# Patient Record
Sex: FEMALE | Race: WHITE | NOT HISPANIC OR LATINO | Employment: OTHER | URBAN - METROPOLITAN AREA
[De-identification: names, ages, dates, MRNs, and addresses within clinical notes are randomized per-mention and may not be internally consistent; named-entity substitution may affect disease eponyms.]

---

## 2017-05-03 ENCOUNTER — CONSULT (OUTPATIENT)
Dept: CARDIOLOGY | Facility: CLINIC | Age: 62
End: 2017-05-03

## 2017-05-03 VITALS
SYSTOLIC BLOOD PRESSURE: 134 MMHG | HEIGHT: 62 IN | HEART RATE: 59 BPM | BODY MASS INDEX: 25.17 KG/M2 | WEIGHT: 136.8 LBS | DIASTOLIC BLOOD PRESSURE: 62 MMHG

## 2017-05-03 DIAGNOSIS — I73.9 PAD (PERIPHERAL ARTERY DISEASE) (HCC): ICD-10-CM

## 2017-05-03 DIAGNOSIS — E78.2 MIXED HYPERLIPIDEMIA: ICD-10-CM

## 2017-05-03 DIAGNOSIS — I10 ESSENTIAL HYPERTENSION: Primary | ICD-10-CM

## 2017-05-03 DIAGNOSIS — I70.221 ATHEROSCLEROSIS OF NATIVE ARTERY OF RIGHT LOWER EXTREMITY WITH REST PAIN (HCC): ICD-10-CM

## 2017-05-03 PROCEDURE — 99204 OFFICE O/P NEW MOD 45 MIN: CPT | Performed by: INTERNAL MEDICINE

## 2017-05-03 PROCEDURE — 93000 ELECTROCARDIOGRAM COMPLETE: CPT | Performed by: INTERNAL MEDICINE

## 2017-05-15 ENCOUNTER — HOSPITAL ENCOUNTER (OUTPATIENT)
Dept: CARDIOLOGY | Facility: HOSPITAL | Age: 62
Discharge: HOME | End: 2017-05-15
Attending: INTERNAL MEDICINE | Admitting: INTERNAL MEDICINE

## 2017-05-15 ENCOUNTER — HOSPITAL ENCOUNTER (OUTPATIENT)
Dept: CARDIOLOGY | Facility: HOSPITAL | Age: 62
Discharge: HOME | End: 2017-05-15
Attending: INTERNAL MEDICINE

## 2017-05-15 ENCOUNTER — LAB (OUTPATIENT)
Dept: LAB | Facility: HOSPITAL | Age: 62
End: 2017-05-15

## 2017-05-15 VITALS
DIASTOLIC BLOOD PRESSURE: 80 MMHG | HEART RATE: 67 BPM | BODY MASS INDEX: 24.84 KG/M2 | WEIGHT: 135 LBS | HEIGHT: 62 IN | SYSTOLIC BLOOD PRESSURE: 130 MMHG

## 2017-05-15 DIAGNOSIS — I73.9 PAD (PERIPHERAL ARTERY DISEASE) (HCC): ICD-10-CM

## 2017-05-15 DIAGNOSIS — I10 ESSENTIAL HYPERTENSION: ICD-10-CM

## 2017-05-15 LAB
ARTICHOKE IGE QN: 147 MG/DL (ref 0–130)
BH CV STRESS BP STAGE 2: NORMAL
BH CV STRESS BP STAGE 3: NORMAL
BH CV STRESS COMMENTS STAGE 1: NORMAL
BH CV STRESS DOSE REGADENOSON STAGE 1: 0.4
BH CV STRESS DURATION MIN STAGE 1: 1
BH CV STRESS DURATION MIN STAGE 2: 1
BH CV STRESS DURATION MIN STAGE 3: 1
BH CV STRESS DURATION MIN STAGE 4: 1
BH CV STRESS DURATION SEC STAGE 2: 0
BH CV STRESS HR STAGE 1: 68
BH CV STRESS HR STAGE 2: 99
BH CV STRESS HR STAGE 3: 96
BH CV STRESS HR STAGE 4: 93
BH CV STRESS PROTOCOL 1: NORMAL
BH CV STRESS RECOVERY BP: NORMAL MMHG
BH CV STRESS RECOVERY HR: 88 BPM
BH CV STRESS STAGE 1: 1
BH CV STRESS STAGE 2: 2
BH CV STRESS STAGE 3: 3
BH CV STRESS STAGE 4: 4
CHOLEST SERPL-MCNC: 225 MG/DL (ref 0–200)
HDLC SERPL-MCNC: 70 MG/DL (ref 40–60)
LV EF NUC BP: 65 %
MAXIMAL PREDICTED HEART RATE: 156 BPM
PERCENT MAX PREDICTED HR: 63.46 %
STRESS BASELINE BP: NORMAL MMHG
STRESS BASELINE HR: 65 BPM
STRESS PERCENT HR: 75 %
STRESS POST PEAK BP: NORMAL MMHG
STRESS POST PEAK HR: 99 BPM
STRESS TARGET HR: 133 BPM
TRIGL SERPL-MCNC: 78 MG/DL (ref 0–150)

## 2017-05-15 PROCEDURE — 36415 COLL VENOUS BLD VENIPUNCTURE: CPT

## 2017-05-15 PROCEDURE — 80061 LIPID PANEL: CPT | Performed by: INTERNAL MEDICINE

## 2017-05-15 PROCEDURE — 93018 CV STRESS TEST I&R ONLY: CPT | Performed by: INTERNAL MEDICINE

## 2017-05-15 PROCEDURE — A9500 TC99M SESTAMIBI: HCPCS | Performed by: INTERNAL MEDICINE

## 2017-05-15 PROCEDURE — 78452 HT MUSCLE IMAGE SPECT MULT: CPT

## 2017-05-15 PROCEDURE — 0 TECHNETIUM SESTAMIBI: Performed by: INTERNAL MEDICINE

## 2017-05-15 PROCEDURE — 78452 HT MUSCLE IMAGE SPECT MULT: CPT | Performed by: INTERNAL MEDICINE

## 2017-05-15 PROCEDURE — 25010000002 REGADENOSON 0.4 MG/5ML SOLUTION: Performed by: INTERNAL MEDICINE

## 2017-05-15 PROCEDURE — 93017 CV STRESS TEST TRACING ONLY: CPT

## 2017-05-15 RX ADMIN — Medication 1 DOSE: at 10:05

## 2017-05-15 RX ADMIN — Medication 1 DOSE: at 08:15

## 2017-05-15 RX ADMIN — REGADENOSON 0.4 MG: 0.08 INJECTION, SOLUTION INTRAVENOUS at 10:05

## 2017-05-23 ENCOUNTER — TELEPHONE (OUTPATIENT)
Dept: CARDIOLOGY | Facility: CLINIC | Age: 62
End: 2017-05-23

## 2017-06-29 ENCOUNTER — TRANSCRIBE ORDERS (OUTPATIENT)
Dept: ADMINISTRATIVE | Facility: HOSPITAL | Age: 62
End: 2017-06-29

## 2017-06-29 DIAGNOSIS — N95.9 MENOPAUSAL AND PERIMENOPAUSAL DISORDER: Primary | ICD-10-CM

## 2017-06-29 DIAGNOSIS — Z12.31 VISIT FOR SCREENING MAMMOGRAM: Primary | ICD-10-CM

## 2017-08-29 ENCOUNTER — OFFICE VISIT (OUTPATIENT)
Dept: CARDIOLOGY | Facility: CLINIC | Age: 62
End: 2017-08-29

## 2017-08-29 VITALS
DIASTOLIC BLOOD PRESSURE: 68 MMHG | HEIGHT: 62 IN | SYSTOLIC BLOOD PRESSURE: 140 MMHG | WEIGHT: 137.8 LBS | BODY MASS INDEX: 25.36 KG/M2 | HEART RATE: 65 BPM

## 2017-08-29 DIAGNOSIS — E78.5 DYSLIPIDEMIA: ICD-10-CM

## 2017-08-29 DIAGNOSIS — I10 ESSENTIAL HYPERTENSION: ICD-10-CM

## 2017-08-29 DIAGNOSIS — I73.9 PERIPHERAL ARTERIAL DISEASE (HCC): Primary | ICD-10-CM

## 2017-08-29 PROCEDURE — 99213 OFFICE O/P EST LOW 20 MIN: CPT | Performed by: INTERNAL MEDICINE

## 2017-08-29 RX ORDER — ROSUVASTATIN CALCIUM 20 MG/1
20 TABLET, COATED ORAL DAILY
Qty: 30 TABLET | Refills: 11 | Status: SHIPPED | OUTPATIENT
Start: 2017-08-29 | End: 2018-09-02 | Stop reason: SDUPTHER

## 2017-08-29 RX ORDER — AMLODIPINE BESYLATE 5 MG/1
5 TABLET ORAL DAILY
Refills: 1 | COMMUNITY
Start: 2017-08-16 | End: 2019-01-04 | Stop reason: SDUPTHER

## 2017-08-29 RX ORDER — LOSARTAN POTASSIUM 100 MG/1
100 TABLET ORAL DAILY
Refills: 0 | COMMUNITY
Start: 2017-08-16 | End: 2019-01-04 | Stop reason: SDUPTHER

## 2017-08-29 NOTE — PROGRESS NOTES
Subjective:     Encounter Date:08/29/2017      Patient ID: Aarti Wade is a 62 y.o. single white female, from [place], [place].  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     INTERNIST: Jannette Chapa MD  VASCULAR SURGEON: Severino Carbajal MD  UROLOGIST: Unknown (near [facility]?)  ORTHOPEDIST: Lance Burgess MD    Chief Complaint:   Chief Complaint   Patient presents with   • Follow-up     peripheral arterial disease     Problem List:  1. Peripheral arterial disease:    A. Subclavian steal syndrome, 2016     B. Abnormal right femoral arterial duplex and popliteal tibial arterial duplex, on ASA and   Plavix, with right femoral endarterectomy August 2016 (Dr. Carbajal)    C. Residual class I claudication without recent TIA/presyncope   2. Hypertensive cardiovascular disease:    A. Remote IV stress test over 10 years ago; negative per patient-data deficit    B. Residual class I symptoms/normal EKG with acceptable IV Lexiscan Cardiolite study   suggestive of low probability for significant focal obstructive coronary artery disease   (LVEF  0.65), May 2017.  3. Hypertension  4. Hyperlipidemia; 10.8% 10-year risk; 3.6% with treatment  5. COPD with mild-moderate exertional dyspnea  6. Former smoker; smoked 1 ppd x 40 years, quit in 2016  7. Syncopal episodes years ago associated with UTIs; last one a couple of years ago; data deficit  8. Depression  9. GERD  10. History of recurrent UTIs   11. Chronic low back pain  12. Surgical history:    A. Right femoral endarterectomy, August 2016    B. Lens implant 1990s    C. Spontaneous pneumothorax, 1960     No Known Allergies      Current Outpatient Prescriptions:   •  amLODIPine (NORVASC) 5 MG tablet, Take 5 mg by mouth Daily., Disp: , Rfl: 1  •  ascorbic acid (VITAMIN C) 1000 MG tablet, Take 1,000 mg by mouth 2 (two) times a day., Disp: , Rfl:   •  aspirin 81  "MG EC tablet, Take 81 mg by mouth every morning. TOLD NOT TO STOP BEFORE SURGERY, Disp: , Rfl:   •  Calcium Citrate-Vitamin D (CALCIUM + D PO), Take 2 tablets by mouth 2 (two) times a day., Disp: , Rfl:   •  Celecoxib (CELEBREX PO), Take 200 mg by mouth 2 (two) times a day., Disp: , Rfl:   •  citalopram (CeleXA) 20 MG tablet, Take 20 mg by mouth every night., Disp: , Rfl:   •  clopidogrel (PLAVIX) 75 MG tablet, Take 75 mg by mouth every night. TOLD NOT TO STOP BEFORE SURGERY, Disp: , Rfl:   •  Fish Oil oil, Take 4 capsules by mouth daily., Disp: , Rfl:   •  Flaxseed, Linseed, (FLAXSEED OIL) 1000 MG capsule, Take 1 capsule by mouth daily., Disp: , Rfl:   •  fluticasone (FLONASE) 50 MCG/ACT nasal spray, 2 sprays into each nostril daily. Administer 2 sprays in each nostril for each dose., Disp: , Rfl:   •  losartan (COZAAR) 100 MG tablet, Take 100 mg by mouth Daily., Disp: , Rfl: 0  •  Methylsulfonylmethane (MSM PO), Take 2 tablets by mouth daily., Disp: , Rfl:   •  Multiple Vitamins-Minerals (MULTIVITAMIN ADULT PO), Take 1 tablet by mouth daily., Disp: , Rfl:   •  omeprazole (PriLOSEC) 20 MG capsule, Take 20 mg by mouth daily., Disp: , Rfl:   •  Potassium 99 MG tablet, Take 1 capsule by mouth daily., Disp: , Rfl:   •  URINARY HEALTH/CRANBERRY PO, Take 1 tablet by mouth 2 (two) times a day. \"CRANACTIN\", Disp: , Rfl:   •  Vitamin D, Cholecalciferol, 1000 UNITS capsule, Take 1 capsule by mouth daily., Disp: , Rfl:     History of Present Illness Patient returns for followup after a nearly 4-month hiatus. She has a horse in a turf sale at [facility] this week and is optimistic.  She is advised that her recent stress test was acceptable but that her cholesterol levels are up.  She states that Dr. Chapa tried her on pravastatin, and within a few weeks, she had shoulder pain so bad that she had to stop it; she has tried it again, but it caused the same problem. She is open to trying other statin medications.  She is back to " "smoking and is aware that she needs to quit.  She is mostly unhappy with her weight and says that this is the heaviest she has ever been in her life.  She is active on a daily basis and is without symptoms from a cardiopulmonary perspective with her activities.  Her blood pressure is elevated today, and she notes that she has a blockage in her left arm, and so she does not get accurate blood pressure readings in that arm.  Patient otherwise denies chest pain, shortness of breath, PND, edema, palpitations, syncope, or presyncope at this time.        Review of Systems   Constitution: Positive for weight gain.   Hematologic/Lymphatic: Bruises/bleeds easily.   Skin: Positive for dry skin.   Musculoskeletal: Positive for arthritis, joint pain, joint swelling and myalgias.      Obtained and otherwise negative except as outlined in problem list and HPI.    Procedures       Objective:       Vitals:    08/29/17 1411 08/29/17 1412 08/29/17 1424 08/29/17 1425   BP: 168/75 156/69 (!) 70/50 140/68   BP Location: Right arm Right arm Left arm Right arm   Patient Position: Sitting Standing Sitting Sitting   Pulse: 68 65     Weight: 137 lb 12.8 oz (62.5 kg)      Height: 62\" (157.5 cm)        Body mass index is 25.2 kg/(m^2).   Last weight:  136 lbs.    Physical Exam   Constitutional: She is oriented to person, place, and time. She appears well-developed and well-nourished.   Neck: No JVD present. Carotid bruit is not present. No thyromegaly present.   Cardiovascular: Regular rhythm, S1 normal, S2 normal and normal heart sounds.  Exam reveals no gallop, no S3 and no friction rub.    No murmur heard.  Pulses:       Carotid pulses are 1+ on the right side, and 1+ on the left side.       Radial pulses are 1+ on the right side, and 1+ on the left side.        Femoral pulses are 1+ on the right side, and 1+ on the left side.       Popliteal pulses are 1+ on the right side, and 1+ on the left side.        Dorsalis pedis pulses are 1+ on " the right side, and 1+ on the left side.        Posterior tibial pulses are 1+ on the right side, and 1+ on the left side.   Pulmonary/Chest: Effort normal. She has decreased breath sounds. She has no wheezes. She has no rhonchi. She has no rales.   Abdominal: Soft. She exhibits no mass. There is no hepatosplenomegaly. There is no tenderness. There is no guarding.   Lymphadenopathy:     She has no cervical adenopathy.   Neurological: She is alert and oriented to person, place, and time.   Skin: Skin is warm, dry and intact. No rash noted.   Vitals reviewed.      Lab Review:   Lab Results   Component Value Date    GLUCOSE 99 08/23/2016    BUN 18 08/23/2016    CREATININE 0.60 08/23/2016    EGFRIFNONA 101 08/23/2016    BCR 30.0 (H) 08/23/2016    CO2 30.0 08/23/2016    CALCIUM 8.9 08/23/2016    ALBUMIN 4.2 06/05/2015    AST 25 06/05/2015    ALT 24 06/05/2015       Lab Results   Component Value Date    WBC 8.04 08/23/2016    HGB 11.7 08/23/2016    HCT 34.1 (L) 08/23/2016    MCV 90.5 08/23/2016     08/23/2016       Lab Results   Component Value Date    HGBA1C 4.40 08/19/2016       Lab Results   Component Value Date    CHOL 225 (H) 05/15/2017     Lab Results   Component Value Date    TRIG 78 05/15/2017     Lab Results   Component Value Date    HDL 70 (H) 05/15/2017   LDL - 147 (05/15/2017)        Assessment:   Overall continued acceptable course with no interim cardiopulmonary complaints with acceptable functional status. We will defer additional diagnostic or therapeutic intervention from a cardiac perspective at this time other than to initiate statin drug therapy.       Diagnosis Plan   1. Peripheral arterial disease  Scheduled for reassessment with Dr. Carbajal next month   2. Dyslipidemia  Initiate rosuvastatin 20 mg daily   3. Essential hypertension  Continue current medications.          Plan:         1. Patient to continue current medications and close follow up with the above providers other than to  initiate rosuvastatin 20 mg daily.  2. Tentative cardiology follow up in February 2018, or patient may return sooner PRN.       Transcribed by Maria Antonia Hamm for Dr. Floyd Ernandez at 2:19 PM on 08/29/2017    IFloyd MD, [facility], personally performed the services described in this documentation as scribed by the above named individual in my presence, and it is both accurate and complete. At 3:55 PM on 08/29/2017

## 2017-09-27 ENCOUNTER — OFFICE VISIT (OUTPATIENT)
Dept: ORTHOPEDIC SURGERY | Facility: CLINIC | Age: 62
End: 2017-09-27

## 2017-09-27 VITALS
HEIGHT: 62 IN | DIASTOLIC BLOOD PRESSURE: 61 MMHG | SYSTOLIC BLOOD PRESSURE: 170 MMHG | HEART RATE: 64 BPM | WEIGHT: 132 LBS | BODY MASS INDEX: 24.29 KG/M2

## 2017-09-27 DIAGNOSIS — M19.011 GLENOHUMERAL ARTHRITIS, RIGHT: ICD-10-CM

## 2017-09-27 DIAGNOSIS — R52 PAIN: Primary | ICD-10-CM

## 2017-09-27 PROCEDURE — 99203 OFFICE O/P NEW LOW 30 MIN: CPT | Performed by: ORTHOPAEDIC SURGERY

## 2017-09-27 RX ORDER — CELECOXIB 200 MG/1
CAPSULE ORAL
Refills: 0 | COMMUNITY
Start: 2017-09-03 | End: 2018-03-02 | Stop reason: SDUPTHER

## 2017-09-27 NOTE — PROGRESS NOTES
[facility] Note    Subjective     Chief Complaint   Patient presents with   • Right Shoulder - Pain     Previous patient of Dr. Burgess- last seen 08/2016        HPI    Aarti Wade is a 62 y.o. female. Patient presents today for evaluation of her right shoulder.  She has a known history of advanced arthritis in the shoulder, following an injury and December 2000.  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  She's previously been followed by Dr. Burgess, and is on anti-inflammatories on a regular basis.  She's had previous injections without long-term relief.  She is currently on Celebrex 200 mg.  The pain is stabbing, so she was stiffness, and moderate in severity.  It has been unchanged over the past several years.      Patient Active Problem List   Diagnosis   • Atherosclerosis of native artery of right lower extremity with rest pain   • Peripheral arterial disease   • Essential hypertension   • Mixed hyperlipidemia   • Dyslipidemia     Past Medical History:   Diagnosis Date   • Anxiety    • Arthritis    • Arthritis of right shoulder region    • Chronic UTI    • Circulatory disorder    • Claudication    • COPD (chronic obstructive pulmonary disease)    • DA (degenerative arthritis)    • Depression    • Gastrointestinal disorder    • GERD (gastroesophageal reflux disease)    • Head injury    • Hepatitis     UNKNOWN TYPE   • Hyperlipidemia    • Hypertension    • Inflammatory arthritis    • Osteoporosis    • PAD (peripheral artery disease)    • Smoker    • Subclavian artery stenosis, left    • Wears dentures     TOP ONLY      Past Surgical History:   Procedure Laterality Date   • ANGIOGRAM - CONVERTED  08/16/2016    AA WITH RUNOFF PER DR. HARRISON   • COLONOSCOPY     • EYE LENS IMPLANT SECONDARY Left    • FEMORAL FEMORAL BYPASS Right 8/22/2016    Procedure: RIGHT COMMON FEMORAL ENDARTERECTOMY WITH PATCH;  Surgeon: Severino Harrison MD;  Location:   [facility];  Service:    • FINGER SURGERY Left     LEFT INDEX FINGER   • INTERVENTIONAL RADIOLOGY PROCEDURE N/A 8/16/2016    Procedure: IR PTA femoral popliteal artery;  Surgeon: Severino Carbajal MD;  Location: [facility] CATH INVASIVE LOCATION;  Service:    • TONSILLECTOMY        Family History   Problem Relation Age of Onset   • Osteoarthritis Mother    • Hypertension Father    • Heart attack Father    • No Known Problems Sister    • No Known Problems Brother    • No Known Problems Daughter    • No Known Problems Son    • Breast cancer Maternal Grandmother 50   • Breast cancer Paternal Grandmother 50   • No Known Problems Maternal Aunt    • No Known Problems Paternal Aunt    • Other Other    • Heart attack Other      Social History     Social History   • Marital status: Single     Spouse name: N/A   • Number of children: N/A   • Years of education: N/A     Occupational History   • Not on file.     Social History Main Topics   • Smoking status: Former Smoker     Packs/day: 1.00     Years: 40.00     Types: Electronic Cigarette, Cigarettes     Quit date: 6/16/2016   • Smokeless tobacco: Never Used      Comment: 1 PACK/DAY SMOKER UNTIL JUNE 2016; THEN STARTED ON E-CIGS STILL USING E-CIGARETTE   • Alcohol use 1.2 oz/week     2 Shots of liquor per week      Comment: 2 DRINKS PER DAY   • Drug use: No   • Sexual activity: Defer     Other Topics Concern   • Not on file     Social History Narrative      Current Outpatient Prescriptions on File Prior to Visit   Medication Sig Dispense Refill   • amLODIPine (NORVASC) 5 MG tablet Take 5 mg by mouth Daily.  1   • ascorbic acid (VITAMIN C) 1000 MG tablet Take 1,000 mg by mouth 2 (two) times a day.     • aspirin 81 MG EC tablet Take 81 mg by mouth every morning. TOLD NOT TO STOP BEFORE SURGERY     • Calcium Citrate-Vitamin D (CALCIUM + D PO) Take 2 tablets by mouth 2 (two) times a day.     • Cetirizine HCl (ZYRTEC ALLERGY PO) Take  by mouth Take As Directed.     • citalopram (CeleXA)  "20 MG tablet Take 20 mg by mouth every night.     • clopidogrel (PLAVIX) 75 MG tablet Take 75 mg by mouth every night. TOLD NOT TO STOP BEFORE SURGERY     • Fish Oil oil Take 4 capsules by mouth daily.     • Flaxseed, Linseed, (FLAXSEED OIL) 1000 MG capsule Take 1 capsule by mouth daily.     • FLUoxetine HCl (PROZAC PO) Take  by mouth Take As Directed.     • fluticasone (FLONASE) 50 MCG/ACT nasal spray 2 sprays into each nostril daily. Administer 2 sprays in each nostril for each dose.     • losartan (COZAAR) 100 MG tablet Take 100 mg by mouth Daily.  0   • Methylsulfonylmethane (MSM PO) Take 2 tablets by mouth daily.     • Multiple Vitamins-Minerals (MULTIVITAMIN ADULT PO) Take 1 tablet by mouth daily.     • omeprazole (PriLOSEC) 20 MG capsule Take 20 mg by mouth daily.     • Potassium 99 MG tablet Take 1 capsule by mouth daily.     • rosuvastatin (CRESTOR) 20 MG tablet Take 1 tablet by mouth Daily. 30 tablet 11   • URINARY HEALTH/CRANBERRY PO Take 1 tablet by mouth 2 (two) times a day. \"CRANACTIN\"     • Vitamin D, Cholecalciferol, 1000 UNITS capsule Take 1 capsule by mouth daily.     • [DISCONTINUED] Celecoxib (CELEBREX PO) Take 200 mg by mouth 2 (two) times a day.     • [DISCONTINUED] LISINOPRIL PO Take  by mouth Take As Directed.       No current facility-administered medications on file prior to visit.       No Known Allergies     Review of Systems   Constitutional: Negative for activity change, appetite change, chills, diaphoresis, fatigue, fever and unexpected weight change.   HENT: Negative for congestion, dental problem, drooling, ear discharge, ear pain, facial swelling, hearing loss, mouth sores, nosebleeds, postnasal drip, rhinorrhea, sinus pressure, sneezing, sore throat, tinnitus, trouble swallowing and voice change.    Eyes: Negative for photophobia, pain, discharge, redness, itching and visual disturbance.   Respiratory: Negative for apnea, cough, choking, chest tightness, shortness of breath, " "wheezing and stridor.    Cardiovascular: Negative for chest pain, palpitations and leg swelling.   Gastrointestinal: Negative for abdominal distention, abdominal pain, anal bleeding, blood in stool, constipation, diarrhea, nausea, rectal pain and vomiting.   Endocrine: Negative for cold intolerance, heat intolerance, polydipsia, polyphagia and polyuria.   Genitourinary: Negative for decreased urine volume, difficulty urinating, dysuria, enuresis, flank pain, frequency, genital sores, hematuria and urgency.   Musculoskeletal: Positive for arthralgias. Negative for back pain, gait problem, joint swelling, myalgias, neck pain and neck stiffness.   Skin: Negative for color change, pallor, rash and wound.   Allergic/Immunologic: Negative for environmental allergies, food allergies and immunocompromised state.   Neurological: Negative for dizziness, tremors, seizures, syncope, facial asymmetry, speech difficulty, weakness, light-headedness, numbness and headaches.   Hematological: Negative for adenopathy. Does not bruise/bleed easily.   Psychiatric/Behavioral: Negative for agitation, behavioral problems, confusion, decreased concentration, dysphoric mood, hallucinations, self-injury, sleep disturbance and suicidal ideas. The patient is not nervous/anxious and is not hyperactive.         Objective      Physical Exam  /61  Pulse 64  Ht 61.5\" (156.2 cm)  Wt 132 lb (59.9 kg)  BMI 24.54 kg/m2    Body mass index is 24.54 kg/(m^2).    General:   Mental Status:  Alert   Appearance: Cooperative, in no acute distress   Build and Nutrition: Well-nourished and well developed female   Orientation: Alert and oriented to person, place and time   Posture: Normal   Gait: Normal    Integument:   Right shoulder: No skin lesions, no rash, no ecchymosis    Neurologic:   Sensation:    Right hand: Intact to light touch in the digits   Motor:  Right upper extremity: 5/5 deltoid, biceps, triceps, wrist flexors, wrist extensors, and " interossei    Vascular:   Right upper extremity: 2+ dorsalis radial pulse, prompt capillary refill    Upper Extremities:   Right Shoulder:    Tenderness:  None    Swelling:  None    Crepitus: Positive    Atrophy:  None    Range of motion:  External rotation:  30°       Forward flexion:  90°       Abduction:   90°  Instability:  None  Deformities:  Bump on the clavicle  Functional testing: Negative drop arm, negative lift-off, positive impingement      Imaging/Studies      Imaging Results (last 24 hours)     Procedure Component Value Units Date/Time    XR Shoulder 2+ View Right [[id number]] Resulted:  09/27/17 1009     Updated:  09/27/17 1010    Narrative:       Right Shoulder Radiographs  Indication: right shoulder pain  Views: AP, outlet and axillary views of the right shoulder    Comparison: no prior studies available for review    Findings:  Advanced arthritic changes are seen, with flattening of the humeral head,   erosions, an old healed clavicle fracture is also appreciated.  Complete   obliteration of the joint space.            Assessment and Plan     Aarti was seen today for pain.    Diagnoses and all orders for this visit:    Pain  -     XR Shoulder 2+ View Right    Glenohumeral arthritis, right        I reviewed my findings with patient today.  She's had long-standing problems with the right shoulder, with advanced arthritis.  Her pain is controlled reasonably with Celebrex.  She is not interested in surgical intervention.  I will be happy to see her back in a year, but sooner for any problems.  She should continue with Celebrex as prescribed by Dr. Burgess.    Return in about 1 year (around 9/27/2018).      Medical Decision Making  Management Options : prescription/IM medicine  Data/Risk: radiology tests and independent visualization of imaging, lab tests, or EMG/NCV      Elvin Moody MD  09/27/17  10:20 AM

## 2017-10-30 ENCOUNTER — HOSPITAL ENCOUNTER (OUTPATIENT)
Dept: BONE DENSITY | Facility: HOSPITAL | Age: 62
Discharge: HOME | End: 2017-10-30
Attending: INTERNAL MEDICINE

## 2017-10-30 ENCOUNTER — HOSPITAL ENCOUNTER (OUTPATIENT)
Dept: MAMMOGRAPHY | Facility: HOSPITAL | Age: 62
Discharge: HOME | End: 2017-10-30
Attending: INTERNAL MEDICINE | Admitting: INTERNAL MEDICINE

## 2017-10-30 DIAGNOSIS — Z12.31 VISIT FOR SCREENING MAMMOGRAM: ICD-10-CM

## 2017-10-30 DIAGNOSIS — N95.9 MENOPAUSAL AND PERIMENOPAUSAL DISORDER: ICD-10-CM

## 2017-10-30 PROCEDURE — 77063 BREAST TOMOSYNTHESIS BI: CPT

## 2017-10-30 PROCEDURE — 77080 DXA BONE DENSITY AXIAL: CPT

## 2017-10-30 PROCEDURE — G0202 SCR MAMMO BI INCL CAD: HCPCS

## 2017-10-31 PROCEDURE — 77063 BREAST TOMOSYNTHESIS BI: CPT | Performed by: RADIOLOGY

## 2017-10-31 PROCEDURE — 77067 SCR MAMMO BI INCL CAD: CPT | Performed by: RADIOLOGY

## 2018-03-01 ENCOUNTER — TELEPHONE (OUTPATIENT)
Dept: ORTHOPEDIC SURGERY | Facility: CLINIC | Age: 63
End: 2018-03-01

## 2018-03-01 NOTE — TELEPHONE ENCOUNTER
PT IS RE[facility]ING [facility]

## 2018-03-02 ENCOUNTER — OFFICE VISIT (OUTPATIENT)
Dept: CARDIOLOGY | Facility: CLINIC | Age: 63
End: 2018-03-02

## 2018-03-02 ENCOUNTER — TELEPHONE (OUTPATIENT)
Dept: ORTHOPEDIC SURGERY | Facility: CLINIC | Age: 63
End: 2018-03-02

## 2018-03-02 VITALS
BODY MASS INDEX: 24.48 KG/M2 | DIASTOLIC BLOOD PRESSURE: 64 MMHG | SYSTOLIC BLOOD PRESSURE: 157 MMHG | HEART RATE: 48 BPM | WEIGHT: 133 LBS | HEIGHT: 62 IN

## 2018-03-02 DIAGNOSIS — I10 ESSENTIAL HYPERTENSION: ICD-10-CM

## 2018-03-02 DIAGNOSIS — I70.221 ATHEROSCLEROSIS OF NATIVE ARTERY OF RIGHT LOWER EXTREMITY WITH REST PAIN (HCC): Primary | ICD-10-CM

## 2018-03-02 DIAGNOSIS — I73.9 PERIPHERAL ARTERIAL DISEASE (HCC): ICD-10-CM

## 2018-03-02 DIAGNOSIS — E78.2 MIXED HYPERLIPIDEMIA: ICD-10-CM

## 2018-03-02 PROCEDURE — 99214 OFFICE O/P EST MOD 30 MIN: CPT | Performed by: INTERNAL MEDICINE

## 2018-03-02 RX ORDER — HYDROCHLOROTHIAZIDE 12.5 MG/1
12.5 CAPSULE, GELATIN COATED ORAL DAILY
Qty: 90 CAPSULE | Refills: 3 | Status: SHIPPED | OUTPATIENT
Start: 2018-03-02 | End: 2018-09-07

## 2018-03-02 RX ORDER — CELECOXIB 200 MG/1
200 CAPSULE ORAL 2 TIMES DAILY PRN
Qty: 60 CAPSULE | Refills: 0 | Status: SHIPPED | OUTPATIENT
Start: 2018-03-02 | End: 2019-01-14 | Stop reason: SDUPTHER

## 2018-03-02 RX ORDER — HYDROCHLOROTHIAZIDE 12.5 MG/1
12.5 CAPSULE, GELATIN COATED ORAL DAILY
Qty: 90 CAPSULE | Refills: 3 | Status: SHIPPED | OUTPATIENT
Start: 2018-03-02 | End: 2018-03-02 | Stop reason: SDUPTHER

## 2018-03-02 RX ORDER — UBIDECARENONE 200 MG
400 CAPSULE ORAL DAILY
COMMUNITY

## 2018-03-02 NOTE — PROGRESS NOTES
Subjective:     Encounter Date:03/02/2018    Patient ID: Aarti Wade is a 63 y.o. single white female, from [place], [place].  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      INTERNIST: Jannette Chapa MD  VASCULAR SURGEON: Severino Carbajal MD  UROLOGIST: Unknown (near [facility]?)  ORTHOPEDIST: Lance Burgess MD       Chief Complaint:   Chief Complaint   Patient presents with   • Hypertension   • peripheral arterial disease     Problem List:  1. Peripheral arterial disease:                                        A. Subclavian steal syndrome, 2016                                         B. Abnormal right femoral arterial duplex and popliteal tibial arterial duplex, on ASA and Plavix, with right femoral endarterectomy August 2016 (Dr. Carbajal)                                        C. Residual class I claudication without recent TIA/presyncope                  2. Hypertensive cardiovascular disease:                                        A. Remote IV stress test over 10 years ago; negative per patient-data deficit                                        B. Residual class I symptoms/normal EKG with acceptable IV Lexiscan Cardiolite study suggestive of low probability for significant focal obstructive coronary artery disease  (LVEF 0.65), May 2017.  3. Hypertension  4. Hyperlipidemia; 10.8% 10-year risk; 3.6% with treatment  5. COPD with mild-moderate exertional dyspnea  6. Former smoker; smoked 1 ppd x 40 years, quit in 2016  7. Syncopal episodes years ago associated with UTIs; last one a couple of years ago; data deficit  8. Depression  9. GERD  10. History of recurrent UTIs   11. Chronic low back pain  12. Surgical history:                                        A. Right femoral endarterectomy, August 2016                                        B. Lens implant 1990s                                      "   C. Spontaneous pneumothorax, 1960    No Known Allergies      Current Outpatient Prescriptions:   •  amLODIPine (NORVASC) 5 MG tablet, Take 5 mg by mouth Daily., Disp: , Rfl: 1  •  ascorbic acid (VITAMIN C) 1000 MG tablet, Take 1,000 mg by mouth 2 (two) times a day., Disp: , Rfl:   •  aspirin 81 MG EC tablet, Take 81 mg by mouth every morning. TOLD NOT TO STOP BEFORE SURGERY, Disp: , Rfl:   •  BISOPROLOL FUMARATE PO, Take  by mouth Daily., Disp: , Rfl:   •  Calcium Citrate-Vitamin D (CALCIUM + D PO), Take 2 tablets by mouth 2 (two) times a day., Disp: , Rfl:   •  celecoxib (CeleBREX) 200 MG capsule, Take 1 capsule by mouth 2 (Two) Times a Day As Needed for Mild Pain ., Disp: 60 capsule, Rfl: 0  •  citalopram (CeleXA) 20 MG tablet, Take 20 mg by mouth every night., Disp: , Rfl:   •  clopidogrel (PLAVIX) 75 MG tablet, Take 75 mg by mouth every night. TOLD NOT TO STOP BEFORE SURGERY, Disp: , Rfl:   •  Fish Oil oil, Take 4 capsules by mouth daily., Disp: , Rfl:   •  Flaxseed, Linseed, (FLAXSEED OIL) 1000 MG capsule, Take 1 capsule by mouth daily., Disp: , Rfl:   •  fluticasone (FLONASE) 50 MCG/ACT nasal spray, 2 sprays into each nostril daily. Administer 2 sprays in each nostril for each dose., Disp: , Rfl:   •  losartan (COZAAR) 100 MG tablet, Take 100 mg by mouth Daily., Disp: , Rfl: 0  •  Methylsulfonylmethane (MSM PO), Take 2 tablets by mouth daily., Disp: , Rfl:   •  Multiple Vitamins-Minerals (MULTIVITAMIN ADULT PO), Take 1 tablet by mouth daily., Disp: , Rfl:   •  omeprazole (PriLOSEC) 20 MG capsule, Take 20 mg by mouth daily., Disp: , Rfl:   •  Potassium 99 MG tablet, Take 1 capsule by mouth daily., Disp: , Rfl:   •  rosuvastatin (CRESTOR) 20 MG tablet, Take 1 tablet by mouth Daily., Disp: 30 tablet, Rfl: 11  •  Temazepam (RESTORIL PO), Take  by mouth Daily., Disp: , Rfl:   •  URINARY HEALTH/CRANBERRY PO, Take 1 tablet by mouth 2 (two) times a day. \"CRANACTIN\", Disp: , Rfl:   •  Vitamin D, Cholecalciferol, " "1000 UNITS capsule, Take 1 capsule by mouth daily., Disp: , Rfl:     HISTORY OF PRESENT ILLNESS: Patient returns for scheduled 6-month followup. She denies chest pain, SOB, palpitations, presyncope, syncope, or edema.  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  Her blood pressure has been uncontrolled lately.  Her losartan was decreased to 50 mg, then increased back up to 100 mg when it became uncontrolled again.  Patient otherwise denies chest pain, shortness of breath, PND, edema, palpitations, syncope or presyncope at this time.      Review of Systems   Hematologic/Lymphatic: Bruises/bleeds easily.   Skin: Positive for dry skin.   Musculoskeletal: Positive for arthritis, back pain and muscle cramps.      Obtained and otherwise negative except as outlined in problem list and HPI.    Procedures       Objective:       Vitals:    03/02/18 1455 03/02/18 1502   BP: 175/65 157/64   BP Location: Right arm Right arm   Patient Position: Standing Sitting   Pulse: 52 (!) 48   Weight: 60.3 kg (133 lb)    Height: 157.5 cm (62\")    Recheck blood pressure right arm sitting was 140/62  Body mass index is 24.33 kg/(m^2).   Last weight:  137 lbs.    Physical Exam   Constitutional: She is oriented to person, place, and time. She appears well-developed and well-nourished.   Neck: No JVD present. Carotid bruit is not present. No thyromegaly present.   Cardiovascular: Regular rhythm, S1 normal, S2 normal and normal heart sounds.  Exam reveals no gallop, no S3 and no friction rub.    No murmur heard.  Pulses:       Dorsalis pedis pulses are 1+ on the right side, and 1+ on the left side.        Posterior tibial pulses are 1+ on the right side, and 1+ on the left side.   Pulmonary/Chest: Effort normal. She has decreased breath sounds. She has no wheezes. She has no rhonchi. She has no rales.   Abdominal: Soft. She exhibits no mass. There is no " hepatosplenomegaly. There is no tenderness. There is no guarding.   Bowel sounds audible x4   Musculoskeletal: Normal range of motion. She exhibits no edema.   Lymphadenopathy:     She has no cervical adenopathy.   Neurological: She is alert and oriented to person, place, and time.   Skin: Skin is warm, dry and intact. No rash noted.   Vitals reviewed.        Lab Review:   Lab Results   Component Value Date    GLUCOSE 99 08/23/2016    BUN 18 08/23/2016    CREATININE 0.60 08/23/2016    EGFRIFNONA 101 08/23/2016    BCR 30.0 (H) 08/23/2016    CO2 30.0 08/23/2016    CALCIUM 8.9 08/23/2016    ALBUMIN 4.2 06/05/2015    AST 25 06/05/2015    ALT 24 06/05/2015       Lab Results   Component Value Date    WBC 8.04 08/23/2016    HGB 11.7 08/23/2016    HCT 34.1 (L) 08/23/2016    MCV 90.5 08/23/2016     08/23/2016       Lab Results   Component Value Date    HGBA1C 4.40 08/19/2016       Lab Results   Component Value Date    CHOL 225 (H) 05/15/2017     Lab Results   Component Value Date    TRIG 78 05/15/2017     Lab Results   Component Value Date    HDL 70 (H) 05/15/2017     Lab Results   Component Value Date     (H) 05/15/2017           Assessment:   Overall continued acceptable course with no interim cardiopulmonary complaints with acceptable functional status. We will defer additional diagnostic or therapeutic intervention from a cardiac perspective at this time. Patient has uncontrolled hypertension and we will add hydrochlorothiazide 12.5 mg daily and we asked the patient to monitor her blood pressure for 2 weeks and call us with the results.       Diagnosis Plan   1. Atherosclerosis of native artery of right lower extremity with rest pain  Stable    2. Peripheral arterial disease  Stable    3. Essential hypertension  Uncontrolled, add hydrochlorothiazide 12.5 mg daily    4. Mixed hyperlipidemia  No new labs           Plan:         1. Patient to continue current medications and close follow up with the above  providers.  2. Tentative cardiology follow up in September 2018, or patient may return sooner PRN.  3. Hydrochlorothiazide 12.5 mg daily  4. Patient to monitor blood pressure for 2 weeks and call us with results  5. [phone] card with fax number; the patient states she will attempt to allow us to review her recent laboratory studies from Dr. Chapa's office.    Scribed for Floyd Ernandez MD by Cynthia Powell, APRN. 3/2/2018  3:02 PM    I, Floyd Ernandez MD, [facility], personally performed the services described in this documentation as scribed by the above named individual in my presence, and it is both accurate and complete. At 3:42 PM on 03/02/2018

## 2018-03-02 NOTE — TELEPHONE ENCOUNTER
SHE CALLED DR SEN'S OFFICE TO GET HER CELEBREX AND SHE IS OUT OF THE OFFICE FOR TWO WEEKS.  SHE IS ASKING IF YOU COULD WRITE HER ONE MONTH OF CELEBREX 200 MG BID AT [facility].  THEN SHE THINKS SHE CAN GET IT THROUGH DR SEN.  (FORMER DR ACOSTA PATIENT)

## 2018-03-02 NOTE — TELEPHONE ENCOUNTER
It would be best for her to get refills from her PCP if possible, because it should be monitored for other drug interactions and screening blood work.  If she cannot get it from her PCP, please have her call back and we will need to coordinate.

## 2018-03-29 NOTE — TELEPHONE ENCOUNTER
[employer] WILL NOT COVER CELEBREX UNLESS IT  IS WRITTEN BY DR COPPOLA. IF DR COPPOLA DECLINES TO WRITE RX FOR PT, [employer] WOULD LIKE A WRITTEN STATEMENT FAXED [phone] CLAIM # [id number].

## 2018-09-04 RX ORDER — ROSUVASTATIN CALCIUM 20 MG/1
TABLET, COATED ORAL
Qty: 30 TABLET | Refills: 11 | Status: SHIPPED | OUTPATIENT
Start: 2018-09-04 | End: 2019-01-14 | Stop reason: SDUPTHER

## 2018-09-07 ENCOUNTER — OFFICE VISIT (OUTPATIENT)
Dept: CARDIOLOGY | Facility: CLINIC | Age: 63
End: 2018-09-07

## 2018-09-07 VITALS
HEIGHT: 62 IN | BODY MASS INDEX: 24.48 KG/M2 | WEIGHT: 133 LBS | DIASTOLIC BLOOD PRESSURE: 61 MMHG | HEART RATE: 49 BPM | SYSTOLIC BLOOD PRESSURE: 156 MMHG

## 2018-09-07 DIAGNOSIS — I73.9 PERIPHERAL ARTERIAL DISEASE (HCC): Primary | ICD-10-CM

## 2018-09-07 DIAGNOSIS — I10 ESSENTIAL HYPERTENSION: ICD-10-CM

## 2018-09-07 DIAGNOSIS — E78.2 MIXED HYPERLIPIDEMIA: ICD-10-CM

## 2018-09-07 PROCEDURE — 99214 OFFICE O/P EST MOD 30 MIN: CPT | Performed by: INTERNAL MEDICINE

## 2018-09-07 RX ORDER — GABAPENTIN 100 MG/1
100 CAPSULE ORAL 2 TIMES DAILY
COMMUNITY
End: 2019-01-04 | Stop reason: SDUPTHER

## 2018-09-07 NOTE — PROGRESS NOTES
Subjective:     Encounter Date:09/07/2018    Patient ID: Aarti Wade is a 63 y.o. single white female, from [place], [place].  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      INTERNIST: Jannette Chapa MD  VASCULAR SURGEON: Severino Carbajal MD  UROLOGIST: Unknown (near [facility]?)  REMOTE ORTHOPEDIST: Lance Burgess MD    Chief Complaint:   Chief Complaint   Patient presents with   • Hypertension     Problem List:  1. Peripheral arterial disease:                                        A. Subclavian steal syndrome, 2016                                         B. Abnormal right femoral arterial duplex and popliteal tibial arterial duplex, on ASA and Plavix, with right femoral endarterectomy August 2016 (Dr. Carbajal)                                        C. Residual class I claudication without recent TIA/presyncope                  2. Hypertensive cardiovascular disease:                                        A. Remote IV stress test over 10 years ago; negative per patient-data deficit                                        B. Residual class I symptoms/normal EKG with acceptable IV Lexiscan Cardiolite study suggestive of low probability for significant focal obstructive coronary artery disease  (LVEF 0.65), May 2017.  3. Hypertension  4. Hyperlipidemia; 10.8% 10-year risk; 3.6% with treatment  5. COPD with mild-moderate exertional dyspnea  6. Former smoker; smoked 1 ppd x 40 years, quit in 2016  7. Syncopal episodes years ago associated with UTIs; last one a couple of years ago; data deficit  8. Depression  9. GERD  10. History of recurrent UTIs   11. Chronic low back pain  12. Surgical history:                                        A. Right femoral endarterectomy, August 2016                                        B. Lens implant 1990s                                        C. Spontaneous  "pneumothorax, 1960     No Known Allergies      Current Outpatient Prescriptions:   •  amLODIPine (NORVASC) 5 MG tablet, Take 5 mg by mouth Daily., Disp: , Rfl: 1  •  ascorbic acid (VITAMIN C) 1000 MG tablet, Take 1,000 mg by mouth 2 (two) times a day., Disp: , Rfl:   •  aspirin 81 MG EC tablet, Take 81 mg by mouth every morning. TOLD NOT TO STOP BEFORE SURGERY, Disp: , Rfl:   •  BISOPROLOL FUMARATE PO, Take  by mouth Daily., Disp: , Rfl:   •  Calcium Citrate-Vitamin D (CALCIUM + D PO), Take 2 tablets by mouth 2 (two) times a day., Disp: , Rfl:   •  celecoxib (CeleBREX) 200 MG capsule, Take 1 capsule by mouth 2 (Two) Times a Day As Needed for Mild Pain ., Disp: 60 capsule, Rfl: 0  •  citalopram (CeleXA) 20 MG tablet, Take 20 mg by mouth every night., Disp: , Rfl:   •  clopidogrel (PLAVIX) 75 MG tablet, Take 75 mg by mouth every night. TOLD NOT TO STOP BEFORE SURGERY, Disp: , Rfl:   •  coenzyme Q10 100 MG capsule, Take 400 mg by mouth Daily., Disp: , Rfl:   •  Fish Oil oil, Take 4 capsules by mouth daily., Disp: , Rfl:   •  Flaxseed, Linseed, (FLAXSEED OIL) 1000 MG capsule, Take 1 capsule by mouth daily., Disp: , Rfl:   •  fluticasone (FLONASE) 50 MCG/ACT nasal spray, 2 sprays into each nostril daily. Administer 2 sprays in each nostril for each dose., Disp: , Rfl:   •  gabapentin (NEURONTIN) 100 MG capsule, Take 100 mg by mouth 2 (Two) Times a Day., Disp: , Rfl:   •  losartan (COZAAR) 100 MG tablet, Take 100 mg by mouth Daily., Disp: , Rfl: 0  •  Methylsulfonylmethane (MSM PO), Take 2 tablets by mouth daily., Disp: , Rfl:   •  Multiple Vitamins-Minerals (MULTIVITAMIN ADULT PO), Take 1 tablet by mouth daily., Disp: , Rfl:   •  omeprazole (PriLOSEC) 20 MG capsule, Take 20 mg by mouth daily., Disp: , Rfl:   •  rosuvastatin (CRESTOR) 20 MG tablet, take 1 tablet by mouth once daily, Disp: 30 tablet, Rfl: 11  •  URINARY HEALTH/CRANBERRY PO, Take 1 tablet by mouth 2 (two) times a day. \"CRANACTIN\", Disp: , Rfl:     HISTORY " "OF PRESENT ILLNESS: Patient returns for scheduled 6-month followup. She denies any chest pressure, palpitations, presyncope, syncope, or edema.  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 She had lab testing with her physician in January 2018 which was acceptable (see below).  Several months ago, she was having some burning in her right great toe, and initially, her potassium apparently was abnormal. The skin on her arms was also itching. Since discontinuing the hydrochlorothiazide, she has not had any problems. Her blood pressure has been in the 130s whenever it has been checked.  Patient otherwise denies chest pain, shortness of breath, PND, edema, palpitations, syncope or presyncope at this time.  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      Review of Systems   All other systems reviewed and are negative.     Obtained and otherwise negative except as outlined in problem list and HPI.    Procedures       Objective:       Vitals:    09/07/18 1428 09/07/18 1430   BP: 135/70 156/61   BP Location: Right arm Right arm   Patient Position: Sitting Standing   Pulse: (!) 49 (!) 49   Weight: 60.3 kg (133 lb)    Height: 157.5 cm (62\")    Recheck blood pressure right arm sitting was 138/58, HR 54  Body mass index is 24.33 kg/m².   Last weight:  133 lbs.    Physical Exam   Constitutional: She is oriented to person, place, and time. She appears well-developed and well-nourished.   Neck: No JVD present. Carotid bruit is not present. No thyromegaly present.   Cardiovascular: Regular rhythm, S1 normal, S2 normal and normal heart sounds.  Exam reveals no gallop, no S3 and no friction rub.    No murmur heard.  Pulses:       Dorsalis pedis pulses are 2+ on the right side, and 2+ on the left side.        Posterior tibial pulses are 2+ on the right side, and 2+ on the left side.   Pulmonary/Chest: Effort normal. She has decreased breath sounds. " She has no wheezes. She has no rhonchi. She has no rales.   Abdominal: Soft. She exhibits no mass. There is no hepatosplenomegaly. There is no tenderness. There is no guarding.   Bowel sounds audible x4   Musculoskeletal: Normal range of motion. She exhibits no edema.   Lymphadenopathy:     She has no cervical adenopathy.   Neurological: She is alert and oriented to person, place, and time.   Skin: Skin is warm, dry and intact. No rash noted.   Vitals reviewed.        Lab Review:   01/08/2018 (reviewed with patient by letter):  · OPAL - mildly elevated at 30  · Vitamin D - 66.8  · CMP - normal electrolytes, serum creatinine 1, BUN 40, normal serum calcium, glucose 105  · Liver function tests mildly elevated with elevated ALT and AST  · Serum albumin - 4.8  · FLP - total cholesterol 141, triglycerides 98, HDL-C 69, LDL-C 52    Lab Results   Component Value Date    GLUCOSE 99 08/23/2016    BUN 18 08/23/2016    CREATININE 0.60 08/23/2016    EGFRIFNONA 101 08/23/2016    BCR 30.0 (H) 08/23/2016    CO2 30.0 08/23/2016    CALCIUM 8.9 08/23/2016    ALBUMIN 4.2 06/05/2015    AST 25 06/05/2015    ALT 24 06/05/2015       Lab Results   Component Value Date    WBC 8.04 08/23/2016    HGB 11.7 08/23/2016    HCT 34.1 (L) 08/23/2016    MCV 90.5 08/23/2016     08/23/2016       Lab Results   Component Value Date    HGBA1C 4.40 08/19/2016       Lab Results   Component Value Date    CHOL 225 (H) 05/15/2017     Lab Results   Component Value Date    TRIG 78 05/15/2017     Lab Results   Component Value Date    HDL 70 (H) 05/15/2017     Lab Results   Component Value Date     (H) 05/15/2017           Assessment:   Overall continued acceptable course with no interim cardiopulmonary complaints with good functional status. We will defer additional diagnostic or therapeutic intervention from a cardiac perspective at this time.       Diagnosis Plan   1. Peripheral arterial disease (CMS/HCC)  Stable   2. Essential hypertension   Controlled   3. Mixed hyperlipidemia  Acceptable values in January 2018          Plan:         1. Patient to continue current medications and close follow up with the above providers.  2. Tentative cardiology follow up in May 2019 or patient may return sooner PRN.    Scribed for Floyd Ernandez MD by Cynthia Powell, APRN. 9/7/2018  3:44 PM    I, Floyd Ernandez MD, [facility], personally performed the services described in this documentation as scribed by the above named individual in my presence, and it is both accurate and complete. At 3:29 PM on 09/07/2018

## 2018-11-14 ENCOUNTER — TRANSCRIBE ORDERS (OUTPATIENT)
Dept: ADMINISTRATIVE | Facility: HOSPITAL | Age: 63
End: 2018-11-14

## 2018-11-14 DIAGNOSIS — Z12.31 VISIT FOR SCREENING MAMMOGRAM: Primary | ICD-10-CM

## 2019-01-03 PROBLEM — K21.9 GASTROESOPHAGEAL REFLUX DISEASE WITHOUT ESOPHAGITIS: Status: ACTIVE | Noted: 2018-04-25

## 2019-01-04 ENCOUNTER — HOSPITAL ENCOUNTER (OUTPATIENT)
Dept: MAMMOGRAPHY | Facility: HOSPITAL | Age: 64
Discharge: HOME | End: 2019-01-04
Attending: INTERNAL MEDICINE | Admitting: INTERNAL MEDICINE

## 2019-01-04 DIAGNOSIS — Z12.31 VISIT FOR SCREENING MAMMOGRAM: ICD-10-CM

## 2019-01-04 PROCEDURE — 77063 BREAST TOMOSYNTHESIS BI: CPT | Performed by: RADIOLOGY

## 2019-01-04 PROCEDURE — 77063 BREAST TOMOSYNTHESIS BI: CPT

## 2019-01-04 PROCEDURE — 77067 SCR MAMMO BI INCL CAD: CPT

## 2019-01-04 PROCEDURE — 77067 SCR MAMMO BI INCL CAD: CPT | Performed by: RADIOLOGY

## 2019-01-04 RX ORDER — LOSARTAN POTASSIUM 100 MG/1
100 TABLET ORAL DAILY
Qty: 30 TABLET | Refills: 11 | Status: SHIPPED | OUTPATIENT
Start: 2019-01-04 | End: 2019-01-14 | Stop reason: SDUPTHER

## 2019-01-04 RX ORDER — GABAPENTIN 100 MG/1
200 CAPSULE ORAL EVERY EVENING
Qty: 60 CAPSULE | Refills: 2 | Status: SHIPPED | OUTPATIENT
Start: 2019-01-04 | End: 2019-01-14 | Stop reason: SDUPTHER

## 2019-01-04 RX ORDER — AMLODIPINE BESYLATE 5 MG/1
5 TABLET ORAL DAILY
Qty: 30 TABLET | Refills: 11 | Status: SHIPPED | OUTPATIENT
Start: 2019-01-04 | End: 2019-01-14 | Stop reason: SDUPTHER

## 2019-01-14 ENCOUNTER — LAB (OUTPATIENT)
Dept: LAB | Facility: HOSPITAL | Age: 64
End: 2019-01-14

## 2019-01-14 ENCOUNTER — OFFICE VISIT (OUTPATIENT)
Dept: INTERNAL MEDICINE | Facility: CLINIC | Age: 64
End: 2019-01-14

## 2019-01-14 ENCOUNTER — TELEPHONE (OUTPATIENT)
Dept: INTERNAL MEDICINE | Facility: CLINIC | Age: 64
End: 2019-01-14

## 2019-01-14 VITALS
HEIGHT: 62 IN | HEART RATE: 52 BPM | WEIGHT: 133 LBS | BODY MASS INDEX: 24.48 KG/M2 | DIASTOLIC BLOOD PRESSURE: 70 MMHG | TEMPERATURE: 98.1 F | SYSTOLIC BLOOD PRESSURE: 148 MMHG

## 2019-01-14 DIAGNOSIS — K21.9 GASTROESOPHAGEAL REFLUX DISEASE WITHOUT ESOPHAGITIS: ICD-10-CM

## 2019-01-14 DIAGNOSIS — I10 BENIGN ESSENTIAL HYPERTENSION: Primary | ICD-10-CM

## 2019-01-14 DIAGNOSIS — M85.89 OSTEOPENIA OF MULTIPLE SITES: ICD-10-CM

## 2019-01-14 DIAGNOSIS — E78.2 MIXED HYPERLIPIDEMIA: ICD-10-CM

## 2019-01-14 DIAGNOSIS — Z23 NEED FOR 23-POLYVALENT PNEUMOCOCCAL POLYSACCHARIDE VACCINE: ICD-10-CM

## 2019-01-14 DIAGNOSIS — Z11.59 ENCOUNTER FOR HEPATITIS C SCREENING TEST FOR LOW RISK PATIENT: ICD-10-CM

## 2019-01-14 DIAGNOSIS — I73.9 PERIPHERAL ARTERIAL DISEASE (HCC): ICD-10-CM

## 2019-01-14 DIAGNOSIS — M15.3 POST-TRAUMATIC OSTEOARTHRITIS OF MULTIPLE JOINTS: ICD-10-CM

## 2019-01-14 DIAGNOSIS — I10 BENIGN ESSENTIAL HYPERTENSION: ICD-10-CM

## 2019-01-14 LAB
ALBUMIN SERPL-MCNC: 4.66 G/DL (ref 3.2–4.8)
ALBUMIN/GLOB SERPL: 2 G/DL (ref 1.5–2.5)
ALP SERPL-CCNC: 48 U/L (ref 25–100)
ALT SERPL W P-5'-P-CCNC: 25 U/L (ref 7–40)
ANION GAP SERPL CALCULATED.3IONS-SCNC: 4 MMOL/L (ref 3–11)
AST SERPL-CCNC: 26 U/L (ref 0–33)
BILIRUB SERPL-MCNC: 0.7 MG/DL (ref 0.3–1.2)
BUN BLD-MCNC: 33 MG/DL (ref 9–23)
BUN/CREAT SERPL: 38.8 (ref 7–25)
CALCIUM SPEC-SCNC: 9.4 MG/DL (ref 8.7–10.4)
CHLORIDE SERPL-SCNC: 103 MMOL/L (ref 99–109)
CHOLEST SERPL-MCNC: 128 MG/DL (ref 0–200)
CO2 SERPL-SCNC: 30 MMOL/L (ref 20–31)
CREAT BLD-MCNC: 0.85 MG/DL (ref 0.6–1.3)
GFR SERPL CREATININE-BSD FRML MDRD: 67 ML/MIN/1.73
GLOBULIN UR ELPH-MCNC: 2.3 GM/DL
GLUCOSE BLD-MCNC: 100 MG/DL (ref 70–100)
HDLC SERPL-MCNC: 56 MG/DL (ref 40–60)
LDLC SERPL CALC-MCNC: 53 MG/DL (ref 0–100)
LDLC/HDLC SERPL: 0.95 {RATIO}
POTASSIUM BLD-SCNC: 4.8 MMOL/L (ref 3.5–5.5)
PROT SERPL-MCNC: 7 G/DL (ref 5.7–8.2)
SODIUM BLD-SCNC: 137 MMOL/L (ref 132–146)
TRIGL SERPL-MCNC: 95 MG/DL (ref 0–150)
VLDLC SERPL-MCNC: 19 MG/DL

## 2019-01-14 PROCEDURE — 90471 IMMUNIZATION ADMIN: CPT | Performed by: INTERNAL MEDICINE

## 2019-01-14 PROCEDURE — 80061 LIPID PANEL: CPT

## 2019-01-14 PROCEDURE — 82570 ASSAY OF URINE CREATININE: CPT

## 2019-01-14 PROCEDURE — 36415 COLL VENOUS BLD VENIPUNCTURE: CPT

## 2019-01-14 PROCEDURE — 99214 OFFICE O/P EST MOD 30 MIN: CPT | Performed by: INTERNAL MEDICINE

## 2019-01-14 PROCEDURE — 90732 PPSV23 VACC 2 YRS+ SUBQ/IM: CPT | Performed by: INTERNAL MEDICINE

## 2019-01-14 PROCEDURE — 82043 UR ALBUMIN QUANTITATIVE: CPT

## 2019-01-14 PROCEDURE — 80053 COMPREHEN METABOLIC PANEL: CPT

## 2019-01-14 RX ORDER — AMMONIUM LACTATE 12 %
1 LOTION (GRAM) TOPICAL AS NEEDED
Refills: 1 | COMMUNITY
Start: 2018-11-18 | End: 2022-02-07

## 2019-01-14 RX ORDER — CITALOPRAM 40 MG/1
40 TABLET ORAL DAILY
Qty: 90 TABLET | Refills: 1 | Status: SHIPPED | OUTPATIENT
Start: 2019-01-14 | End: 2019-07-19 | Stop reason: SDUPTHER

## 2019-01-14 RX ORDER — FLUTICASONE PROPIONATE 50 MCG
2 SPRAY, SUSPENSION (ML) NASAL 2 TIMES DAILY
Qty: 1 BOTTLE | Refills: 5 | Status: SHIPPED | OUTPATIENT
Start: 2019-01-14 | End: 2019-08-25 | Stop reason: SDUPTHER

## 2019-01-14 RX ORDER — ROSUVASTATIN CALCIUM 20 MG/1
20 TABLET, COATED ORAL DAILY
Qty: 30 TABLET | Refills: 5 | Status: SHIPPED | OUTPATIENT
Start: 2019-01-14 | End: 2019-07-21 | Stop reason: SDUPTHER

## 2019-01-14 RX ORDER — AMLODIPINE BESYLATE 5 MG/1
5 TABLET ORAL DAILY
Qty: 30 TABLET | Refills: 11 | Status: SHIPPED | OUTPATIENT
Start: 2019-01-14 | End: 2019-05-10 | Stop reason: SDUPTHER

## 2019-01-14 RX ORDER — LOSARTAN POTASSIUM 100 MG/1
100 TABLET ORAL DAILY
Qty: 30 TABLET | Refills: 11 | Status: SHIPPED | OUTPATIENT
Start: 2019-01-14 | End: 2019-07-19

## 2019-01-14 RX ORDER — BISOPROLOL FUMARATE 10 MG/1
10 TABLET, FILM COATED ORAL DAILY
Qty: 30 TABLET | Refills: 5 | Status: SHIPPED | OUTPATIENT
Start: 2019-01-14 | End: 2019-05-10 | Stop reason: SDUPTHER

## 2019-01-14 RX ORDER — CLOPIDOGREL BISULFATE 75 MG/1
75 TABLET ORAL NIGHTLY
Qty: 30 TABLET | Refills: 5 | Status: SHIPPED | OUTPATIENT
Start: 2019-01-14 | End: 2019-07-21 | Stop reason: SDUPTHER

## 2019-01-14 RX ORDER — GABAPENTIN 100 MG/1
200 CAPSULE ORAL EVERY EVENING
Qty: 60 CAPSULE | Refills: 2 | Status: SHIPPED | OUTPATIENT
Start: 2019-01-14 | End: 2019-04-11 | Stop reason: SDUPTHER

## 2019-01-14 RX ORDER — CELECOXIB 200 MG/1
200 CAPSULE ORAL 2 TIMES DAILY PRN
Qty: 60 CAPSULE | Refills: 0 | Status: SHIPPED | OUTPATIENT
Start: 2019-01-14 | End: 2019-02-15 | Stop reason: SDUPTHER

## 2019-01-14 RX ORDER — BISOPROLOL FUMARATE 10 MG/1
TABLET, FILM COATED ORAL
Refills: 1 | COMMUNITY
Start: 2019-01-12 | End: 2019-01-14 | Stop reason: SDUPTHER

## 2019-01-14 NOTE — TELEPHONE ENCOUNTER
CALLED [facility] SPOKE WITH SHANNAN UNABLE TO PROCESS NOT RETURNED WITH IN 72 HRS. IF SHE WANT ORDER THEY WILL NEED TO SPEAK WITH PT.

## 2019-01-14 NOTE — PROGRESS NOTES
Subjective   Aarti Wade is a 64 y.o. female.     History of Present Illness     {Common H&P Review Areas:02303}    Review of Systems    Objective   Physical Exam      Assessment/Plan   {Assess/PlanSmartLinks:72394}

## 2019-01-14 NOTE — PROGRESS NOTES
[facility]     Aarti Wade  1955   [id number]      Patient Care Team:  Jannette Chapa MD as PCP - General  Jannette Chapa MD as PCP - Family Medicine  Severino Harrison MD as Consulting Physician (Vascular Surgery)    Chief Complaint;:   Chief Complaint   Patient presents with   • Follow-up     6 month fasting   • Med Refill            HPI  Patient is a 64 y.o. female presents with f/u hypertension.  CHRONIC CONDITIONS  BP's at home 120's/70's. 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 Eats low fat and low salt diet.  She takes all of her medications as prescribed.  She denies any side effects.  She denies any claudication.  Her arthritis symptoms are continuing problem.  Her right shoulder is the worst recently.  She does take Celebrex once a day as needed and it does help her symptoms.  She also takes gabapentin every evening and feels that it does help her arthritis symptoms.    She feels somewhat depressed lately and continues to feel anxious.  and would like to try a higher dose of the citalopram.  She does enjoy activities.  She also enjoys her work very much.    Past Medical History:   Diagnosis Date   • Anxiety    • Arthritis    • Arthritis of right shoulder region    • Chronic UTI    • Circulatory disorder    • Claudication (CMS/HCC)    • COPD (chronic obstructive pulmonary disease) (CMS/HCC)    • DA (degenerative arthritis)    • Depression    • Gastrointestinal disorder    • GERD (gastroesophageal reflux disease)    • Head injury    • Hepatitis     UNKNOWN TYPE   • Hyperlipidemia    • Hypertension    • Inflammatory arthritis    • Osteopenia of multiple sites    • Osteoporosis    • PAD (peripheral artery disease) (CMS/HCC)    • Smoker    • Subclavian artery stenosis, left (CMS/HCC)    • Wears dentures     TOP ONLY       Past Surgical History:   Procedure Laterality Date   • ANGIOGRAM - CONVERTED  08/16/2016    AA WITH RUNOFF PER DR. HARRISON   • COLONOSCOPY     • EYE LENS  IMPLANT SECONDARY Left    • FEMORAL FEMORAL BYPASS Right 2016    Procedure: RIGHT COMMON FEMORAL ENDARTERECTOMY WITH PATCH;  Surgeon: Severino Carbajal MD;  Location:  [facility];  Service:    • FINGER SURGERY Left     LEFT INDEX FINGER   • INTERVENTIONAL RADIOLOGY PROCEDURE N/A 2016    Procedure: IR PTA femoral popliteal artery;  Surgeon: Severino Carbajal MD;  Location:  [facility];  Service:    • TONSILLECTOMY         Family History   Problem Relation Age of Onset   • Osteoarthritis Mother    • Alzheimer's disease Mother    • Hypertension Father    • Heart attack Father    • Alcohol abuse Father    • No Known Problems Sister    • No Known Problems Brother    • No Known Problems Daughter    • No Known Problems Son    • Breast cancer Maternal Grandmother 50   • Breast cancer Paternal Grandmother 50   • No Known Problems Maternal Aunt    • No Known Problems Paternal Aunt    • Other Other    • Heart attack Other    • Coronary artery disease Paternal Grandfather    • Stroke Paternal Grandfather    • Ovarian cancer Neg Hx    • Endometrial cancer Neg Hx        Social History     Socioeconomic History   • Marital status: Single     Spouse name: Not on file   • Number of children: Not on file   • Years of education: Not on file   • Highest education level: Not on file   Social Needs   • Financial resource strain: Not on file   • Food insecurity - worry: Not on file   • Food insecurity - inability: Not on file   • Transportation needs - medical: Not on file   • Transportation needs - non-medical: Not on file   Occupational History   • Not on file   Tobacco Use   • Smoking status: Former Smoker     Packs/day: 1.00     Years: 40.00     Pack years: 40.00     Types: Electronic Cigarette, Cigarettes     Last attempt to quit: 2016     Years since quittin.5   • Smokeless tobacco: Never Used   • Tobacco comment: 1 PACK/DAY SMOKER UNTIL 2016; THEN STARTED ON E-CIGS STILL USING E-CIGARETTE  "  Substance and Sexual Activity   • Alcohol use: Yes     Alcohol/week: 1.2 oz     Types: 2 Shots of liquor per week     Comment: 2 DRINKS PER DAY   • Drug use: No   • Sexual activity: Defer   Other Topics Concern   • Not on file   Social History Narrative   • Not on file       Allergies   Allergen Reactions   • Levocetirizine Dihydrochloride Myalgia     Muscle aches/joint pain       Review of Systems:     Review of Systems   Constitutional: Negative for chills, fatigue and fever.   HENT: Negative for congestion, ear pain and sinus pressure.    Respiratory: Negative for cough, chest tightness, shortness of breath and wheezing.    Cardiovascular: Negative for chest pain and palpitations.   Gastrointestinal: Negative for abdominal pain, blood in stool and constipation.   Musculoskeletal: Positive for arthralgias. Negative for back pain and gait problem.   Skin: Negative for color change.   Allergic/Immunologic: Negative for environmental allergies.   Neurological: Negative for dizziness, speech difficulty and headache.   Psychiatric/Behavioral: Negative for decreased concentration. The patient is not nervous/anxious.        Vital Signs  Vitals:    01/14/19 0906   BP: 148/70   BP Location: Right arm   Patient Position: Sitting   Cuff Size: Adult   Pulse: 52   Temp: 98.1 °F (36.7 °C)   TempSrc: Temporal   Weight: 60.3 kg (133 lb)   Height: 157.5 cm (62.01\")   PainSc: 0-No pain     Body mass index is 24.32 kg/m².      Current Outpatient Medications:   •  amLODIPine (NORVASC) 5 MG tablet, Take 1 tablet by mouth Daily., Disp: 30 tablet, Rfl: 11  •  ascorbic acid (VITAMIN C) 1000 MG tablet, Take 1,000 mg by mouth 2 (two) times a day., Disp: , Rfl:   •  aspirin 81 MG EC tablet, Take 81 mg by mouth every morning. TOLD NOT TO STOP BEFORE SURGERY, Disp: , Rfl:   •  Calcium Citrate-Vitamin D (CALCIUM + D PO), Take 2 tablets by mouth 2 (two) times a day., Disp: , Rfl:   •  celecoxib (CeleBREX) 200 MG capsule, Take 1 capsule by " "mouth 2 (Two) Times a Day As Needed for Mild Pain ., Disp: 60 capsule, Rfl: 0  •  clopidogrel (PLAVIX) 75 MG tablet, Take 1 tablet by mouth Every Night. TOLD NOT TO STOP BEFORE SURGERY, Disp: 30 tablet, Rfl: 5  •  coenzyme Q10 100 MG capsule, Take 400 mg by mouth Daily., Disp: , Rfl:   •  Fish Oil oil, Take 1 capsule by mouth Daily., Disp: , Rfl:   •  Flaxseed, Linseed, (FLAXSEED OIL) 1000 MG capsule, Take 1 capsule by mouth daily., Disp: , Rfl:   •  fluticasone (FLONASE) 50 MCG/ACT nasal spray, 2 sprays into the nostril(s) as directed by provider 2 (Two) Times a Day. Administer 2 sprays in each nostril for each dose., Disp: 1 bottle, Rfl: 5  •  gabapentin (NEURONTIN) 100 MG capsule, Take 2 capsules by mouth Every Evening., Disp: 60 capsule, Rfl: 2  •  losartan (COZAAR) 100 MG tablet, Take 1 tablet by mouth Daily., Disp: 30 tablet, Rfl: 11  •  Methylsulfonylmethane (MSM PO), Take 2 tablets by mouth daily., Disp: , Rfl:   •  Multiple Vitamins-Minerals (MULTIVITAMIN ADULT PO), Take 1 tablet by mouth daily., Disp: , Rfl:   •  omeprazole (PriLOSEC) 20 MG capsule, Take 20 mg by mouth daily., Disp: , Rfl:   •  rosuvastatin (CRESTOR) 20 MG tablet, Take 1 tablet by mouth Daily., Disp: 30 tablet, Rfl: 5  •  URINARY HEALTH/CRANBERRY PO, Take 1 tablet by mouth 2 (two) times a day. \"CRANACTIN\", Disp: , Rfl:   •  AMLACTIN 12 % lotion, , Disp: , Rfl: 1  •  bisoprolol (ZEBeta) 10 MG tablet, Take 1 tablet by mouth Daily., Disp: 30 tablet, Rfl: 5  •  citalopram (CELEXA) 40 MG tablet, Take 1 tablet by mouth Daily., Disp: 90 tablet, Rfl: 1    Physical Exam:    Physical Exam   Constitutional: She is oriented to person, place, and time. She appears well-developed and well-nourished.   HENT:   Head: Normocephalic.   Eyes: Conjunctivae and EOM are normal. Pupils are equal, round, and reactive to light.   Neck: Normal range of motion. Neck supple. No thyromegaly present.   Cardiovascular: Normal rate, regular rhythm, normal heart sounds " and intact distal pulses.   Pulmonary/Chest: Effort normal and breath sounds normal.   Musculoskeletal: Normal range of motion. She exhibits deformity. She exhibits no edema or tenderness.   Lymphadenopathy:     She has no cervical adenopathy.   Neurological: She is alert and oriented to person, place, and time.   Psychiatric: She has a normal mood and affect. Thought content normal.   Nursing note and vitals reviewed.       Results Review:     None    Medication Review: Medications reviewed and noted    Assessment/Plan:    Aarti was seen today for follow-up and med refill.    Diagnoses and all orders for this visit:    Benign essential hypertension  -     Comprehensive metabolic panel; Future  -     Microalbumin / Creatinine Urine Ratio - Urine, Clean Catch; Future    Mixed hyperlipidemia  -     Lipid Panel With LDL/HDL Ratio; Future    Peripheral arterial disease (CMS/HCC)    Gastroesophageal reflux disease without esophagitis    Osteopenia of multiple sites    Need for 23-polyvalent pneumococcal polysaccharide vaccine  -     Pneumococcal Polysaccharide Vaccine 23-Valent Greater Than or Equal To 3yo Subcutaneous / IM    Encounter for hepatitis C screening test for low risk patient  -     Hepatitis C Antibody; Future    Post-traumatic osteoarthritis of multiple joints  -     gabapentin (NEURONTIN) 100 MG capsule; Take 2 capsules by mouth Every Evening.    Other orders  -     Cancel: Lipid Panel With LDL/HDL Ratio; Future  -     Cancel: Comprehensive metabolic panel; Future  -     citalopram (CELEXA) 40 MG tablet; Take 1 tablet by mouth Daily.  -     rosuvastatin (CRESTOR) 20 MG tablet; Take 1 tablet by mouth Daily.  -     clopidogrel (PLAVIX) 75 MG tablet; Take 1 tablet by mouth Every Night. TOLD NOT TO STOP BEFORE SURGERY  -     losartan (COZAAR) 100 MG tablet; Take 1 tablet by mouth Daily.  -     amLODIPine (NORVASC) 5 MG tablet; Take 1 tablet by mouth Daily.  -     bisoprolol (ZEBeta) 10 MG tablet; Take 1 tablet  by mouth Daily.  -     celecoxib (CeleBREX) 200 MG capsule; Take 1 capsule by mouth 2 (Two) Times a Day As Needed for Mild Pain .  -     fluticasone (FLONASE) 50 MCG/ACT nasal spray; 2 sprays into the nostril(s) as directed by provider 2 (Two) Times a Day. Administer 2 sprays in each nostril for each dose.    She will continue current treatment for blood pressure.  She will continue to eat a low-salt and low-fat diet.  She will continue regular physical activity and exercise.  She will continue to drink a lot of fluids.  She will follow-up with Dr. Carbajal as planned for the peripheral arterial disease.      We will increase the citalopram to 40 mg daily to improve anxiety symptoms.  Physical activity and exercise also help anxiety.  Doing some relaxation every day helps doing fun things that you enjoy a with friends and family helps.      Plan of care reviewed with patient at the conclusion of today's visit. Education was provided regarding diagnosis, management, and any prescribed or recommended OTC medications.Patient verbalizes understanding of and agreement with management plan.         Jannette Chapa MD

## 2019-01-15 LAB
CREAT 24H UR-MCNC: 95.1 MG/DL
MICROALBUMIN UR-MCNC: 70.4 UG/ML
MICROALBUMIN/CREAT UR: 74 MG/G CREAT (ref 0–30)

## 2019-02-12 PROBLEM — G89.29 CHRONIC LOW BACK PAIN: Status: ACTIVE | Noted: 2019-02-12

## 2019-02-12 PROBLEM — M54.50 CHRONIC LOW BACK PAIN: Status: ACTIVE | Noted: 2019-02-12

## 2019-02-12 PROBLEM — M77.9 CAPSULITIS: Status: ACTIVE | Noted: 2019-02-12

## 2019-02-12 PROBLEM — M19.019 DEGENERATIVE JOINT DISEASE OF SHOULDER REGION: Status: ACTIVE | Noted: 2019-02-12

## 2019-02-12 PROBLEM — F32.A DEPRESSIVE DISORDER: Status: ACTIVE | Noted: 2019-02-12

## 2019-02-12 PROBLEM — I73.9 PERIPHERAL VASCULAR DISEASE: Status: ACTIVE | Noted: 2019-02-12

## 2019-02-12 PROBLEM — J44.9 CHRONIC OBSTRUCTIVE LUNG DISEASE: Status: ACTIVE | Noted: 2019-02-12

## 2019-02-12 PROBLEM — L85.8 KERATOSIS PILARIS: Status: ACTIVE | Noted: 2019-02-12

## 2019-02-12 PROBLEM — G45.8 SUBCLAVIAN STEAL SYNDROME: Status: ACTIVE | Noted: 2019-02-12

## 2019-02-15 ENCOUNTER — OFFICE VISIT (OUTPATIENT)
Dept: INTERNAL MEDICINE | Facility: CLINIC | Age: 64
End: 2019-02-15

## 2019-02-15 VITALS
BODY MASS INDEX: 23.92 KG/M2 | DIASTOLIC BLOOD PRESSURE: 72 MMHG | SYSTOLIC BLOOD PRESSURE: 138 MMHG | TEMPERATURE: 97.8 F | WEIGHT: 130 LBS | HEART RATE: 62 BPM | HEIGHT: 62 IN

## 2019-02-15 DIAGNOSIS — M25.511 CHRONIC RIGHT SHOULDER PAIN: Primary | ICD-10-CM

## 2019-02-15 DIAGNOSIS — G89.29 CHRONIC RIGHT SHOULDER PAIN: Primary | ICD-10-CM

## 2019-02-15 PROCEDURE — 99213 OFFICE O/P EST LOW 20 MIN: CPT | Performed by: PHYSICIAN ASSISTANT

## 2019-02-15 RX ORDER — CELECOXIB 200 MG/1
200 CAPSULE ORAL DAILY
Qty: 30 CAPSULE | Refills: 5 | Status: SHIPPED | OUTPATIENT
Start: 2019-02-15 | End: 2019-08-11 | Stop reason: SDUPTHER

## 2019-02-15 NOTE — PROGRESS NOTES
Patient Care Team:  Jannette Chapa MD as PCP - General  Jannette Chapa MD as PCP - Family Medicine  Severino Carbajal MD as Consulting Physician (Vascular Surgery)    Chief Complaint;:   Chief Complaint   Patient presents with   • Shoulder Pain     since 2000        Subjective     HPI  64-year-old white female presents to the office today complaining of chronic right shoulder pain.  She had been taking the Celebrex twice a day but has now reduced it once a day.  She needs a refill on her Celebrex.  She is also taking 200 mg of gabapentin which also helps with her chronic pain.  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    • Osteopenia of multiple sites    • Osteoporosis    • PAD (peripheral artery disease) (CMS/HCC)    • Smoker    • Spontaneous pneumothorax 1960   • Subclavian artery stenosis, left (CMS/[facility])    • Subclavian steal syndrome 2016   • Wears dentures     TOP ONLY       Social History     Socioeconomic History   • Marital status: Single     Spouse name: Not on file   • Number of children: Not on file   • Years of education: Not on file   • Highest education level: Not on file   Social Needs   • Financial resource strain: Not on file   • Food insecurity - worry: Not on file   • Food insecurity - inability: Not on file   • Transportation needs - medical: Not on file   • Transportation needs - non-medical: Not on file   Occupational History   • Not on file   Tobacco Use  "  • Smoking status: Former Smoker     Packs/day: 1.00     Years: 40.00     Pack years: 40.00     Types: Electronic Cigarette, Cigarettes     Last attempt to quit: 2016     Years since quittin.6   • Smokeless tobacco: Never Used   • Tobacco comment: 1 PACK/DAY SMOKER UNTIL 2016   Substance and Sexual Activity   • Alcohol use: Yes     Alcohol/week: 1.2 oz     Types: 2 Shots of liquor per week     Comment: 2 DRINKS PER DAY   • Drug use: No   • Sexual activity: Defer   Other Topics Concern   • Not on file   Social History Narrative   • Not on file       Allergies   Allergen Reactions   • Levocetirizine Dihydrochloride Myalgia     Muscle aches/joint pain       Review of Systems:     Review of Systems   Constitutional: Negative.    Respiratory: Negative.    Cardiovascular: Negative.    Musculoskeletal: Positive for arthralgias.       Vital Signs  Vitals:    02/15/19 1554   BP: 138/72   Pulse: 62   Temp: 97.8 °F (36.6 °C)   Weight: 59 kg (130 lb)   Height: 157.5 cm (62.01\")         Current Outpatient Medications:   •  AMLACTIN 12 % lotion, , Disp: , Rfl: 1  •  amLODIPine (NORVASC) 5 MG tablet, Take 1 tablet by mouth Daily., Disp: 30 tablet, Rfl: 11  •  ascorbic acid (VITAMIN C) 1000 MG tablet, Take 1,000 mg by mouth 2 (two) times a day., Disp: , Rfl:   •  aspirin 81 MG EC tablet, Take 81 mg by mouth every morning. TOLD NOT TO STOP BEFORE SURGERY, Disp: , Rfl:   •  bisoprolol (ZEBeta) 10 MG tablet, Take 1 tablet by mouth Daily., Disp: 30 tablet, Rfl: 5  •  Calcium Citrate-Vitamin D (CALCIUM + D PO), Take 2 tablets by mouth 2 (two) times a day., Disp: , Rfl:   •  celecoxib (CeleBREX) 200 MG capsule, Take 1 capsule by mouth Daily., Disp: 30 capsule, Rfl: 5  •  citalopram (CELEXA) 40 MG tablet, Take 1 tablet by mouth Daily., Disp: 90 tablet, Rfl: 1  •  clopidogrel (PLAVIX) 75 MG tablet, Take 1 tablet by mouth Every Night. TOLD NOT TO STOP BEFORE SURGERY, Disp: 30 tablet, Rfl: 5  •  coenzyme Q10 100 MG capsule, " "Take 400 mg by mouth Daily., Disp: , Rfl:   •  Fish Oil oil, Take 1 capsule by mouth Daily., Disp: , Rfl:   •  Flaxseed, Linseed, (FLAXSEED OIL) 1000 MG capsule, Take 1 capsule by mouth daily., Disp: , Rfl:   •  fluticasone (FLONASE) 50 MCG/ACT nasal spray, 2 sprays into the nostril(s) as directed by provider 2 (Two) Times a Day. Administer 2 sprays in each nostril for each dose., Disp: 1 bottle, Rfl: 5  •  gabapentin (NEURONTIN) 100 MG capsule, Take 2 capsules by mouth Every Evening., Disp: 60 capsule, Rfl: 2  •  losartan (COZAAR) 100 MG tablet, Take 1 tablet by mouth Daily., Disp: 30 tablet, Rfl: 11  •  Methylsulfonylmethane (MSM PO), Take 2 tablets by mouth daily., Disp: , Rfl:   •  Multiple Vitamins-Minerals (MULTIVITAMIN ADULT PO), Take 1 tablet by mouth daily., Disp: , Rfl:   •  omeprazole (PriLOSEC) 20 MG capsule, Take 20 mg by mouth daily., Disp: , Rfl:   •  rosuvastatin (CRESTOR) 20 MG tablet, Take 1 tablet by mouth Daily., Disp: 30 tablet, Rfl: 5  •  URINARY HEALTH/CRANBERRY PO, Take 1 tablet by mouth 2 (two) times a day. \"CRANACTIN\", Disp: , Rfl:     Physical Exam:    Physical Exam   Constitutional: She is oriented to person, place, and time. She appears well-developed and well-nourished.   Cardiovascular: Normal rate, regular rhythm and normal heart sounds.   Pulmonary/Chest: Effort normal and breath sounds normal.   Musculoskeletal: She exhibits tenderness. She exhibits no edema.   Right shoulder decreased range of motion right upper back  tenderness   Neurological: She is alert and oriented to person, place, and time.   Nursing note and vitals reviewed.      Procedures      Assessment/Plan   Problem List Items Addressed This Visit     None      Visit Diagnoses     Chronic right shoulder pain    -  Primary    Refilled Celebrex        Patient Instructions   Continue Celebrex 200 mg once a day.  Continue 200 mg of gabapentin may increase dose if necessary.  Follow-up in May as scheduled.      Plan of care " reviewed with patient at the conclusion of today's visit. Education was provided regarding diagnosis, management, and any prescribed or recommended OTC medications.Patient verbalizes understanding of and agreement with management plan.     Alison Parekh PA-C

## 2019-02-15 NOTE — PATIENT INSTRUCTIONS
Continue Celebrex 200 mg once a day.  Continue 200 mg of gabapentin may increase dose if necessary.  Follow-up in May as scheduled.

## 2019-02-15 NOTE — PROGRESS NOTES
Patient Care Team:  Jannette Chapa MD as PCP - General  Jannette Chapa MD as PCP - Family Medicine  Severino Harrison MD as Consulting Physician (Vascular Surgery)    Chief Complaint;:   Chief Complaint   Patient presents with   • Shoulder Pain     since 2000        Subjective     HPI    Past Medical History:   Diagnosis Date   • Anxiety    • Arthritis    • Arthritis of right shoulder region    • Chronic UTI    • Circulatory disorder    • Claudication (CMS/HCC)    • COPD (chronic obstructive pulmonary disease) (CMS/HCC)    • DA (degenerative arthritis)    • Depression    • diffuse fatty liver infiltration on CT 2009   • Gastrointestinal disorder    • GERD (gastroesophageal reflux disease)    • Head injury    • Hepatitis     UNKNOWN TYPE   • Hyperlipidemia    • Hypertension    • Inflammatory arthritis    • 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 HARRISON   • COLONOSCOPY     • EYE LENS IMPLANT SECONDARY Left    • FEMORAL FEMORAL BYPASS Right 8/22/2016    Procedure: RIGHT COMMON FEMORAL ENDARTERECTOMY WITH PATCH;  Surgeon: Severino Harrison MD;  Location:  [facility];  Service:    • FINGER SURGERY Left     LEFT INDEX FINGER   • INTERVENTIONAL RADIOLOGY PROCEDURE N/A 8/16/2016    Procedure: IR PTA femoral popliteal artery;  Surgeon: Severino Harrison MD;  Location:  [facility];  Service:    • TONSILLECTOMY         Family History   Problem Relation Age of Onset   • Osteoarthritis Mother    • Alzheimer's disease Mother    • Hypertension Father    • Heart attack Father    • Alcohol abuse Father    • No Known  "Problems Sister    • No Known Problems Brother    • No Known Problems Daughter    • No Known Problems Son    • Breast cancer Maternal Grandmother 50   • Breast cancer Paternal Grandmother 50   • No Known Problems Maternal Aunt    • No Known Problems Paternal Aunt    • Other Other    • Heart attack Other    • Coronary artery disease Paternal Grandfather    • Stroke Paternal Grandfather    • Ovarian cancer Neg Hx    • Endometrial cancer Neg Hx        Social History     Socioeconomic History   • Marital status: Single     Spouse name: Not on file   • Number of children: Not on file   • Years of education: Not on file   • Highest education level: Not on file   Social Needs   • Financial resource strain: Not on file   • Food insecurity - worry: Not on file   • Food insecurity - inability: Not on file   • Transportation needs - medical: Not on file   • Transportation needs - non-medical: Not on file   Occupational History   • Not on file   Tobacco Use   • Smoking status: Former Smoker     Packs/day: 1.00     Years: 40.00     Pack years: 40.00     Types: Electronic Cigarette, Cigarettes     Last attempt to quit: 2016     Years since quittin.6   • Smokeless tobacco: Never Used   • Tobacco comment: 1 PACK/DAY SMOKER UNTIL 2016   Substance and Sexual Activity   • Alcohol use: Yes     Alcohol/week: 1.2 oz     Types: 2 Shots of liquor per week     Comment: 2 DRINKS PER DAY   • Drug use: No   • Sexual activity: Defer   Other Topics Concern   • Not on file   Social History Narrative   • Not on file       Allergies   Allergen Reactions   • Levocetirizine Dihydrochloride Myalgia     Muscle aches/joint pain       Review of Systems:     Review of Systems    Vital Signs  Vitals:    02/15/19 1554   BP: 138/72   Pulse: 62   Temp: 97.8 °F (36.6 °C)   Weight: 59 kg (130 lb)   Height: 157.5 cm (62.01\")         Current Outpatient Medications:   •  AMLACTIN 12 % lotion, , Disp: , Rfl: 1  •  amLODIPine (NORVASC) 5 MG tablet, " "Take 1 tablet by mouth Daily., Disp: 30 tablet, Rfl: 11  •  ascorbic acid (VITAMIN C) 1000 MG tablet, Take 1,000 mg by mouth 2 (two) times a day., Disp: , Rfl:   •  aspirin 81 MG EC tablet, Take 81 mg by mouth every morning. TOLD NOT TO STOP BEFORE SURGERY, Disp: , Rfl:   •  bisoprolol (ZEBeta) 10 MG tablet, Take 1 tablet by mouth Daily., Disp: 30 tablet, Rfl: 5  •  Calcium Citrate-Vitamin D (CALCIUM + D PO), Take 2 tablets by mouth 2 (two) times a day., Disp: , Rfl:   •  celecoxib (CeleBREX) 200 MG capsule, Take 1 capsule by mouth 2 (Two) Times a Day As Needed for Mild Pain ., Disp: 60 capsule, Rfl: 0  •  citalopram (CELEXA) 40 MG tablet, Take 1 tablet by mouth Daily., Disp: 90 tablet, Rfl: 1  •  clopidogrel (PLAVIX) 75 MG tablet, Take 1 tablet by mouth Every Night. TOLD NOT TO STOP BEFORE SURGERY, Disp: 30 tablet, Rfl: 5  •  coenzyme Q10 100 MG capsule, Take 400 mg by mouth Daily., Disp: , Rfl:   •  Fish Oil oil, Take 1 capsule by mouth Daily., Disp: , Rfl:   •  Flaxseed, Linseed, (FLAXSEED OIL) 1000 MG capsule, Take 1 capsule by mouth daily., Disp: , Rfl:   •  fluticasone (FLONASE) 50 MCG/ACT nasal spray, 2 sprays into the nostril(s) as directed by provider 2 (Two) Times a Day. Administer 2 sprays in each nostril for each dose., Disp: 1 bottle, Rfl: 5  •  gabapentin (NEURONTIN) 100 MG capsule, Take 2 capsules by mouth Every Evening., Disp: 60 capsule, Rfl: 2  •  losartan (COZAAR) 100 MG tablet, Take 1 tablet by mouth Daily., Disp: 30 tablet, Rfl: 11  •  Methylsulfonylmethane (MSM PO), Take 2 tablets by mouth daily., Disp: , Rfl:   •  Multiple Vitamins-Minerals (MULTIVITAMIN ADULT PO), Take 1 tablet by mouth daily., Disp: , Rfl:   •  omeprazole (PriLOSEC) 20 MG capsule, Take 20 mg by mouth daily., Disp: , Rfl:   •  rosuvastatin (CRESTOR) 20 MG tablet, Take 1 tablet by mouth Daily., Disp: 30 tablet, Rfl: 5  •  URINARY HEALTH/CRANBERRY PO, Take 1 tablet by mouth 2 (two) times a day. \"CRANACTIN\", Disp: , Rfl: "     Physical Exam:    Physical Exam    Procedures     Results Review:    I reviewed the patient's new clinical results.    Assessment/Plan   Problem List Items Addressed This Visit     None        There are no Patient Instructions on file for this visit.    Plan of care reviewed with patient at the conclusion of today's visit. Education was provided regarding diagnosis, management, and any prescribed or recommended OTC medications.Patient verbalizes understanding of and agreement with management plan.     Nanda Young LPN

## 2019-02-15 NOTE — PROGRESS NOTES
Patient Care Team:  Jannette Chapa MD as PCP - General  Jannette Chapa MD as PCP - Family Medicine  Severino Carbajal MD as Consulting Physician (Vascular Surgery)    Chief Complaint;:   Chief Complaint   Patient presents with   • Shoulder Pain     since         Subjective     HPI    Past Medical History:   Diagnosis Date   • Anxiety    • Arthritis    • Arthritis of right shoulder region    • Chronic UTI    • Circulatory disorder    • Claudication (CMS/[facility])    • COPD (chronic obstructive pulmonary disease) (CMS/[facility])    • DA (degenerative arthritis)    • Depression    • diffuse fatty liver infiltration on CT    • Gastrointestinal disorder    • GERD (gastroesophageal reflux disease)    • Head injury    • Hepatitis     UNKNOWN TYPE   • Hyperlipidemia    • Hypertension    • Inflammatory arthritis    • 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    • Osteopenia of multiple sites    • Osteoporosis    • PAD (peripheral artery disease) (CMS/[facility])    • Smoker    • Spontaneous pneumothorax    • Subclavian artery stenosis, left (CMS/[facility])    • Subclavian steal syndrome    • Wears dentures     TOP ONLY       Social History     Socioeconomic History   • Marital status: Single     Spouse name: Not on file   • Number of children: Not on file   • Years of education: Not on file   • Highest education level: Not on file   Social Needs   • Financial resource strain: Not on file   • Food insecurity - worry: Not on file   • Food insecurity - inability: Not on file   • Transportation needs - medical: Not on file   • Transportation needs - non-medical: Not on file   Occupational History   • Not on file   Tobacco Use   • Smoking status: Former Smoker     Packs/day: 1.00     Years: 40.00     Pack years: 40.00     Types: Electronic Cigarette, Cigarettes     Last attempt to quit: 2016     Years since quittin.6   • Smokeless tobacco: Never Used   • Tobacco comment: 1 PACK/DAY SMOKER UNTIL 2016  "  Substance and Sexual Activity   • Alcohol use: Yes     Alcohol/week: 1.2 oz     Types: 2 Shots of liquor per week     Comment: 2 DRINKS PER DAY   • Drug use: No   • Sexual activity: Defer   Other Topics Concern   • Not on file   Social History Narrative   • Not on file       Allergies   Allergen Reactions   • Levocetirizine Dihydrochloride Myalgia     Muscle aches/joint pain       Review of Systems:     Review of Systems    Vital Signs  Vitals:    02/15/19 1554   BP: 138/72   Pulse: 62   Temp: 97.8 °F (36.6 °C)   Weight: 59 kg (130 lb)   Height: 157.5 cm (62.01\")         Current Outpatient Medications:   •  AMLACTIN 12 % lotion, , Disp: , Rfl: 1  •  amLODIPine (NORVASC) 5 MG tablet, Take 1 tablet by mouth Daily., Disp: 30 tablet, Rfl: 11  •  ascorbic acid (VITAMIN C) 1000 MG tablet, Take 1,000 mg by mouth 2 (two) times a day., Disp: , Rfl:   •  aspirin 81 MG EC tablet, Take 81 mg by mouth every morning. TOLD NOT TO STOP BEFORE SURGERY, Disp: , Rfl:   •  bisoprolol (ZEBeta) 10 MG tablet, Take 1 tablet by mouth Daily., Disp: 30 tablet, Rfl: 5  •  Calcium Citrate-Vitamin D (CALCIUM + D PO), Take 2 tablets by mouth 2 (two) times a day., Disp: , Rfl:   •  celecoxib (CeleBREX) 200 MG capsule, Take 1 capsule by mouth 2 (Two) Times a Day As Needed for Mild Pain ., Disp: 60 capsule, Rfl: 0  •  citalopram (CELEXA) 40 MG tablet, Take 1 tablet by mouth Daily., Disp: 90 tablet, Rfl: 1  •  clopidogrel (PLAVIX) 75 MG tablet, Take 1 tablet by mouth Every Night. TOLD NOT TO STOP BEFORE SURGERY, Disp: 30 tablet, Rfl: 5  •  coenzyme Q10 100 MG capsule, Take 400 mg by mouth Daily., Disp: , Rfl:   •  Fish Oil oil, Take 1 capsule by mouth Daily., Disp: , Rfl:   •  Flaxseed, Linseed, (FLAXSEED OIL) 1000 MG capsule, Take 1 capsule by mouth daily., Disp: , Rfl:   •  fluticasone (FLONASE) 50 MCG/ACT nasal spray, 2 sprays into the nostril(s) as directed by provider 2 (Two) Times a Day. Administer 2 sprays in each nostril for each dose., " "Disp: 1 bottle, Rfl: 5  •  gabapentin (NEURONTIN) 100 MG capsule, Take 2 capsules by mouth Every Evening., Disp: 60 capsule, Rfl: 2  •  losartan (COZAAR) 100 MG tablet, Take 1 tablet by mouth Daily., Disp: 30 tablet, Rfl: 11  •  Methylsulfonylmethane (MSM PO), Take 2 tablets by mouth daily., Disp: , Rfl:   •  Multiple Vitamins-Minerals (MULTIVITAMIN ADULT PO), Take 1 tablet by mouth daily., Disp: , Rfl:   •  omeprazole (PriLOSEC) 20 MG capsule, Take 20 mg by mouth daily., Disp: , Rfl:   •  rosuvastatin (CRESTOR) 20 MG tablet, Take 1 tablet by mouth Daily., Disp: 30 tablet, Rfl: 5  •  URINARY HEALTH/CRANBERRY PO, Take 1 tablet by mouth 2 (two) times a day. \"CRANACTIN\", Disp: , Rfl:     Physical Exam:    Physical Exam    Procedures      Assessment/Plan   Problem List Items Addressed This Visit     None        There are no Patient Instructions on file for this visit.    Plan of care reviewed with patient at the conclusion of today's visit. Education was provided regarding diagnosis, management, and any prescribed or recommended OTC medications.Patient verbalizes understanding of and agreement with management plan.     Alison Parekh PA-C  "

## 2019-04-11 DIAGNOSIS — M15.3 POST-TRAUMATIC OSTEOARTHRITIS OF MULTIPLE JOINTS: ICD-10-CM

## 2019-04-11 RX ORDER — GABAPENTIN 100 MG/1
200 CAPSULE ORAL EVERY EVENING
Qty: 60 CAPSULE | Refills: 2 | Status: SHIPPED | OUTPATIENT
Start: 2019-04-11 | End: 2019-07-21 | Stop reason: SDUPTHER

## 2019-04-13 ENCOUNTER — HOSPITAL ENCOUNTER (EMERGENCY)
Facility: HOSPITAL | Age: 64
Discharge: LEFT BEFORE DISCHARGE | End: 2019-04-13
Attending: EMERGENCY MEDICINE

## 2019-04-13 VITALS
HEIGHT: 62 IN | RESPIRATION RATE: 20 BRPM | HEART RATE: 55 BPM | SYSTOLIC BLOOD PRESSURE: 158 MMHG | OXYGEN SATURATION: 92 % | BODY MASS INDEX: 23.92 KG/M2 | WEIGHT: 130 LBS | TEMPERATURE: 97.5 F | DIASTOLIC BLOOD PRESSURE: 70 MMHG

## 2019-05-10 ENCOUNTER — OFFICE VISIT (OUTPATIENT)
Dept: CARDIOLOGY | Facility: CLINIC | Age: 64
End: 2019-05-10

## 2019-05-10 VITALS
HEIGHT: 62 IN | WEIGHT: 130 LBS | DIASTOLIC BLOOD PRESSURE: 58 MMHG | BODY MASS INDEX: 23.92 KG/M2 | HEART RATE: 46 BPM | SYSTOLIC BLOOD PRESSURE: 149 MMHG

## 2019-05-10 DIAGNOSIS — I10 BENIGN ESSENTIAL HYPERTENSION: ICD-10-CM

## 2019-05-10 DIAGNOSIS — E78.2 MIXED HYPERLIPIDEMIA: ICD-10-CM

## 2019-05-10 DIAGNOSIS — I73.9 PERIPHERAL ARTERIAL DISEASE (HCC): Primary | ICD-10-CM

## 2019-05-10 PROCEDURE — 99214 OFFICE O/P EST MOD 30 MIN: CPT | Performed by: INTERNAL MEDICINE

## 2019-05-10 RX ORDER — BISOPROLOL FUMARATE 5 MG/1
5 TABLET, FILM COATED ORAL DAILY
Qty: 90 TABLET | Refills: 1 | Status: SHIPPED | OUTPATIENT
Start: 2019-05-10 | End: 2019-07-19 | Stop reason: SDUPTHER

## 2019-05-10 RX ORDER — AMLODIPINE BESYLATE 10 MG/1
10 TABLET ORAL DAILY
Qty: 90 TABLET | Refills: 1 | Status: SHIPPED | OUTPATIENT
Start: 2019-05-10 | End: 2019-05-24 | Stop reason: SDUPTHER

## 2019-05-10 NOTE — PROGRESS NOTES
Subjective:     Encounter Date:05/10/2019    Patient ID: Aarti Wade is a 64 y.o. single white female, from [place], [place].  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      INTERNIST: Jannette Chapa MD  VASCULAR SURGEON: Severino Carbajal MD  UROLOGIST: Unknown (near [facility]?)  REMOTE ORTHOPEDIST: Lance Burgess MD    Chief Complaint:   Chief Complaint   Patient presents with   • peripheral arterial disease     Problem List:  1. Peripheral arterial disease:  a. Subclavian steal syndrome, 2016   b. Abnormal right femoral arterial duplex and popliteal tibial arterial duplex, on ASA and Plavix, with right femoral endarterectomy August 2016 (Dr. Carbajal)  c. Residual class I claudication without recent TIA/presyncope  2. Hypertensive cardiovascular disease:  a. Remote IV stress test over 10 years ago; negative per patient-data deficit  b. Residual class I symptoms/normal EKG with acceptable IV Lexiscan Cardiolite study suggestive of low probability for significant focal obstructive coronary artery disease  (LVEF 0.65), May 2017.  3. Hypertension  4. Hyperlipidemia; 10.8% 10-year risk; 3.6% with treatment  5. COPD with mild-moderate exertional dyspnea  6. Former smoker; smoked 1 ppd x 40 years, quit in 2016  7. Syncopal episodes years ago associated with UTIs; last one a couple of years ago; data deficit  8. Depression  9. GERD  10. History of recurrent UTIs   11. Chronic low back pain  12. Surgical history:  a. Right femoral endarterectomy, August 2016  b. Lens implant, 1990s  c. Spontaneous pneumothorax, 1960        Allergies   Allergen Reactions   • Levocetirizine Dihydrochloride Myalgia     Muscle aches/joint pain         Current Outpatient Medications:   •  AMLACTIN 12 % lotion, Daily., Disp: , Rfl: 1  •  amLODIPine (NORVASC) 5 MG tablet, Take 1 tablet by mouth Daily., Disp: 30 tablet, Rfl:  "11  •  ascorbic acid (VITAMIN C) 1000 MG tablet, Take 1,000 mg by mouth 2 (two) times a day., Disp: , Rfl:   •  aspirin 81 MG EC tablet, Take 81 mg by mouth every morning. TOLD NOT TO STOP BEFORE SURGERY, Disp: , Rfl:   •  bisoprolol (ZEBeta) 10 MG tablet, Take 1 tablet by mouth Daily., Disp: 30 tablet, Rfl: 5  •  Calcium Citrate-Vitamin D (CALCIUM + D PO), Take 2 tablets by mouth 2 (two) times a day., Disp: , Rfl:   •  celecoxib (CeleBREX) 200 MG capsule, Take 1 capsule by mouth Daily., Disp: 30 capsule, Rfl: 5  •  citalopram (CELEXA) 40 MG tablet, Take 1 tablet by mouth Daily., Disp: 90 tablet, Rfl: 1  •  clopidogrel (PLAVIX) 75 MG tablet, Take 1 tablet by mouth Every Night. TOLD NOT TO STOP BEFORE SURGERY, Disp: 30 tablet, Rfl: 5  •  coenzyme Q10 100 MG capsule, Take 400 mg by mouth Daily., Disp: , Rfl:   •  Fish Oil oil, Take 1 capsule by mouth Daily., Disp: , Rfl:   •  Flaxseed, Linseed, (FLAXSEED OIL) 1000 MG capsule, Take 1 capsule by mouth daily., Disp: , Rfl:   •  fluticasone (FLONASE) 50 MCG/ACT nasal spray, 2 sprays into the nostril(s) as directed by provider 2 (Two) Times a Day. Administer 2 sprays in each nostril for each dose., Disp: 1 bottle, Rfl: 5  •  gabapentin (NEURONTIN) 100 MG capsule, Take 2 capsules by mouth Every Evening., Disp: 60 capsule, Rfl: 2  •  losartan (COZAAR) 100 MG tablet, Take 1 tablet by mouth Daily., Disp: 30 tablet, Rfl: 11  •  Methylsulfonylmethane (MSM PO), Take 2 tablets by mouth daily., Disp: , Rfl:   •  Multiple Vitamins-Minerals (MULTIVITAMIN ADULT PO), Take 1 tablet by mouth daily., Disp: , Rfl:   •  omeprazole (PriLOSEC) 20 MG capsule, Take 20 mg by mouth daily., Disp: , Rfl:   •  rosuvastatin (CRESTOR) 20 MG tablet, Take 1 tablet by mouth Daily., Disp: 30 tablet, Rfl: 5  •  URINARY HEALTH/CRANBERRY PO, Take 1 tablet by mouth 2 (two) times a day. \"CRANACTIN\", Disp: , Rfl:     HISTORY OF PRESENT ILLNESS: Patient returns for scheduled 8-month followup. The patient denies " "any chest pressure, palpitations, presyncope, syncope, or edema.  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  Whenever she goes into [facility], she has some lightheadedness, but she does not experience this at any other time.  She believes that she is scheduled to see her physician in July 2019 for laboratory testing.  She checks her blood pressure at home, and it is normally in the 120s systolic.  Patient otherwise denies chest pain, unrelieved shortness of breath, PND, edema, palpitations, syncope or presyncope at this time.      Review of Systems   Musculoskeletal: Positive for arthritis, back pain, joint pain and joint swelling.      Obtained and otherwise negative except as outlined in problem list and HPI.    Procedures       Objective:       Vitals:    05/10/19 1352 05/10/19 1354   BP: 168/66 149/58   BP Location: Right arm Right arm   Patient Position: Sitting Standing   Pulse: (!) 43 (!) 46   Weight: 59 kg (130 lb) 59 kg (130 lb)   Height: 157.5 cm (62\") 157.5 cm (62\")   Recheck blood pressure right arm sitting was 140/58  Body mass index is 23.78 kg/m².   Last weight:  133 lbs.    Physical Exam   Constitutional: She is oriented to person, place, and time. She appears well-developed and well-nourished.   Neck: No JVD present. Carotid bruit is not present. No thyromegaly present.   Cardiovascular: Regular rhythm, S1 normal, S2 normal and normal heart sounds. Bradycardia present. Exam reveals no gallop, no S3 and no friction rub.   No murmur heard.  Pulses:       Dorsalis pedis pulses are 2+ on the right side, and 2+ on the left side.        Posterior tibial pulses are 2+ on the right side, and 2+ on the left side.   Pulmonary/Chest: Effort normal. She has decreased breath sounds. She has no wheezes. She has no rhonchi. She has no rales. "   Abdominal: Soft. She exhibits no mass. There is no hepatosplenomegaly. There is no tenderness. There is no guarding.   Bowel sounds audible x4   Musculoskeletal: Normal range of motion. She exhibits no edema.   Lymphadenopathy:     She has no cervical adenopathy.   Neurological: She is alert and oriented to person, place, and time.   Skin: Skin is warm, dry and intact. No rash noted.   Vitals reviewed.        Lab Review:   Lab Results   Component Value Date    GLUCOSE 100 01/14/2019    BUN 33 (H) 01/14/2019    CREATININE 0.85 01/14/2019    EGFRIFNONA 67 01/14/2019    BCR 38.8 (H) 01/14/2019    CO2 30.0 01/14/2019    CALCIUM 9.4 01/14/2019    ALBUMIN 4.66 01/14/2019    AST 26 01/14/2019    ALT 25 01/14/2019   Sodium - 137  Potassium - 4.8  Chloride - 103    Lab Results   Component Value Date    WBC 8.04 08/23/2016    HGB 11.7 08/23/2016    HCT 34.1 (L) 08/23/2016    MCV 90.5 08/23/2016     08/23/2016       Lab Results   Component Value Date    HGBA1C 4.40 08/19/2016       Lab Results   Component Value Date    CHOL 128 01/14/2019    CHOL 225 (H) 05/15/2017     Lab Results   Component Value Date    TRIG 95 01/14/2019    TRIG 78 05/15/2017     Lab Results   Component Value Date    HDL 56 01/14/2019    HDL 70 (H) 05/15/2017     Lab Results   Component Value Date    LDL 53 01/14/2019     (H) 05/15/2017     01/14/2019:  · Creatinine, urine - 95.1  · Microalbumin, urine - 70.4      Assessment:   Overall continued acceptable course with no interim cardiopulmonary complaints with acceptable functional status. We will defer additional diagnostic or therapeutic intervention from a cardiac perspective at this time.  The patient has bradycardia with rates in the 40s, so we will decrease her bisoprolol to 5 mg daily and increase her amlodipine to 10 mg daily.  She states she is to have laboratory studies done in July 2019 with Dr. Chapa.       Diagnosis Plan   1. Peripheral arterial disease (CMS/HCC)  Stable,  continue current cardiac medications   2. Benign essential hypertension  Decrease bisoprolol to 5 mg daily, and increase amlodipine to 10 mg daily   3. Mixed hyperlipidemia   Acceptable laboratory testing January 2019, would continue Crestor          Plan:         1. Patient to continue current medications and close follow up with the above providers with the following changes:   A. Decrease bisoprolol to 5 mg daily   B. Increase amlodipine to 10 mg daily  2. Tentative cardiology follow up in January 2020 or patient may return sooner PRN.      Scribed for Floyd Ernandez MD by Cynthia Powell, CYR[facility]. 5/10/2019  2:24 PM    I, Floyd Ernandez MD, [facility], personally performed the services described in this documentation as scribed by the above named individual in my presence, and it is both accurate and complete. At 2:27 PM on 05/10/2019

## 2019-05-23 ENCOUNTER — TELEPHONE (OUTPATIENT)
Dept: INTERNAL MEDICINE | Facility: CLINIC | Age: 64
End: 2019-05-23

## 2019-05-23 NOTE — TELEPHONE ENCOUNTER
PATIENT CALLED AND SAID THAT DR. LEONARDO MADE CHANGES IN HER BLOOD PRESSURE MEDICATION. HE INCREASED HER AMLODIPINE FROM 5MG TO 10MG AND BISOPROLOL FROM 10MG TO 5MG AND HAS BEEN HAVING CHEST PAINS EVER SINCE.    TOLD PATIENT TO CALL DR. LEONARDO'S OFFICE FOR ADVICE SINCE HE MADE THE CHANGES.  IF SHE CAN NOT GET TO THEIR OFFICE TO CALL US BACK AND IF PAINS GET WORSE GO TO ER.       FYI TO DR. VALDIVIA  (DR. SEN'S PATIENT)

## 2019-05-23 NOTE — TELEPHONE ENCOUNTER
If she is still having chest pains and unable to get a hold of cardiolgy would recoomend going to ER for further evaluation and could follow with us tomorrow

## 2019-05-24 ENCOUNTER — TELEPHONE (OUTPATIENT)
Dept: CARDIOLOGY | Facility: CLINIC | Age: 64
End: 2019-05-24

## 2019-05-24 RX ORDER — AMLODIPINE BESYLATE 5 MG/1
5 TABLET ORAL DAILY
Qty: 30 TABLET | Refills: 3 | Status: SHIPPED | OUTPATIENT
Start: 2019-05-24 | End: 2019-12-20 | Stop reason: SDUPTHER

## 2019-05-24 NOTE — TELEPHONE ENCOUNTER
Spoke with patient, advised per KTS to go back to dosages taking prior to visit which will be:  Bisoprolol 10 mg QD, Amlodipine 5 mg QD.  She needs new RX for amlodipine sent in. She will keep a record of BP/HR and symptoms and update us next week.

## 2019-05-24 NOTE — TELEPHONE ENCOUNTER
Tell her to go back to what she was taking and update us on your symptoms, blood pressure, and heart rate next week.    Thanks

## 2019-05-24 NOTE — TELEPHONE ENCOUNTER
Spoke with patient in response to VM she left with question about some medication changes made at her last appt.   Per chart review:     1. Patient to continue current medications and close follow up with the above providers with the following changes:              A. Decrease bisoprolol to 5 mg daily              B. Increase amlodipine to 10 mg daily  2. Tentative cardiology follow up in January 2020 or patient may return sooner PRN.    She states she has been having some discomfort in her chest, she states it's not bad enough to go to the ER, but she just feels something is different and wonders if she should switch her medications back to how she took them previously. She reports BP has been running 120-135 systolic, 70-78 diastolic with heart rate 50-52.  Advised will check with Dr. Ernandez for recommendations and call her back.

## 2019-05-28 ENCOUNTER — TELEPHONE (OUTPATIENT)
Dept: INTERNAL MEDICINE | Facility: CLINIC | Age: 64
End: 2019-05-28

## 2019-05-28 NOTE — TELEPHONE ENCOUNTER
PT CALLED STATING THAT YOU HAD PRESCRIBED HER SOME TRIAMCINOLONE CREAM 4-5 YRS AGO AND WANTED TO KNOW IF YOU WOULD PRESCRIBE IT AGAIN   SHE CLEANED OUT HER VENTS AT HOME AND HAS GOTTEN SOME BUG BITES   SHE SAYS SHE HAS SOME OF THE OINTMENT BUT ITS OLD  AND SHE WANTS THE CREAM    ALSO WANTS TO LET YOU KNOW THAT DR MACHADO CHANGED HER AMLODIPINE AND ANOTHER MEDICATION BUT I COULDN'T UNDERSTAND WHAT SHE SAID  BACK TO WHAT IT WAS AND SHE FEELS BETTER     I CALLED PT BACK TO GET THAT MEDICATION AND TO SEE IF SHE WOULD WANT TO BE SEEN GOT NO ANSWER AND COULDN'T LEAVE VM

## 2019-05-29 RX ORDER — TRIAMCINOLONE ACETONIDE 1 MG/G
CREAM TOPICAL 2 TIMES DAILY
Qty: 15 G | Refills: 0 | Status: SHIPPED | OUTPATIENT
Start: 2019-05-29 | End: 2022-02-09

## 2019-07-19 ENCOUNTER — OFFICE VISIT (OUTPATIENT)
Dept: INTERNAL MEDICINE | Facility: CLINIC | Age: 64
End: 2019-07-19

## 2019-07-19 ENCOUNTER — LAB (OUTPATIENT)
Dept: LAB | Facility: HOSPITAL | Age: 64
End: 2019-07-19

## 2019-07-19 VITALS
HEIGHT: 62 IN | HEART RATE: 56 BPM | SYSTOLIC BLOOD PRESSURE: 112 MMHG | WEIGHT: 133 LBS | DIASTOLIC BLOOD PRESSURE: 78 MMHG | BODY MASS INDEX: 24.48 KG/M2

## 2019-07-19 DIAGNOSIS — G89.29 CHRONIC BILATERAL LOW BACK PAIN WITHOUT SCIATICA: Chronic | ICD-10-CM

## 2019-07-19 DIAGNOSIS — E78.2 MIXED HYPERLIPIDEMIA: Chronic | ICD-10-CM

## 2019-07-19 DIAGNOSIS — I73.9 PERIPHERAL ARTERIAL DISEASE (HCC): Chronic | ICD-10-CM

## 2019-07-19 DIAGNOSIS — M85.89 OSTEOPENIA OF MULTIPLE SITES: Chronic | ICD-10-CM

## 2019-07-19 DIAGNOSIS — Z11.59 ENCOUNTER FOR HEPATITIS C SCREENING TEST FOR LOW RISK PATIENT: ICD-10-CM

## 2019-07-19 DIAGNOSIS — J44.9 COPD MIXED TYPE (HCC): Chronic | ICD-10-CM

## 2019-07-19 DIAGNOSIS — I10 BENIGN ESSENTIAL HYPERTENSION: Chronic | ICD-10-CM

## 2019-07-19 DIAGNOSIS — M12.50 TRAUMATIC ARTHRITIS: Chronic | ICD-10-CM

## 2019-07-19 DIAGNOSIS — K21.9 GASTROESOPHAGEAL REFLUX DISEASE WITHOUT ESOPHAGITIS: Chronic | ICD-10-CM

## 2019-07-19 DIAGNOSIS — Z13.820 OSTEOPOROSIS SCREENING: ICD-10-CM

## 2019-07-19 DIAGNOSIS — Z00.00 ANNUAL PHYSICAL EXAM: Primary | ICD-10-CM

## 2019-07-19 DIAGNOSIS — G47.62 NOCTURNAL LEG CRAMPS: Chronic | ICD-10-CM

## 2019-07-19 DIAGNOSIS — M54.50 CHRONIC BILATERAL LOW BACK PAIN WITHOUT SCIATICA: Chronic | ICD-10-CM

## 2019-07-19 DIAGNOSIS — I10 BENIGN ESSENTIAL HYPERTENSION: ICD-10-CM

## 2019-07-19 DIAGNOSIS — I70.221 ATHEROSCLEROSIS OF NATIVE ARTERY OF RIGHT LOWER EXTREMITY WITH REST PAIN (HCC): Chronic | ICD-10-CM

## 2019-07-19 LAB
25(OH)D3 SERPL-MCNC: 84.4 NG/ML (ref 30–100)
ALBUMIN SERPL-MCNC: 4.6 G/DL (ref 3.5–5.2)
ALBUMIN UR-MCNC: 11.4 MG/L
ALBUMIN/GLOB SERPL: 1.6 G/DL
ALP SERPL-CCNC: 43 U/L (ref 39–117)
ALT SERPL W P-5'-P-CCNC: 22 U/L (ref 1–33)
ANION GAP SERPL CALCULATED.3IONS-SCNC: 11.8 MMOL/L (ref 5–15)
AST SERPL-CCNC: 23 U/L (ref 1–32)
BACTERIA UR QL AUTO: ABNORMAL /HPF
BASOPHILS # BLD AUTO: 0.03 10*3/MM3 (ref 0–0.2)
BASOPHILS NFR BLD AUTO: 0.6 % (ref 0–1.5)
BILIRUB SERPL-MCNC: 0.6 MG/DL (ref 0.2–1.2)
BILIRUB UR QL STRIP: NEGATIVE
BUN BLD-MCNC: 25 MG/DL (ref 8–23)
BUN/CREAT SERPL: 24.3 (ref 7–25)
CALCIUM SPEC-SCNC: 9.6 MG/DL (ref 8.6–10.5)
CHLORIDE SERPL-SCNC: 100 MMOL/L (ref 98–107)
CHOLEST SERPL-MCNC: 107 MG/DL (ref 0–200)
CLARITY UR: ABNORMAL
CO2 SERPL-SCNC: 23.2 MMOL/L (ref 22–29)
COLOR UR: ABNORMAL
CREAT BLD-MCNC: 1.03 MG/DL (ref 0.57–1)
CREAT UR-MCNC: 156.9 MG/DL
DEPRECATED RDW RBC AUTO: 42 FL (ref 37–54)
EOSINOPHIL # BLD AUTO: 0.2 10*3/MM3 (ref 0–0.4)
EOSINOPHIL NFR BLD AUTO: 3.8 % (ref 0.3–6.2)
ERYTHROCYTE [DISTWIDTH] IN BLOOD BY AUTOMATED COUNT: 11.7 % (ref 12.3–15.4)
GFR SERPL CREATININE-BSD FRML MDRD: 54 ML/MIN/1.73
GLOBULIN UR ELPH-MCNC: 2.8 GM/DL
GLUCOSE BLD-MCNC: 96 MG/DL (ref 65–99)
GLUCOSE UR STRIP-MCNC: NEGATIVE MG/DL
HCT VFR BLD AUTO: 42.1 % (ref 34–46.6)
HCV AB SER DONR QL: NORMAL
HCYS SERPL-MCNC: 17.1 UMOL/L (ref 0–15)
HDLC SERPL-MCNC: 56 MG/DL (ref 40–60)
HGB BLD-MCNC: 13.5 G/DL (ref 12–15.9)
HGB UR QL STRIP.AUTO: NEGATIVE
HYALINE CASTS UR QL AUTO: ABNORMAL /LPF
IMM GRANULOCYTES # BLD AUTO: 0.01 10*3/MM3 (ref 0–0.05)
IMM GRANULOCYTES NFR BLD AUTO: 0.2 % (ref 0–0.5)
KETONES UR QL STRIP: ABNORMAL
LDLC SERPL CALC-MCNC: 38 MG/DL (ref 0–100)
LDLC/HDLC SERPL: 0.68 {RATIO}
LEUKOCYTE ESTERASE UR QL STRIP.AUTO: ABNORMAL
LYMPHOCYTES # BLD AUTO: 1.06 10*3/MM3 (ref 0.7–3.1)
LYMPHOCYTES NFR BLD AUTO: 20.3 % (ref 19.6–45.3)
MCH RBC QN AUTO: 31.2 PG (ref 26.6–33)
MCHC RBC AUTO-ENTMCNC: 32.1 G/DL (ref 31.5–35.7)
MCV RBC AUTO: 97.2 FL (ref 79–97)
MICROALBUMIN/CREAT UR: 72.7 MG/G
MONOCYTES # BLD AUTO: 0.49 10*3/MM3 (ref 0.1–0.9)
MONOCYTES NFR BLD AUTO: 9.4 % (ref 5–12)
NEUTROPHILS # BLD AUTO: 3.42 10*3/MM3 (ref 1.7–7)
NEUTROPHILS NFR BLD AUTO: 65.7 % (ref 42.7–76)
NITRITE UR QL STRIP: NEGATIVE
NRBC BLD AUTO-RTO: 0 /100 WBC (ref 0–0.2)
PH UR STRIP.AUTO: <=5 [PH] (ref 5–8)
PLATELET # BLD AUTO: 253 10*3/MM3 (ref 140–450)
PMV BLD AUTO: 11.3 FL (ref 6–12)
POTASSIUM BLD-SCNC: 5 MMOL/L (ref 3.5–5.2)
PROT SERPL-MCNC: 7.4 G/DL (ref 6–8.5)
PROT UR QL STRIP: ABNORMAL
RBC # BLD AUTO: 4.33 10*6/MM3 (ref 3.77–5.28)
RBC # UR: ABNORMAL /HPF
REF LAB TEST METHOD: ABNORMAL
SODIUM BLD-SCNC: 135 MMOL/L (ref 136–145)
SP GR UR STRIP: 1.03 (ref 1–1.03)
SQUAMOUS #/AREA URNS HPF: ABNORMAL /HPF
TRIGL SERPL-MCNC: 66 MG/DL (ref 0–150)
TSH SERPL DL<=0.05 MIU/L-ACNC: 1.77 MIU/ML (ref 0.27–4.2)
UROBILINOGEN UR QL STRIP: ABNORMAL
VLDLC SERPL-MCNC: 13.2 MG/DL (ref 5–40)
WBC NRBC COR # BLD: 5.21 10*3/MM3 (ref 3.4–10.8)
WBC UR QL AUTO: ABNORMAL /HPF

## 2019-07-19 PROCEDURE — 83090 ASSAY OF HOMOCYSTEINE: CPT

## 2019-07-19 PROCEDURE — 99397 PER PM REEVAL EST PAT 65+ YR: CPT | Performed by: INTERNAL MEDICINE

## 2019-07-19 PROCEDURE — 82570 ASSAY OF URINE CREATININE: CPT

## 2019-07-19 PROCEDURE — 80061 LIPID PANEL: CPT

## 2019-07-19 PROCEDURE — 84443 ASSAY THYROID STIM HORMONE: CPT

## 2019-07-19 PROCEDURE — 86803 HEPATITIS C AB TEST: CPT

## 2019-07-19 PROCEDURE — 82306 VITAMIN D 25 HYDROXY: CPT

## 2019-07-19 PROCEDURE — 80053 COMPREHEN METABOLIC PANEL: CPT

## 2019-07-19 PROCEDURE — 36415 COLL VENOUS BLD VENIPUNCTURE: CPT

## 2019-07-19 PROCEDURE — 81001 URINALYSIS AUTO W/SCOPE: CPT

## 2019-07-19 PROCEDURE — 85025 COMPLETE CBC W/AUTO DIFF WBC: CPT

## 2019-07-19 PROCEDURE — 94060 EVALUATION OF WHEEZING: CPT | Performed by: INTERNAL MEDICINE

## 2019-07-19 PROCEDURE — 82043 UR ALBUMIN QUANTITATIVE: CPT

## 2019-07-19 PROCEDURE — 99213 OFFICE O/P EST LOW 20 MIN: CPT | Performed by: INTERNAL MEDICINE

## 2019-07-19 RX ORDER — OLMESARTAN MEDOXOMIL 5 MG/1
5 TABLET ORAL DAILY
Qty: 30 TABLET | Refills: 5 | Status: SHIPPED | OUTPATIENT
Start: 2019-07-19 | End: 2019-09-30 | Stop reason: SDUPTHER

## 2019-07-19 RX ORDER — BISOPROLOL FUMARATE 10 MG/1
10 TABLET, FILM COATED ORAL DAILY
Refills: 1 | COMMUNITY
Start: 2019-07-06 | End: 2020-02-04 | Stop reason: SDUPTHER

## 2019-07-19 RX ORDER — ACETAMINOPHEN 650 MG/1
650 TABLET, EXTENDED RELEASE ORAL 2 TIMES DAILY
COMMUNITY

## 2019-07-19 RX ORDER — CITALOPRAM 40 MG/1
40 TABLET ORAL DAILY
Qty: 90 TABLET | Refills: 1 | Status: SHIPPED | OUTPATIENT
Start: 2019-07-19 | End: 2020-01-31 | Stop reason: SDUPTHER

## 2019-07-19 NOTE — PROGRESS NOTES
Annual Wellness visit    HEALTH RISK ASSESSMENT    1955    Recent History  No hospitalization(s) within the last year..        Current Medical Providers:  Patient Care Team:  Jannette Chapa MD as PCP - General  Jannette Chapa MD as PCP - Family Medicine  Severino Carbajal MD as Consulting Physician (Vascular Surgery)        Smoking Status:  Social History     Tobacco Use   Smoking Status Former Smoker   • Packs/day: 1.00   • Years: 40.00   • Pack years: 40.00   • Types: Electronic Cigarette, Cigarettes   • Last attempt to quit: 6/16/2016   • Years since quitting: 3.0   Smokeless Tobacco Never Used   Tobacco Comment    1 PACK/DAY SMOKER UNTIL JUNE 2016       Alcohol Consumption:  Social History     Substance and Sexual Activity   Alcohol Use Yes   • Alcohol/week: 1.2 oz   • Types: 2 Shots of liquor per week    Comment: 2 DRINKS PER DAY       Depression Screen:   No flowsheet data found.    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  She does try to eat a low-fat and healthy diet with lots of vegetables.          Recent Lab Results:  CMP:  Lab Results   Component Value Date    BUN 25 (H) 07/19/2019    CREATININE 1.03 (H) 07/19/2019    EGFRIFNONA 54 (L) 07/19/2019    BCR 24.3 07/19/2019     (L) 07/19/2019    K 5.0 07/19/2019    CO2 23.2 07/19/2019    CALCIUM 9.6 07/19/2019    ALBUMIN 4.60 07/19/2019    BILITOT 0.6 07/19/2019    ALKPHOS 43 07/19/2019    AST 23 07/19/2019    ALT 22 07/19/2019     Lipid Panel:  Lab Results   Component Value Date    CHOL 107 07/19/2019    TRIG 66 07/19/2019    HDL 56 07/19/2019    VLDL 13.2 07/19/2019    LDLHDL 0.68 07/19/2019     HbA1c:  Lab Results   Component Value Date    HGBA1C 4.40 08/19/2016           Age-appropriate Screening Schedule:  Refer to the list below for future screening recommendations based on patient's age, sex and/or medical conditions. Orders for these recommended tests are listed in the plan  section. The patient has been provided with a written plan.    Age appropriate preventive counseling done including age appropriate vaccines,regular  Mammogram and self breast exam, pap smear, colonoscopy, regular dental visits, mental health, injury prevention such as wearing seat belt and preventing falls, healthy  nutrition, healthy weight, regular physical exercise. Alcohol use is moderate.  Tobacco history-quit 2016. Drug use-none.  STD's-not at risk.        Health Maintenance   Topic Date Due   • COLONOSCOPY  05/03/2017   • ZOSTER VACCINE (2 of 3) 11/29/2017   • INFLUENZA VACCINE  08/01/2019   • DXA SCAN  10/30/2019   • LIPID PANEL  07/19/2020   • MAMMOGRAM  01/04/2021   • TDAP/TD VACCINES (2 - Td) 08/05/2025   • PNEUMOCOCCAL VACCINES (65+ LOW/MEDIUM RISK)  Completed        Subjective   History of Present Illness    Aarti Wade is a 64 y.o. female who presents for an Annual Wellness Visit and follow-up hypertension, peripheral artery disease, hyperlipidemia, COPD, GERD, traumatic arthritis multiple joints.    CHRONIC CONDITIONS    Blood pressures at home are 113-149/64-84.  Heart rates usually 53-57.  In May, Dr. Ernandez had changed her medications to decrease the bisoprolol because of the bradycardia and increase the amlodipine to make up for that.  She had palpitations and fast heartbeat and so we went back to bisoprolol 10 mg and amlodipine 5 mg.  She also takes losartan 100 mg daily.  She feels good on her medications.  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  She thinks may be she does not drink enough in the hot weather.  She sleeps well.    No chest pain or palpitations with her physical work.  No edema.    She denies getting short of breath with her physical work.  She denies cough or wheeze.  She is not on any inhalers or COPD.    Chronic joint and back pains from the past traumas are stable.  The right  shoulder is the most painful joint lately.  1 tablet of Celebrex daily helps a whole lot.  She is taking gabapentin and Tylenol at night.  That combination does pretty well.    For hyperlipidemia, she takes Crestor every evening.  She eats a low-fat diet.  Active at the farm.    For osteopenia, she does weightbearing exercises at the farm.  She takes calcium and vitamin D.    The following portions of the patient's history were reviewed and updated as appropriate: allergies, current medications, past family history, past medical history, past social history, past surgical history and problem list.    Outpatient Medications Prior to Visit   Medication Sig Dispense Refill   • acetaminophen (ACETAMINOPHEN 8 HOUR) 650 MG 8 hr tablet Take 650 mg by mouth Every 8 (Eight) Hours As Needed for Mild Pain .     • AMLACTIN 12 % lotion Daily.  1   • amLODIPine (NORVASC) 5 MG tablet Take 1 tablet by mouth Daily. 30 tablet 3   • ascorbic acid (VITAMIN C) 1000 MG tablet Take 1,000 mg by mouth 2 (two) times a day.     • aspirin 81 MG EC tablet Take 81 mg by mouth every morning. TOLD NOT TO STOP BEFORE SURGERY     • bisoprolol (ZEBeta) 10 MG tablet   1   • Calcium Citrate-Vitamin D (CALCIUM + D PO) Take 2 tablets by mouth 2 (two) times a day.     • celecoxib (CeleBREX) 200 MG capsule Take 1 capsule by mouth Daily. 30 capsule 5   • clopidogrel (PLAVIX) 75 MG tablet Take 1 tablet by mouth Every Night. TOLD NOT TO STOP BEFORE SURGERY 30 tablet 5   • coenzyme Q10 100 MG capsule Take 400 mg by mouth Daily.     • Fish Oil oil Take 1 capsule by mouth Daily.     • Flaxseed, Linseed, (FLAXSEED OIL) 1000 MG capsule Take 1 capsule by mouth daily.     • fluticasone (FLONASE) 50 MCG/ACT nasal spray 2 sprays into the nostril(s) as directed by provider 2 (Two) Times a Day. Administer 2 sprays in each nostril for each dose. 1 bottle 5   • gabapentin (NEURONTIN) 100 MG capsule Take 2 capsules by mouth Every Evening. 60 capsule 2   •  "Methylsulfonylmethane (MSM PO) Take 2 tablets by mouth daily.     • Multiple Vitamins-Minerals (MULTIVITAMIN ADULT PO) Take 1 tablet by mouth daily.     • omeprazole (PriLOSEC) 20 MG capsule Take 20 mg by mouth daily.     • rosuvastatin (CRESTOR) 20 MG tablet Take 1 tablet by mouth Daily. 30 tablet 5   • triamcinolone (KENALOG) 0.1 % cream Apply  topically to the appropriate area as directed 2 (Two) Times a Day. 15 g 0   • URINARY HEALTH/CRANBERRY PO Take 1 tablet by mouth 2 (two) times a day. \"CRANACTIN\"     • citalopram (CELEXA) 40 MG tablet Take 1 tablet by mouth Daily. 90 tablet 1   • losartan (COZAAR) 100 MG tablet Take 1 tablet by mouth Daily. 30 tablet 11   • bisoprolol (ZEBeta) 5 MG tablet Take 1 tablet by mouth Daily. 90 tablet 1     No facility-administered medications prior to visit.        Patient Active Problem List   Diagnosis   • Atherosclerosis of native artery of right lower extremity with rest pain (CMS/HCC)   • Peripheral arterial disease (CMS/HCC)   • Benign essential hypertension   • Mixed hyperlipidemia   • Allergic rhinitis   • Gastroesophageal reflux disease without esophagitis   • Osteopenia of multiple sites   • Capsulitis   • Chronic low back pain   • COPD mixed type (CMS/HCC)   • Degenerative joint disease of shoulder region   • Depressive disorder   • Keratosis pilaris   • Subclavian steal syndrome   • Nocturnal leg cramps   • Traumatic arthritis       Advance Care Planning:  Patient has an advance directive - a copy has not been provided. Have asked the patient to send this to us to add to record    Identification of Risk Factors:  Risk factors include: Chronic Pain .    Review of Systems   Constitutional: Positive for fatigue. Negative for chills and fever.   HENT: Negative for sinus pressure and sore throat.    Eyes: Negative for visual disturbance.   Respiratory: Negative for cough, shortness of breath and wheezing.    Cardiovascular: Negative for chest pain, palpitations and leg " swelling.   Gastrointestinal: Negative for abdominal pain, blood in stool, constipation, diarrhea and nausea.   Endocrine: Negative for cold intolerance and heat intolerance.   Genitourinary: Negative for dysuria, frequency and hematuria.   Musculoskeletal: Positive for arthralgias and back pain. Negative for gait problem and joint swelling.   Skin: Negative for rash and wound.   Allergic/Immunologic: Positive for environmental allergies. Negative for food allergies and immunocompromised state.   Neurological: Negative for dizziness, weakness, numbness and headaches.   Hematological: Negative for adenopathy. Does not bruise/bleed easily.   Psychiatric/Behavioral: Negative for dysphoric mood, sleep disturbance and suicidal ideas. The patient is not nervous/anxious.        Compared to one year ago, the patient feels her physical health is the same.  Compared to one year ago, the patient feels her mental health is the same.    Objective     Physical Exam   Constitutional: She is oriented to person, place, and time. She appears well-developed and well-nourished.   HENT:   Head: Normocephalic.   Eyes: Conjunctivae and EOM are normal. Pupils are equal, round, and reactive to light.   Neck: Normal range of motion. Neck supple. No thyromegaly present.   Cardiovascular: Normal rate, regular rhythm, normal heart sounds and intact distal pulses.   Pulmonary/Chest: Effort normal and breath sounds normal. She has no wheezes. Right breast exhibits no inverted nipple, no mass, no nipple discharge, no skin change and no tenderness. Left breast exhibits no inverted nipple, no mass, no nipple discharge, no skin change and no tenderness.   Abdominal: Soft. Bowel sounds are normal. There is no tenderness.   Musculoskeletal: She exhibits deformity. She exhibits no edema.        Right shoulder: She exhibits decreased range of motion and tenderness. She exhibits no swelling.        Right hand: She exhibits decreased range of motion and  deformity. She exhibits no tenderness.        Left hand: She exhibits decreased range of motion and deformity. She exhibits no tenderness.   Multiple bony deformities after remote fractures   Lymphadenopathy:        Head (right side): No submental, no submandibular, no preauricular, no posterior auricular and no occipital adenopathy present.        Head (left side): No submental, no submandibular, no preauricular, no posterior auricular and no occipital adenopathy present.     She has no cervical adenopathy.     She has no axillary adenopathy.   Neurological: She is alert and oriented to person, place, and time. She has normal strength. No cranial nerve deficit or sensory deficit. Coordination and gait normal.   Skin: Skin is warm and dry. No rash noted.   Psychiatric: She has a normal mood and affect. Her speech is normal and behavior is normal. Judgment and thought content normal. Cognition and memory are normal.   Nursing note and vitals reviewed.          ECG 12 Lead  Date/Time: 7/20/2019 4:46 PM  Performed by: Jannette Chapa MD  Authorized by: Jannette Chapa MD   Comparison: compared with previous ECG from 7/20/2018  Similar to previous ECG  Rhythm: sinus bradycardia  Rate: bradycardic  BPM: 47  Conduction: conduction normal  ST Segments: ST segments normal  T Waves: T waves normal  QRS axis: normal    Clinical impression: normal ECG        PFT's  FVC is 2.24 or 78% predicted prebronchodilator and 2.32 or 80% predicted postbronchodilator  FEV1 pre-bronchodilator is 1.52 or 69% of predicted, postbronchodilator is 1.59  FEV1/ FVC is 0.679 or 89% prebronchodilator and 0.687 postbronchodilator  FEF 25-75 is 0.76 prebronchodilator and improves to 0.86 post bronchodilator, 14% change    Moderate obstruction with improved FEF 25-75 after bronchodilator consistent with mixed COPD with bronchodilator response.      Vitals:    07/19/19 0938   BP: 112/78   BP Location: Right arm   Patient Position: Sitting   Cuff  "Size: Adult   Pulse: 56   Weight: 60.3 kg (133 lb)   Height: 157.5 cm (62\")       Patient's Body mass index is 24.33 kg/m². BMI is within normal parameters. No follow-up required..      CMP:  Lab Results   Component Value Date    BUN 25 (H) 07/19/2019    CREATININE 1.03 (H) 07/19/2019    EGFRIFNONA 54 (L) 07/19/2019    BCR 24.3 07/19/2019     (L) 07/19/2019    K 5.0 07/19/2019    CO2 23.2 07/19/2019    CALCIUM 9.6 07/19/2019    ALBUMIN 4.60 07/19/2019    BILITOT 0.6 07/19/2019    ALKPHOS 43 07/19/2019    AST 23 07/19/2019    ALT 22 07/19/2019     HbA1c:  Lab Results   Component Value Date    HGBA1C 4.40 08/19/2016     Microalbumin:  Lab Results   Component Value Date    MICROALBUR 11.4 07/19/2019     Lipid Panel  Lab Results   Component Value Date    CHOL 107 07/19/2019    TRIG 66 07/19/2019    HDL 56 07/19/2019    LDL 38 07/19/2019    AST 23 07/19/2019    ALT 22 07/19/2019       Assessment/Plan   Patient Self-Management and Personalized Health Advice  The patient has been provided with information about: exercise and prevention of cardiac or vascular disease and preventive services including:   · Blood pressure and cholesterol control and exercise.    Problem List Items Addressed This Visit        Cardiovascular and Mediastinum    Atherosclerosis of native artery of right lower extremity with rest pain (CMS/HCC)    Peripheral arterial disease (CMS/HCC)    Benign essential hypertension    Relevant Medications    amLODIPine (NORVASC) 5 MG tablet    bisoprolol (ZEBeta) 10 MG tablet    olmesartan (BENICAR) 5 MG tablet    Other Relevant Orders    CBC & Differential (Completed)    Comprehensive Metabolic Panel (Completed)    Microalbumin / Creatinine Urine Ratio - Urine, Clean Catch (Completed)    Urinalysis With Culture If Indicated - (Completed)    Mixed hyperlipidemia    Relevant Medications    rosuvastatin (CRESTOR) 20 MG tablet    Other Relevant Orders    Homocysteine (Completed)    Lipid Panel (Completed)    " TSH (Completed)       Respiratory    COPD mixed type (CMS/HCC)    Relevant Medications    fluticasone (FLONASE) 50 MCG/ACT nasal spray    Other Relevant Orders    Spirometry With Bronchodilator       Digestive    Gastroesophageal reflux disease without esophagitis    Relevant Medications    omeprazole (PriLOSEC) 20 MG capsule       Nervous and Auditory    Chronic low back pain       Musculoskeletal and Integument    Nocturnal leg cramps (Chronic)    Overview     Drink more fluids, especially in hot weather.  Also may take over-the-counter magnesium tablets or gummy's.  Up to 500 mg twice a day.         Osteopenia of multiple sites    Relevant Orders    Vitamin D 25 Hydroxy (Completed)    Traumatic arthritis      Other Visit Diagnoses     Annual physical exam    -  Primary    Encounter for hepatitis C screening test for low risk patient        Relevant Orders    Hepatitis C Antibody    Osteoporosis screening        Relevant Orders    DEXA Bone Density Axial           There are no Patient Instructions on file for this visit.    Outpatient Encounter Medications as of 7/19/2019   Medication Sig Dispense Refill   • acetaminophen (ACETAMINOPHEN 8 HOUR) 650 MG 8 hr tablet Take 650 mg by mouth Every 8 (Eight) Hours As Needed for Mild Pain .     • AMLACTIN 12 % lotion Daily.  1   • amLODIPine (NORVASC) 5 MG tablet Take 1 tablet by mouth Daily. 30 tablet 3   • ascorbic acid (VITAMIN C) 1000 MG tablet Take 1,000 mg by mouth 2 (two) times a day.     • aspirin 81 MG EC tablet Take 81 mg by mouth every morning. TOLD NOT TO STOP BEFORE SURGERY     • bisoprolol (ZEBeta) 10 MG tablet   1   • Calcium Citrate-Vitamin D (CALCIUM + D PO) Take 2 tablets by mouth 2 (two) times a day.     • celecoxib (CeleBREX) 200 MG capsule Take 1 capsule by mouth Daily. 30 capsule 5   • citalopram (CELEXA) 40 MG tablet Take 1 tablet by mouth Daily. 90 tablet 1   • clopidogrel (PLAVIX) 75 MG tablet Take 1 tablet by mouth Every Night. TOLD NOT TO STOP  "BEFORE SURGERY 30 tablet 5   • coenzyme Q10 100 MG capsule Take 400 mg by mouth Daily.     • Fish Oil oil Take 1 capsule by mouth Daily.     • Flaxseed, Linseed, (FLAXSEED OIL) 1000 MG capsule Take 1 capsule by mouth daily.     • fluticasone (FLONASE) 50 MCG/ACT nasal spray 2 sprays into the nostril(s) as directed by provider 2 (Two) Times a Day. Administer 2 sprays in each nostril for each dose. 1 bottle 5   • gabapentin (NEURONTIN) 100 MG capsule Take 2 capsules by mouth Every Evening. 60 capsule 2   • Methylsulfonylmethane (MSM PO) Take 2 tablets by mouth daily.     • Multiple Vitamins-Minerals (MULTIVITAMIN ADULT PO) Take 1 tablet by mouth daily.     • omeprazole (PriLOSEC) 20 MG capsule Take 20 mg by mouth daily.     • rosuvastatin (CRESTOR) 20 MG tablet Take 1 tablet by mouth Daily. 30 tablet 5   • triamcinolone (KENALOG) 0.1 % cream Apply  topically to the appropriate area as directed 2 (Two) Times a Day. 15 g 0   • URINARY HEALTH/CRANBERRY PO Take 1 tablet by mouth 2 (two) times a day. \"CRANACTIN\"     • [DISCONTINUED] citalopram (CELEXA) 40 MG tablet Take 1 tablet by mouth Daily. 90 tablet 1   • [DISCONTINUED] losartan (COZAAR) 100 MG tablet Take 1 tablet by mouth Daily. 30 tablet 11   • olmesartan (BENICAR) 5 MG tablet Take 1 tablet by mouth Daily. 30 tablet 5   • [DISCONTINUED] bisoprolol (ZEBeta) 5 MG tablet Take 1 tablet by mouth Daily. 90 tablet 1     No facility-administered encounter medications on file as of 7/19/2019.        Reviewed use of high risk medication in the elderly: yes  Reviewed for potential of harmful drug interactions in the elderly: yes    Follow Up:  Return in about 6 months (around 1/19/2020) for Fasting lipids and peripheral arterial disease, labs today.     An After Visit Summary and PPPS with all of these plans were given to the patient.           Note: Part of this note may be an electronic transcription/translation of spoken language to printed text using the Dragon Dictation " System.

## 2019-07-20 PROBLEM — M12.50 TRAUMATIC ARTHRITIS: Status: ACTIVE | Noted: 2019-07-20

## 2019-07-20 PROCEDURE — 93000 ELECTROCARDIOGRAM COMPLETE: CPT | Performed by: INTERNAL MEDICINE

## 2019-07-21 DIAGNOSIS — M15.3 POST-TRAUMATIC OSTEOARTHRITIS OF MULTIPLE JOINTS: ICD-10-CM

## 2019-07-22 ENCOUNTER — TELEPHONE (OUTPATIENT)
Dept: INTERNAL MEDICINE | Facility: CLINIC | Age: 64
End: 2019-07-22

## 2019-07-22 RX ORDER — CLOPIDOGREL BISULFATE 75 MG/1
75 TABLET ORAL NIGHTLY
Qty: 30 TABLET | Refills: 5 | Status: SHIPPED | OUTPATIENT
Start: 2019-07-22 | End: 2019-12-30

## 2019-07-22 RX ORDER — GABAPENTIN 100 MG/1
200 CAPSULE ORAL EVERY EVENING
Qty: 60 CAPSULE | Refills: 2 | Status: SHIPPED | OUTPATIENT
Start: 2019-07-22 | End: 2019-09-30 | Stop reason: SDUPTHER

## 2019-07-22 RX ORDER — ROSUVASTATIN CALCIUM 20 MG/1
20 TABLET, COATED ORAL DAILY
Qty: 30 TABLET | Refills: 5 | Status: SHIPPED | OUTPATIENT
Start: 2019-07-22 | End: 2019-09-30 | Stop reason: SDUPTHER

## 2019-07-22 NOTE — TELEPHONE ENCOUNTER
Regarding: Prescription Question  Contact: [phone]  ----- Message from Mercedes Trammell LPN sent at 7/22/2019  8:45 AM EDT -----       ----- Message from Aarti Wade to Jannette Chapa MD sent at 7/21/2019  9:36 AM -----   Hi Dr. Chapa,   When I saw you Friday I declined an inhaler, but after thinking about it, I think I would like to try one if that is okay. I also forgot to look at some of my meds that had no more refills which were Gabapentin, generic for crestor, and plavix..sorry, but I did put them on a refill request today on the MY Chart. Thank you and sorry to bother you.   Thanks so much,   Aarti:)

## 2019-08-01 DIAGNOSIS — E72.11 HYPERHOMOCYSTEINEMIA (HCC): Primary | ICD-10-CM

## 2019-08-01 RX ORDER — FOLIC ACID 1 MG/1
1 TABLET ORAL DAILY
Qty: 90 TABLET | Refills: 1 | Status: SHIPPED | OUTPATIENT
Start: 2019-08-01 | End: 2019-09-30 | Stop reason: SDUPTHER

## 2019-08-12 RX ORDER — CELECOXIB 200 MG/1
CAPSULE ORAL
Qty: 90 CAPSULE | Refills: 0 | Status: SHIPPED | OUTPATIENT
Start: 2019-08-12 | End: 2019-12-08 | Stop reason: SDUPTHER

## 2019-08-26 RX ORDER — FLUTICASONE PROPIONATE 50 MCG
SPRAY, SUSPENSION (ML) NASAL
Qty: 16 G | Refills: 5 | Status: SHIPPED | OUTPATIENT
Start: 2019-08-26 | End: 2020-06-30

## 2019-09-30 ENCOUNTER — OFFICE VISIT (OUTPATIENT)
Dept: INTERNAL MEDICINE | Facility: CLINIC | Age: 64
End: 2019-09-30

## 2019-09-30 VITALS
BODY MASS INDEX: 23.74 KG/M2 | SYSTOLIC BLOOD PRESSURE: 150 MMHG | TEMPERATURE: 97.8 F | WEIGHT: 129 LBS | HEIGHT: 62 IN | HEART RATE: 56 BPM | DIASTOLIC BLOOD PRESSURE: 74 MMHG

## 2019-09-30 DIAGNOSIS — G89.29 CHRONIC BILATERAL LOW BACK PAIN WITHOUT SCIATICA: ICD-10-CM

## 2019-09-30 DIAGNOSIS — I10 BENIGN ESSENTIAL HYPERTENSION: Chronic | ICD-10-CM

## 2019-09-30 DIAGNOSIS — E72.11 HYPERHOMOCYSTEINEMIA (HCC): ICD-10-CM

## 2019-09-30 DIAGNOSIS — E78.2 MIXED HYPERLIPIDEMIA: ICD-10-CM

## 2019-09-30 DIAGNOSIS — M54.50 CHRONIC BILATERAL LOW BACK PAIN WITHOUT SCIATICA: ICD-10-CM

## 2019-09-30 DIAGNOSIS — B02.9 HERPES ZOSTER WITHOUT COMPLICATION: Primary | ICD-10-CM

## 2019-09-30 DIAGNOSIS — M15.3 POST-TRAUMATIC OSTEOARTHRITIS OF MULTIPLE JOINTS: ICD-10-CM

## 2019-09-30 PROCEDURE — 99213 OFFICE O/P EST LOW 20 MIN: CPT | Performed by: NURSE PRACTITIONER

## 2019-09-30 RX ORDER — OLMESARTAN MEDOXOMIL 5 MG/1
10 TABLET ORAL DAILY
Qty: 60 TABLET | Refills: 0 | Status: SHIPPED | OUTPATIENT
Start: 2019-09-30 | End: 2019-10-29 | Stop reason: SDUPTHER

## 2019-09-30 RX ORDER — FOLIC ACID 1 MG/1
1 TABLET ORAL DAILY
Qty: 90 TABLET | Refills: 1 | Status: SHIPPED | OUTPATIENT
Start: 2019-09-30 | End: 2020-01-31 | Stop reason: SDUPTHER

## 2019-09-30 RX ORDER — GABAPENTIN 100 MG/1
200 CAPSULE ORAL EVERY EVENING
Qty: 60 CAPSULE | Refills: 2 | Status: SHIPPED | OUTPATIENT
Start: 2019-09-30 | End: 2020-01-31 | Stop reason: SDUPTHER

## 2019-09-30 RX ORDER — ROSUVASTATIN CALCIUM 20 MG/1
20 TABLET, COATED ORAL DAILY
Qty: 30 TABLET | Refills: 5 | Status: SHIPPED | OUTPATIENT
Start: 2019-09-30 | End: 2020-05-08

## 2019-09-30 NOTE — PROGRESS NOTES
Aarti Wade  1955  [id number]  Patient Care Team:  Jannette Chapa MD as PCP - General  Jannette Chapa MD as PCP - Family Medicine  Severino Harrison MD as Consulting Physician (Vascular Surgery)    Aarti Wade is a pleasant 64 y.o. female who presents for evaluation of Rash      Chief Complaint   Patient presents with   • Rash       HPI:   Rash x 8 days, left trunk, bra line, thought it was flea bites initially using calamine    BP:  htn still high, avg 140/70s at home.  No cp, sob, leg swelling  olmesartan from losartan switched a few mo ago to try and get better control    Needs refills on gabapentin, olmesartan, crestor and folic acid  Past Medical History:   Diagnosis Date   • Anxiety    • Arthritis    • Arthritis of right shoulder region    • Chronic UTI    • Circulatory disorder    • Claudication (CMS/HCC)    • COPD (chronic obstructive pulmonary disease) (CMS/HCC)    • DA (degenerative arthritis)    • Depression    • diffuse fatty liver infiltration on CT 2009   • Gastrointestinal disorder    • GERD (gastroesophageal reflux disease)    • Head injury    • Hepatitis     UNKNOWN TYPE   • Hyperlipidemia    • Hypertension    • Inflammatory arthritis    • 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 HARRISON   • COLONOSCOPY     • EYE LENS IMPLANT SECONDARY Left    • FEMORAL FEMORAL BYPASS Right 8/22/2016    Procedure: RIGHT COMMON FEMORAL ENDARTERECTOMY WITH PATCH;  Surgeon: Severino Harrison MD;  Location: [facility];  Service:    • FINGER SURGERY Left     LEFT INDEX FINGER   • INTERVENTIONAL RADIOLOGY PROCEDURE N/A  8/16/2016    Procedure: IR PTA femoral popliteal artery;  Surgeon: Severino Carbajal MD;  Location: [facility] INVASIVE LOCATION;  Service:    • TONSILLECTOMY       Family History   Problem Relation Age of Onset   • Osteoarthritis Mother    • Alzheimer's disease Mother    • Hypertension Father    • Heart attack Father    • Alcohol abuse Father    • No Known Problems Sister    • No Known Problems Brother    • No Known Problems Daughter    • No Known Problems Son    • Breast cancer Maternal Grandmother 50   • Breast cancer Paternal Grandmother 50   • No Known Problems Maternal Aunt    • No Known Problems Paternal Aunt    • Other Other    • Heart attack Other    • Coronary artery disease Paternal Grandfather    • Stroke Paternal Grandfather    • Ovarian cancer Neg Hx    • Endometrial cancer Neg Hx      Social History     Tobacco Use   Smoking Status Former Smoker   • Packs/day: 1.00   • Years: 40.00   • Pack years: 40.00   • Types: Electronic Cigarette, Cigarettes   • Last attempt to quit: 6/16/2016   • Years since quitting: 3.2   Smokeless Tobacco Never Used   Tobacco Comment    1 PACK/DAY SMOKER UNTIL JUNE 2016     Allergies   Allergen Reactions   • Levocetirizine Dihydrochloride Myalgia     Muscle aches/joint pain       Current Outpatient Medications:   •  acetaminophen (ACETAMINOPHEN 8 HOUR) 650 MG 8 hr tablet, Take 650 mg by mouth Every 8 (Eight) Hours As Needed for Mild Pain ., Disp: , Rfl:   •  AMLACTIN 12 % lotion, Daily., Disp: , Rfl: 1  •  amLODIPine (NORVASC) 5 MG tablet, Take 1 tablet by mouth Daily., Disp: 30 tablet, Rfl: 3  •  ascorbic acid (VITAMIN C) 1000 MG tablet, Take 1,000 mg by mouth 2 (two) times a day., Disp: , Rfl:   •  aspirin 81 MG EC tablet, Take 81 mg by mouth every morning. TOLD NOT TO STOP BEFORE SURGERY, Disp: , Rfl:   •  bisoprolol (ZEBeta) 10 MG tablet, Take 10 mg by mouth Daily., Disp: , Rfl: 1  •  Calcium Citrate-Vitamin D (CALCIUM + D PO), Take 2 tablets by mouth 2 (two) times a  "day., Disp: , Rfl:   •  celecoxib (CeleBREX) 200 MG capsule, take 1 capsule by mouth once daily with food, Disp: 90 capsule, Rfl: 0  •  citalopram (CELEXA) 40 MG tablet, Take 1 tablet by mouth Daily., Disp: 90 tablet, Rfl: 1  •  clopidogrel (PLAVIX) 75 MG tablet, Take 1 tablet by mouth Every Night. TOLD NOT TO STOP BEFORE SURGERY, Disp: 30 tablet, Rfl: 5  •  Coenzyme Q10 200 MG capsule, Take 400 mg by mouth Daily., Disp: , Rfl:   •  Fish Oil oil, Take 1 capsule by mouth Daily., Disp: , Rfl:   •  Flaxseed, Linseed, (FLAXSEED OIL) 1000 MG capsule, Take 1 capsule by mouth daily., Disp: , Rfl:   •  fluticasone (FLONASE) 50 MCG/ACT nasal spray, instill 2 sprays into each nostril twice a day, Disp: 16 g, Rfl: 5  •  folic acid (FOLVITE) 1 MG tablet, Take 1 tablet by mouth Daily., Disp: 90 tablet, Rfl: 1  •  gabapentin (NEURONTIN) 100 MG capsule, Take 2 capsules by mouth Every Evening., Disp: 60 capsule, Rfl: 2  •  Methylsulfonylmethane (MSM PO), Take 2 tablets by mouth daily., Disp: , Rfl:   •  Multiple Vitamins-Minerals (MULTIVITAMIN ADULT PO), Take 1 tablet by mouth daily., Disp: , Rfl:   •  olmesartan (BENICAR) 5 MG tablet, Take 2 tablets by mouth Daily., Disp: 60 tablet, Rfl: 0  •  omeprazole (PriLOSEC) 20 MG capsule, Take 20 mg by mouth daily., Disp: , Rfl:   •  rosuvastatin (CRESTOR) 20 MG tablet, Take 1 tablet by mouth Daily., Disp: 30 tablet, Rfl: 5  •  triamcinolone (KENALOG) 0.1 % cream, Apply  topically to the appropriate area as directed 2 (Two) Times a Day., Disp: 15 g, Rfl: 0  •  URINARY HEALTH/CRANBERRY PO, Take 1 tablet by mouth 2 (two) times a day. \"CRANACTIN\", Disp: , Rfl:   •  umeclidinium-vilanterol (ANORO ELLIPTA) 62.5-25 MCG/INH aerosol powder  inhaler, Inhale 1 puff Daily., Disp: 60 each, Rfl: 3    Review of Systems   Constitutional: Negative for chills, fatigue and fever.   HENT: Negative for congestion, ear pain and sinus pressure.    Respiratory: Negative for cough, chest tightness, shortness of " "breath and wheezing.    Cardiovascular: Negative for chest pain and palpitations.   Gastrointestinal: Negative for abdominal pain, blood in stool and constipation.   Skin: Negative for color change.   Allergic/Immunologic: Negative for environmental allergies.   Neurological: Negative for dizziness, speech difficulty and headache.   Psychiatric/Behavioral: Negative for decreased concentration. The patient is nervous/anxious.      /74 (BP Location: Right arm, Patient Position: Sitting, Cuff Size: Adult)   Pulse 56   Temp 97.8 °F (36.6 °C) (Temporal)   Ht 157.5 cm (62.01\")   Wt 58.5 kg (129 lb)   BMI 23.59 kg/m²     Physical Exam   Constitutional: She appears well-developed and well-nourished.   Pulmonary/Chest: Effort normal.   Skin: Skin is warm and dry. Rash noted. Rash is vesicular.   Drying, no secondary infx, no weeping   Psychiatric: She has a normal mood and affect. Her behavior is normal.   Vitals reviewed.      Results Review:  None    Assessment/Plan:  Aarti was seen today for rash.    Diagnoses and all orders for this visit:    Herpes zoster without complication    Benign essential hypertension  Comments:  To improve blood pressure control, change losartan to olmesartan 40 mg tablet daily.Continue 10 mg bisoprolol and 5 mg amlodipine daily.Avoid salt in the diet  Orders:  -     olmesartan (BENICAR) 5 MG tablet; Take 2 tablets by mouth Daily.    Post-traumatic osteoarthritis of multiple joints  -     gabapentin (NEURONTIN) 100 MG capsule; Take 2 capsules by mouth Every Evening.    Hyperhomocysteinemia (CMS/HCC)  -     folic acid (FOLVITE) 1 MG tablet; Take 1 tablet by mouth Daily.    Mixed hyperlipidemia    Chronic bilateral low back pain without sciatica    Other orders  -     rosuvastatin (CRESTOR) 20 MG tablet; Take 1 tablet by mouth Daily.       Patient Instructions   Shingles    Shingles is an infection. It gives you a painful skin rash and blisters that have fluid in them. Shingles is " caused by the same germ (virus) that causes chickenpox.  Shingles only happens in people who:  · Have had chickenpox.  · Have been given a shot of medicine (vaccine) to protect against chickenpox. Shingles is rare in this group.  The first symptoms of shingles may be itching, tingling, or pain in an area on your skin. A rash will show on your skin a few days or weeks later. The rash is likely to be on one side of your body. The rash usually has a shape like a belt or a band. Over time, the rash turns into fluid-filled blisters. The blisters will break open, change into scabs, and dry up. Medicines may:  · Help with pain and itching.  · Help you get better sooner.  · Help to prevent long-term problems.  Follow these instructions at home:  Medicines  · Take over-the-counter and prescription medicines only as told by your doctor.  · Put on an anti-itch cream or numbing cream where you have a rash, blisters, or scabs. Do this as told by your doctor.  Helping with itching and discomfort    · Put cold, wet cloths (cold compresses) on the area of the rash or blisters as told by your doctor.  · Cool baths can help you feel better. Try adding baking soda or dry oatmeal to the water to lessen itching. Do not bathe in hot water.  Blister and rash care  · Keep your rash covered with a loose bandage (dressing).  · Wear loose clothing that does not rub on your rash.  · Keep your rash and blisters clean. To do this, wash the area with mild soap and cool water as told by your doctor.  · Check your rash every day for signs of infection. Check for:  ? More redness, swelling, or pain.  ? Fluid or blood.  ? Warmth.  ? Pus or a bad smell.  · Do not scratch your rash. Do not pick at your blisters. To help you to not scratch:  ? Keep your fingernails clean and cut short.  ? Wear gloves or mittens when you sleep, if scratching is a problem.  General instructions  · Rest as told by your doctor.  · Keep all follow-up visits as told by your  doctor. This is important.  · Wash your hands often with soap and water. If soap and water are not available, use hand . Doing this lowers your chance of getting a skin infection caused by germs (bacteria).  · Your infection can cause chickenpox in people who have never had chickenpox or never got a shot of chickenpox vaccine. If you have blisters that did not change into scabs yet, try not to touch other people or be around other people, especially:  ? Babies.  ? Pregnant women.  ? Children who have areas of red, itchy, or rough skin (eczema).  ? Very old people who have transplants.  ? People who have a long-term (chronic) sickness, like cancer or AIDS.  Contact a doctor if:  · Your pain does not get better with medicine.  · Your pain does not get better after the rash heals.  · You have any signs of infection in the rash area. These signs include:  ? More redness, swelling, or pain around the rash.  ? Fluid or blood coming from the rash.  ? The rash area feeling warm to the touch.  ? Pus or a bad smell coming from the rash.  Get help right away if:  · The rash is on your face or nose.  · You have pain in your face or pain by your eye.  · You lose feeling on one side of your face.  · You have trouble seeing.  · You have ear pain, or you have ringing in your ear.  · You have a loss of taste.  · Your condition gets worse.  Summary  · Shingles gives you a painful skin rash and blisters that have fluid in them.  · Shingles is an infection. It is caused by the same germ (virus) that causes chickenpox.  · Keep your rash covered with a loose bandage (dressing). Wear loose clothing that does not rub on your rash.  · If you have blisters that did not change into scabs yet, try not to touch other people or be around people.  This information is not intended to replace advice given to you by your health care provider. Make sure you discuss any questions you have with your health care provider.  Document Released:  06/05/2009 Document Revised: 08/22/2018 Document Reviewed: 08/22/2018  [employer] Interactive Patient Education © 2019 [employer]      This patient is on a controlled substance which improves symptoms/quality of life and is aware of the risks, benefits and possible side-effects current treatment. The patient denies any medication side-effects at this time. A controlled substance agreement will be obtained or is currently on file. We reviewed required monitoring for controlled substances including but not limited to quarterly follow-up visits, annual depression screening, and urine drug screens to which the patient is agreeable. A WOOD report has been or shortly will be reviewed. There are no signs of deviation or misuse.         Plan of care reviewed with patient at the conclusion of today's visit. Education was provided regarding diagnosis, management and any prescribed or recommended OTC medications.  Patient verbalizes understanding of and agreement with management plan.    Return in about 1 month (around 10/30/2019) for Recheck.    *Note that portions of this note were completed with a voice recognition program.  Efforts were made to edit the dictation but occasionally words are transcribed.    CYRUS Gallegos

## 2019-09-30 NOTE — PATIENT INSTRUCTIONS
Shingles    Shingles is an infection. It gives you a painful skin rash and blisters that have fluid in them. Shingles is caused by the same germ (virus) that causes chickenpox.  Shingles only happens in people who:  · Have had chickenpox.  · Have been given a shot of medicine (vaccine) to protect against chickenpox. Shingles is rare in this group.  The first symptoms of shingles may be itching, tingling, or pain in an area on your skin. A rash will show on your skin a few days or weeks later. The rash is likely to be on one side of your body. The rash usually has a shape like a belt or a band. Over time, the rash turns into fluid-filled blisters. The blisters will break open, change into scabs, and dry up. Medicines may:  · Help with pain and itching.  · Help you get better sooner.  · Help to prevent long-term problems.  Follow these instructions at home:  Medicines  · Take over-the-counter and prescription medicines only as told by your doctor.  · Put on an anti-itch cream or numbing cream where you have a rash, blisters, or scabs. Do this as told by your doctor.  Helping with itching and discomfort    · Put cold, wet cloths (cold compresses) on the area of the rash or blisters as told by your doctor.  · Cool baths can help you feel better. Try adding baking soda or dry oatmeal to the water to lessen itching. Do not bathe in hot water.  Blister and rash care  · Keep your rash covered with a loose bandage (dressing).  · Wear loose clothing that does not rub on your rash.  · Keep your rash and blisters clean. To do this, wash the area with mild soap and cool water as told by your doctor.  · Check your rash every day for signs of infection. Check for:  ? More redness, swelling, or pain.  ? Fluid or blood.  ? Warmth.  ? Pus or a bad smell.  · Do not scratch your rash. Do not pick at your blisters. To help you to not scratch:  ? Keep your fingernails clean and cut short.  ? Wear gloves or mittens when you sleep, if  scratching is a problem.  General instructions  · Rest as told by your doctor.  · Keep all follow-up visits as told by your doctor. This is important.  · Wash your hands often with soap and water. If soap and water are not available, use hand . Doing this lowers your chance of getting a skin infection caused by germs (bacteria).  · Your infection can cause chickenpox in people who have never had chickenpox or never got a shot of chickenpox vaccine. If you have blisters that did not change into scabs yet, try not to touch other people or be around other people, especially:  ? Babies.  ? Pregnant women.  ? Children who have areas of red, itchy, or rough skin (eczema).  ? Very old people who have transplants.  ? People who have a long-term (chronic) sickness, like cancer or AIDS.  Contact a doctor if:  · Your pain does not get better with medicine.  · Your pain does not get better after the rash heals.  · You have any signs of infection in the rash area. These signs include:  ? More redness, swelling, or pain around the rash.  ? Fluid or blood coming from the rash.  ? The rash area feeling warm to the touch.  ? Pus or a bad smell coming from the rash.  Get help right away if:  · The rash is on your face or nose.  · You have pain in your face or pain by your eye.  · You lose feeling on one side of your face.  · You have trouble seeing.  · You have ear pain, or you have ringing in your ear.  · You have a loss of taste.  · Your condition gets worse.  Summary  · Shingles gives you a painful skin rash and blisters that have fluid in them.  · Shingles is an infection. It is caused by the same germ (virus) that causes chickenpox.  · Keep your rash covered with a loose bandage (dressing). Wear loose clothing that does not rub on your rash.  · If you have blisters that did not change into scabs yet, try not to touch other people or be around people.  This information is not intended to replace advice given to you by  your health care provider. Make sure you discuss any questions you have with your health care provider.  Document Released: 06/05/2009 Document Revised: 08/22/2018 Document Reviewed: 08/22/2018  [employer] Interactive Patient Education © 2019 [employer]      This patient is on a controlled substance which improves symptoms/quality of life and is aware of the risks, benefits and possible side-effects current treatment. The patient denies any medication side-effects at this time. A controlled substance agreement will be obtained or is currently on file. We reviewed required monitoring for controlled substances including but not limited to quarterly follow-up visits, annual depression screening, and urine drug screens to which the patient is agreeable. A WOOD report has been or shortly will be reviewed. There are no signs of deviation or misuse.

## 2019-10-29 ENCOUNTER — OFFICE VISIT (OUTPATIENT)
Dept: INTERNAL MEDICINE | Facility: CLINIC | Age: 64
End: 2019-10-29

## 2019-10-29 DIAGNOSIS — I10 BENIGN ESSENTIAL HYPERTENSION: Chronic | ICD-10-CM

## 2019-10-29 PROCEDURE — 99213 OFFICE O/P EST LOW 20 MIN: CPT | Performed by: NURSE PRACTITIONER

## 2019-10-29 RX ORDER — OLMESARTAN MEDOXOMIL 20 MG/1
20 TABLET ORAL DAILY
Qty: 30 TABLET | Refills: 0 | Status: SHIPPED | OUTPATIENT
Start: 2019-10-29 | End: 2019-11-06 | Stop reason: SDUPTHER

## 2019-10-29 NOTE — PATIENT INSTRUCTIONS
Increase olmesartan to 20 mg from 10mg daily.  Continue other regular medications.  Check BP at home and keep a log for your next visit.    •In general, adults should engage in aerobic physical activity 3-4 times a week with each session lasting an average of 40 minutes.  Goal for 150 minutes per week total.  •Moderate (brisk walking or jogging) to vigorous (running or biking) physical activity is recommended.  •There are many helpful strategies for heart-healthy eating, including the DASH diet and the [employer] Choose My Plate.   •Patients with high blood pressure should consume no more than 2,400 mg of sodium a day, ideally reducing sodium intake to 1,500 mg a day. However, even reducing sodium intake in one's current diet by 1,000 mg each day can help lower blood pressure.    Limit alcohol consumption.    Information obtained from the American College of Cardiology and American Heart Association.

## 2019-10-29 NOTE — PROGRESS NOTES
Aarti Wade  1955  [id number]  Patient Care Team:  Jannette Chapa MD as PCP - General  Jannette Chapa MD as PCP - Family Medicine  Severino Harrison MD as Consulting Physician (Vascular Surgery)    Aarti Wade is a pleasant 64 y.o. female who presents for evaluation of Herpes Zoster (1 month follow up )      Chief Complaint   Patient presents with   • Herpes Zoster     1 month follow up        HPI:   bp f/u:  Not checking bp at home in past few weeks, we increased olmesartan from 5 to 10mg last month.  No change in diet, activity but inc stress at work.  Denies headaches, chest pain, leg swelling.  Compliant with all medications including amlodipine 5 mg, bisoprolol 10 mg, and olmesartan.  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 HARRISON   • COLONOSCOPY     • EYE LENS IMPLANT SECONDARY Left    • FEMORAL FEMORAL BYPASS Right 8/22/2016    Procedure: RIGHT COMMON FEMORAL ENDARTERECTOMY WITH PATCH;  Surgeon: Severino Harrison MD;  Location: [facility] OR;  Service:    • FINGER SURGERY Left     LEFT  INDEX FINGER   • INTERVENTIONAL RADIOLOGY PROCEDURE N/A 8/16/2016    Procedure: IR PTA femoral popliteal artery;  Surgeon: Severino Carbajal MD;  Location: [facility] INVASIVE LOCATION;  Service:    • TONSILLECTOMY       Family History   Problem Relation Age of Onset   • Osteoarthritis Mother    • Alzheimer's disease Mother    • Hypertension Father    • Heart attack Father    • Alcohol abuse Father    • No Known Problems Sister    • No Known Problems Brother    • No Known Problems Daughter    • No Known Problems Son    • Breast cancer Maternal Grandmother 50   • Breast cancer Paternal Grandmother 50   • No Known Problems Maternal Aunt    • No Known Problems Paternal Aunt    • Other Other    • Heart attack Other    • Coronary artery disease Paternal Grandfather    • Stroke Paternal Grandfather    • Ovarian cancer Neg Hx    • Endometrial cancer Neg Hx      Social History     Tobacco Use   Smoking Status Former Smoker   • Packs/day: 1.00   • Years: 40.00   • Pack years: 40.00   • Types: Electronic Cigarette, Cigarettes   • Last attempt to quit: 6/16/2016   • Years since quitting: 3.3   Smokeless Tobacco Never Used   Tobacco Comment    1 PACK/DAY SMOKER UNTIL JUNE 2016     Allergies   Allergen Reactions   • Levocetirizine Dihydrochloride Myalgia     Muscle aches/joint pain       Current Outpatient Medications:   •  acetaminophen (ACETAMINOPHEN 8 HOUR) 650 MG 8 hr tablet, Take 650 mg by mouth Every 8 (Eight) Hours As Needed for Mild Pain ., Disp: , Rfl:   •  AMLACTIN 12 % lotion, Daily., Disp: , Rfl: 1  •  amLODIPine (NORVASC) 5 MG tablet, Take 1 tablet by mouth Daily., Disp: 30 tablet, Rfl: 3  •  ascorbic acid (VITAMIN C) 1000 MG tablet, Take 1,000 mg by mouth 2 (two) times a day., Disp: , Rfl:   •  aspirin 81 MG EC tablet, Take 81 mg by mouth every morning. TOLD NOT TO STOP BEFORE SURGERY, Disp: , Rfl:   •  bisoprolol (ZEBeta) 10 MG tablet, Take 10 mg by mouth Daily., Disp: , Rfl: 1  •  Calcium Citrate-Vitamin D  "(CALCIUM + D PO), Take 2 tablets by mouth 2 (two) times a day., Disp: , Rfl:   •  celecoxib (CeleBREX) 200 MG capsule, take 1 capsule by mouth once daily with food, Disp: 90 capsule, Rfl: 0  •  citalopram (CELEXA) 40 MG tablet, Take 1 tablet by mouth Daily., Disp: 90 tablet, Rfl: 1  •  clopidogrel (PLAVIX) 75 MG tablet, Take 1 tablet by mouth Every Night. TOLD NOT TO STOP BEFORE SURGERY, Disp: 30 tablet, Rfl: 5  •  Coenzyme Q10 200 MG capsule, Take 400 mg by mouth Daily., Disp: , Rfl:   •  Fish Oil oil, Take 1 capsule by mouth Daily., Disp: , Rfl:   •  Flaxseed, Linseed, (FLAXSEED OIL) 1000 MG capsule, Take 1 capsule by mouth daily., Disp: , Rfl:   •  fluticasone (FLONASE) 50 MCG/ACT nasal spray, instill 2 sprays into each nostril twice a day, Disp: 16 g, Rfl: 5  •  folic acid (FOLVITE) 1 MG tablet, Take 1 tablet by mouth Daily., Disp: 90 tablet, Rfl: 1  •  gabapentin (NEURONTIN) 100 MG capsule, Take 2 capsules by mouth Every Evening., Disp: 60 capsule, Rfl: 2  •  Methylsulfonylmethane (MSM PO), Take 2 tablets by mouth daily., Disp: , Rfl:   •  Multiple Vitamins-Minerals (MULTIVITAMIN ADULT PO), Take 1 tablet by mouth daily., Disp: , Rfl:   •  olmesartan (BENICAR) 20 MG tablet, Take 1 tablet by mouth Daily., Disp: 30 tablet, Rfl: 0  •  omeprazole (PriLOSEC) 20 MG capsule, Take 20 mg by mouth daily., Disp: , Rfl:   •  rosuvastatin (CRESTOR) 20 MG tablet, Take 1 tablet by mouth Daily., Disp: 30 tablet, Rfl: 5  •  triamcinolone (KENALOG) 0.1 % cream, Apply  topically to the appropriate area as directed 2 (Two) Times a Day., Disp: 15 g, Rfl: 0  •  umeclidinium-vilanterol (ANORO ELLIPTA) 62.5-25 MCG/INH aerosol powder  inhaler, Inhale 1 puff Daily., Disp: 60 each, Rfl: 3  •  URINARY HEALTH/CRANBERRY PO, Take 1 tablet by mouth 2 (two) times a day. \"CRANACTIN\", Disp: , Rfl:     Review of Systems   Constitutional: Negative for chills, fatigue and fever.   HENT: Positive for congestion. Negative for ear pain and sinus " "pressure.    Respiratory: Negative for cough, chest tightness, shortness of breath and wheezing.    Cardiovascular: Negative for chest pain and palpitations.   Gastrointestinal: Negative for abdominal pain, blood in stool and constipation.   Skin: Negative for color change.   Allergic/Immunologic: Negative for environmental allergies.   Neurological: Negative for dizziness, speech difficulty and headache.   Psychiatric/Behavioral: Negative for decreased concentration. The patient is not nervous/anxious.      /78   Pulse 60   Temp 97.2 °F (36.2 °C) (Temporal)   Ht 157.5 cm (62.01\")   Wt 59.4 kg (131 lb)   BMI 23.95 kg/m²     Physical Exam   Constitutional: She appears well-developed and well-nourished.   HENT:   Head: Normocephalic and atraumatic.   Right Ear: External ear normal.   Left Ear: External ear normal.   Mouth/Throat: Oropharynx is clear and moist.   Eyes: Conjunctivae and EOM are normal.   Neck: Normal range of motion. Neck supple.   Cardiovascular: Normal rate, regular rhythm and normal heart sounds.   Pulmonary/Chest: Effort normal and breath sounds normal.   Musculoskeletal: Normal range of motion.   Neurological: She is alert.   Skin: Skin is warm and dry.   Psychiatric: She has a normal mood and affect. Her behavior is normal. Thought content normal.       Results Review:  None    Assessment/Plan:  Aarti was seen today for herpes zoster.    Diagnoses and all orders for this visit:    Benign essential hypertension  Comments:  To improve blood pressure control, change losartan to olmesartan 40 mg tablet daily.Continue 10 mg bisoprolol and 5 mg amlodipine daily.Avoid salt in the diet  Orders:  -     olmesartan (BENICAR) 20 MG tablet; Take 1 tablet by mouth Daily.       Patient Instructions   Increase olmesartan to 20 mg from 10mg daily.  Continue other regular medications.  Check BP at home and keep a log for your next visit.    •In general, adults should engage in aerobic physical activity " 3-4 times a week with each session lasting an average of 40 minutes.  Goal for 150 minutes per week total.  •Moderate (brisk walking or jogging) to vigorous (running or biking) physical activity is recommended.  •There are many helpful strategies for heart-healthy eating, including the DASH diet and the [employer] Choose My Plate.   •Patients with high blood pressure should consume no more than 2,400 mg of sodium a day, ideally reducing sodium intake to 1,500 mg a day. However, even reducing sodium intake in one's current diet by 1,000 mg each day can help lower blood pressure.    Limit alcohol consumption.    Information obtained from the American College of Cardiology and American Heart Association.      Plan of care reviewed with patient at the conclusion of today's visit. Education was provided regarding diagnosis, management and any prescribed or recommended OTC medications.  Patient verbalizes understanding of and agreement with management plan.    Return in about 2 weeks (around 11/12/2019).    *Note that portions of this note were completed with a voice recognition program.  Efforts were made to edit the dictation but occasionally words are transcribed.    CYRUS Gallegos

## 2019-10-30 VITALS
HEART RATE: 60 BPM | BODY MASS INDEX: 24.11 KG/M2 | WEIGHT: 131 LBS | DIASTOLIC BLOOD PRESSURE: 78 MMHG | TEMPERATURE: 97.2 F | HEIGHT: 62 IN | SYSTOLIC BLOOD PRESSURE: 172 MMHG

## 2019-10-31 ENCOUNTER — TELEPHONE (OUTPATIENT)
Dept: INTERNAL MEDICINE | Facility: CLINIC | Age: 64
End: 2019-10-31

## 2019-11-04 ENCOUNTER — APPOINTMENT (OUTPATIENT)
Dept: BONE DENSITY | Facility: HOSPITAL | Age: 64
End: 2019-11-04

## 2019-11-06 ENCOUNTER — OFFICE VISIT (OUTPATIENT)
Dept: INTERNAL MEDICINE | Facility: CLINIC | Age: 64
End: 2019-11-06

## 2019-11-06 VITALS
SYSTOLIC BLOOD PRESSURE: 166 MMHG | DIASTOLIC BLOOD PRESSURE: 72 MMHG | HEART RATE: 60 BPM | BODY MASS INDEX: 24.29 KG/M2 | TEMPERATURE: 97.2 F | WEIGHT: 132 LBS | HEIGHT: 62 IN

## 2019-11-06 DIAGNOSIS — R07.89 CHEST PRESSURE: ICD-10-CM

## 2019-11-06 DIAGNOSIS — I73.9 PERIPHERAL ARTERIAL DISEASE (HCC): ICD-10-CM

## 2019-11-06 DIAGNOSIS — I10 BENIGN ESSENTIAL HYPERTENSION: Chronic | ICD-10-CM

## 2019-11-06 DIAGNOSIS — I10 ESSENTIAL HYPERTENSION: Primary | ICD-10-CM

## 2019-11-06 PROCEDURE — 99213 OFFICE O/P EST LOW 20 MIN: CPT | Performed by: NURSE PRACTITIONER

## 2019-11-06 PROCEDURE — 93000 ELECTROCARDIOGRAM COMPLETE: CPT | Performed by: NURSE PRACTITIONER

## 2019-11-06 RX ORDER — OLMESARTAN MEDOXOMIL 20 MG/1
20 TABLET ORAL DAILY
Qty: 30 TABLET | Refills: 0 | Status: SHIPPED | OUTPATIENT
Start: 2019-11-06 | End: 2019-11-11 | Stop reason: SDUPTHER

## 2019-11-06 RX ORDER — HYDROCHLOROTHIAZIDE 12.5 MG/1
12.5 TABLET ORAL DAILY
Qty: 30 TABLET | Refills: 2 | Status: SHIPPED | OUTPATIENT
Start: 2019-11-06 | End: 2020-01-31 | Stop reason: SDUPTHER

## 2019-11-06 NOTE — PATIENT INSTRUCTIONS
Continue bisoprolol 10mg daily, amlodipine 5mg daily, olmesartan 40mg daily, and aspirin 81mg daily.  Adding hctz 12.5 mg daily.  Increase fluid intake.    Ordering echo and carotid duplex studies.  Follow up here in 2 weeks.

## 2019-11-06 NOTE — PROGRESS NOTES
"Aarti Wade  1955  [id number]  Patient Care Team:  Jannette Chapa MD as PCP - General  Jannette Chapa MD as PCP - Family Medicine  Severino Carbajal MD as Consulting Physician (Vascular Surgery)    Aarti Wade is a pleasant 64 y.o. female who presents for evaluation of Hypertension (1 week follow up )    Chief Complaint   Patient presents with   • Hypertension     1 week follow up        HPI:   Blood pressure remains high despite continued medication adjustments.  See home records (scanned).  Many blood pressure still in the 160s over 80s. she came for her one-month follow-up on 1029 blood pressure was still high.  We increased olmesartan from 10 mg to 20 mg and advised her to continue bisoprolol 10 mg and amlodipine 5 mg.  Today she reports that she has had some chest wall discomfort although she has not mentioned this because she thought this was secondary to her recent zoster outbreak.  Pressure has been intermittent, mild, \"uncomfortable\".  No dyspnea more than her baseline COPD.  Has had increased stress at work, no change in diet, activity.  Compliant with meds.  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 HARRISON   • COLONOSCOPY     • EYE LENS IMPLANT SECONDARY Left    • FEMORAL FEMORAL BYPASS Right 8/22/2016    Procedure: RIGHT COMMON FEMORAL ENDARTERECTOMY WITH PATCH;  Surgeon: Severino Harrison MD;  Location:  [facility] OR;  Service:    • FINGER SURGERY Left     LEFT INDEX FINGER   • INTERVENTIONAL RADIOLOGY PROCEDURE N/A 8/16/2016    Procedure: IR PTA femoral popliteal artery;  Surgeon: Severino Harrison MD;  Location:  [facility] INVASIVE LOCATION;  Service:    • TONSILLECTOMY       Family History   Problem Relation Age of Onset   • Osteoarthritis Mother    • Alzheimer's disease Mother    • Hypertension Father    • Heart attack Father    • Alcohol abuse Father    • No Known Problems Sister    • No Known Problems Brother    • No Known Problems Daughter    • No Known Problems Son    • Breast cancer Maternal Grandmother 50   • Breast cancer Paternal Grandmother 50   • No Known Problems Maternal Aunt    • No Known Problems Paternal Aunt    • Other Other    • Heart attack Other    • Coronary artery disease Paternal Grandfather    • Stroke Paternal Grandfather    • Ovarian cancer Neg Hx    • Endometrial cancer Neg Hx      Social History     Tobacco Use   Smoking Status Former Smoker   • Packs/day: 1.00   • Years: 40.00   • Pack years: 40.00   • Types: Electronic Cigarette, Cigarettes   • Last attempt to quit: 6/16/2016   • Years since quitting: 3.3   Smokeless Tobacco Never Used   Tobacco Comment    1 PACK/DAY SMOKER UNTIL JUNE 2016     Allergies   Allergen Reactions   • Levocetirizine Dihydrochloride Myalgia     Muscle aches/joint pain       Current Outpatient Medications:   •  acetaminophen (ACETAMINOPHEN 8 HOUR) 650 MG 8 hr tablet, Take 650 mg by mouth Every 8 (Eight) Hours As Needed for Mild Pain ., Disp: , Rfl:   •  AMLACTIN 12 % lotion, Daily., Disp: , Rfl: 1  •  amLODIPine (NORVASC) 5 MG tablet,  Take 1 tablet by mouth Daily., Disp: 30 tablet, Rfl: 3  •  ascorbic acid (VITAMIN C) 1000 MG tablet, Take 1,000 mg by mouth 2 (two) times a day., Disp: , Rfl:   •  aspirin 81 MG EC tablet, Take 81 mg by mouth every morning. TOLD NOT TO STOP BEFORE SURGERY, Disp: , Rfl:   •  bisoprolol (ZEBeta) 10 MG tablet, Take 10 mg by mouth Daily., Disp: , Rfl: 1  •  Calcium Citrate-Vitamin D (CALCIUM + D PO), Take 2 tablets by mouth 2 (two) times a day., Disp: , Rfl:   •  celecoxib (CeleBREX) 200 MG capsule, take 1 capsule by mouth once daily with food, Disp: 90 capsule, Rfl: 0  •  citalopram (CELEXA) 40 MG tablet, Take 1 tablet by mouth Daily., Disp: 90 tablet, Rfl: 1  •  clopidogrel (PLAVIX) 75 MG tablet, Take 1 tablet by mouth Every Night. TOLD NOT TO STOP BEFORE SURGERY, Disp: 30 tablet, Rfl: 5  •  Coenzyme Q10 200 MG capsule, Take 400 mg by mouth Daily., Disp: , Rfl:   •  Fish Oil oil, Take 1 capsule by mouth Daily., Disp: , Rfl:   •  Flaxseed, Linseed, (FLAXSEED OIL) 1000 MG capsule, Take 1 capsule by mouth daily., Disp: , Rfl:   •  fluticasone (FLONASE) 50 MCG/ACT nasal spray, instill 2 sprays into each nostril twice a day, Disp: 16 g, Rfl: 5  •  folic acid (FOLVITE) 1 MG tablet, Take 1 tablet by mouth Daily., Disp: 90 tablet, Rfl: 1  •  gabapentin (NEURONTIN) 100 MG capsule, Take 2 capsules by mouth Every Evening., Disp: 60 capsule, Rfl: 2  •  Methylsulfonylmethane (MSM PO), Take 2 tablets by mouth daily., Disp: , Rfl:   •  Multiple Vitamins-Minerals (MULTIVITAMIN ADULT PO), Take 1 tablet by mouth daily., Disp: , Rfl:   •  olmesartan (BENICAR) 20 MG tablet, Take 1 tablet by mouth Daily., Disp: 30 tablet, Rfl: 0  •  omeprazole (PriLOSEC) 20 MG capsule, Take 20 mg by mouth daily., Disp: , Rfl:   •  rosuvastatin (CRESTOR) 20 MG tablet, Take 1 tablet by mouth Daily., Disp: 30 tablet, Rfl: 5  •  triamcinolone (KENALOG) 0.1 % cream, Apply  topically to the appropriate area as directed 2 (Two) Times a Day., Disp: 15 g, Rfl:  "0  •  umeclidinium-vilanterol (ANORO ELLIPTA) 62.5-25 MCG/INH aerosol powder  inhaler, Inhale 1 puff Daily., Disp: 60 each, Rfl: 3  •  URINARY HEALTH/CRANBERRY PO, Take 1 tablet by mouth 2 (two) times a day. \"CRANACTIN\", Disp: , Rfl:   •  hydroCHLOROthiazide (HYDRODIURIL) 12.5 MG tablet, Take 1 tablet by mouth Daily., Disp: 30 tablet, Rfl: 2    Review of Systems   Constitutional: Negative for chills, fatigue and fever.   HENT: Negative for congestion, ear pain and sinus pressure.    Respiratory: Negative for cough, chest tightness, shortness of breath and wheezing.    Cardiovascular: Positive for chest pain (discomfort under left breast). Negative for palpitations.   Gastrointestinal: Negative for abdominal pain, blood in stool and constipation.   Skin: Negative for color change.   Allergic/Immunologic: Negative for environmental allergies.   Neurological: Negative for dizziness, speech difficulty and headache.   Psychiatric/Behavioral: Negative for decreased concentration. The patient is not nervous/anxious.      /72 (BP Location: Right arm, Patient Position: Sitting, Cuff Size: Adult)   Pulse 60   Temp 97.2 °F (36.2 °C) (Temporal)   Ht 157.5 cm (62.01\")   Wt 59.9 kg (132 lb)   BMI 24.14 kg/m²     Physical Exam   Constitutional: She appears well-developed and well-nourished.   HENT:   Head: Normocephalic and atraumatic.   Right Ear: External ear normal.   Left Ear: External ear normal.   Mouth/Throat: Oropharynx is clear and moist.   Eyes: Conjunctivae and EOM are normal.   Neck: Normal range of motion. Neck supple.   Cardiovascular: Regular rhythm and normal heart sounds. Bradycardia present.   Pulmonary/Chest: Effort normal and breath sounds normal.   Musculoskeletal: Normal range of motion.   Neurological: She is alert.   Skin: Skin is warm and dry.   Psychiatric: She has a normal mood and affect. Her behavior is normal. Thought content normal.         ECG 12 Lead  Date/Time: 11/6/2019 4:28 " PM  Performed by: Jenny Hurtado APRN  Authorized by: Jenny Hurtado APRN   Comparison: compared with previous ECG from 11/6/2019  Similar to previous ECG  Rhythm: sinus bradycardia  BPM: 48    Clinical impression: abnormal EKG            Results Review:  I reviewed the patient's new clinical results.    Assessment/Plan:  Aarti was seen today for hypertension.    Diagnoses and all orders for this visit:    Essential hypertension  -     ECG 12 Lead  -     Duplex Carotid Ultrasound CAR; Future  -     Adult Transthoracic Echo Complete W/ Cont if Necessary Per Protocol; Future    Chest pressure  -     ECG 12 Lead  -     Duplex Carotid Ultrasound CAR; Future  -     Adult Transthoracic Echo Complete W/ Cont if Necessary Per Protocol; Future    Peripheral arterial disease (CMS/HCC)    Benign essential hypertension  Comments:  To improve blood pressure control, change losartan to olmesartan 40 mg tablet daily.Continue 10 mg bisoprolol and 5 mg amlodipine daily.Avoid salt in the diet  Orders:  -     olmesartan (BENICAR) 20 MG tablet; Take 1 tablet by mouth Daily.    Other orders  -     hydroCHLOROthiazide (HYDRODIURIL) 12.5 MG tablet; Take 1 tablet by mouth Daily.       Patient Instructions   Continue bisoprolol 10mg daily, amlodipine 5mg daily, olmesartan 40mg daily, and aspirin 81mg daily.  Adding hctz 12.5 mg daily.  Increase fluid intake.    Ordering echo and carotid duplex studies.  Follow up here in 2 weeks.    Plan of care reviewed with patient at the conclusion of today's visit. Education was provided regarding diagnosis, management and any prescribed or recommended OTC medications.  Patient verbalizes understanding of and agreement with management plan.    Return in about 2 weeks (around 11/20/2019).    *Note that portions of this note were completed with a voice recognition program.  Efforts were made to edit the dictation but occasionally words are transcribed.    CYRUS Gallegos

## 2019-11-11 ENCOUNTER — TELEPHONE (OUTPATIENT)
Dept: INTERNAL MEDICINE | Facility: CLINIC | Age: 64
End: 2019-11-11

## 2019-11-11 DIAGNOSIS — I10 BENIGN ESSENTIAL HYPERTENSION: Chronic | ICD-10-CM

## 2019-11-11 RX ORDER — OLMESARTAN MEDOXOMIL 20 MG/1
40 TABLET ORAL DAILY
Qty: 60 TABLET | Refills: 3 | Status: SHIPPED | OUTPATIENT
Start: 2019-11-11 | End: 2019-11-11 | Stop reason: SDUPTHER

## 2019-11-11 RX ORDER — OLMESARTAN MEDOXOMIL 40 MG/1
40 TABLET ORAL DAILY
Qty: 90 TABLET | Refills: 0 | Status: SHIPPED | OUTPATIENT
Start: 2019-11-11 | End: 2020-02-03

## 2019-11-11 NOTE — TELEPHONE ENCOUNTER
Cynthia from [facility] called with a prescription dosage change request for the olmesartan (BENICAR) 20 MG tablet. She stated that the patient had previously been taking 1 tablet of this medication daily and that the prescription was increased to 2 tablets of this medication daily. Cynthia said that the patient's insurance will only cover 1 tablet a day for this medication regardless of the strength, so she is requesting for the prescription to be changed to the 40 MG tablet taken by mouth once daily. Please call Cynthia from [facility] back at [phone] when this message is received.

## 2019-11-11 NOTE — TELEPHONE ENCOUNTER
See previous call. Your note said for her to take 40mg daily but it was only sent in for 20mg daily. Pt will run out tomorrow. Please send the 40mg olmesartan.

## 2019-11-11 NOTE — TELEPHONE ENCOUNTER
Pt stated that when her medication was sent to the pharmacy last month the dosage was sent incorrectly.  The medication is olmesartan 20 mg. Pt needs to take 2 days a day but last month it was sent as 1 time a day. Pt is almost out of medication.    Her pharmacy is [facility] in [place] on [address]

## 2019-11-18 ENCOUNTER — TELEPHONE (OUTPATIENT)
Dept: INTERNAL MEDICINE | Facility: CLINIC | Age: 64
End: 2019-11-18

## 2019-11-18 ENCOUNTER — HOSPITAL ENCOUNTER (OUTPATIENT)
Dept: CARDIOLOGY | Facility: HOSPITAL | Age: 64
Discharge: HOME | End: 2019-11-18

## 2019-11-18 ENCOUNTER — HOSPITAL ENCOUNTER (OUTPATIENT)
Dept: CARDIOLOGY | Facility: HOSPITAL | Age: 64
Discharge: HOME | End: 2019-11-18
Admitting: NURSE PRACTITIONER

## 2019-11-18 DIAGNOSIS — I10 ESSENTIAL HYPERTENSION: ICD-10-CM

## 2019-11-18 DIAGNOSIS — R07.89 CHEST PRESSURE: ICD-10-CM

## 2019-11-18 LAB
BH CV ECHO MEAS - AI DEC SLOPE: 208 CM/SEC^2
BH CV ECHO MEAS - AI MAX PG: 50.6 MMHG
BH CV ECHO MEAS - AI MAX VEL: 355.5 CM/SEC
BH CV ECHO MEAS - AI P1/2T: 500.6 MSEC
BH CV ECHO MEAS - AO MAX PG (FULL): 1.5 MMHG
BH CV ECHO MEAS - AO MAX PG: 8 MMHG
BH CV ECHO MEAS - AO MEAN PG (FULL): 1 MMHG
BH CV ECHO MEAS - AO MEAN PG: 4 MMHG
BH CV ECHO MEAS - AO ROOT AREA (BSA CORRECTED): 1.7
BH CV ECHO MEAS - AO ROOT AREA: 5.7 CM^2
BH CV ECHO MEAS - AO ROOT DIAM: 2.7 CM
BH CV ECHO MEAS - AO V2 MAX: 143 CM/SEC
BH CV ECHO MEAS - AO V2 MEAN: 93.8 CM/SEC
BH CV ECHO MEAS - AO V2 VTI: 37.4 CM
BH CV ECHO MEAS - AVA(I,A): 1.5 CM^2
BH CV ECHO MEAS - AVA(I,D): 1.5 CM^2
BH CV ECHO MEAS - AVA(V,A): 1.6 CM^2
BH CV ECHO MEAS - AVA(V,D): 1.6 CM^2
BH CV ECHO MEAS - BSA(HAYCOCK): 1.6 M^2
BH CV ECHO MEAS - BSA(HAYCOCK): 1.6 M^2
BH CV ECHO MEAS - BSA: 1.6 M^2
BH CV ECHO MEAS - BSA: 1.6 M^2
BH CV ECHO MEAS - BZI_BMI: 23.8 KILOGRAMS/M^2
BH CV ECHO MEAS - BZI_BMI: 24.1 KILOGRAMS/M^2
BH CV ECHO MEAS - BZI_METRIC_HEIGHT: 157.5 CM
BH CV ECHO MEAS - BZI_METRIC_HEIGHT: 157.5 CM
BH CV ECHO MEAS - BZI_METRIC_WEIGHT: 59 KG
BH CV ECHO MEAS - BZI_METRIC_WEIGHT: 59.9 KG
BH CV ECHO MEAS - EDV(CUBED): 88.1 ML
BH CV ECHO MEAS - EDV(MOD-SP2): 52 ML
BH CV ECHO MEAS - EDV(MOD-SP4): 49 ML
BH CV ECHO MEAS - EDV(TEICH): 90.1 ML
BH CV ECHO MEAS - EF(CUBED): 75.4 %
BH CV ECHO MEAS - EF(MOD-BP): 64 %
BH CV ECHO MEAS - EF(MOD-SP2): 61.5 %
BH CV ECHO MEAS - EF(MOD-SP4): 63.3 %
BH CV ECHO MEAS - EF(TEICH): 67.5 %
BH CV ECHO MEAS - ESV(CUBED): 21.7 ML
BH CV ECHO MEAS - ESV(MOD-SP2): 20 ML
BH CV ECHO MEAS - ESV(MOD-SP4): 18 ML
BH CV ECHO MEAS - ESV(TEICH): 29.3 ML
BH CV ECHO MEAS - FS: 37.3 %
BH CV ECHO MEAS - IVS/LVPW: 0.96
BH CV ECHO MEAS - IVSD: 0.8 CM
BH CV ECHO MEAS - LA DIMENSION: 4.1 CM
BH CV ECHO MEAS - LA/AO: 1.5
BH CV ECHO MEAS - LAD MAJOR: 5.2 CM
BH CV ECHO MEAS - LAT PEAK E' VEL: 9.5 CM/SEC
BH CV ECHO MEAS - LATERAL E/E' RATIO: 11.2
BH CV ECHO MEAS - LV DIASTOLIC VOL/BSA (35-75): 30.8 ML/M^2
BH CV ECHO MEAS - LV MASS(C)D: 120.9 GRAMS
BH CV ECHO MEAS - LV MASS(C)DI: 76 GRAMS/M^2
BH CV ECHO MEAS - LV MAX PG: 6.5 MMHG
BH CV ECHO MEAS - LV MEAN PG: 3 MMHG
BH CV ECHO MEAS - LV SYSTOLIC VOL/BSA (12-30): 11.3 ML/M^2
BH CV ECHO MEAS - LV V1 MAX: 127 CM/SEC
BH CV ECHO MEAS - LV V1 MEAN: 80.2 CM/SEC
BH CV ECHO MEAS - LV V1 VTI: 32.8 CM
BH CV ECHO MEAS - LVIDD: 4.5 CM
BH CV ECHO MEAS - LVIDS: 2.8 CM
BH CV ECHO MEAS - LVLD AP2: 6.3 CM
BH CV ECHO MEAS - LVLD AP4: 5.8 CM
BH CV ECHO MEAS - LVLS AP2: 5 CM
BH CV ECHO MEAS - LVLS AP4: 4.9 CM
BH CV ECHO MEAS - LVOT AREA (M): 1.8 CM^2
BH CV ECHO MEAS - LVOT AREA: 1.8 CM^2
BH CV ECHO MEAS - LVOT DIAM: 1.5 CM
BH CV ECHO MEAS - LVPWD: 0.9 CM
BH CV ECHO MEAS - MED PEAK E' VEL: 8.2 CM/SEC
BH CV ECHO MEAS - MEDIAL E/E' RATIO: 12.9
BH CV ECHO MEAS - MV A MAX VEL: 71.6 CM/SEC
BH CV ECHO MEAS - MV DEC TIME: 0.31 SEC
BH CV ECHO MEAS - MV E MAX VEL: 106 CM/SEC
BH CV ECHO MEAS - MV E/A: 1.5
BH CV ECHO MEAS - PA ACC SLOPE: 546 CM/SEC^2
BH CV ECHO MEAS - PA ACC TIME: 0.14 SEC
BH CV ECHO MEAS - PA PR(ACCEL): 17.4 MMHG
BH CV ECHO MEAS - PI END-D VEL: 90.1 CM/SEC
BH CV ECHO MEAS - RAP SYSTOLE: 3 MMHG
BH CV ECHO MEAS - RVSP: 28 MMHG
BH CV ECHO MEAS - SI(AO): 134.5 ML/M^2
BH CV ECHO MEAS - SI(CUBED): 41.7 ML/M^2
BH CV ECHO MEAS - SI(LVOT): 36.4 ML/M^2
BH CV ECHO MEAS - SI(MOD-SP2): 20.1 ML/M^2
BH CV ECHO MEAS - SI(MOD-SP4): 19.5 ML/M^2
BH CV ECHO MEAS - SI(TEICH): 38.2 ML/M^2
BH CV ECHO MEAS - SV(AO): 214.1 ML
BH CV ECHO MEAS - SV(CUBED): 66.4 ML
BH CV ECHO MEAS - SV(LVOT): 58 ML
BH CV ECHO MEAS - SV(MOD-SP2): 32 ML
BH CV ECHO MEAS - SV(MOD-SP4): 31 ML
BH CV ECHO MEAS - SV(TEICH): 60.8 ML
BH CV ECHO MEAS - TR MAX PG: 25 MMHG
BH CV ECHO MEAS - TR MAX VEL: 250 CM/SEC
BH CV ECHO MEASUREMENTS AVERAGE E/E' RATIO: 11.98
BH CV XLRA MEAS LEFT DIST CCA EDV: 20.3 CM/SEC
BH CV XLRA MEAS LEFT DIST CCA PSV: 109 CM/SEC
BH CV XLRA MEAS LEFT DIST ICA EDV: 34.9 CM/SEC
BH CV XLRA MEAS LEFT DIST ICA PSV: 135 CM/SEC
BH CV XLRA MEAS LEFT ICA/CCA RATIO: 1.4
BH CV XLRA MEAS LEFT MID CCA EDV: 17.5 CM/SEC
BH CV XLRA MEAS LEFT MID CCA PSV: 87.3 CM/SEC
BH CV XLRA MEAS LEFT MID ICA EDV: 32.8 CM/SEC
BH CV XLRA MEAS LEFT MID ICA PSV: 127 CM/SEC
BH CV XLRA MEAS LEFT PROX CCA EDV: 17.5 CM/SEC
BH CV XLRA MEAS LEFT PROX CCA PSV: 103 CM/SEC
BH CV XLRA MEAS LEFT PROX ECA EDV: 88.7 CM/SEC
BH CV XLRA MEAS LEFT PROX ECA PSV: 106 CM/SEC
BH CV XLRA MEAS LEFT PROX ICA EDV: 38.4 CM/SEC
BH CV XLRA MEAS LEFT PROX ICA PSV: 149 CM/SEC
BH CV XLRA MEAS LEFT PROX SCLA EDV: 0 CM/SEC
BH CV XLRA MEAS LEFT PROX SCLA PSV: 95.7 CM/SEC
BH CV XLRA MEAS LEFT VERTEBRAL A EDV: 0 CM/SEC
BH CV XLRA MEAS LEFT VERTEBRAL A PSV: 65.6 CM/SEC
BH CV XLRA MEAS RIGHT DIST CCA EDV: 12.9 CM/SEC
BH CV XLRA MEAS RIGHT DIST CCA PSV: 52.2 CM/SEC
BH CV XLRA MEAS RIGHT DIST ICA EDV: 19.1 CM/SEC
BH CV XLRA MEAS RIGHT DIST ICA PSV: 89.2 CM/SEC
BH CV XLRA MEAS RIGHT ICA/CCA RATIO: 3.9
BH CV XLRA MEAS RIGHT MID CCA EDV: 10.4 CM/SEC
BH CV XLRA MEAS RIGHT MID CCA PSV: 45.9 CM/SEC
BH CV XLRA MEAS RIGHT MID ICA EDV: 24.1 CM/SEC
BH CV XLRA MEAS RIGHT MID ICA PSV: 112 CM/SEC
BH CV XLRA MEAS RIGHT PROX CCA EDV: 12.6 CM/SEC
BH CV XLRA MEAS RIGHT PROX CCA PSV: 62.9 CM/SEC
BH CV XLRA MEAS RIGHT PROX ECA EDV: 0 CM/SEC
BH CV XLRA MEAS RIGHT PROX ECA PSV: 47.8 CM/SEC
BH CV XLRA MEAS RIGHT PROX ICA EDV: 34.8 CM/SEC
BH CV XLRA MEAS RIGHT PROX ICA PSV: 202 CM/SEC
BH CV XLRA MEAS RIGHT PROX SCLA EDV: 0 CM/SEC
BH CV XLRA MEAS RIGHT PROX SCLA PSV: 134 CM/SEC
BH CV XLRA MEAS RIGHT VERTEBRAL A EDV: 19.6 CM/SEC
BH CV XLRA MEAS RIGHT VERTEBRAL A PSV: 99.9 CM/SEC
LEFT ATRIUM VOLUME INDEX: 32.7 ML/M^2
LEFT ATRIUM VOLUME: 52 ML
LV EF 2D ECHO EST: 68 %

## 2019-11-18 PROCEDURE — 93880 EXTRACRANIAL BILAT STUDY: CPT

## 2019-11-18 PROCEDURE — 93306 TTE W/DOPPLER COMPLETE: CPT

## 2019-11-18 PROCEDURE — 93306 TTE W/DOPPLER COMPLETE: CPT | Performed by: INTERNAL MEDICINE

## 2019-11-18 PROCEDURE — 93880 EXTRACRANIAL BILAT STUDY: CPT | Performed by: INTERNAL MEDICINE

## 2019-11-18 NOTE — TELEPHONE ENCOUNTER
Pt is calling for a return call , she had a echo this morning and is wanting to discuss with Dr Dial , phone number confirmed

## 2019-11-19 ENCOUNTER — TELEPHONE (OUTPATIENT)
Dept: INTERNAL MEDICINE | Facility: CLINIC | Age: 64
End: 2019-11-19

## 2019-11-20 ENCOUNTER — OFFICE VISIT (OUTPATIENT)
Dept: INTERNAL MEDICINE | Facility: CLINIC | Age: 64
End: 2019-11-20

## 2019-11-20 ENCOUNTER — TELEPHONE (OUTPATIENT)
Dept: INTERNAL MEDICINE | Facility: CLINIC | Age: 64
End: 2019-11-20

## 2019-11-20 VITALS
DIASTOLIC BLOOD PRESSURE: 70 MMHG | SYSTOLIC BLOOD PRESSURE: 142 MMHG | TEMPERATURE: 97.4 F | WEIGHT: 131 LBS | BODY MASS INDEX: 24.11 KG/M2 | HEIGHT: 62 IN | HEART RATE: 56 BPM

## 2019-11-20 DIAGNOSIS — I65.23 BILATERAL CAROTID ARTERY STENOSIS: ICD-10-CM

## 2019-11-20 DIAGNOSIS — I10 BENIGN ESSENTIAL HYPERTENSION: Primary | ICD-10-CM

## 2019-11-20 DIAGNOSIS — I73.9 PERIPHERAL ARTERIAL DISEASE (HCC): ICD-10-CM

## 2019-11-20 PROCEDURE — 99214 OFFICE O/P EST MOD 30 MIN: CPT | Performed by: NURSE PRACTITIONER

## 2019-11-20 NOTE — TELEPHONE ENCOUNTER
Call tomorrow and let her know I talked to Dr. Chapa and we will continue Plavix and aspirin but hold off on adding Xarelto.  I have put in the order for the renal artery duplex

## 2019-11-20 NOTE — PROGRESS NOTES
Aarti Wade  1955  [id number]  Patient Care Team:  Jannette Chapa MD as PCP - General  Jannette Chapa MD as PCP - Family Medicine  Severino Carbajal MD as Consulting Physician (Vascular Surgery)    Aarti Wade is a pleasant 64 y.o. female who presents for evaluation of Hypertension (2 week follow up )      Chief Complaint   Patient presents with   • Hypertension     2 week follow up        HPI:   Resistant HTN:  finally improved to normal after recent changes in addition of HCTZ.  avg at home this week 120s over 70s.  No chest pain, shortness of breath, leg swelling.  See home readings.    She did have the carotid ultrasound and echo as scheduled.  She has moderate right coronary artery stenosis and mild left coronary artery stenosis.  She has a follow-up with Dr. Ernandez in January.  Echo was basically unchanged from previous.    She sees Dr. Hollins for follow-up on December 4 and hopefully will have the vein surgery on the left leg like she did on the right leg in the past.  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  She does endorse increased leg pain and wants to proceed.  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 HARRISON   • COLONOSCOPY     • EYE LENS IMPLANT SECONDARY Left    • FEMORAL FEMORAL BYPASS Right 8/22/2016    Procedure: RIGHT COMMON FEMORAL ENDARTERECTOMY WITH PATCH;  Surgeon: Severino Harrison MD;  Location:  [facility];  Service:    • FINGER SURGERY Left     LEFT INDEX FINGER   • INTERVENTIONAL RADIOLOGY PROCEDURE N/A 8/16/2016    Procedure: IR PTA femoral popliteal artery;  Surgeon: Severino Harrison MD;  Location:  [facility];  Service:    • TONSILLECTOMY       Family History   Problem Relation Age of Onset   • Osteoarthritis Mother    • Alzheimer's disease Mother    • Hypertension Father    • Heart attack Father    • Alcohol abuse Father    • No Known Problems Sister    • No Known Problems Brother    • No Known Problems Daughter    • No Known Problems Son    • Breast cancer Maternal Grandmother 50   • Breast cancer Paternal Grandmother 50   • No Known Problems Maternal Aunt    • No Known Problems Paternal Aunt    • Other Other    • Heart attack Other    • Coronary artery disease Paternal Grandfather    • Stroke Paternal Grandfather    • Ovarian cancer Neg Hx    • Endometrial cancer Neg Hx      Social History     Tobacco Use   Smoking Status Former Smoker   • Packs/day: 1.00   • Years: 40.00   • Pack years: 40.00   • Types: Electronic Cigarette, Cigarettes   • Last attempt to quit: 6/16/2016   • Years since quitting: 3.4   Smokeless Tobacco Never Used   Tobacco Comment    1 PACK/DAY SMOKER UNTIL JUNE 2016     Allergies   Allergen Reactions   • Levocetirizine Dihydrochloride Myalgia     Muscle aches/joint pain       Current Outpatient Medications:   •  acetaminophen (ACETAMINOPHEN 8 HOUR) 650 MG 8 hr tablet, Take 650 mg by mouth Every 8 (Eight) Hours As Needed for Mild Pain ., Disp: , Rfl:   •  AMLACTIN 12 % lotion, Daily., Disp: , Rfl: 1  •   amLODIPine (NORVASC) 5 MG tablet, Take 1 tablet by mouth Daily., Disp: 30 tablet, Rfl: 3  •  ascorbic acid (VITAMIN C) 1000 MG tablet, Take 1,000 mg by mouth 2 (two) times a day., Disp: , Rfl:   •  aspirin 81 MG EC tablet, Take 81 mg by mouth every morning. TOLD NOT TO STOP BEFORE SURGERY, Disp: , Rfl:   •  bisoprolol (ZEBeta) 10 MG tablet, Take 10 mg by mouth Daily., Disp: , Rfl: 1  •  Calcium Citrate-Vitamin D (CALCIUM + D PO), Take 2 tablets by mouth 2 (two) times a day., Disp: , Rfl:   •  celecoxib (CeleBREX) 200 MG capsule, take 1 capsule by mouth once daily with food, Disp: 90 capsule, Rfl: 0  •  citalopram (CELEXA) 40 MG tablet, Take 1 tablet by mouth Daily., Disp: 90 tablet, Rfl: 1  •  clopidogrel (PLAVIX) 75 MG tablet, Take 1 tablet by mouth Every Night. TOLD NOT TO STOP BEFORE SURGERY, Disp: 30 tablet, Rfl: 5  •  Coenzyme Q10 200 MG capsule, Take 400 mg by mouth Daily., Disp: , Rfl:   •  Fish Oil oil, Take 1 capsule by mouth Daily., Disp: , Rfl:   •  Flaxseed, Linseed, (FLAXSEED OIL) 1000 MG capsule, Take 1 capsule by mouth daily., Disp: , Rfl:   •  fluticasone (FLONASE) 50 MCG/ACT nasal spray, instill 2 sprays into each nostril twice a day, Disp: 16 g, Rfl: 5  •  folic acid (FOLVITE) 1 MG tablet, Take 1 tablet by mouth Daily., Disp: 90 tablet, Rfl: 1  •  gabapentin (NEURONTIN) 100 MG capsule, Take 2 capsules by mouth Every Evening., Disp: 60 capsule, Rfl: 2  •  hydroCHLOROthiazide (HYDRODIURIL) 12.5 MG tablet, Take 1 tablet by mouth Daily., Disp: 30 tablet, Rfl: 2  •  Methylsulfonylmethane (MSM PO), Take 2 tablets by mouth daily., Disp: , Rfl:   •  Multiple Vitamins-Minerals (MULTIVITAMIN ADULT PO), Take 1 tablet by mouth daily., Disp: , Rfl:   •  olmesartan (BENICAR) 40 MG tablet, Take 1 tablet by mouth Daily., Disp: 90 tablet, Rfl: 0  •  omeprazole (PriLOSEC) 20 MG capsule, Take 20 mg by mouth daily., Disp: , Rfl:   •  rosuvastatin (CRESTOR) 20 MG tablet, Take 1 tablet by mouth Daily., Disp: 30 tablet,  "Rfl: 5  •  triamcinolone (KENALOG) 0.1 % cream, Apply  topically to the appropriate area as directed 2 (Two) Times a Day., Disp: 15 g, Rfl: 0  •  URINARY HEALTH/CRANBERRY PO, Take 1 tablet by mouth 2 (two) times a day. \"CRANACTIN\", Disp: , Rfl:     Review of Systems   Constitutional: Negative for chills, fatigue and fever.   HENT: Negative for congestion, ear pain and sinus pressure.    Respiratory: Negative for cough, chest tightness, shortness of breath and wheezing.    Cardiovascular: Negative for chest pain and palpitations.   Gastrointestinal: Negative for abdominal pain, blood in stool and constipation.   Skin: Negative for color change.   Allergic/Immunologic: Positive for environmental allergies.   Neurological: Negative for dizziness, speech difficulty and headache.   Psychiatric/Behavioral: Negative for decreased concentration. The patient is not nervous/anxious.      /70 (BP Location: Right arm, Patient Position: Sitting, Cuff Size: Adult)   Pulse 56   Temp 97.4 °F (36.3 °C) (Temporal)   Ht 157.5 cm (62.01\")   Wt 59.4 kg (131 lb)   BMI 23.95 kg/m²     Physical Exam   Constitutional: She appears well-developed and well-nourished.   HENT:   Head: Normocephalic and atraumatic.   Right Ear: External ear normal.   Left Ear: External ear normal.   Mouth/Throat: Oropharynx is clear and moist.   Eyes: Conjunctivae and EOM are normal.   Neck: Normal range of motion. Neck supple.   Cardiovascular: Normal rate, regular rhythm and normal heart sounds.   Pulmonary/Chest: Effort normal and breath sounds normal.   Abdominal: Soft. Bowel sounds are normal.   Musculoskeletal: Normal range of motion.   Neurological: She is alert.   Skin: Skin is warm and dry.   Psychiatric: She has a normal mood and affect. Her behavior is normal. Thought content normal.       Results Review:  I reviewed the patient's new clinical results.    Assessment/Plan:  Aarti was seen today for hypertension.    Diagnoses and all orders " for this visit:    Benign essential hypertension  Comments:  Continue olmesartan 40 mg, hydrochlorothiazide 12.5 mg, amlodipine 5 mg.  Continue bisoprolol 10 mg    Peripheral arterial disease (CMS/HCC)  Comments:  Continue Plavix and aspirin    Bilateral carotid artery stenosis  Comments:  Follow-up with [facility] as planned in January continue Plavix and aspirin, statin, blood pressure medications and monitoring BP       There are no Patient Instructions on file for this visit.  Plan of care reviewed with patient at the conclusion of today's visit. Education was provided regarding diagnosis, management and any prescribed or recommended OTC medications.  Patient verbalizes understanding of and agreement with management plan.    Return in about 1 month (around 12/20/2019).    *Note that portions of this note were completed with a voice recognition program.  Efforts were made to edit the dictation but occasionally words are transcribed.    Jenny Hurtado, CYRUS

## 2019-11-26 ENCOUNTER — TELEPHONE (OUTPATIENT)
Dept: INTERNAL MEDICINE | Facility: CLINIC | Age: 64
End: 2019-11-26

## 2019-11-26 NOTE — TELEPHONE ENCOUNTER
I put in an order but since he is not with [facility] can we change that to external if she is can have him do it?  We need to clarify with her if he is going to do it where she needs to go to [facility] for it

## 2019-11-27 NOTE — TELEPHONE ENCOUNTER
Spoke with pt and she said Dr. Carbajal office said it would be another 6 weeks before they could do it. She would like to have it done before the end of the year. She request that we just set it up for her and then we could send the results to [facility].

## 2019-12-09 RX ORDER — CELECOXIB 200 MG/1
CAPSULE ORAL
Qty: 90 CAPSULE | Refills: 0 | Status: SHIPPED | OUTPATIENT
Start: 2019-12-09 | End: 2020-01-15

## 2019-12-11 DIAGNOSIS — N28.9: Primary | ICD-10-CM

## 2019-12-11 DIAGNOSIS — I73.9 PERIPHERAL ARTERIAL DISEASE (HCC): ICD-10-CM

## 2019-12-11 DIAGNOSIS — I10 BENIGN ESSENTIAL HYPERTENSION: ICD-10-CM

## 2019-12-11 DIAGNOSIS — I10 UNCONTROLLED HYPERTENSION: ICD-10-CM

## 2019-12-11 DIAGNOSIS — I65.23 BILATERAL CAROTID ARTERY STENOSIS: ICD-10-CM

## 2019-12-13 ENCOUNTER — APPOINTMENT (OUTPATIENT)
Dept: CARDIOLOGY | Facility: HOSPITAL | Age: 64
End: 2019-12-13

## 2019-12-20 ENCOUNTER — OFFICE VISIT (OUTPATIENT)
Dept: INTERNAL MEDICINE | Facility: CLINIC | Age: 64
End: 2019-12-20

## 2019-12-20 VITALS
HEIGHT: 62 IN | BODY MASS INDEX: 23.92 KG/M2 | TEMPERATURE: 97.1 F | SYSTOLIC BLOOD PRESSURE: 128 MMHG | HEART RATE: 56 BPM | DIASTOLIC BLOOD PRESSURE: 62 MMHG | WEIGHT: 130 LBS

## 2019-12-20 DIAGNOSIS — I10 BENIGN ESSENTIAL HYPERTENSION: Primary | ICD-10-CM

## 2019-12-20 PROCEDURE — 99213 OFFICE O/P EST LOW 20 MIN: CPT | Performed by: NURSE PRACTITIONER

## 2019-12-20 RX ORDER — AMLODIPINE BESYLATE 5 MG/1
5 TABLET ORAL DAILY
Qty: 30 TABLET | Refills: 5 | Status: SHIPPED | OUTPATIENT
Start: 2019-12-20 | End: 2020-05-11 | Stop reason: SDUPTHER

## 2019-12-20 NOTE — PATIENT INSTRUCTIONS
Continue medications as prescribed.  Check BP at home and keep a log for your next visit.    •In general, adults should engage in aerobic physical activity 3-4 times a week with each session lasting an average of 40 minutes.  Goal for 150 minutes per week total.  •Moderate (brisk walking or jogging) to vigorous (running or biking) physical activity is recommended.  •There are many helpful strategies for heart-healthy eating, including the DASH diet and the [employer]'s Choose My Plate.   •Patients with high blood pressure should consume no more than 2,400 mg of sodium a day, ideally reducing sodium intake to 1,500 mg a day. However, even reducing sodium intake in one's current diet by 1,000 mg each day can help lower blood pressure.    Limit alcohol consumption.    Information obtained from the American College of Cardiology and American Heart Association.      Referral has already been made to see nephrology, appointment has not yet been scheduled.

## 2019-12-20 NOTE — PROGRESS NOTES
Aarti Wade  1955  [id number]  Patient Care Team:  Jannette Chapa MD as PCP - General  Jannette Chapa MD as PCP - Family Medicine  Severino Harrison MD as Consulting Physician (Vascular Surgery)    Aarti Wade is a pleasant 64 y.o. female who presents for evaluation of Hypertension (1 month follow up )      Chief Complaint   Patient presents with   • Hypertension     1 month follow up        HPI:   HTN:  Continue to be normal after recent med adjustment, no cp, inc. sob, leg swelling.  Has not been checking daily now that it is back to normal but has been checking occasionally and is 120s and 130s over 60s and highest 70s.    Plans to have Left leg vein surg with Gurv[facility] early next year.  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 HARRISON   • COLONOSCOPY     • EYE LENS IMPLANT SECONDARY Left    • FEMORAL FEMORAL BYPASS Right 8/22/2016    Procedure: RIGHT  COMMON FEMORAL ENDARTERECTOMY WITH PATCH;  Surgeon: Severino Carbajal MD;  Location:  [facility];  Service:    • FINGER SURGERY Left     LEFT INDEX FINGER   • INTERVENTIONAL RADIOLOGY PROCEDURE N/A 8/16/2016    Procedure: IR PTA femoral popliteal artery;  Surgeon: Severino Carbajal MD;  Location:  [facility];  Service:    • TONSILLECTOMY       Family History   Problem Relation Age of Onset   • Osteoarthritis Mother    • Alzheimer's disease Mother    • Hypertension Father    • Heart attack Father    • Alcohol abuse Father    • No Known Problems Sister    • No Known Problems Brother    • No Known Problems Daughter    • No Known Problems Son    • Breast cancer Maternal Grandmother 50   • Breast cancer Paternal Grandmother 50   • No Known Problems Maternal Aunt    • No Known Problems Paternal Aunt    • Other Other    • Heart attack Other    • Coronary artery disease Paternal Grandfather    • Stroke Paternal Grandfather    • Ovarian cancer Neg Hx    • Endometrial cancer Neg Hx      Social History     Tobacco Use   Smoking Status Former Smoker   • Packs/day: 1.00   • Years: 40.00   • Pack years: 40.00   • Types: Electronic Cigarette, Cigarettes   • Last attempt to quit: 6/16/2016   • Years since quitting: 3.5   Smokeless Tobacco Never Used   Tobacco Comment    1 PACK/DAY SMOKER UNTIL JUNE 2016     Allergies   Allergen Reactions   • Levocetirizine Dihydrochloride Myalgia     Muscle aches/joint pain       Current Outpatient Medications:   •  acetaminophen (ACETAMINOPHEN 8 HOUR) 650 MG 8 hr tablet, Take 650 mg by mouth Every 8 (Eight) Hours As Needed for Mild Pain ., Disp: , Rfl:   •  AMLACTIN 12 % lotion, Daily., Disp: , Rfl: 1  •  amLODIPine (NORVASC) 5 MG tablet, Take 1 tablet by mouth Daily., Disp: 30 tablet, Rfl: 5  •  ascorbic acid (VITAMIN C) 1000 MG tablet, Take 1,000 mg by mouth 2 (two) times a day., Disp: , Rfl:   •  aspirin 81 MG EC tablet, Take 81 mg by mouth every morning. TOLD NOT TO STOP  "BEFORE SURGERY, Disp: , Rfl:   •  bisoprolol (ZEBeta) 10 MG tablet, Take 10 mg by mouth Daily., Disp: , Rfl: 1  •  Calcium Citrate-Vitamin D (CALCIUM + D PO), Take 2 tablets by mouth 2 (two) times a day., Disp: , Rfl:   •  celecoxib (CeleBREX) 200 MG capsule, take 1 capsule by mouth once daily with food, Disp: 90 capsule, Rfl: 0  •  citalopram (CELEXA) 40 MG tablet, Take 1 tablet by mouth Daily., Disp: 90 tablet, Rfl: 1  •  clopidogrel (PLAVIX) 75 MG tablet, Take 1 tablet by mouth Every Night. TOLD NOT TO STOP BEFORE SURGERY, Disp: 30 tablet, Rfl: 5  •  Coenzyme Q10 200 MG capsule, Take 400 mg by mouth Daily., Disp: , Rfl:   •  Fish Oil oil, Take 1 capsule by mouth Daily., Disp: , Rfl:   •  Flaxseed, Linseed, (FLAXSEED OIL) 1000 MG capsule, Take 1 capsule by mouth daily., Disp: , Rfl:   •  fluticasone (FLONASE) 50 MCG/ACT nasal spray, instill 2 sprays into each nostril twice a day, Disp: 16 g, Rfl: 5  •  folic acid (FOLVITE) 1 MG tablet, Take 1 tablet by mouth Daily., Disp: 90 tablet, Rfl: 1  •  gabapentin (NEURONTIN) 100 MG capsule, Take 2 capsules by mouth Every Evening., Disp: 60 capsule, Rfl: 2  •  hydroCHLOROthiazide (HYDRODIURIL) 12.5 MG tablet, Take 1 tablet by mouth Daily., Disp: 30 tablet, Rfl: 2  •  Methylsulfonylmethane (MSM PO), Take 2 tablets by mouth daily., Disp: , Rfl:   •  Multiple Vitamins-Minerals (MULTIVITAMIN ADULT PO), Take 1 tablet by mouth daily., Disp: , Rfl:   •  olmesartan (BENICAR) 40 MG tablet, Take 1 tablet by mouth Daily., Disp: 90 tablet, Rfl: 0  •  omeprazole (PriLOSEC) 20 MG capsule, Take 20 mg by mouth daily., Disp: , Rfl:   •  rosuvastatin (CRESTOR) 20 MG tablet, Take 1 tablet by mouth Daily., Disp: 30 tablet, Rfl: 5  •  triamcinolone (KENALOG) 0.1 % cream, Apply  topically to the appropriate area as directed 2 (Two) Times a Day., Disp: 15 g, Rfl: 0  •  URINARY HEALTH/CRANBERRY PO, Take 1 tablet by mouth 2 (two) times a day. \"CRANACTIN\", Disp: , Rfl:     Review of Systems " "  Constitutional: Negative for chills, fatigue and fever.   HENT: Negative for congestion, ear pain and sinus pressure.    Respiratory: Negative for cough, chest tightness, shortness of breath and wheezing.    Cardiovascular: Negative for chest pain and palpitations.   Gastrointestinal: Negative for abdominal pain, blood in stool and constipation.   Skin: Negative for color change.   Allergic/Immunologic: Positive for environmental allergies.   Neurological: Negative for dizziness, speech difficulty and headache.   Psychiatric/Behavioral: Negative for decreased concentration. The patient is not nervous/anxious.      /62 (BP Location: Left arm, Patient Position: Sitting, Cuff Size: Adult)   Pulse 56   Temp 97.1 °F (36.2 °C) (Temporal)   Ht 157.5 cm (62.01\")   Wt 59 kg (130 lb)   BMI 23.77 kg/m²     Physical Exam   Constitutional: She appears well-developed and well-nourished.   HENT:   Head: Normocephalic and atraumatic.   Right Ear: External ear normal.   Left Ear: External ear normal.   Mouth/Throat: Oropharynx is clear and moist.   Eyes: Conjunctivae and EOM are normal.   Neck: Normal range of motion. Neck supple.   Cardiovascular: Normal rate, regular rhythm and normal heart sounds.   Pulmonary/Chest: Effort normal and breath sounds normal.   Musculoskeletal: Normal range of motion.   Neurological: She is alert.   Skin: Skin is warm and dry.   Psychiatric: She has a normal mood and affect. Her behavior is normal. Thought content normal.       Results Review:  None    Assessment/Plan:  Aarti was seen today for hypertension.    Diagnoses and all orders for this visit:    Benign essential hypertension    Other orders  -     amLODIPine (NORVASC) 5 MG tablet; Take 1 tablet by mouth Daily.       Patient Instructions   Continue medications as prescribed.  Check BP at home and keep a log for your next visit.    •In general, adults should engage in aerobic physical activity 3-4 times a week with each session " lasting an average of 40 minutes.  Goal for 150 minutes per week total.  •Moderate (brisk walking or jogging) to vigorous (running or biking) physical activity is recommended.  •There are many helpful strategies for heart-healthy eating, including the DASH diet and the [employer]'s Choose My Plate.   •Patients with high blood pressure should consume no more than 2,400 mg of sodium a day, ideally reducing sodium intake to 1,500 mg a day. However, even reducing sodium intake in one's current diet by 1,000 mg each day can help lower blood pressure.    Limit alcohol consumption.    Information obtained from the American College of Cardiology and American Heart Association.      Referral has already been made to see nephrology, appointment has not yet been scheduled.    Plan of care reviewed with patient at the conclusion of today's visit. Education was provided regarding diagnosis, management and any prescribed or recommended OTC medications.  Patient verbalizes understanding of and agreement with management plan.    Return for Next scheduled follow up.    *Note that portions of this note were completed with a voice recognition program.  Efforts were made to edit the dictation but occasionally words are transcribed.    CYRUS Gallegos

## 2019-12-30 ENCOUNTER — TRANSCRIBE ORDERS (OUTPATIENT)
Dept: INTERNAL MEDICINE | Facility: CLINIC | Age: 64
End: 2019-12-30

## 2019-12-30 DIAGNOSIS — Z12.31 VISIT FOR SCREENING MAMMOGRAM: Primary | ICD-10-CM

## 2019-12-30 RX ORDER — CLOPIDOGREL BISULFATE 75 MG/1
TABLET ORAL
Qty: 30 TABLET | Refills: 5 | Status: SHIPPED | OUTPATIENT
Start: 2019-12-30 | End: 2020-01-31 | Stop reason: SDUPTHER

## 2020-01-02 ENCOUNTER — TRANSCRIBE ORDERS (OUTPATIENT)
Dept: ADMINISTRATIVE | Facility: HOSPITAL | Age: 65
End: 2020-01-02

## 2020-01-02 DIAGNOSIS — I70.212 ATHEROSCLEROSIS OF NATIVE ARTERY OF LEFT LOWER EXTREMITY WITH INTERMITTENT CLAUDICATION (HCC): Primary | ICD-10-CM

## 2020-01-03 ENCOUNTER — APPOINTMENT (OUTPATIENT)
Dept: PREADMISSION TESTING | Facility: HOSPITAL | Age: 65
End: 2020-01-03

## 2020-01-03 LAB
ALBUMIN SERPL-MCNC: 4.7 G/DL (ref 3.5–5.2)
ANION GAP SERPL CALCULATED.3IONS-SCNC: 13 MMOL/L (ref 5–15)
ANION GAP SERPL CALCULATED.3IONS-SCNC: 18 MMOL/L (ref 5–15)
BACTERIA UR QL AUTO: ABNORMAL /HPF
BASOPHILS # BLD AUTO: 0.05 10*3/MM3 (ref 0–0.2)
BASOPHILS NFR BLD AUTO: 0.8 % (ref 0–1.5)
BILIRUB UR QL STRIP: NEGATIVE
BUN BLD-MCNC: 38 MG/DL (ref 8–23)
BUN BLD-MCNC: 38 MG/DL (ref 8–23)
BUN/CREAT SERPL: 36.2 (ref 7–25)
BUN/CREAT SERPL: 36.9 (ref 7–25)
CALCIUM SPEC-SCNC: 9.3 MG/DL (ref 8.6–10.5)
CALCIUM SPEC-SCNC: 9.4 MG/DL (ref 8.6–10.5)
CHLORIDE SERPL-SCNC: 102 MMOL/L (ref 98–107)
CHLORIDE SERPL-SCNC: 102 MMOL/L (ref 98–107)
CLARITY UR: ABNORMAL
CO2 SERPL-SCNC: 21 MMOL/L (ref 22–29)
CO2 SERPL-SCNC: 25 MMOL/L (ref 22–29)
COLOR UR: YELLOW
CREAT BLD-MCNC: 1.03 MG/DL (ref 0.57–1)
CREAT BLD-MCNC: 1.05 MG/DL (ref 0.57–1)
CREAT UR-MCNC: 98.6 MG/DL
DEPRECATED RDW RBC AUTO: 43.1 FL (ref 37–54)
EOSINOPHIL # BLD AUTO: 0.49 10*3/MM3 (ref 0–0.4)
EOSINOPHIL NFR BLD AUTO: 7.6 % (ref 0.3–6.2)
ERYTHROCYTE [DISTWIDTH] IN BLOOD BY AUTOMATED COUNT: 12.2 % (ref 12.3–15.4)
GFR SERPL CREATININE-BSD FRML MDRD: 52 ML/MIN/1.73
GFR SERPL CREATININE-BSD FRML MDRD: 53 ML/MIN/1.73
GLUCOSE BLD-MCNC: 98 MG/DL (ref 65–99)
GLUCOSE BLD-MCNC: 99 MG/DL (ref 65–99)
GLUCOSE UR STRIP-MCNC: NEGATIVE MG/DL
HBA1C MFR BLD: 5.5 % (ref 4.8–5.6)
HCT VFR BLD AUTO: 38.6 % (ref 34–46.6)
HGB BLD-MCNC: 12.1 G/DL (ref 12–15.9)
HGB UR QL STRIP.AUTO: NEGATIVE
HYALINE CASTS UR QL AUTO: ABNORMAL /LPF
IMM GRANULOCYTES # BLD AUTO: 0.02 10*3/MM3 (ref 0–0.05)
IMM GRANULOCYTES NFR BLD AUTO: 0.3 % (ref 0–0.5)
KETONES UR QL STRIP: NEGATIVE
LEUKOCYTE ESTERASE UR QL STRIP.AUTO: ABNORMAL
LYMPHOCYTES # BLD AUTO: 0.74 10*3/MM3 (ref 0.7–3.1)
LYMPHOCYTES NFR BLD AUTO: 11.5 % (ref 19.6–45.3)
MCH RBC QN AUTO: 30.3 PG (ref 26.6–33)
MCHC RBC AUTO-ENTMCNC: 31.3 G/DL (ref 31.5–35.7)
MCV RBC AUTO: 96.5 FL (ref 79–97)
MONOCYTES # BLD AUTO: 0.68 10*3/MM3 (ref 0.1–0.9)
MONOCYTES NFR BLD AUTO: 10.5 % (ref 5–12)
NEUTROPHILS # BLD AUTO: 4.48 10*3/MM3 (ref 1.7–7)
NEUTROPHILS NFR BLD AUTO: 69.3 % (ref 42.7–76)
NITRITE UR QL STRIP: NEGATIVE
NRBC BLD AUTO-RTO: 0 /100 WBC (ref 0–0.2)
PH UR STRIP.AUTO: <=5 [PH] (ref 5–8)
PHOSPHATE SERPL-MCNC: 4.6 MG/DL (ref 2.5–4.5)
PLATELET # BLD AUTO: 249 10*3/MM3 (ref 140–450)
PMV BLD AUTO: 10.5 FL (ref 6–12)
POTASSIUM BLD-SCNC: 5.6 MMOL/L (ref 3.5–5.2)
POTASSIUM BLD-SCNC: 5.7 MMOL/L (ref 3.5–5.2)
PROT UR QL STRIP: ABNORMAL
PROT UR-MCNC: 24.4 MG/DL
RBC # BLD AUTO: 4 10*6/MM3 (ref 3.77–5.28)
RBC # UR: ABNORMAL /HPF
REF LAB TEST METHOD: ABNORMAL
SODIUM BLD-SCNC: 140 MMOL/L (ref 136–145)
SODIUM BLD-SCNC: 141 MMOL/L (ref 136–145)
SP GR UR STRIP: 1.02 (ref 1–1.03)
SQUAMOUS #/AREA URNS HPF: ABNORMAL /HPF
UROBILINOGEN UR QL STRIP: ABNORMAL
WBC NRBC COR # BLD: 6.46 10*3/MM3 (ref 3.4–10.8)
WBC UR QL AUTO: ABNORMAL /HPF

## 2020-01-03 PROCEDURE — 82570 ASSAY OF URINE CREATININE: CPT | Performed by: INTERNAL MEDICINE

## 2020-01-03 PROCEDURE — 81001 URINALYSIS AUTO W/SCOPE: CPT | Performed by: INTERNAL MEDICINE

## 2020-01-03 PROCEDURE — 80048 BASIC METABOLIC PNL TOTAL CA: CPT | Performed by: SURGERY

## 2020-01-03 PROCEDURE — 83036 HEMOGLOBIN GLYCOSYLATED A1C: CPT | Performed by: SURGERY

## 2020-01-03 PROCEDURE — 85025 COMPLETE CBC W/AUTO DIFF WBC: CPT | Performed by: INTERNAL MEDICINE

## 2020-01-03 PROCEDURE — 36415 COLL VENOUS BLD VENIPUNCTURE: CPT

## 2020-01-03 PROCEDURE — 84156 ASSAY OF PROTEIN URINE: CPT | Performed by: INTERNAL MEDICINE

## 2020-01-03 PROCEDURE — 82435 ASSAY OF BLOOD CHLORIDE: CPT

## 2020-01-03 PROCEDURE — 80069 RENAL FUNCTION PANEL: CPT | Performed by: SURGERY

## 2020-01-03 NOTE — DISCHARGE INSTRUCTIONS

## 2020-01-03 NOTE — PAT
NOTIFIED DON[facility] IN [facility] THAT PATIENT WAS PLANNING TO TAKE UBER TO AND FROM PROCEDURE, WAS TOLD THIS WAS OK AND THEY WILL EVALUATES PATIENT PRIOR TO DISCHARGE.

## 2020-01-07 ENCOUNTER — HOSPITAL ENCOUNTER (OUTPATIENT)
Facility: HOSPITAL | Age: 65
Discharge: HOME | End: 2020-01-07
Attending: SURGERY | Admitting: SURGERY

## 2020-01-07 ENCOUNTER — APPOINTMENT (OUTPATIENT)
Dept: CARDIOLOGY | Facility: HOSPITAL | Age: 65
End: 2020-01-07

## 2020-01-07 VITALS
BODY MASS INDEX: 24.68 KG/M2 | OXYGEN SATURATION: 95 % | HEART RATE: 56 BPM | HEIGHT: 62 IN | RESPIRATION RATE: 16 BRPM | TEMPERATURE: 97.3 F | DIASTOLIC BLOOD PRESSURE: 73 MMHG | SYSTOLIC BLOOD PRESSURE: 159 MMHG | WEIGHT: 134.1 LBS

## 2020-01-07 DIAGNOSIS — I70.212 ATHEROSCLEROSIS OF NATIVE ARTERY OF LEFT LOWER EXTREMITY WITH INTERMITTENT CLAUDICATION (HCC): ICD-10-CM

## 2020-01-07 LAB — ACT BLD: 318 SECONDS (ref 82–152)

## 2020-01-07 PROCEDURE — 0 IOPAMIDOL PER 1 ML: Performed by: SURGERY

## 2020-01-07 PROCEDURE — C1769 GUIDE WIRE: HCPCS | Performed by: SURGERY

## 2020-01-07 PROCEDURE — C1887 CATHETER, GUIDING: HCPCS | Performed by: SURGERY

## 2020-01-07 PROCEDURE — C1894 INTRO/SHEATH, NON-LASER: HCPCS | Performed by: SURGERY

## 2020-01-07 PROCEDURE — 85347 COAGULATION TIME ACTIVATED: CPT

## 2020-01-07 PROCEDURE — C1714 CATH, TRANS ATHERECTOMY, DIR: HCPCS | Performed by: SURGERY

## 2020-01-07 PROCEDURE — C2623 CATH, TRANSLUMIN, DRUG-COAT: HCPCS | Performed by: SURGERY

## 2020-01-07 PROCEDURE — C1884 EMBOLIZATION PROTECT SYST: HCPCS | Performed by: SURGERY

## 2020-01-07 PROCEDURE — 75710 ARTERY X-RAYS ARM/LEG: CPT | Performed by: SURGERY

## 2020-01-07 PROCEDURE — 76937 US GUIDE VASCULAR ACCESS: CPT

## 2020-01-07 PROCEDURE — 99152 MOD SED SAME PHYS/QHP 5/>YRS: CPT

## 2020-01-07 PROCEDURE — 25010000002 HEPARIN (PORCINE) PER 1000 UNITS: Performed by: SURGERY

## 2020-01-07 PROCEDURE — 25010000002 FENTANYL CITRATE (PF) 100 MCG/2ML SOLUTION: Performed by: SURGERY

## 2020-01-07 PROCEDURE — 25010000002 MIDAZOLAM PER 1 MG: Performed by: SURGERY

## 2020-01-07 PROCEDURE — 99153 MOD SED SAME PHYS/QHP EA: CPT

## 2020-01-07 RX ORDER — NALOXONE HCL 0.4 MG/ML
0.4 VIAL (ML) INJECTION
Status: DISCONTINUED | OUTPATIENT
Start: 2020-01-07 | End: 2020-01-07 | Stop reason: HOSPADM

## 2020-01-07 RX ORDER — HEPARIN SODIUM 1000 [USP'U]/ML
INJECTION, SOLUTION INTRAVENOUS; SUBCUTANEOUS AS NEEDED
Status: DISCONTINUED | OUTPATIENT
Start: 2020-01-07 | End: 2020-01-07 | Stop reason: HOSPADM

## 2020-01-07 RX ORDER — SODIUM CHLORIDE 9 MG/ML
150 INJECTION, SOLUTION INTRAVENOUS CONTINUOUS
Status: DISCONTINUED | OUTPATIENT
Start: 2020-01-07 | End: 2020-01-07 | Stop reason: HOSPADM

## 2020-01-07 RX ORDER — HYDROCODONE BITARTRATE AND ACETAMINOPHEN 7.5; 325 MG/1; MG/1
1 TABLET ORAL EVERY 4 HOURS PRN
Status: DISCONTINUED | OUTPATIENT
Start: 2020-01-07 | End: 2020-01-07 | Stop reason: HOSPADM

## 2020-01-07 RX ORDER — MIDAZOLAM HYDROCHLORIDE 1 MG/ML
INJECTION INTRAMUSCULAR; INTRAVENOUS AS NEEDED
Status: DISCONTINUED | OUTPATIENT
Start: 2020-01-07 | End: 2020-01-07 | Stop reason: HOSPADM

## 2020-01-07 RX ORDER — FENTANYL CITRATE 50 UG/ML
INJECTION, SOLUTION INTRAMUSCULAR; INTRAVENOUS AS NEEDED
Status: DISCONTINUED | OUTPATIENT
Start: 2020-01-07 | End: 2020-01-07 | Stop reason: HOSPADM

## 2020-01-07 RX ORDER — LIDOCAINE HYDROCHLORIDE 10 MG/ML
INJECTION, SOLUTION EPIDURAL; INFILTRATION; INTRACAUDAL; PERINEURAL AS NEEDED
Status: DISCONTINUED | OUTPATIENT
Start: 2020-01-07 | End: 2020-01-07 | Stop reason: HOSPADM

## 2020-01-07 RX ORDER — MORPHINE SULFATE 2 MG/ML
2 INJECTION, SOLUTION INTRAMUSCULAR; INTRAVENOUS EVERY 4 HOURS PRN
Status: DISCONTINUED | OUTPATIENT
Start: 2020-01-07 | End: 2020-01-07 | Stop reason: HOSPADM

## 2020-01-07 RX ORDER — SODIUM CHLORIDE 9 MG/ML
250 INJECTION, SOLUTION INTRAVENOUS CONTINUOUS
Status: DISCONTINUED | OUTPATIENT
Start: 2020-01-07 | End: 2020-01-07 | Stop reason: HOSPADM

## 2020-01-07 RX ADMIN — SODIUM CHLORIDE 150 ML/HR: 9 INJECTION, SOLUTION INTRAVENOUS at 08:07

## 2020-01-07 NOTE — H&P
HPI: The patient has developed rest pain in the left foot    Allergies: None    Past medical history:  COPD  Right lower extremity PVD status post common femoral endarterectomy  Essential hypertension  Left subclavian steal  Dyslipidemia  History of tobacco use  Medications: Per chart    Review of systems essentially negative    Physical examination     extremity pulses: Right femoral 2+, left femoral 1+,   popliteal right 1+,  left absent   dorsalis pedis pulses absent bilaterally,   posterior tibial pulse left absent right normal    Impression severe left lower extremity PVD with limb threatening ischemia    Plan: Left lower extremity angiography with possible revascularization

## 2020-01-07 NOTE — BRIEF OP NOTE
ENDOVASCULAR BRIEF PROCEDURE NOTE     Aarti Wade  1/7/2020     Diagnosis:   Left lower extremity PVD with rest pain    Access:   Right common femoral artery    Procedures:   Angiography:  Left lower extremity     Interventions: Left common femoral-SFA atherectomy with DCB PTA     Findings/Results:  PRE: Occluded left common femoral artery      POST: Patent left common femoral artery, SFA, and profunda femoris artery    Contrast:   115 ml    Complications:  None      Severino Carbajal MD   1/7/2020  10:34 AM

## 2020-01-08 LAB
ACT BLD: 158 SECONDS (ref 82–152)
ACT BLD: 169 SECONDS (ref 82–152)
ACT BLD: 213 SECONDS (ref 82–152)

## 2020-01-15 ENCOUNTER — OFFICE VISIT (OUTPATIENT)
Dept: CARDIOLOGY | Facility: CLINIC | Age: 65
End: 2020-01-15

## 2020-01-15 VITALS
DIASTOLIC BLOOD PRESSURE: 54 MMHG | SYSTOLIC BLOOD PRESSURE: 103 MMHG | BODY MASS INDEX: 24.11 KG/M2 | WEIGHT: 131 LBS | HEIGHT: 62 IN | HEART RATE: 53 BPM

## 2020-01-15 DIAGNOSIS — R42 POSTURAL DIZZINESS WITH PRESYNCOPE: ICD-10-CM

## 2020-01-15 DIAGNOSIS — R55 PRE-SYNCOPE: Primary | ICD-10-CM

## 2020-01-15 DIAGNOSIS — R55 POSTURAL DIZZINESS WITH PRESYNCOPE: ICD-10-CM

## 2020-01-15 DIAGNOSIS — I70.212 ATHEROSCLEROSIS OF NATIVE ARTERY OF LEFT LOWER EXTREMITY WITH INTERMITTENT CLAUDICATION (HCC): ICD-10-CM

## 2020-01-15 DIAGNOSIS — E87.5 HYPERKALEMIA: ICD-10-CM

## 2020-01-15 DIAGNOSIS — I73.9 PERIPHERAL ARTERIAL DISEASE (HCC): ICD-10-CM

## 2020-01-15 PROCEDURE — 99214 OFFICE O/P EST MOD 30 MIN: CPT | Performed by: INTERNAL MEDICINE

## 2020-01-15 NOTE — PROGRESS NOTES
Subjective:     Encounter Date:01/15/2020    Patient ID: Aarti Wade is a 65 y.o. single white female, from [place], [place].  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      INTERNIST: Jannette Chapa MD  VASCULAR SURGEON: Severino Carbajal MD  UROLOGIST: Unknown (near [facility]?)  REMOTE ORTHOPEDIST: Lance Burgess MD  NEPHROLOGIST: MD Cathleen     Chief Complaint:   Chief Complaint   Patient presents with   • Peripheral arterial disease     Problem List:  1. Peripheral arterial disease:  a. Subclavian steal syndrome, 2016   b. Abnormal right femoral arterial duplex and popliteal tibial arterial duplex, on ASA and Plavix, with right femoral endarterectomy August 2016 (Dr. Carbajal)  c. Residual class I claudication without recent TIA/presyncope  d. Carotid duplex 11/18/2019: LICA 0-49% with retrograde left vertebral artery flow demonstrated, R ICA 50-69%, proximal LICA plaque without significant stenosis  e. Renal artery duplex 12/11/2019: Parenchymal disease in bilateral arteries, no JAIMEE  f. Left atherectomy, balloon angioplasty 1/7/2020: Common femoral arteries patent with moderate stenosis.  The common iliac, external iliac and internal leg arteries patent without evidence of stenosis.  Mid common femoral artery exhibits an occlusive lesion.  The origin of the profundus femoris artery appears to be occluded.  The SFA is reconstituted at its origin via collateral vessels.  The SFA, popliteal artery, and trifurcation are all patent.  Dominant flow to the foot is via the posterior tibial artery with a patent peroneal and anterior tibial artery into the distal leg.  Left common femoral artery-SFA atherectomy with balloon angioplasty.  After atherectomy and drug-coated balloon angioplasty, the common femoral artery and SFA as well as the profunda femoris artery were patent without evidence of  hemodynamically significant stenosis  g. Residual class I claudication, January 2020  2. Hypertensive cardiovascular disease:  a. Remote IV stress test over 10 years ago; negative per patient-data deficit  b. Residual class I symptoms/normal EKG with acceptable IV Lexiscan Cardiolite study suggestive of low probability for significant focal obstructive coronary artery disease  (LVEF 0.65), May 2017.  c. Echocardiogram 11/18/2019: Mild to moderate AR, mild TR, LA mildly dilated, LVEF 68%, mild calcification of the aortic valve mainly affecting the non-and left coronary cusps, LV diastolic function normal, normal RV cavity size, wall thickness, systolic function and septal motion noted.  Mild MAC is present.  Aortic valve exhibits moderate sclerosis without stenosis, no pulmonary hypertension, no pericardial effusion  d. Residual class I symptoms, January 2020  3. Hypertension  4. Hyperlipidemia; 10.8% 10-year risk; 3.6% with treatment  5. COPD with mild-moderate exertional dyspnea  6. Former smoker; smoked 1 ppd x 40 years, quit in 2016  7. Syncopal episodes years ago associated with UTIs; last one a couple of years ago; data deficit  8. Depression  9. GERD  10. History of recurrent UTIs   11. Chronic low back pain  12. GABBY  13. Dizziness and presyncope January 2020  14. Surgical history:  a. Right femoral endarterectomy, August 2016  b. Lens implant, 1990s  c. Spontaneous pneumothorax, 1960  d. Left atherectomy and balloon angioplasty     Allergies   Allergen Reactions   • Levocetirizine Dihydrochloride Myalgia     Muscle aches/joint pain         Current Outpatient Medications:   •  acetaminophen (ACETAMINOPHEN 8 HOUR) 650 MG 8 hr tablet, Take 650 mg by mouth Every 8 (Eight) Hours As Needed for Mild Pain ., Disp: , Rfl:   •  AMLACTIN 12 % lotion, As Needed for Irritation., Disp: , Rfl: 1  •  amLODIPine (NORVASC) 5 MG tablet, Take 1 tablet by mouth Daily., Disp: 30 tablet, Rfl: 5  •  ascorbic acid (VITAMIN C) 1000 MG  tablet, Take 1,000 mg by mouth 2 (two) times a day., Disp: , Rfl:   •  aspirin 81 MG EC tablet, Take 81 mg by mouth every morning. TOLD NOT TO STOP BEFORE SURGERY, Disp: , Rfl:   •  bisoprolol (ZEBeta) 10 MG tablet, Take 10 mg by mouth Daily., Disp: , Rfl: 1  •  Calcium Citrate-Vitamin D (CALCIUM + D PO), Take 2 tablets by mouth 2 (two) times a day., Disp: , Rfl:   •  citalopram (CELEXA) 40 MG tablet, Take 1 tablet by mouth Daily., Disp: 90 tablet, Rfl: 1  •  clopidogrel (PLAVIX) 75 MG tablet, take 1 tablet by mouth at bedtime TOLD NOT TO STOP BEFORE SURGERY (Patient taking differently: Take 75 mg by mouth Daily.), Disp: 30 tablet, Rfl: 5  •  Coenzyme Q10 200 MG capsule, Take 400 mg by mouth Daily., Disp: , Rfl:   •  Fish Oil oil, Take 1 capsule by mouth Daily., Disp: , Rfl:   •  Flaxseed, Linseed, (FLAXSEED OIL) 1000 MG capsule, Take 1 capsule by mouth daily., Disp: , Rfl:   •  fluticasone (FLONASE) 50 MCG/ACT nasal spray, instill 2 sprays into each nostril twice a day (Patient taking differently: 1 spray into the nostril(s) as directed by provider Daily.), Disp: 16 g, Rfl: 5  •  folic acid (FOLVITE) 1 MG tablet, Take 1 tablet by mouth Daily., Disp: 90 tablet, Rfl: 1  •  gabapentin (NEURONTIN) 100 MG capsule, Take 2 capsules by mouth Every Evening., Disp: 60 capsule, Rfl: 2  •  hydroCHLOROthiazide (HYDRODIURIL) 12.5 MG tablet, Take 1 tablet by mouth Daily., Disp: 30 tablet, Rfl: 2  •  Methylsulfonylmethane (MSM PO), Take 2 tablets by mouth daily., Disp: , Rfl:   •  Multiple Vitamins-Minerals (MULTIVITAMIN ADULT PO), Take 1 tablet by mouth daily., Disp: , Rfl:   •  olmesartan (BENICAR) 40 MG tablet, Take 1 tablet by mouth Daily., Disp: 90 tablet, Rfl: 0  •  omeprazole (PriLOSEC) 20 MG capsule, Take 20 mg by mouth daily., Disp: , Rfl:   •  rosuvastatin (CRESTOR) 20 MG tablet, Take 1 tablet by mouth Daily., Disp: 30 tablet, Rfl: 5  •  triamcinolone (KENALOG) 0.1 % cream, Apply  topically to the appropriate area as  "directed 2 (Two) Times a Day. (Patient taking differently: Apply 1 application topically to the appropriate area as directed 2 (Two) Times a Day.), Disp: 15 g, Rfl: 0  •  URINARY HEALTH/CRANBERRY PO, Take 1 tablet by mouth 2 (two) times a day. \"CRANACTIN\", Disp: , Rfl:     HISTORY OF PRESENT ILLNESS: Patient returns for scheduled 8-month followup.  In the interim, the patient denies any chest pain or shortness of breath.  She had a balloon angioplasty to her left lower extremity last week.  She denies any pain or numbness in this leg.  At that time, she was also told that her potassium was elevated, and she is now being seen by Nephrology.  She recently was taken off Celebrex due to an elevated creatinine level.  She is now trying to adapt to Tylenol but does not feel it is as effective as her Celebrex was.  She has had some dizziness and had a couple of presyncopal episodes last week, both on the same day.  She states that these episodes lasted a couple of minutes, but she denies syncope.  She also denies palpitations and vertigo.  In the past, when she had presyncopal episodes and dizziness, she would feel a little nauseous.  She felt that she may have had a urinary tract infection recently and had some spare Cipro at home, so she took 3 days' worth.  When she had a recheck on her labs, they were acceptable.  Patient otherwise denies chest pain, shortness of breath, PND, edema, palpitations, syncope or presyncope at this time.        Review of Systems   Musculoskeletal: Positive for arthritis, back pain, muscle cramps, neck pain and stiffness.   Neurological: Positive for light-headedness.      All other systems reviewed and otherwise negative.    Procedures       Objective:       Vitals:    01/15/20 1406 01/15/20 1407   BP: 108/57 103/54   BP Location: Right arm Left arm   Patient Position: Sitting Standing   Pulse: 52 53   Weight: 59.4 kg (131 lb)    Height: 157.5 cm (62\")      Body mass index is 23.96 kg/m². "   Last weight:  130 lbs.    Physical Exam   Constitutional: She is oriented to person, place, and time. She appears well-developed and well-nourished.   Neck: No JVD present. Carotid bruit is not present. No thyromegaly present.   Cardiovascular: Regular rhythm, S1 normal and S2 normal. Bradycardia present. Exam reveals no gallop, no S3 and no friction rub.   Murmur heard.   Medium-pitched early systolic murmur is present with a grade of 2/6 at the lower left sternal border.  Pulses:       Carotid pulses are 1+ on the right side, and 1+ on the left side.       Radial pulses are 1+ on the right side, and 1+ on the left side.        Femoral pulses are 1+ on the right side, and 1+ on the left side.       Popliteal pulses are 1+ on the right side, and 1+ on the left side.        Dorsalis pedis pulses are 1+ on the right side, and 1+ on the left side.        Posterior tibial pulses are 1+ on the right side, and 1+ on the left side.   Pulmonary/Chest: Effort normal. She has decreased breath sounds. She has no wheezes. She has no rhonchi. She has no rales.   Abdominal: Soft. She exhibits no mass. There is no hepatosplenomegaly. There is no tenderness. There is no guarding.   Bowel sounds audible x4   Musculoskeletal: Normal range of motion. She exhibits no edema.   Lymphadenopathy:     She has no cervical adenopathy.   Neurological: She is alert and oriented to person, place, and time.   Skin: Skin is warm, dry and intact. No rash noted.   Vitals reviewed.        Lab Review:   Lab Results   Component Value Date    GLUCOSE 99 01/03/2020    GLUCOSE 98 01/03/2020    BUN 38 (H) 01/03/2020    BUN 38 (H) 01/03/2020    CREATININE 1.05 (H) 01/03/2020    CREATININE 1.03 (H) 01/03/2020    EGFRIFNONA 52 (L) 01/03/2020    EGFRIFNONA 53 (L) 01/03/2020    BCR 36.2 (H) 01/03/2020    BCR 36.9 (H) 01/03/2020    CO2 21.0 (L) 01/03/2020    CO2 25.0 01/03/2020    CALCIUM 9.4 01/03/2020    CALCIUM 9.3 01/03/2020    ALBUMIN 4.70 01/03/2020     AST 23 07/19/2019    ALT 22 07/19/2019       Lab Results   Component Value Date    WBC 6.46 01/03/2020    HGB 12.1 01/03/2020    HCT 38.6 01/03/2020    MCV 96.5 01/03/2020     01/03/2020       Lab Results   Component Value Date    HGBA1C 5.50 01/03/2020       Lab Results   Component Value Date    TSH 1.770 07/19/2019       Lab Results   Component Value Date    CHOL 107 07/19/2019    CHOL 128 01/14/2019     Lab Results   Component Value Date    TRIG 66 07/19/2019    TRIG 95 01/14/2019     Lab Results   Component Value Date    HDL 56 07/19/2019    HDL 56 01/14/2019     Lab Results   Component Value Date    LDL 38 07/19/2019    LDL 53 01/14/2019 11/18/2019, carotid duplex:  · Right internal carotid artery stenosis of 50-69%.  · Proximal left internal carotid artery plaque without significant stenosis.  · Left internal carotid artery stenosis of 0-49%.  · Retrograde left vertebral artery flow demonstrated.    11/18/2019, echocardiogram:  · Mild to moderate aortic valve regurgitation is present.  · Mild tricuspid valve regurgitation is present.  · Left atrial cavity size is mildly dilated.  · Estimated EF = 68%.  · Left ventricular systolic function is normal.  · There is mild calcification of the aortic valve mainly affecting the non and left coronary cusp(s).  · Left ventricular diastolic function is normal.  · Normal right ventricular cavity size, wall thickness, systolic function and septal motion noted.  · Mild MAC is present.  · The aortic valve exhibits moderate sclerosis without stenosis.  · No evidence of pulmonary hypertension is present.  · There is no evidence of pericardial effusion.    01/07/2020, left atherectomy, balloon angioplasty:  FINDINGS/INTERPRETATION  1.  Ultrasound guidance.  The common femoral artery is patent with moderate stenosis.  Using continuous color flow Doppler with B-mode imaging, a 21-gauge needle and over a wire were successfully placed into the vessel.  2.  Left lower  extremity angiography.  The common iliac, external iliac, and internal leg arteries are patent without evidence of stenosis.  The mid common femoral artery exhibits an occlusive lesion.  The origin of the profunda femoris artery appears to be occluded.  The SFA is reconstituted at its origin via collateral vessels.  The SFA, popliteal artery, and trifurcation are all patent.  Dominant flow to the foot is via the posterior tibial artery with a patent peroneal and anterior tibial artery into the distal leg.  3.  Left common femoral artery-SFA atherectomy with balloon angioplasty.  After atherectomy and drug-coated balloon angioplasty, the common femoral artery and SFA, as well as the profunda femoris artery, were patent without evidence of hemodynamically significant stenosis.    01/07/2020, arterial US VASC: Color flow Doppler demonstrates that the common femoral artery is patent.  Spectral analysis demonstrates elevated flow velocities, consistent with moderate stenosis.  Using color flow Doppler and B-mode imaging, a needle and wire were successfully placed into the vessel.    Assessment:   Overall continued acceptable course with no new interim cardiopulmonary complaints with acceptable functional status. We will defer additional diagnostic or therapeutic intervention from a cardiac perspective at this time.  Patient had an acceptable echocardiogram, carotid duplex, renal artery duplex, and successful left atherectomy and balloon angioplasty.  She has had some recent hyperkalemia and was encouraged to follow-up with her nephrologist. The patient has had some dizziness and presyncopal episodes, and we will have her wear an E patch to assess for PAF, arrhythmias, or pauses.        Diagnosis Plan   1. Pre-syncope  Holter Monitor - 72 Hour Up To 21 Days   2. Postural dizziness with presyncope  E patch   3. Atherosclerosis of native artery of left lower extremity with intermittent claudication (CMS/HCC)   Successful  left atherectomy and balloon angioplasty 1/7/2020   4. Peripheral arterial disease (CMS/HCC)  Successful left atherectomy and balloon angioplasty 1/7/2020   5. Hyperkalemia   Encouraged the patient to follow-up with her nephrologist          Plan:         1. Patient to continue current medications and close follow up with the above providers.  2. Tentative cardiology follow up in July 2020, or patient may return sooner PRN.  3. E patch placed today  4. Encouraged patient to follow-up with her nephrologist regarding her hyperkalemia and elevated creatinine level    Scribed for Floyd Ernandez MD by Cynthia Powell, APRN. 1/15/2020  2:48 PM    I, Floyd Ernandez MD, [facility], personally performed the services described in this documentation as scribed by the above named individual in my presence, and it is both accurate and complete. At 3:06 PM on 01/15/2020

## 2020-01-31 ENCOUNTER — OFFICE VISIT (OUTPATIENT)
Dept: INTERNAL MEDICINE | Facility: CLINIC | Age: 65
End: 2020-01-31

## 2020-01-31 ENCOUNTER — LAB (OUTPATIENT)
Dept: LAB | Facility: HOSPITAL | Age: 65
End: 2020-01-31

## 2020-01-31 VITALS
HEIGHT: 62 IN | DIASTOLIC BLOOD PRESSURE: 56 MMHG | HEART RATE: 64 BPM | BODY MASS INDEX: 24.56 KG/M2 | TEMPERATURE: 98 F | WEIGHT: 133.5 LBS | SYSTOLIC BLOOD PRESSURE: 124 MMHG

## 2020-01-31 DIAGNOSIS — K21.9 GASTROESOPHAGEAL REFLUX DISEASE WITHOUT ESOPHAGITIS: ICD-10-CM

## 2020-01-31 DIAGNOSIS — I10 BENIGN ESSENTIAL HYPERTENSION: Chronic | ICD-10-CM

## 2020-01-31 DIAGNOSIS — E78.2 MIXED HYPERLIPIDEMIA: ICD-10-CM

## 2020-01-31 DIAGNOSIS — G89.29 CHRONIC BILATERAL LOW BACK PAIN WITHOUT SCIATICA: ICD-10-CM

## 2020-01-31 DIAGNOSIS — M15.3 POST-TRAUMATIC OSTEOARTHRITIS OF MULTIPLE JOINTS: Primary | ICD-10-CM

## 2020-01-31 DIAGNOSIS — E72.11 HYPERHOMOCYSTEINEMIA (HCC): ICD-10-CM

## 2020-01-31 DIAGNOSIS — N18.30 CHRONIC KIDNEY DISEASE, STAGE 3, MOD DECREASED GFR (HCC): Chronic | ICD-10-CM

## 2020-01-31 DIAGNOSIS — F32.A DEPRESSIVE DISORDER: ICD-10-CM

## 2020-01-31 DIAGNOSIS — I70.213 ATHEROSCLEROSIS OF NATIVE ARTERY OF BOTH LOWER EXTREMITIES WITH INTERMITTENT CLAUDICATION (HCC): Chronic | ICD-10-CM

## 2020-01-31 DIAGNOSIS — M19.111 POST-TRAUMATIC OSTEOARTHRITIS OF BOTH SHOULDERS: ICD-10-CM

## 2020-01-31 DIAGNOSIS — M19.112 POST-TRAUMATIC OSTEOARTHRITIS OF BOTH SHOULDERS: ICD-10-CM

## 2020-01-31 DIAGNOSIS — M54.50 CHRONIC BILATERAL LOW BACK PAIN WITHOUT SCIATICA: ICD-10-CM

## 2020-01-31 DIAGNOSIS — I10 BENIGN ESSENTIAL HYPERTENSION: ICD-10-CM

## 2020-01-31 PROBLEM — M79.642 BILATERAL HAND PAIN: Chronic | Status: ACTIVE | Noted: 2020-01-31

## 2020-01-31 PROBLEM — M79.641 BILATERAL HAND PAIN: Chronic | Status: ACTIVE | Noted: 2020-01-31

## 2020-01-31 LAB
ALBUMIN SERPL-MCNC: 4.5 G/DL (ref 3.5–5.2)
ALBUMIN/GLOB SERPL: 1.7 G/DL
ALP SERPL-CCNC: 45 U/L (ref 39–117)
ALT SERPL W P-5'-P-CCNC: 16 U/L (ref 1–33)
ANION GAP SERPL CALCULATED.3IONS-SCNC: 12 MMOL/L (ref 5–15)
AST SERPL-CCNC: 23 U/L (ref 1–32)
BILIRUB SERPL-MCNC: 0.3 MG/DL (ref 0.2–1.2)
BUN BLD-MCNC: 18 MG/DL (ref 8–23)
BUN/CREAT SERPL: 25.7 (ref 7–25)
CALCIUM SPEC-SCNC: 9.4 MG/DL (ref 8.6–10.5)
CHLORIDE SERPL-SCNC: 102 MMOL/L (ref 98–107)
CHOLEST SERPL-MCNC: 118 MG/DL (ref 0–200)
CO2 SERPL-SCNC: 26 MMOL/L (ref 22–29)
CREAT BLD-MCNC: 0.7 MG/DL (ref 0.57–1)
DEPRECATED RDW RBC AUTO: 43.5 FL (ref 37–54)
ERYTHROCYTE [DISTWIDTH] IN BLOOD BY AUTOMATED COUNT: 12.7 % (ref 12.3–15.4)
GFR SERPL CREATININE-BSD FRML MDRD: 83 ML/MIN/1.73
GLOBULIN UR ELPH-MCNC: 2.6 GM/DL
GLUCOSE BLD-MCNC: 91 MG/DL (ref 65–99)
HCT VFR BLD AUTO: 38.8 % (ref 34–46.6)
HDLC SERPL-MCNC: 54 MG/DL (ref 40–60)
HGB BLD-MCNC: 12.4 G/DL (ref 12–15.9)
LDLC SERPL CALC-MCNC: 53 MG/DL (ref 0–100)
LDLC/HDLC SERPL: 0.98 {RATIO}
MCH RBC QN AUTO: 29.9 PG (ref 26.6–33)
MCHC RBC AUTO-ENTMCNC: 32 G/DL (ref 31.5–35.7)
MCV RBC AUTO: 93.5 FL (ref 79–97)
PLATELET # BLD AUTO: 304 10*3/MM3 (ref 140–450)
PMV BLD AUTO: 10.6 FL (ref 6–12)
POTASSIUM BLD-SCNC: 4.8 MMOL/L (ref 3.5–5.2)
PROT SERPL-MCNC: 7.1 G/DL (ref 6–8.5)
RBC # BLD AUTO: 4.15 10*6/MM3 (ref 3.77–5.28)
SODIUM BLD-SCNC: 140 MMOL/L (ref 136–145)
TRIGL SERPL-MCNC: 56 MG/DL (ref 0–150)
VLDLC SERPL-MCNC: 11.2 MG/DL (ref 5–40)
WBC NRBC COR # BLD: 5.32 10*3/MM3 (ref 3.4–10.8)

## 2020-01-31 PROCEDURE — 85027 COMPLETE CBC AUTOMATED: CPT | Performed by: SURGERY

## 2020-01-31 PROCEDURE — 99214 OFFICE O/P EST MOD 30 MIN: CPT | Performed by: INTERNAL MEDICINE

## 2020-01-31 PROCEDURE — 36415 COLL VENOUS BLD VENIPUNCTURE: CPT

## 2020-01-31 PROCEDURE — 80061 LIPID PANEL: CPT

## 2020-01-31 PROCEDURE — 80053 COMPREHEN METABOLIC PANEL: CPT

## 2020-01-31 RX ORDER — GABAPENTIN 100 MG/1
300 CAPSULE ORAL EVERY EVENING
Qty: 90 CAPSULE | Refills: 2 | Status: SHIPPED | OUTPATIENT
Start: 2020-01-31 | End: 2020-05-04

## 2020-01-31 RX ORDER — CLOPIDOGREL BISULFATE 75 MG/1
75 TABLET ORAL DAILY
Qty: 90 TABLET | Refills: 1 | Status: ON HOLD | OUTPATIENT
Start: 2020-01-31 | End: 2020-06-09 | Stop reason: SDUPTHER

## 2020-01-31 RX ORDER — HYDROCHLOROTHIAZIDE 12.5 MG/1
12.5 TABLET ORAL DAILY
Qty: 90 TABLET | Refills: 3 | Status: SHIPPED | OUTPATIENT
Start: 2020-01-31 | End: 2020-12-18 | Stop reason: SDUPTHER

## 2020-01-31 RX ORDER — DULOXETIN HYDROCHLORIDE 60 MG/1
60 CAPSULE, DELAYED RELEASE ORAL DAILY
Qty: 90 CAPSULE | Refills: 3 | Status: SHIPPED | OUTPATIENT
Start: 2020-01-31 | End: 2020-03-13 | Stop reason: SDUPTHER

## 2020-01-31 RX ORDER — TRAMADOL HYDROCHLORIDE 50 MG/1
50 TABLET ORAL EVERY 6 HOURS PRN
Qty: 120 TABLET | Refills: 2 | Status: SHIPPED | OUTPATIENT
Start: 2020-01-31 | End: 2020-04-14

## 2020-01-31 RX ORDER — CITALOPRAM 40 MG/1
40 TABLET ORAL DAILY
Qty: 90 TABLET | Refills: 1 | Status: SHIPPED | OUTPATIENT
Start: 2020-01-31 | End: 2020-01-31

## 2020-01-31 RX ORDER — FOLIC ACID 1 MG/1
1 TABLET ORAL DAILY
Qty: 90 TABLET | Refills: 1 | Status: SHIPPED | OUTPATIENT
Start: 2020-01-31 | End: 2020-06-03

## 2020-01-31 RX ORDER — OMEPRAZOLE 20 MG/1
20 CAPSULE, DELAYED RELEASE ORAL DAILY
Qty: 90 CAPSULE | Refills: 3 | Status: SHIPPED | OUTPATIENT
Start: 2020-01-31

## 2020-02-01 PROBLEM — M15.3 POST-TRAUMATIC OSTEOARTHRITIS OF MULTIPLE JOINTS: Status: ACTIVE | Noted: 2020-02-01

## 2020-02-01 PROBLEM — N18.30 CHRONIC KIDNEY DISEASE, STAGE 3, MOD DECREASED GFR (HCC): Chronic | Status: ACTIVE | Noted: 2020-02-01

## 2020-02-01 NOTE — PATIENT INSTRUCTIONS
Patient Instructions   Problem List Items Addressed This Visit        Cardiovascular and Mediastinum    Atherosclerosis of native artery of both lower extremities with intermittent claudication (CMS/HCC) (Chronic)    Overview     1/31/2020 Jannette Chapa MD    Patient is status post left lower extremity atherectomy and balloon angioplasty by Dr. Carbajal on 1/7/2020.    Continue daily Plavix and aspirin.  Continue rosuvastatin nightly.         Benign essential hypertension    Overview     1/31/2020 Jannette Chapa MD    Continue bisoprolol and hydrochlorothiazide and olmesartan daily.  Continue to avoid salt in the diet.  Continue to avoid sodas.         Relevant Medications    bisoprolol (ZEBeta) 10 MG tablet    olmesartan (BENICAR) 40 MG tablet    amLODIPine (NORVASC) 5 MG tablet    hydroCHLOROthiazide (HYDRODIURIL) 12.5 MG tablet    Mixed hyperlipidemia    Overview     1/31/2020 Jannette Chapa MD    Continue rosuvastatin every evening.  Continue low-fat diet and regular exercise and physical activity.         Relevant Medications    rosuvastatin (CRESTOR) 20 MG tablet    Other Relevant Orders    Comprehensive Metabolic Panel (Completed)    Lipid Panel (Completed)       Digestive    Gastroesophageal reflux disease without esophagitis    Overview     1/31/2020 Jannette Chapa MD    Continue omeprazole daily.  Continue to avoid eating close to bedtime and avoid large meals.         Relevant Medications    omeprazole (priLOSEC) 20 MG capsule       Nervous and Auditory    Chronic low back pain    Overview     2/1/2020 Jannette Chapa MD    Do a few back stretches and exercises every morning.  Use moist heat to relax tight muscles.  Walking also helps.            Musculoskeletal and Integument    Degenerative joint disease of shoulder region    Overview     2/1/2020 Jannette Chapa MD    Moist heat may help.  Do a few stretches from past physical therapy every day.         Post-traumatic osteoarthritis of  multiple joints - Primary    Overview     2/1/2020 Jannette Chapa MD    Change citalopram to duloxetine to see if it helps more with pain.  Increase gabapentin to 3 capsules every evening.  Tramadol 1/2 to 1 tablet up to 4 times a day as needed.  May also take tylenol with it as needed.         Relevant Medications    gabapentin (NEURONTIN) 100 MG capsule    traMADol (ULTRAM) 50 MG tablet       Genitourinary    Chronic kidney disease, stage 3, mod decreased GFR (CMS/HCC) (Chronic)    Overview     2/1/2020 Jannette Chapa MD    Continue drinking lots of fluids, especially water.  Continue to avoid NSAIDs.  We will continue to monitor labs.           Relevant Medications    hydroCHLOROthiazide (HYDRODIURIL) 12.5 MG tablet       Other    Depressive disorder    Overview     1/31/2020 Jannette Chapa MD    Change citalopram to duloxetine 60mg daily.    Physical activity and exercise also help.  Keeping up with friends and family also help.         Relevant Medications    DULoxetine (CYMBALTA) 60 MG capsule      Other Visit Diagnoses     Hyperhomocysteinemia (CMS/HCC)        Relevant Medications    folic acid (FOLVITE) 1 MG tablet

## 2020-02-03 RX ORDER — OLMESARTAN MEDOXOMIL 40 MG/1
TABLET ORAL
Qty: 90 TABLET | Refills: 0 | Status: SHIPPED | OUTPATIENT
Start: 2020-02-03 | End: 2020-05-08

## 2020-02-04 ENCOUNTER — TELEPHONE (OUTPATIENT)
Dept: INTERNAL MEDICINE | Facility: CLINIC | Age: 65
End: 2020-02-04

## 2020-02-04 RX ORDER — BISOPROLOL FUMARATE 10 MG/1
10 TABLET, FILM COATED ORAL DAILY
Qty: 90 TABLET | Refills: 1 | Status: SHIPPED | OUTPATIENT
Start: 2020-02-04 | End: 2020-07-30

## 2020-02-04 NOTE — TELEPHONE ENCOUNTER
Pt called and stated that she took her last bisoprolol tablet this morning. Pt does not have anymore refills. Pt is requesting a refill on bisoprolol (ZEBeta) 10 MG tablet to be sent to [facility] in [employer] [facility] on [address].

## 2020-03-04 ENCOUNTER — HOSPITAL ENCOUNTER (OUTPATIENT)
Dept: MAMMOGRAPHY | Facility: HOSPITAL | Age: 65
Discharge: HOME | End: 2020-03-04
Admitting: INTERNAL MEDICINE

## 2020-03-04 DIAGNOSIS — Z12.31 VISIT FOR SCREENING MAMMOGRAM: ICD-10-CM

## 2020-03-04 PROCEDURE — 77067 SCR MAMMO BI INCL CAD: CPT | Performed by: RADIOLOGY

## 2020-03-04 PROCEDURE — 77067 SCR MAMMO BI INCL CAD: CPT

## 2020-03-04 PROCEDURE — 77063 BREAST TOMOSYNTHESIS BI: CPT | Performed by: RADIOLOGY

## 2020-03-04 PROCEDURE — 77063 BREAST TOMOSYNTHESIS BI: CPT

## 2020-03-13 ENCOUNTER — OFFICE VISIT (OUTPATIENT)
Dept: INTERNAL MEDICINE | Facility: CLINIC | Age: 65
End: 2020-03-13

## 2020-03-13 VITALS
WEIGHT: 132 LBS | BODY MASS INDEX: 24.29 KG/M2 | SYSTOLIC BLOOD PRESSURE: 120 MMHG | HEART RATE: 62 BPM | HEIGHT: 62 IN | DIASTOLIC BLOOD PRESSURE: 60 MMHG

## 2020-03-13 DIAGNOSIS — I10 BENIGN ESSENTIAL HYPERTENSION: ICD-10-CM

## 2020-03-13 DIAGNOSIS — M85.89 OSTEOPENIA OF MULTIPLE SITES: ICD-10-CM

## 2020-03-13 DIAGNOSIS — N18.30 CHRONIC KIDNEY DISEASE, STAGE 3, MOD DECREASED GFR (HCC): Chronic | ICD-10-CM

## 2020-03-13 DIAGNOSIS — M54.50 CHRONIC BILATERAL LOW BACK PAIN WITHOUT SCIATICA: ICD-10-CM

## 2020-03-13 DIAGNOSIS — G89.29 CHRONIC BILATERAL LOW BACK PAIN WITHOUT SCIATICA: ICD-10-CM

## 2020-03-13 DIAGNOSIS — F32.A DEPRESSIVE DISORDER: ICD-10-CM

## 2020-03-13 DIAGNOSIS — M25.512 ACUTE PAIN OF LEFT SHOULDER: Chronic | ICD-10-CM

## 2020-03-13 DIAGNOSIS — Z23 NEED FOR VACCINATION: Primary | ICD-10-CM

## 2020-03-13 PROCEDURE — 90471 IMMUNIZATION ADMIN: CPT | Performed by: INTERNAL MEDICINE

## 2020-03-13 PROCEDURE — 99214 OFFICE O/P EST MOD 30 MIN: CPT | Performed by: INTERNAL MEDICINE

## 2020-03-13 PROCEDURE — 90653 IIV ADJUVANT VACCINE IM: CPT | Performed by: INTERNAL MEDICINE

## 2020-03-13 RX ORDER — DULOXETIN HYDROCHLORIDE 60 MG/1
60 CAPSULE, DELAYED RELEASE ORAL 2 TIMES DAILY
Qty: 180 CAPSULE | Refills: 3 | Status: SHIPPED | OUTPATIENT
Start: 2020-03-13 | End: 2020-05-29

## 2020-03-13 NOTE — PROGRESS NOTES
[facility]     Aarti Wade  1955   [id number]      Patient Care Team:  Jannette Chapa MD as PCP - General  Jannette Chapa MD as PCP - Family Medicine  Severino Carbajal MD as Consulting Physician (Vascular Surgery)    Chief Complaint   Patient presents with   • Hypertension     f/u   • Arthritis   • Cough     for about the last month            HPI  Patient is a 65 y.o. female presents with L shoulder pain. Onset of symptoms was abrupt starting 1 month ago.  Chronicity acute. Severity moderate to severe.  Symptoms are associated with pain and decrease in ROM. Pertinent negatives no fever/chills/swelling.   Symptoms are aggravated by lifting,putting on bra.   Symptoms improve with improving spontaneously slowly over last month.  Context HX rotator cuff tear and arthritis R shoulder.       CHRONIC CONDITIONS  Pain is helped some by tramadol and cymbalta and gabapentin. No sleepiness with these. Doing back stretches. Walking. Still misses the antiinflammatory.    GERD doing pretty well. Prilosec helps.     Didn't do DEXA since copay was going to be $400.    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 HARRISON   • COLONOSCOPY     • EYE LENS IMPLANT SECONDARY Left    • FEMORAL FEMORAL BYPASS Right 8/22/2016    Procedure: RIGHT COMMON FEMORAL ENDARTERECTOMY WITH PATCH;  Surgeon: Severino Harrison MD;  Location:  [facility];  Service:    • FINGER SURGERY Left     LEFT INDEX FINGER   • INTERVENTIONAL RADIOLOGY PROCEDURE N/A 8/16/2016    Procedure: IR PTA femoral popliteal artery;  Surgeon: Severino Harrison MD;  Location:  [facility];  Service:    • INTERVENTIONAL RADIOLOGY PROCEDURE Left 1/7/2020    Procedure: LEFT ATHERECTOMY, BALLOON ANGIOPLASTY;  Surgeon: Severino Harrison MD;  Location:  [facility];  Service: Interventional Radiology   • TONSILLECTOMY         Family History   Problem Relation Age of Onset   • Osteoarthritis Mother    • Alzheimer's disease Mother    • Hypertension Father    • Heart attack Father    • Alcohol abuse Father    • No Known Problems Sister    • No Known Problems Brother    • No Known Problems Daughter    • No Known Problems Son    • Breast cancer Maternal Grandmother 50   • Breast cancer Paternal Grandmother 50   • No Known Problems Maternal Aunt    • No Known Problems Paternal Aunt    • Other Other    • Heart attack Other    • Coronary artery disease Paternal Grandfather    • Stroke Paternal Grandfather    • Ovarian cancer Neg Hx    • Endometrial cancer Neg Hx        Social History     Socioeconomic History   • Marital status: Single     Spouse name: Not on file   • Number of children: Not on file   • Years of education: Not on file   • Highest education level: Not on file   Tobacco Use   • Smoking status: Former Smoker     Packs/day: 1.00     Years: 40.00     Pack years: 40.00     Types: Electronic Cigarette, Cigarettes     Last attempt to quit: 6/16/2016     Years since quitting: 3.7   • Smokeless tobacco: Never Used   •  "Tobacco comment: 1 PACK/DAY SMOKER UNTIL JUNE 2016   Substance and Sexual Activity   • Alcohol use: Yes     Alcohol/week: 2.0 standard drinks     Types: 2 Shots of liquor per week     Comment: 2 DRINKS PER DAY   • Drug use: No   • Sexual activity: Defer       Allergies   Allergen Reactions   • Levocetirizine Dihydrochloride Myalgia     Muscle aches/joint pain       Review of Systems:     Review of Systems   Constitutional: Negative for chills, fatigue and fever.   HENT: Positive for postnasal drip. Negative for congestion and sinus pressure.    Respiratory: Positive for cough. Negative for shortness of breath and wheezing.         Occasional  Cough with PND   Cardiovascular: Negative for chest pain, palpitations and leg swelling.   Gastrointestinal: Negative for abdominal pain, blood in stool, constipation and diarrhea.   Musculoskeletal: Positive for arthralgias and back pain.   Psychiatric/Behavioral: Positive for dysphoric mood. The patient is not nervous/anxious.        Vital Signs  Vitals:    03/13/20 1559   BP: 120/60   BP Location: Right arm   Patient Position: Sitting   Cuff Size: Adult   Pulse: 62   Weight: 59.9 kg (132 lb)   Height: 157.5 cm (62\")   PainSc:   8   PainLoc: Generalized  Comment: arthritis     Body mass index is 24.14 kg/m².      Current Outpatient Medications:   •  acetaminophen (ACETAMINOPHEN 8 HOUR) 650 MG 8 hr tablet, Take 650 mg by mouth 3 (Three) Times a Day., Disp: , Rfl:   •  AMLACTIN 12 % lotion, As Needed for Irritation., Disp: , Rfl: 1  •  amLODIPine (NORVASC) 5 MG tablet, Take 1 tablet by mouth Daily., Disp: 30 tablet, Rfl: 5  •  ascorbic acid (VITAMIN C) 1000 MG tablet, Take 1,000 mg by mouth 2 (two) times a day., Disp: , Rfl:   •  aspirin 81 MG EC tablet, Take 81 mg by mouth every morning. TOLD NOT TO STOP BEFORE SURGERY, Disp: , Rfl:   •  bisoprolol (ZEBeta) 10 MG tablet, Take 1 tablet by mouth Daily., Disp: 90 tablet, Rfl: 1  •  Calcium Citrate-Vitamin D (CALCIUM + D PO), Take " 2 tablets by mouth 2 (two) times a day., Disp: , Rfl:   •  clopidogrel (PLAVIX) 75 MG tablet, Take 1 tablet by mouth Daily., Disp: 90 tablet, Rfl: 1  •  Coenzyme Q10 200 MG capsule, Take 400 mg by mouth Daily., Disp: , Rfl:   •  DULoxetine (CYMBALTA) 60 MG capsule, Take 1 capsule by mouth 2 (Two) Times a Day., Disp: 180 capsule, Rfl: 3  •  Fish Oil oil, Take 1 capsule by mouth Daily., Disp: , Rfl:   •  Flaxseed, Linseed, (FLAXSEED OIL) 1000 MG capsule, Take 1 capsule by mouth daily., Disp: , Rfl:   •  fluticasone (FLONASE) 50 MCG/ACT nasal spray, instill 2 sprays into each nostril twice a day (Patient taking differently: 1 spray into the nostril(s) as directed by provider Daily.), Disp: 16 g, Rfl: 5  •  folic acid (FOLVITE) 1 MG tablet, Take 1 tablet by mouth Daily., Disp: 90 tablet, Rfl: 1  •  gabapentin (NEURONTIN) 100 MG capsule, Take 3 capsules by mouth Every Evening., Disp: 90 capsule, Rfl: 2  •  hydroCHLOROthiazide (HYDRODIURIL) 12.5 MG tablet, Take 1 tablet by mouth Daily., Disp: 90 tablet, Rfl: 3  •  Methylsulfonylmethane (MSM PO), Take 2 tablets by mouth daily., Disp: , Rfl:   •  Multiple Vitamins-Minerals (MULTIVITAMIN ADULT PO), Take 1 tablet by mouth daily., Disp: , Rfl:   •  olmesartan (BENICAR) 40 MG tablet, TAKE 1 TABLET BY MOUTH EVERY DAY, Disp: 90 tablet, Rfl: 0  •  omeprazole (priLOSEC) 20 MG capsule, Take 1 capsule by mouth Daily., Disp: 90 capsule, Rfl: 3  •  rosuvastatin (CRESTOR) 20 MG tablet, Take 1 tablet by mouth Daily., Disp: 30 tablet, Rfl: 5  •  traMADol (ULTRAM) 50 MG tablet, Take 1 tablet by mouth Every 6 (Six) Hours As Needed for Moderate Pain  or Severe Pain ., Disp: 120 tablet, Rfl: 2  •  triamcinolone (KENALOG) 0.1 % cream, Apply  topically to the appropriate area as directed 2 (Two) Times a Day. (Patient taking differently: Apply 1 application topically to the appropriate area as directed 2 (Two) Times a Day.), Disp: 15 g, Rfl: 0  •  URINARY HEALTH/CRANBERRY PO, Take 1 tablet by  "mouth 2 (two) times a day. \"CRANACTIN\", Disp: , Rfl:     Physical Exam:    Physical Exam   Constitutional: She is oriented to person, place, and time. She appears well-developed and well-nourished.   HENT:   Head: Normocephalic.   Eyes: Pupils are equal, round, and reactive to light. Conjunctivae and EOM are normal.   Neck: Normal range of motion. Neck supple. No thyromegaly present.   Cardiovascular: Normal rate, regular rhythm, normal heart sounds and intact distal pulses.   Pulmonary/Chest: Effort normal and breath sounds normal.   Musculoskeletal: She exhibits deformity. She exhibits no edema.        Right shoulder: She exhibits decreased range of motion and deformity. She exhibits no tenderness and no swelling.        Left shoulder: She exhibits decreased range of motion. She exhibits no tenderness and no swelling.        Lumbar back: She exhibits tenderness and spasm.        Right hand: She exhibits decreased range of motion, tenderness and deformity.        Left hand: She exhibits decreased range of motion, tenderness and deformity.   Lymphadenopathy:     She has no cervical adenopathy.   Neurological: She is alert and oriented to person, place, and time.   Psychiatric: She has a normal mood and affect. Thought content normal.   Nursing note and vitals reviewed.       ACE III MINI        Results Review:    None    CMP:  Lab Results   Component Value Date    BUN 18 01/31/2020    CREATININE 0.70 01/31/2020    EGFRIFNONA 83 01/31/2020    BCR 25.7 (H) 01/31/2020     01/31/2020    K 4.8 01/31/2020    CO2 26.0 01/31/2020    CALCIUM 9.4 01/31/2020    ALBUMIN 4.50 01/31/2020    BILITOT 0.3 01/31/2020    ALKPHOS 45 01/31/2020    AST 23 01/31/2020    ALT 16 01/31/2020     HbA1c:  Lab Results   Component Value Date    HGBA1C 5.50 01/03/2020    HGBA1C 4.40 08/19/2016     Microalbumin:  Lab Results   Component Value Date    MICROALBUR 11.4 07/19/2019     Lipid Panel  Lab Results   Component Value Date    CHOL 118 " 01/31/2020    TRIG 56 01/31/2020    HDL 54 01/31/2020    LDL 53 01/31/2020    AST 23 01/31/2020    ALT 16 01/31/2020       Medication Review: Medications reviewed and noted  Patient Instructions   Problem List Items Addressed This Visit        Cardiovascular and Mediastinum    Benign essential hypertension    Overview     3/13/2020 Jannette Chapa MD    Continue bisoprolol and hydrochlorothiazide and olmesartan daily.  Continue to avoid salt in the diet.  Continue to avoid sodas.         Relevant Medications    amLODIPine (NORVASC) 5 MG tablet    hydroCHLOROthiazide (HYDRODIURIL) 12.5 MG tablet    olmesartan (BENICAR) 40 MG tablet    bisoprolol (ZEBeta) 10 MG tablet       Nervous and Auditory    Acute pain of left shoulder (Chronic)    Overview     3/13/2020 Jannette Chapa MD    We discussed some simple shoulder exercises to do at home.Use moist heat on the shoulder. If not improving more in a month, will refer for PT.         Chronic low back pain    Overview     3/13/2020 Jannette Chapa MD    Do a few back stretches and exercises every morning.  Use moist heat to relax tight muscles.  Walking also helps.    Increase duloxetine to 2 of the 60mg capsules every day.  Continue gabapentin every evening and tramadol and tylenol as needed.         Relevant Medications    DULoxetine (CYMBALTA) 60 MG capsule       Musculoskeletal and Integument    Osteopenia of multiple sites    Overview     3/13/2020 Jannette Chapa MD    She will check on price of DEXA again later in the year after she has met deductible.            Genitourinary    Chronic kidney disease, stage 3, mod decreased GFR (CMS/HCC) (Chronic)    Overview     3/13/2020 Jannette Chapa MD    Continue drinking lots of fluids, especially water.  Continue to avoid NSAIDs.  We will continue to monitor labs.           Relevant Medications    hydroCHLOROthiazide (HYDRODIURIL) 12.5 MG tablet       Other    Depressive disorder    Overview     3/13/2020 Jannette  ALESSANDRA Chapa MD    Increase duloxetine to 2 of the 60mg capsules  daily.    Physical activity and exercise also help.  Keeping up with friends and family also help.         Relevant Medications    DULoxetine (CYMBALTA) 60 MG capsule      Other Visit Diagnoses     Need for vaccination    -  Primary    Relevant Orders    Fluad Tri 65yr+ (Completed)             Diagnosis Plan   1. Need for vaccination  Fluad Tri 65yr+   2. Chronic bilateral low back pain without sciatica  DULoxetine (CYMBALTA) 60 MG capsule   3. Acute pain of left shoulder     4. Depressive disorder     5. Benign essential hypertension     6. Chronic kidney disease, stage 3, mod decreased GFR (CMS/HCC)     7. Osteopenia of multiple sites         Plan of care reviewed with patient at the conclusion of today's visit. Education was provided regarding diagnosis, management, and any prescribed or recommended OTC medications.Patient verbalizes understanding of and agreement with management plan.         Jannette Chapa MD

## 2020-03-13 NOTE — PATIENT INSTRUCTIONS
Patient Instructions   Problem List Items Addressed This Visit        Cardiovascular and Mediastinum    Benign essential hypertension    Overview     3/13/2020 Jannette Chapa MD    Continue bisoprolol and hydrochlorothiazide and olmesartan daily.  Continue to avoid salt in the diet.  Continue to avoid sodas.         Relevant Medications    amLODIPine (NORVASC) 5 MG tablet    hydroCHLOROthiazide (HYDRODIURIL) 12.5 MG tablet    olmesartan (BENICAR) 40 MG tablet    bisoprolol (ZEBeta) 10 MG tablet       Nervous and Auditory    Acute pain of left shoulder (Chronic)    Overview     3/13/2020 Jannette Chapa MD    We discussed some simple shoulder exercises to do at home.Use moist heat on the shoulder. If not improving more in a month, will refer for PT.         Chronic low back pain    Overview     3/13/2020 Jannette Chapa MD    Do a few back stretches and exercises every morning.  Use moist heat to relax tight muscles.  Walking also helps.    Increase duloxetine to 2 of the 60mg capsules every day.  Continue gabapentin every evening and tramadol and tylenol as needed.         Relevant Medications    DULoxetine (CYMBALTA) 60 MG capsule       Musculoskeletal and Integument    Osteopenia of multiple sites    Overview     3/13/2020 Jannette Chapa MD    She will check on price of DEXA again later in the year after she has met deductible.            Genitourinary    Chronic kidney disease, stage 3, mod decreased GFR (CMS/HCC) (Chronic)    Overview     3/13/2020 Jannette Chapa MD    Continue drinking lots of fluids, especially water.  Continue to avoid NSAIDs.  We will continue to monitor labs.           Relevant Medications    hydroCHLOROthiazide (HYDRODIURIL) 12.5 MG tablet       Other    Depressive disorder    Overview     3/13/2020 Jannette Chapa MD    Increase duloxetine to 2 of the 60mg capsules  daily.    Physical activity and exercise also help.  Keeping up with friends and family also help.          Relevant Medications    DULoxetine (CYMBALTA) 60 MG capsule      Other Visit Diagnoses     Need for vaccination    -  Primary    Relevant Orders    Fluad Tri 65yr+ (Completed)

## 2020-03-20 ENCOUNTER — TELEPHONE (OUTPATIENT)
Dept: INTERNAL MEDICINE | Facility: CLINIC | Age: 65
End: 2020-03-20

## 2020-03-20 DIAGNOSIS — M25.512 ACUTE PAIN OF LEFT SHOULDER: Primary | Chronic | ICD-10-CM

## 2020-03-20 NOTE — TELEPHONE ENCOUNTER
PATIENT CALLED IN REGARDS TO LEFT SHOULDER PAIN , PATIENT FEELS AS IF ITS GETTING WORSE, PATIENT STATES SHE HAD CONVERSATION WITH PCP TO GET A MRI OF LEFT SHOULDER AND WOULD LIKE TO HAVE THAT SCHEDULED OR TO BE ADVISED OF PCP SUGGESTIONS ..    PLEASE ADVISE [phone]      PATIENT WOULD LIKE TO SEE WHAT PCP FEELS ABOUT  DR. VINES WHOM IS A SHOULDER DOCTOR ...

## 2020-03-20 NOTE — TELEPHONE ENCOUNTER
Let patient know I ordered MRI of left shoulder and also put in a referral to Dr. Lazcano or associate if he is not available

## 2020-03-25 ENCOUNTER — TELEPHONE (OUTPATIENT)
Dept: INTERNAL MEDICINE | Facility: CLINIC | Age: 65
End: 2020-03-25

## 2020-03-25 NOTE — TELEPHONE ENCOUNTER
Patient called to get more information about her MRI she is wanting the doctor to make her an appointment for the MRI informed patient that the [facility]   will be the ones to make the appointment. Patient wants to know if it has anything to do with the insurance.    Patient call back [phone]    Please call and advise

## 2020-03-30 DIAGNOSIS — M25.512 ACUTE PAIN OF LEFT SHOULDER: Chronic | ICD-10-CM

## 2020-04-13 DIAGNOSIS — M15.3 POST-TRAUMATIC OSTEOARTHRITIS OF MULTIPLE JOINTS: ICD-10-CM

## 2020-04-14 RX ORDER — TRAMADOL HYDROCHLORIDE 50 MG/1
TABLET ORAL
Qty: 120 TABLET | Refills: 2 | Status: SHIPPED | OUTPATIENT
Start: 2020-04-14 | End: 2020-05-11 | Stop reason: SDUPTHER

## 2020-05-02 DIAGNOSIS — M15.3 POST-TRAUMATIC OSTEOARTHRITIS OF MULTIPLE JOINTS: ICD-10-CM

## 2020-05-04 RX ORDER — GABAPENTIN 100 MG/1
300 CAPSULE ORAL EVERY EVENING
Qty: 90 CAPSULE | Refills: 2 | Status: SHIPPED | OUTPATIENT
Start: 2020-05-04 | End: 2020-07-29 | Stop reason: SDUPTHER

## 2020-05-04 NOTE — TELEPHONE ENCOUNTER
Last Seen:   03/13/2020    Follow Up: 09/25/2020    Last approved:     01/31/2020         Refills:  2    Giancarlo:   Current     4/14/2020

## 2020-05-07 DIAGNOSIS — I10 BENIGN ESSENTIAL HYPERTENSION: Chronic | ICD-10-CM

## 2020-05-08 RX ORDER — OLMESARTAN MEDOXOMIL 40 MG/1
TABLET ORAL
Qty: 90 TABLET | Refills: 2 | Status: SHIPPED | OUTPATIENT
Start: 2020-05-08 | End: 2021-01-05

## 2020-05-08 RX ORDER — ROSUVASTATIN CALCIUM 20 MG/1
TABLET, COATED ORAL
Qty: 90 TABLET | Refills: 2 | Status: SHIPPED | OUTPATIENT
Start: 2020-05-08 | End: 2020-12-18 | Stop reason: SDUPTHER

## 2020-05-11 ENCOUNTER — TELEPHONE (OUTPATIENT)
Dept: INTERNAL MEDICINE | Facility: CLINIC | Age: 65
End: 2020-05-11

## 2020-05-11 DIAGNOSIS — M25.512 ACUTE PAIN OF LEFT SHOULDER: Primary | Chronic | ICD-10-CM

## 2020-05-11 DIAGNOSIS — M15.3 POST-TRAUMATIC OSTEOARTHRITIS OF MULTIPLE JOINTS: ICD-10-CM

## 2020-05-11 RX ORDER — TRAMADOL HYDROCHLORIDE 50 MG/1
50 TABLET ORAL
Qty: 120 TABLET | Refills: 2 | Status: SHIPPED | OUTPATIENT
Start: 2020-05-11 | End: 2020-06-09 | Stop reason: HOSPADM

## 2020-05-11 RX ORDER — AMLODIPINE BESYLATE 5 MG/1
5 TABLET ORAL DAILY
Qty: 90 TABLET | Refills: 1 | Status: SHIPPED | OUTPATIENT
Start: 2020-05-11 | End: 2020-12-01 | Stop reason: SDUPTHER

## 2020-05-11 NOTE — TELEPHONE ENCOUNTER
I put in the referral to Dr. Janae Powell for left shoulder pain.  Please see the patient's communication for the person that it needs to be sent to you.  Patient will make her own appointment

## 2020-05-11 NOTE — TELEPHONE ENCOUNTER
Regarding: Prescription Question  Contact: [phone]  ----- Message from Pinky Ramírez RN sent at 5/11/2020  3:51 PM EDT -----       ----- Message from Aarti Wade to Jannette Chapa MD sent at 5/11/2020  3:39 PM -----   Hi dr Chapa I noticed you did not put refills on the tramadol or amlodipine if you want me to come in for a visit I am happy to. I also saw dr shirley and am on a list for surgery on the left shoulder a replacement. Also could you send Katherine something saying I was referred to him also for my right shoulder send it to carol trevizo. You guys have a copy of the letter from katherine with her info. Let me know if you want me to do telehealth with you or whatever I need to do. Thanks so much aarti appreciate everything you do kid!!!

## 2020-05-12 ENCOUNTER — TELEPHONE (OUTPATIENT)
Dept: INTERNAL MEDICINE | Facility: CLINIC | Age: 65
End: 2020-05-12

## 2020-05-12 NOTE — TELEPHONE ENCOUNTER
I printed letter for Worker's Comp.  I sent it to the patient on her portal.  I printed a paper copy for you to send to Worker's [employer].  Also send a copy to Dr. Lazcano

## 2020-05-12 NOTE — TELEPHONE ENCOUNTER
Spoke to patient-  She states her right shoulder is under worker comp ([employer])  They said they need a letter from you stating patient is established with Dr Lazcano for another orthopedic issue and you have referred her to see him for her workers comp injury  To her right shoulder.

## 2020-05-12 NOTE — TELEPHONE ENCOUNTER
Regarding: RE: Prescription Question  Contact: [phone]  ----- Message from Mercedes Trammell LPN sent at 5/12/2020  9:04 AM EDT -----       ----- Message from Aarti Wade to Jannette Chapa MD sent at 5/11/2020  7:29 PM -----   I think they need a referral for the right shoulder as well if that is okay. I think as of April 1st they are wanting to use their in house physicians versus who I or you choose so I told her that you referred me for the left but that I talked to him about both the left and right to see if he would be willing to take dr plascencia place since dr shirley is a shoulder specialist. I hope I do not have to have my right shoulder operated on but figured I would be better with him than one of [facility] doctors although dr capellan was with their network thank you so much for everything and I will keep you posted about the surgery. I have already heard more horror stories than I would care to talk about so I hope I am not making the wrong decision. :)  ----- Message -----  From: Jannette Chapa MD  Sent: 5/11/2020  4:07 PM EDT  To: Aarti Wade  Subject: RE: Prescription Question  Both of those medications had refills on them so I do not know what happened, but I resent them to your pharmacy anyway.  I also put in the referral for the left shoulder.  We will see you in September unless she needs something before then.  Also of course we will need to see you within a week or 2 weeks after discharge from the hospital after your stress shoulder surgery.  BC        ----- Message -----     From: Aatri Wade     Sent: 5/11/2020  3:39 PM EDT       To: Jannette Chapa MD  Subject: Prescription Question    Hi dr Chapa I noticed you did not put refills on the tramadol or amlodipine if you want me to come in for a visit I am happy to. I also saw dr shirley and am on a list for surgery on the left shoulder a replacement. Also could you send Katherine something saying I was referred to him also for my right shoulder  send it to carol trevizo. You guys have a copy of the letter from [facility] with her info. Let me know if you want me to do telehealth with you or whatever I need to do. Thanks so much harvey appreciate everything you do kid!!!

## 2020-05-12 NOTE — TELEPHONE ENCOUNTER
I am confused about the referral.  We had already referred her I thought to Dr. Lazcano for one shoulder and he is going to do surgery on that shoulder.  Yesterday she asked a referral for the other shoulder and I figured he is going to address the pain in that one as well.  So I put in referral to [facility] for  the other shoulder.    Please call her to see what she needs

## 2020-05-13 NOTE — TELEPHONE ENCOUNTER
We may need to wait until she sees Dr. Montaño he again because I think she is wanting the right shoulder done first and he thinks the left should be done first.  See the note this morning from aCrolann, referral person.  So maybe we have to wait and see which one he is going to do first, but it may be that Worker's Comp. will cover both shoulders I do not know

## 2020-05-13 NOTE — TELEPHONE ENCOUNTER
I have spoken to Dr Lazcano's office since everyone is confused even them on the which shoulder the pt needs the needs the referral for.  Following is what I see:  You referred the pt to Dr Lazcano in 03/2020 for left shoulder pain and he is seeing her for that and they are trying to schedule surgery for the left shoulder.   On 05/11/2020 you referred the pt to Dr Lazcano for left shoulder pain which NEEDS to be right shoulder pain and will be workers comp.  Agree?

## 2020-05-29 RX ORDER — CITALOPRAM 40 MG/1
TABLET ORAL
Qty: 90 TABLET | Refills: 1 | Status: SHIPPED | OUTPATIENT
Start: 2020-05-29 | End: 2020-12-09 | Stop reason: SDUPTHER

## 2020-06-02 ENCOUNTER — TELEPHONE (OUTPATIENT)
Dept: INTERNAL MEDICINE | Facility: CLINIC | Age: 65
End: 2020-06-02

## 2020-06-02 ENCOUNTER — APPOINTMENT (OUTPATIENT)
Dept: PREADMISSION TESTING | Facility: HOSPITAL | Age: 65
End: 2020-06-02

## 2020-06-02 DIAGNOSIS — E72.11 HYPERHOMOCYSTEINEMIA (HCC): ICD-10-CM

## 2020-06-02 NOTE — TELEPHONE ENCOUNTER
Please fax the referral to Dr. Lazcano and the letter from 5/12 written by Dr. Chapa to Tata Castrejon with [facility] [phone].

## 2020-06-02 NOTE — TELEPHONE ENCOUNTER
PT CALLED TO GET DOCUMENTATION FOR HER SHOULDER.  PT STATED THAT THE LETTER IS IN HER MYCHART BUT SHE DOESN'T THINK IT WAS SENT.    PT CONTACT [phone]     PLEASE ADVISE

## 2020-06-02 NOTE — TELEPHONE ENCOUNTER
COLLEEN ACKERMAN CALLED TO REQUEST MEDICAL NOTES FOR THE PT SINCE SHE STARTED TAKING THE TRAMADOL.     COLLEEN'S CONTACT [phone]

## 2020-06-03 RX ORDER — FOLIC ACID 1 MG/1
TABLET ORAL
Qty: 90 TABLET | Refills: 1 | Status: SHIPPED | OUTPATIENT
Start: 2020-06-03 | End: 2021-01-05 | Stop reason: SDUPTHER

## 2020-06-04 ENCOUNTER — TELEPHONE (OUTPATIENT)
Dept: INTERNAL MEDICINE | Facility: CLINIC | Age: 65
End: 2020-06-04

## 2020-06-04 ENCOUNTER — PRIOR AUTHORIZATION (OUTPATIENT)
Dept: INTERNAL MEDICINE | Facility: CLINIC | Age: 65
End: 2020-06-04

## 2020-06-04 NOTE — TELEPHONE ENCOUNTER
Spoke with Dr. Lazcano's office just to verify that they are indeed doing a total LEFT shoulder replacement on Monday and the pt is trying to get her workers comp to approve having Dr. Lazcano do the RIGHT as well.

## 2020-06-05 ENCOUNTER — HOSPITAL ENCOUNTER (OUTPATIENT)
Dept: GENERAL RADIOLOGY | Facility: HOSPITAL | Age: 65
Discharge: HOME | End: 2020-06-05
Admitting: ORTHOPAEDIC SURGERY

## 2020-06-05 ENCOUNTER — ANESTHESIA EVENT (OUTPATIENT)
Dept: PERIOP | Facility: HOSPITAL | Age: 65
End: 2020-06-05

## 2020-06-05 ENCOUNTER — APPOINTMENT (OUTPATIENT)
Dept: PREADMISSION TESTING | Facility: HOSPITAL | Age: 65
End: 2020-06-05

## 2020-06-05 VITALS — BODY MASS INDEX: 23.92 KG/M2 | HEIGHT: 62 IN | WEIGHT: 130 LBS

## 2020-06-05 LAB
ANION GAP SERPL CALCULATED.3IONS-SCNC: 13 MMOL/L (ref 5–15)
BUN BLD-MCNC: 43 MG/DL (ref 8–23)
BUN/CREAT SERPL: 41.7 (ref 7–25)
CALCIUM SPEC-SCNC: 9.3 MG/DL (ref 8.6–10.5)
CHLORIDE SERPL-SCNC: 103 MMOL/L (ref 98–107)
CO2 SERPL-SCNC: 23 MMOL/L (ref 22–29)
CREAT BLD-MCNC: 1.03 MG/DL (ref 0.57–1)
DEPRECATED RDW RBC AUTO: 42.2 FL (ref 37–54)
ERYTHROCYTE [DISTWIDTH] IN BLOOD BY AUTOMATED COUNT: 12.1 % (ref 12.3–15.4)
GFR SERPL CREATININE-BSD FRML MDRD: 53 ML/MIN/1.73
GLUCOSE BLD-MCNC: 104 MG/DL (ref 65–99)
HBA1C MFR BLD: 5.5 % (ref 4.8–5.6)
HCT VFR BLD AUTO: 39.1 % (ref 34–46.6)
HGB BLD-MCNC: 12.5 G/DL (ref 12–15.9)
MCH RBC QN AUTO: 30.2 PG (ref 26.6–33)
MCHC RBC AUTO-ENTMCNC: 32 G/DL (ref 31.5–35.7)
MCV RBC AUTO: 94.4 FL (ref 79–97)
PLATELET # BLD AUTO: 284 10*3/MM3 (ref 140–450)
PMV BLD AUTO: 10.1 FL (ref 6–12)
POTASSIUM BLD-SCNC: 5.8 MMOL/L (ref 3.5–5.2)
RBC # BLD AUTO: 4.14 10*6/MM3 (ref 3.77–5.28)
REF LAB TEST METHOD: NORMAL
SARS-COV-2 RNA RESP QL NAA+PROBE: NOT DETECTED
SODIUM BLD-SCNC: 139 MMOL/L (ref 136–145)
WBC NRBC COR # BLD: 6.52 10*3/MM3 (ref 3.4–10.8)

## 2020-06-05 PROCEDURE — 93010 ELECTROCARDIOGRAM REPORT: CPT | Performed by: INTERNAL MEDICINE

## 2020-06-05 PROCEDURE — U0002 COVID-19 LAB TEST NON-CDC: HCPCS

## 2020-06-05 PROCEDURE — 71046 X-RAY EXAM CHEST 2 VIEWS: CPT

## 2020-06-05 PROCEDURE — 36415 COLL VENOUS BLD VENIPUNCTURE: CPT

## 2020-06-05 PROCEDURE — 93005 ELECTROCARDIOGRAM TRACING: CPT

## 2020-06-05 PROCEDURE — 80048 BASIC METABOLIC PNL TOTAL CA: CPT | Performed by: ORTHOPAEDIC SURGERY

## 2020-06-05 PROCEDURE — 83036 HEMOGLOBIN GLYCOSYLATED A1C: CPT | Performed by: ORTHOPAEDIC SURGERY

## 2020-06-05 PROCEDURE — 85027 COMPLETE CBC AUTOMATED: CPT | Performed by: ORTHOPAEDIC SURGERY

## 2020-06-05 PROCEDURE — C9803 HOPD COVID-19 SPEC COLLECT: HCPCS

## 2020-06-05 NOTE — PAT
Dr. Lazcano's office notified of pt last dose Plavix 6/4 - Ok per Dr. Lazcano.    Patient given a prescription for Benzol Peroxide 5% wash during PAT visit.  Instructed them to fill the prescription or  from [facility] pharmacy if was submitted electronically by the surgeon's office.  Verbal and written instructions given regarding proper use of the Benzoyl Peroxide wash given to patient and/or famlily during PAT visit. Patient/family also instructed to complete checklist and return it to Pre-op on the day of surgery.  Patient and/or family verbalized understanding.      Additionally, reinforced with patient to acquire this prescription from the [facility] retail pharmacy before leaving the hospital after PAT visit due to the potential unavailability at local pharmacies.    Patient instructed to drink 20 ounces (or until full) of Gatorade and it needs to be completed 1 hour before given arrival time for procedure (NO RED Gatorade)    Patient verbalized understanding.    Pt taken to CXR after PAT visit.

## 2020-06-06 RX ORDER — CEFAZOLIN SODIUM 2 G/100ML
2 INJECTION, SOLUTION INTRAVENOUS ONCE
Status: DISCONTINUED | OUTPATIENT
Start: 2020-06-06 | End: 2020-06-06 | Stop reason: SDUPTHER

## 2020-06-06 RX ORDER — VANCOMYCIN HYDROCHLORIDE 1 G/200ML
15 INJECTION, SOLUTION INTRAVENOUS ONCE
Status: DISCONTINUED | OUTPATIENT
Start: 2020-06-06 | End: 2020-06-06 | Stop reason: SDUPTHER

## 2020-06-08 ENCOUNTER — HOSPITAL ENCOUNTER (INPATIENT)
Facility: HOSPITAL | Age: 65
LOS: 1 days | Discharge: HOME | End: 2020-06-09
Attending: ORTHOPAEDIC SURGERY | Admitting: ORTHOPAEDIC SURGERY

## 2020-06-08 ENCOUNTER — APPOINTMENT (OUTPATIENT)
Dept: GENERAL RADIOLOGY | Facility: HOSPITAL | Age: 65
End: 2020-06-08

## 2020-06-08 ENCOUNTER — ANESTHESIA (OUTPATIENT)
Dept: PERIOP | Facility: HOSPITAL | Age: 65
End: 2020-06-08

## 2020-06-08 DIAGNOSIS — Z74.09 IMPAIRED MOBILITY AND ADLS: Primary | ICD-10-CM

## 2020-06-08 DIAGNOSIS — Z78.9 IMPAIRED MOBILITY AND ADLS: Primary | ICD-10-CM

## 2020-06-08 DIAGNOSIS — J44.9 COPD MIXED TYPE (HCC): ICD-10-CM

## 2020-06-08 DIAGNOSIS — Z96.612 STATUS POST TOTAL REPLACEMENT OF LEFT SHOULDER: ICD-10-CM

## 2020-06-08 LAB — POTASSIUM BLD-SCNC: 5 MMOL/L (ref 3.5–5.2)

## 2020-06-08 PROCEDURE — 97166 OT EVAL MOD COMPLEX 45 MIN: CPT | Performed by: OCCUPATIONAL THERAPIST

## 2020-06-08 PROCEDURE — 0RRK0JZ REPLACEMENT OF LEFT SHOULDER JOINT WITH SYNTHETIC SUBSTITUTE, OPEN APPROACH: ICD-10-PCS | Performed by: ORTHOPAEDIC SURGERY

## 2020-06-08 PROCEDURE — 25010000002 DEXAMETHASONE SODIUM PHOSPHATE 10 MG/ML SOLUTION: Performed by: ANESTHESIOLOGY

## 2020-06-08 PROCEDURE — C1776 JOINT DEVICE (IMPLANTABLE): HCPCS | Performed by: ORTHOPAEDIC SURGERY

## 2020-06-08 PROCEDURE — 97530 THERAPEUTIC ACTIVITIES: CPT | Performed by: OCCUPATIONAL THERAPIST

## 2020-06-08 PROCEDURE — C1713 ANCHOR/SCREW BN/BN,TIS/BN: HCPCS | Performed by: ORTHOPAEDIC SURGERY

## 2020-06-08 PROCEDURE — 25010000002 VANCOMYCIN PER 500 MG: Performed by: ORTHOPAEDIC SURGERY

## 2020-06-08 PROCEDURE — 73030 X-RAY EXAM OF SHOULDER: CPT

## 2020-06-08 PROCEDURE — 25010000002 FENTANYL CITRATE (PF) 100 MCG/2ML SOLUTION: Performed by: ANESTHESIOLOGY

## 2020-06-08 PROCEDURE — 84132 ASSAY OF SERUM POTASSIUM: CPT | Performed by: ANESTHESIOLOGY

## 2020-06-08 PROCEDURE — 25010000003 CEFAZOLIN IN DEXTROSE 2-4 GM/100ML-% SOLUTION: Performed by: ORTHOPAEDIC SURGERY

## 2020-06-08 PROCEDURE — 97110 THERAPEUTIC EXERCISES: CPT | Performed by: OCCUPATIONAL THERAPIST

## 2020-06-08 PROCEDURE — 25010000002 PROPOFOL 10 MG/ML EMULSION: Performed by: NURSE ANESTHETIST, CERTIFIED REGISTERED

## 2020-06-08 PROCEDURE — 25010000002 NEOSTIGMINE 10 MG/10ML SOLUTION: Performed by: NURSE ANESTHETIST, CERTIFIED REGISTERED

## 2020-06-08 PROCEDURE — 25010000002 ONDANSETRON PER 1 MG: Performed by: NURSE ANESTHETIST, CERTIFIED REGISTERED

## 2020-06-08 PROCEDURE — L3670 SO ACRO/CLAV CAN WEB PRE OTS: HCPCS | Performed by: ORTHOPAEDIC SURGERY

## 2020-06-08 PROCEDURE — 97535 SELF CARE MNGMENT TRAINING: CPT | Performed by: OCCUPATIONAL THERAPIST

## 2020-06-08 PROCEDURE — 25010000002 DEXAMETHASONE PER 1 MG: Performed by: NURSE ANESTHETIST, CERTIFIED REGISTERED

## 2020-06-08 PROCEDURE — 25010000002 BUPRENORPHINE PER 0.1 MG: Performed by: ANESTHESIOLOGY

## 2020-06-08 PROCEDURE — 25010000003 LIDOCAINE 1 % SOLUTION: Performed by: NURSE ANESTHETIST, CERTIFIED REGISTERED

## 2020-06-08 PROCEDURE — 0LS40ZZ REPOSITION LEFT UPPER ARM TENDON, OPEN APPROACH: ICD-10-PCS | Performed by: ORTHOPAEDIC SURGERY

## 2020-06-08 DEVICE — TOTL SHLDR ARTHROPLASTY SYS: Type: IMPLANTABLE DEVICE | Site: SHOULDER | Status: FUNCTIONAL

## 2020-06-08 DEVICE — IMPLANTABLE DEVICE
Type: IMPLANTABLE DEVICE | Site: SHOULDER | Status: FUNCTIONAL
Brand: EQUINOXE

## 2020-06-08 DEVICE — SMARTSET HIGH PERFORMANCE MV MEDIUM VISCOSITY BONE CEMENT 40G
Type: IMPLANTABLE DEVICE | Site: SHOULDER | Status: FUNCTIONAL
Brand: SMARTSET

## 2020-06-08 DEVICE — SUT FW #2 W/TPR NDL 1/2 CIR 38IN 97CM 26.5MM BLU: Type: IMPLANTABLE DEVICE | Site: SHOULDER | Status: FUNCTIONAL

## 2020-06-08 DEVICE — STEM HUM PRI EQUINOXE PRESSFIT 8MM SHT: Type: IMPLANTABLE DEVICE | Site: SHOULDER | Status: FUNCTIONAL

## 2020-06-08 DEVICE — IMPLANTABLE DEVICE: Type: IMPLANTABLE DEVICE | Site: SHOULDER | Status: FUNCTIONAL

## 2020-06-08 RX ORDER — PROMETHAZINE HYDROCHLORIDE 25 MG/ML
6.25 INJECTION, SOLUTION INTRAMUSCULAR; INTRAVENOUS ONCE AS NEEDED
Status: DISCONTINUED | OUTPATIENT
Start: 2020-06-08 | End: 2020-06-08 | Stop reason: HOSPADM

## 2020-06-08 RX ORDER — LABETALOL HYDROCHLORIDE 5 MG/ML
10 INJECTION, SOLUTION INTRAVENOUS EVERY 4 HOURS PRN
Status: DISCONTINUED | OUTPATIENT
Start: 2020-06-08 | End: 2020-06-08 | Stop reason: SDUPTHER

## 2020-06-08 RX ORDER — LIDOCAINE HYDROCHLORIDE 10 MG/ML
0.5 INJECTION, SOLUTION EPIDURAL; INFILTRATION; INTRACAUDAL; PERINEURAL ONCE AS NEEDED
Status: COMPLETED | OUTPATIENT
Start: 2020-06-08 | End: 2020-06-08

## 2020-06-08 RX ORDER — CITALOPRAM 40 MG/1
40 TABLET ORAL DAILY
Status: DISCONTINUED | OUTPATIENT
Start: 2020-06-08 | End: 2020-06-09 | Stop reason: HOSPADM

## 2020-06-08 RX ORDER — GABAPENTIN 300 MG/1
300 CAPSULE ORAL NIGHTLY
Status: DISCONTINUED | OUTPATIENT
Start: 2020-06-08 | End: 2020-06-09 | Stop reason: HOSPADM

## 2020-06-08 RX ORDER — ONDANSETRON 2 MG/ML
4 INJECTION INTRAMUSCULAR; INTRAVENOUS EVERY 6 HOURS PRN
Status: DISCONTINUED | OUTPATIENT
Start: 2020-06-08 | End: 2020-06-09 | Stop reason: HOSPADM

## 2020-06-08 RX ORDER — SODIUM CHLORIDE 0.9 % (FLUSH) 0.9 %
10 SYRINGE (ML) INJECTION AS NEEDED
Status: DISCONTINUED | OUTPATIENT
Start: 2020-06-08 | End: 2020-06-08 | Stop reason: HOSPADM

## 2020-06-08 RX ORDER — FENTANYL CITRATE 50 UG/ML
INJECTION, SOLUTION INTRAMUSCULAR; INTRAVENOUS
Status: COMPLETED | OUTPATIENT
Start: 2020-06-08 | End: 2020-06-08

## 2020-06-08 RX ORDER — LABETALOL HYDROCHLORIDE 5 MG/ML
10 INJECTION, SOLUTION INTRAVENOUS EVERY 4 HOURS PRN
Status: DISCONTINUED | OUTPATIENT
Start: 2020-06-08 | End: 2020-06-09 | Stop reason: HOSPADM

## 2020-06-08 RX ORDER — SODIUM CHLORIDE, SODIUM LACTATE, POTASSIUM CHLORIDE, CALCIUM CHLORIDE 600; 310; 30; 20 MG/100ML; MG/100ML; MG/100ML; MG/100ML
9 INJECTION, SOLUTION INTRAVENOUS CONTINUOUS
Status: DISCONTINUED | OUTPATIENT
Start: 2020-06-08 | End: 2020-06-09 | Stop reason: HOSPADM

## 2020-06-08 RX ORDER — NALOXONE HCL 0.4 MG/ML
0.1 VIAL (ML) INJECTION
Status: DISCONTINUED | OUTPATIENT
Start: 2020-06-08 | End: 2020-06-09 | Stop reason: HOSPADM

## 2020-06-08 RX ORDER — FENTANYL CITRATE 50 UG/ML
50 INJECTION, SOLUTION INTRAMUSCULAR; INTRAVENOUS
Status: DISCONTINUED | OUTPATIENT
Start: 2020-06-08 | End: 2020-06-08 | Stop reason: HOSPADM

## 2020-06-08 RX ORDER — OXYCODONE HYDROCHLORIDE 5 MG/1
5 TABLET ORAL EVERY 4 HOURS PRN
Status: DISCONTINUED | OUTPATIENT
Start: 2020-06-08 | End: 2020-06-09 | Stop reason: HOSPADM

## 2020-06-08 RX ORDER — ACETAMINOPHEN 325 MG/1
650 TABLET ORAL EVERY 4 HOURS PRN
Status: DISCONTINUED | OUTPATIENT
Start: 2020-06-08 | End: 2020-06-09 | Stop reason: HOSPADM

## 2020-06-08 RX ORDER — FAMOTIDINE 10 MG/ML
20 INJECTION, SOLUTION INTRAVENOUS ONCE
Status: DISCONTINUED | OUTPATIENT
Start: 2020-06-08 | End: 2020-06-08 | Stop reason: HOSPADM

## 2020-06-08 RX ORDER — SODIUM CHLORIDE 450 MG/100ML
50 INJECTION, SOLUTION INTRAVENOUS CONTINUOUS
Status: DISCONTINUED | OUTPATIENT
Start: 2020-06-08 | End: 2020-06-09 | Stop reason: HOSPADM

## 2020-06-08 RX ORDER — BISOPROLOL FUMARATE 10 MG/1
10 TABLET, FILM COATED ORAL DAILY
Status: DISCONTINUED | OUTPATIENT
Start: 2020-06-08 | End: 2020-06-09 | Stop reason: HOSPADM

## 2020-06-08 RX ORDER — BUPRENORPHINE HYDROCHLORIDE 0.32 MG/ML
INJECTION INTRAMUSCULAR; INTRAVENOUS
Status: COMPLETED | OUTPATIENT
Start: 2020-06-08 | End: 2020-06-08

## 2020-06-08 RX ORDER — SODIUM CHLORIDE 0.9 % (FLUSH) 0.9 %
10 SYRINGE (ML) INJECTION EVERY 12 HOURS SCHEDULED
Status: DISCONTINUED | OUTPATIENT
Start: 2020-06-08 | End: 2020-06-08 | Stop reason: HOSPADM

## 2020-06-08 RX ORDER — VANCOMYCIN HYDROCHLORIDE 1 G/200ML
1000 INJECTION, SOLUTION INTRAVENOUS ONCE
Status: COMPLETED | OUTPATIENT
Start: 2020-06-08 | End: 2020-06-08

## 2020-06-08 RX ORDER — BUPIVACAINE HYDROCHLORIDE 2.5 MG/ML
INJECTION, SOLUTION EPIDURAL; INFILTRATION; INTRACAUDAL
Status: COMPLETED | OUTPATIENT
Start: 2020-06-08 | End: 2020-06-08

## 2020-06-08 RX ORDER — ACETAMINOPHEN 650 MG/1
650 SUPPOSITORY RECTAL EVERY 4 HOURS PRN
Status: DISCONTINUED | OUTPATIENT
Start: 2020-06-08 | End: 2020-06-09 | Stop reason: HOSPADM

## 2020-06-08 RX ORDER — PROPOFOL 10 MG/ML
VIAL (ML) INTRAVENOUS AS NEEDED
Status: DISCONTINUED | OUTPATIENT
Start: 2020-06-08 | End: 2020-06-08 | Stop reason: SURG

## 2020-06-08 RX ORDER — PROMETHAZINE HYDROCHLORIDE 25 MG/1
25 SUPPOSITORY RECTAL ONCE AS NEEDED
Status: DISCONTINUED | OUTPATIENT
Start: 2020-06-08 | End: 2020-06-08 | Stop reason: HOSPADM

## 2020-06-08 RX ORDER — DEXAMETHASONE SODIUM PHOSPHATE 10 MG/ML
INJECTION, SOLUTION INTRAMUSCULAR; INTRAVENOUS
Status: COMPLETED | OUTPATIENT
Start: 2020-06-08 | End: 2020-06-08

## 2020-06-08 RX ORDER — ESMOLOL HYDROCHLORIDE 10 MG/ML
INJECTION INTRAVENOUS AS NEEDED
Status: DISCONTINUED | OUTPATIENT
Start: 2020-06-08 | End: 2020-06-08 | Stop reason: SURG

## 2020-06-08 RX ORDER — ONDANSETRON 4 MG/1
4 TABLET, FILM COATED ORAL EVERY 6 HOURS PRN
Status: DISCONTINUED | OUTPATIENT
Start: 2020-06-08 | End: 2020-06-09 | Stop reason: HOSPADM

## 2020-06-08 RX ORDER — GLYCOPYRROLATE 0.2 MG/ML
INJECTION INTRAMUSCULAR; INTRAVENOUS AS NEEDED
Status: DISCONTINUED | OUTPATIENT
Start: 2020-06-08 | End: 2020-06-08 | Stop reason: SURG

## 2020-06-08 RX ORDER — LOSARTAN POTASSIUM 50 MG/1
100 TABLET ORAL
Status: DISCONTINUED | OUTPATIENT
Start: 2020-06-08 | End: 2020-06-09 | Stop reason: HOSPADM

## 2020-06-08 RX ORDER — VANCOMYCIN HYDROCHLORIDE 1 G/200ML
15 INJECTION, SOLUTION INTRAVENOUS ONCE
Status: COMPLETED | OUTPATIENT
Start: 2020-06-08 | End: 2020-06-08

## 2020-06-08 RX ORDER — NEOSTIGMINE METHYLSULFATE 1 MG/ML
INJECTION, SOLUTION INTRAVENOUS AS NEEDED
Status: DISCONTINUED | OUTPATIENT
Start: 2020-06-08 | End: 2020-06-08 | Stop reason: SURG

## 2020-06-08 RX ORDER — ONDANSETRON 2 MG/ML
4 INJECTION INTRAMUSCULAR; INTRAVENOUS EVERY 6 HOURS PRN
Status: DISCONTINUED | OUTPATIENT
Start: 2020-06-08 | End: 2020-06-08 | Stop reason: SDUPTHER

## 2020-06-08 RX ORDER — SODIUM CHLORIDE 9 MG/ML
9 INJECTION, SOLUTION INTRAVENOUS CONTINUOUS PRN
Status: DISCONTINUED | OUTPATIENT
Start: 2020-06-08 | End: 2020-06-08 | Stop reason: HOSPADM

## 2020-06-08 RX ORDER — PANTOPRAZOLE SODIUM 40 MG/1
40 TABLET, DELAYED RELEASE ORAL EVERY MORNING
Status: DISCONTINUED | OUTPATIENT
Start: 2020-06-09 | End: 2020-06-09 | Stop reason: HOSPADM

## 2020-06-08 RX ORDER — ASPIRIN 81 MG/1
81 TABLET ORAL EVERY MORNING
Status: DISCONTINUED | OUTPATIENT
Start: 2020-06-09 | End: 2020-06-09 | Stop reason: HOSPADM

## 2020-06-08 RX ORDER — CEFAZOLIN SODIUM 2 G/100ML
2 INJECTION, SOLUTION INTRAVENOUS ONCE
Status: COMPLETED | OUTPATIENT
Start: 2020-06-08 | End: 2020-06-08

## 2020-06-08 RX ORDER — ROSUVASTATIN CALCIUM 20 MG/1
20 TABLET, COATED ORAL NIGHTLY
Status: DISCONTINUED | OUTPATIENT
Start: 2020-06-08 | End: 2020-06-09 | Stop reason: HOSPADM

## 2020-06-08 RX ORDER — EPHEDRINE SULFATE 50 MG/ML
5 INJECTION, SOLUTION INTRAVENOUS ONCE AS NEEDED
Status: DISCONTINUED | OUTPATIENT
Start: 2020-06-08 | End: 2020-06-08 | Stop reason: HOSPADM

## 2020-06-08 RX ORDER — MAGNESIUM HYDROXIDE 1200 MG/15ML
LIQUID ORAL AS NEEDED
Status: DISCONTINUED | OUTPATIENT
Start: 2020-06-08 | End: 2020-06-08 | Stop reason: HOSPADM

## 2020-06-08 RX ORDER — FAMOTIDINE 20 MG/1
20 TABLET, FILM COATED ORAL ONCE
Status: DISCONTINUED | OUTPATIENT
Start: 2020-06-08 | End: 2020-06-08 | Stop reason: HOSPADM

## 2020-06-08 RX ORDER — LIDOCAINE HYDROCHLORIDE 10 MG/ML
INJECTION, SOLUTION INFILTRATION; PERINEURAL AS NEEDED
Status: DISCONTINUED | OUTPATIENT
Start: 2020-06-08 | End: 2020-06-08 | Stop reason: SURG

## 2020-06-08 RX ORDER — ATRACURIUM BESYLATE 10 MG/ML
INJECTION, SOLUTION INTRAVENOUS AS NEEDED
Status: DISCONTINUED | OUTPATIENT
Start: 2020-06-08 | End: 2020-06-08 | Stop reason: SURG

## 2020-06-08 RX ORDER — PROMETHAZINE HYDROCHLORIDE 25 MG/1
25 TABLET ORAL ONCE AS NEEDED
Status: DISCONTINUED | OUTPATIENT
Start: 2020-06-08 | End: 2020-06-08 | Stop reason: HOSPADM

## 2020-06-08 RX ORDER — ACETAMINOPHEN 500 MG
1000 TABLET ORAL ONCE
Status: DISCONTINUED | OUTPATIENT
Start: 2020-06-08 | End: 2020-06-08 | Stop reason: HOSPADM

## 2020-06-08 RX ORDER — ONDANSETRON 2 MG/ML
INJECTION INTRAMUSCULAR; INTRAVENOUS AS NEEDED
Status: DISCONTINUED | OUTPATIENT
Start: 2020-06-08 | End: 2020-06-08 | Stop reason: SURG

## 2020-06-08 RX ORDER — AMLODIPINE BESYLATE 5 MG/1
5 TABLET ORAL DAILY
Status: DISCONTINUED | OUTPATIENT
Start: 2020-06-08 | End: 2020-06-09 | Stop reason: HOSPADM

## 2020-06-08 RX ORDER — DEXAMETHASONE SODIUM PHOSPHATE 4 MG/ML
INJECTION, SOLUTION INTRA-ARTICULAR; INTRALESIONAL; INTRAMUSCULAR; INTRAVENOUS; SOFT TISSUE AS NEEDED
Status: DISCONTINUED | OUTPATIENT
Start: 2020-06-08 | End: 2020-06-08 | Stop reason: SURG

## 2020-06-08 RX ORDER — HYDROMORPHONE HYDROCHLORIDE 1 MG/ML
0.5 INJECTION, SOLUTION INTRAMUSCULAR; INTRAVENOUS; SUBCUTANEOUS
Status: DISCONTINUED | OUTPATIENT
Start: 2020-06-08 | End: 2020-06-09 | Stop reason: HOSPADM

## 2020-06-08 RX ADMIN — SODIUM CHLORIDE 9 ML/HR: 9 INJECTION, SOLUTION INTRAVENOUS at 10:26

## 2020-06-08 RX ADMIN — OXYCODONE 5 MG: 5 TABLET ORAL at 21:05

## 2020-06-08 RX ADMIN — ROSUVASTATIN CALCIUM 20 MG: 20 TABLET, COATED ORAL at 21:03

## 2020-06-08 RX ADMIN — BUPIVACAINE HYDROCHLORIDE 30 ML: 2.5 INJECTION, SOLUTION EPIDURAL; INFILTRATION; INTRACAUDAL; PERINEURAL at 13:04

## 2020-06-08 RX ADMIN — PROPOFOL 150 MG: 10 INJECTION, EMULSION INTRAVENOUS at 12:59

## 2020-06-08 RX ADMIN — BISOPROLOL FUMARATE 10 MG: 10 TABLET ORAL at 16:39

## 2020-06-08 RX ADMIN — ONDANSETRON 4 MG: 2 INJECTION INTRAMUSCULAR; INTRAVENOUS at 14:30

## 2020-06-08 RX ADMIN — LIDOCAINE HYDROCHLORIDE 0.5 ML: 10 INJECTION, SOLUTION EPIDURAL; INFILTRATION; INTRACAUDAL; PERINEURAL at 10:25

## 2020-06-08 RX ADMIN — DEXAMETHASONE SODIUM PHOSPHATE 2 MG: 10 INJECTION INTRAMUSCULAR; INTRAVENOUS at 11:00

## 2020-06-08 RX ADMIN — BUPIVACAINE HYDROCHLORIDE 20 ML: 2.5 INJECTION, SOLUTION EPIDURAL; INFILTRATION; INTRACAUDAL; PERINEURAL at 11:00

## 2020-06-08 RX ADMIN — ESMOLOL HYDROCHLORIDE 30 MG: 10 INJECTION, SOLUTION INTRAVENOUS at 12:59

## 2020-06-08 RX ADMIN — PROPOFOL 25 MCG/KG/MIN: 10 INJECTION, EMULSION INTRAVENOUS at 13:07

## 2020-06-08 RX ADMIN — NEOSTIGMINE 4 MG: 1 INJECTION INTRAVENOUS at 14:30

## 2020-06-08 RX ADMIN — FENTANYL CITRATE 100 MCG: 50 INJECTION, SOLUTION INTRAMUSCULAR; INTRAVENOUS at 11:00

## 2020-06-08 RX ADMIN — BUPRENORPHINE HYDROCHLORIDE 0.3 MG: 0.32 INJECTION INTRAMUSCULAR; INTRAVENOUS at 13:04

## 2020-06-08 RX ADMIN — CITALOPRAM HYDROBROMIDE 40 MG: 40 TABLET ORAL at 16:38

## 2020-06-08 RX ADMIN — VANCOMYCIN HYDROCHLORIDE 1000 MG: 1 INJECTION, SOLUTION INTRAVENOUS at 23:41

## 2020-06-08 RX ADMIN — AMLODIPINE BESYLATE 5 MG: 5 TABLET ORAL at 16:38

## 2020-06-08 RX ADMIN — GLYCOPYRROLATE 0.4 MG: 0.2 INJECTION INTRAMUSCULAR; INTRAVENOUS at 14:30

## 2020-06-08 RX ADMIN — SODIUM CHLORIDE, POTASSIUM CHLORIDE, SODIUM LACTATE AND CALCIUM CHLORIDE: 600; 310; 30; 20 INJECTION, SOLUTION INTRAVENOUS at 12:59

## 2020-06-08 RX ADMIN — ATRACURIUM BESYLATE 40 MG: 10 INJECTION, SOLUTION INTRAVENOUS at 12:59

## 2020-06-08 RX ADMIN — TRANEXAMIC ACID 1000 MG: 100 INJECTION, SOLUTION INTRAVENOUS at 14:14

## 2020-06-08 RX ADMIN — VANCOMYCIN HYDROCHLORIDE 1000 MG: 1 INJECTION, SOLUTION INTRAVENOUS at 12:25

## 2020-06-08 RX ADMIN — SODIUM CHLORIDE 50 ML/HR: 4.5 INJECTION, SOLUTION INTRAVENOUS at 15:14

## 2020-06-08 RX ADMIN — DEXAMETHASONE SODIUM PHOSPHATE 8 MG: 4 INJECTION, SOLUTION INTRAMUSCULAR; INTRAVENOUS at 12:59

## 2020-06-08 RX ADMIN — LOSARTAN POTASSIUM 100 MG: 50 TABLET, FILM COATED ORAL at 16:39

## 2020-06-08 RX ADMIN — TRANEXAMIC ACID 1000 MG: 100 INJECTION, SOLUTION INTRAVENOUS at 13:07

## 2020-06-08 RX ADMIN — CEFAZOLIN SODIUM 2 G: 2 INJECTION, SOLUTION INTRAVENOUS at 12:00

## 2020-06-08 RX ADMIN — GABAPENTIN 300 MG: 300 CAPSULE ORAL at 21:03

## 2020-06-08 RX ADMIN — DEXAMETHASONE SODIUM PHOSPHATE 2 MG: 10 INJECTION INTRAMUSCULAR; INTRAVENOUS at 13:04

## 2020-06-08 RX ADMIN — LIDOCAINE HYDROCHLORIDE 50 MG: 10 INJECTION, SOLUTION INFILTRATION; PERINEURAL at 12:59

## 2020-06-08 NOTE — THERAPY EVALUATION
Acute Care - Occupational Therapy Initial Evaluation  [facility]     Patient Name: Aarti Wade  : 1955  MRN: [id number]  Today's Date: 2020             Admit Date: 2020       ICD-10-CM ICD-9-CM   1. Impaired mobility and ADLs Z74.09 V49.89    Z78.9      Patient Active Problem List   Diagnosis   • Atherosclerosis of native artery of both lower extremities with intermittent claudication (CMS/HCC)   • Peripheral arterial disease (CMS/HCC)   • Benign essential hypertension   • Mixed hyperlipidemia   • Allergic rhinitis   • Gastroesophageal reflux disease without esophagitis   • Osteopenia of multiple sites   • Capsulitis   • Chronic low back pain   • COPD mixed type (CMS/HCC)   • Degenerative joint disease of shoulder region   • Depressive disorder   • Keratosis pilaris   • Subclavian steal syndrome   • Nocturnal leg cramps   • Atherosclerosis of native artery of left lower extremity with intermittent claudication (CMS/HCC)   • Postural dizziness with presyncope   • Hyperkalemia   • Bilateral hand pain   • Post-traumatic osteoarthritis of multiple joints   • Chronic kidney disease, stage 3, mod decreased GFR (CMS/HCC)   • Acute pain of left shoulder   • Status post total replacement of left shoulder     Past Medical History:   Diagnosis Date   • Anxiety    • Arthritis    • Arthritis of right shoulder region    • Chronic UTI    • Circulatory disorder    • Claudication (CMS/HCC)    • COPD (chronic obstructive pulmonary disease) (CMS/HCC)    • DA (degenerative arthritis)    • Depression    • diffuse fatty liver infiltration on CT    • Gastrointestinal disorder    • GERD (gastroesophageal reflux disease)    • Head injury    • Hepatitis     UNKNOWN TYPE   • Hyperlipidemia    • Hypertension    • Inflammatory arthritis    • 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 HARRISON   • COLONOSCOPY      last 8 years ago.  Due to schedule   • EYE LENS IMPLANT SECONDARY Left    • FEMORAL FEMORAL BYPASS Right 8/22/2016    Procedure: RIGHT COMMON FEMORAL ENDARTERECTOMY WITH PATCH;  Surgeon: Severino Harrison MD;  Location:  [facility];  Service:    • FINGER SURGERY Left     LEFT INDEX FINGER   • INTERVENTIONAL RADIOLOGY PROCEDURE N/A 8/16/2016    Procedure: IR PTA femoral popliteal artery;  Surgeon: Severino Harrison MD;  Location: [facility] CATH INVASIVE LOCATION;  Service:    • INTERVENTIONAL RADIOLOGY PROCEDURE Left 1/7/2020    Procedure: LEFT ATHERECTOMY, BALLOON ANGIOPLASTY;  Surgeon: Severino Harrison MD;  Location: [facility] CATH INVASIVE LOCATION;  Service: Interventional Radiology   • TONSILLECTOMY            OT ASSESSMENT FLOWSHEET (last 12 hours)      Occupational Therapy Evaluation     Row Name 06/08/20 1639                   OT Evaluation Time/Intention    Subjective Information  no complaints  -AR        Document Type  evaluation  -AR        Mode of Treatment  occupational therapy  -AR        Symptoms Noted During/After Treatment  significant change in vital signs  -AR        Comment  desaturation on RA to 79%- nurse aware  -AR           General Information    Patient Profile Reviewed?  yes  -AR        Prior Level of Function  all household mobility;community mobility;gait;transfer;independent:;min assist:;ADL's  -AR        Pertinent History of Current Functional Problem  POD#0 left TSA, left biceps tenodesis  -AR        Existing Precautions/Restrictions  fall;left;non-weight bearing;shoulder;oxygen therapy device and L/min Donsteve gilbert on RA  -AR           Relationship/Environment    Primary Source of Support/Comfort  friend  -AR         Lives With  alone  -AR        Concerns About Impact on Relationships  Pt lives alone, reports no family in state.   -AR           Resource/Environmental Concerns    Current Living Arrangements  home/apartment/condo  -AR        Resource/Environmental Concerns  home accessibility  -AR        Home Accessibility Concerns  stairs to enter home  -AR           Home Main Entrance    Number of Stairs, Main Entrance  one  -AR        Stair Railings, Main Entrance  none  -AR           Cognitive Assessment/Interventions    Additional Documentation  Cognitive Assessment/Intervention (Group)  -AR           Cognitive Assessment/Intervention- PT/OT    Affect/Mental Status (Cognitive)  WNL  -AR        Orientation Status (Cognition)  oriented x 4  -AR        Follows Commands (Cognition)  WNL  -AR        Cognitive Function (Cognitive)  attention deficit;safety deficit  -AR        Attention Deficit (Cognitive)  mild deficit  -AR        Safety Deficit (Cognitive)  mild deficit  -AR           Safety Issues, Functional Mobility    Safety Issues Affecting Function (Mobility)  judgment;problem solving;safety precaution awareness;safety precautions follow-through/compliance  -AR        Impairments Affecting Function (Mobility)  balance;endurance/activity tolerance;range of motion (ROM);sensation/sensory awareness;strength;shortness of breath  -AR           Mobility Assessment/Treatment    Extremity Weight-bearing Status  left upper extremity  -AR        Left Upper Extremity (Weight-bearing Status)  non weight-bearing (NWB)  -AR           Bed Mobility Assessment/Treatment    Bed Mobility Assessment/Treatment  scooting/bridging;supine-sit;sit-supine  -AR        Scooting/Bridging [place] (Bed Mobility)  contact guard  -AR        Supine-Sit [place] (Bed Mobility)  contact guard;verbal cues  -AR        Sit-Supine [place] (Bed Mobility)  contact guard;verbal cues  -AR        Assistive Device (Bed Mobility)  bed rails;head of  bed elevated  -AR        Comment (Bed Mobility)  Educated pt on importance of maintaining NWB LUE AAT, reviewed safe sleeping position  -AR           Functional Mobility    Functional Mobility- Ind. Level  verbal cues required;minimum assist (75% patient effort)  -AR        Functional Mobility- Device  other (see comments) RUE HHA  -AR        Functional Mobility-Distance (Feet)  50  -AR        Functional Mobility- Comment  Limited with desaturation to 79% on RA. Pt scored 18 on mobility screen, recommend PT eval d/t h/o recent falls and pt may benefit from AD  -AR           Transfer Assessment/Treatment    Transfer Assessment/Treatment  sit-stand transfer;stand-sit transfer  -AR        Maintains Weight-bearing Status (Transfers)  able to maintain  -AR           Sit-Stand Transfer    Sit-Stand [place] (Transfers)  contact guard;verbal cues  -AR           Stand-Sit Transfer    Stand-Sit [place] (Transfers)  contact guard;verbal cues  -AR           ADL Assessment/Intervention    47980 - OT Self Care/Mgmt Minutes  16  -AR        BADL Assessment/Intervention  bathing;upper body dressing;feeding  -AR           Bathing Assessment/Intervention    Comment (Bathing)  Educated pt on L shoulder precautions and L axilla care to maintain  -AR           Upper Body Dressing Assessment/Training    Upper Body Dressing [place] Level  doff;don;pajama/robe;maximum assist (25% patient effort) LUE sling with dependence  -AR        Assistive Devices (Upper Body Dressing)  one hand technique  -AR        Upper Body Dressing Position  supine  -AR        Comment (Upper Body Dressing)  Educated pt on left shoulder precautions, ADL retraining to maintain, sling mangement.   -AR           Self-Feeding Assessment/Training    [place] Level (Feeding)  supervision;set up  -AR        Position (Self-Feeding)  supine  -AR           BADL Safety/Performance    Impairments, BADL Safety/Performance  balance;endurance/activity  tolerance;grasp/prehension;coordination;motor control;range of motion;sensory awareness;strength;shortness of breath  -AR        Skilled BADL Treatment/Intervention  BADL process/adaptation training;compensatory training;kai/one-hand technique  -AR           General ROM    GENERAL ROM COMMENTS  R shoulder AROM , remainder RUE WFL; L elbow/wrist/hand WFL  -AR           MMT (Manual Muscle Testing)    General MMT Comments  RUE 4/5, LUE deferred  -AR           Motor Assessment/Interventions    Additional Documentation  Balance (Group);Balance Interventions (Group);Fine Motor Testing & Training (Group);Therapeutic Exercise (Group);Therapeutic Exercise Interventions (Group)  -AR           Therapeutic Exercise    71011 - OT Therapeutic Exercise Minutes  15  -AR        70153 - OT Therapeutic Activity Minutes  10  -AR           Therapeutic Exercise    Upper Extremity Range of Motion (Therapeutic Exercise)  shoulder flexion/extension, left;shoulder internal/external rotation, left;elbow flexion/extension, left;forearm supination/pronation, left;wrist flexion/extension, left scapualr retraction- bilat  -AR        Hand (Therapeutic Exercise)  finger flexion/extension, left  -AR        Exercise Type (Therapeutic Exercise)  PROM (passive range of motion)  -AR        Position (Therapeutic Exercise)  supine  -AR        Sets/Reps (Therapeutic Exercise)  1/10  -AR        Comment (Therapeutic Exercise)  Issued and reviewed KEVIN HEP, pt tolerated L shoulder PROM , IR chest/ER 30.   -AR           Balance    Balance  static sitting balance;static standing balance  -AR           Static Sitting Balance    Level of [place] (Unsupported Sitting, Static Balance)  supervision  -AR        Sitting Position (Unsupported Sitting, Static Balance)  sitting on edge of bed  -AR           Static Standing Balance    Level of [place] (Supported Standing, Static Balance)  contact guard assist  -AR        Assistive Device Utilized  (Supported Standing, Static Balance)  other (see comments) TREMAYNE HHA  -AR           Fine Motor Testing & Training    Comment, Fine Motor Coordination  unable oppose L hand  -AR           Sensory Assessment/Intervention    Sensory General Assessment  light touch sensation deficits identified  -AR           Light Touch Sensation Assessment    Left Upper Extremity: Light Touch Sensation Assessment  severe impairment, less than 50% correct responses  -AR        Right Upper Extremity: Light Touch Sensation Assessment  intact  -AR           Positioning and Restraints    Pre-Treatment Position  in bed  -AR        Post Treatment Position  bed  -AR        In Bed  notified nsg;supine;call light within reach;encouraged to call for assist;exit alarm on;SCD pump applied;with brace  -AR           Pain Assessment    Additional Documentation  Pain Scale: Numbers Pre/Post-Treatment (Group)  -AR           Pain Scale: Numbers Pre/Post-Treatment    Pain Scale: Numbers, Pretreatment  0/10 - no pain  -AR        Pain Scale: Numbers, Post-Treatment  0/10 - no pain  -AR           Orthotics & Prosthetics Management    Orthosis Location  upper extremity orthosis  -AR        Additional Documentation  Orthosis Location (Row)  -AR           Upper Extremity Orthosis Management    Type (Upper Extremity Orthosis)  RobinHithru Ultra II sling  -AR        Functional Design (Upper Extremity Orthosis)  static orthosis  -AR        Therapeutic Indications (Upper Extremity Orthosis)  post-op positioning/protection;stabilization and support  -AR        Wearing Schedule (Upper Extremity Orthosis)  remove for hygiene/bathing;remove for exercise  -AR        Orthosis Training (Upper Extremity Orthosis)  patient and caregiver;activity limitations/precautions;donning/doffing orthosis;orthosis adjustment;orthosis maintenance;purpose/goals of orthosis;sleeping precautions;wearing schedule;requires cues;requires assistance  -AR        Compliance/Wearing Issues (Upper  Extremity Orthosis)  follow-up training required  -AR           Wound 06/08/20 Left shoulder Incision    Wound - Properties Group Date first assessed: 06/08/20  -AC Side: Left  -AC Location: shoulder  -AC Primary Wound Type: Incision  -AC       Plan of Care Review    Plan of Care Reviewed With  patient  -AR           Clinical Impression (OT)    Therapy Frequency (OT Eval)  daily  -AR        Anticipated Discharge Disposition (OT)  home with home health;home with 24/7 care  -AR           Vital Signs    Pre SpO2 (%)  94  -AR        O2 Delivery Pre Treatment  supplemental O2 3L NC  -AR        Intra SpO2 (%)  79  -AR        O2 Delivery Intra Treatment  room air  -AR        Post SpO2 (%)  92  -AR        O2 Delivery Post Treatment  supplemental O2  -AR        Pre Patient Position  Supine  -AR        Intra Patient Position  Standing  -AR        Post Patient Position  Supine  -AR           OT Goals    Transfer Goal Selection (OT)  transfer, OT goal 1  -AR        Dressing Goal Selection (OT)  dressing, OT goal 1  -AR        ROM Goal Selection (OT)  ROM, OT goal 1  -AR        Precaution Goal Selection (OT)  precaution, OT goal 1  -AR        Additional Documentation  ROM Goal Selection (OT) (Row);Precaution Goal Selection (OT) (Row)  -AR           Transfer Goal 1 (OT)    Activity/Assistive Device (Transfer Goal 1, OT)  sit-to-stand/stand-to-sit;toilet  -AR        [place] Level/Cues Needed (Transfer Goal 1, OT)  supervision required;verbal cues required  -AR        Time Frame (Transfer Goal 1, OT)  short term goal (STG);5 days  -AR        Progress/Outcome (Transfer Goal 1, OT)  goal ongoing  -AR           Dressing Goal 1 (OT)    Activity/Assistive Device (Dressing Goal 1, OT)  other (see comments) Pt will don/doff LUE sling  -AR        [place]/Cues Needed (Dressing Goal 1, OT)  verbal cues required;contact guard assist  -AR        Time Frame (Dressing Goal 1, OT)  short term goal (STG);5 days  -AR         Progress/Outcome (Dressing Goal 1, OT)  goal ongoing  -AR           ROM Goal 1 (OT)    ROM Goal 1 (OT)  Pt will complete LUE HEP within physician parameters with supervision  -AR        Time Frame (ROM Goal 1, OT)  short term goal (STG);5 days  -AR        Progress/Outcome (ROM Goal 1, OT)  goal ongoing  -AR           Precaution Goal 1 (OT)    Activity (Precaution Goal 1, OT)  during all tasks in daily routine Pt will maintain L shoulder precautions  -AR        [place] Level/Cues Needed (Precautions Goal 1, OT)  with minimum;verbal cues/redirection  -AR        Time Frame (Precaution Goal 1, OT)  short term goal (STG);5 days  -AR        Progress/Outcome (Precaution Goal 1, OT)  goal ongoing  -AR           Living Environment    Home Accessibility  stairs to enter home  -AR          User Key  (r) = Recorded By, (t) = Taken By, (c) = Cosigned By    Initials Name Effective Dates    Deya Cavazos OT 06/22/15 -     Lacey Rivera RN 01/29/20 -          Occupational Therapy Education                 Title: PT OT SLP Therapies (Done)     Topic: Occupational Therapy (Done)     Point: ADL training (Done)     Description:   Instruct learner(s) on proper safety adaptation and remediation techniques during self care or transfers.   Instruct in proper use of assistive devices.              Learning Progress Summary           Patient LION Moore,TERESITA,D,H, VU,NR by AR at 6/8/2020 1639                   Point: Home exercise program (Done)     Description:   Instruct learner(s) on appropriate technique for monitoring, assisting and/or progressing therapeutic exercises/activities.              Learning Progress Summary           Patient LION Moore,TERESITA,D,H, VU,NR by AR at 6/8/2020 1639                   Point: Precautions (Done)     Description:   Instruct learner(s) on prescribed precautions during self-care and functional transfers.              Learning Progress Summary           Patient Teresa, LION,TERESITA,D,H, VU,NR by AR at  6/8/2020 1639                   Point: Body mechanics (Done)     Description:   Instruct learner(s) on proper positioning and spine alignment during self-care, functional mobility activities and/or exercises.              Learning Progress Summary           Patient Eager, E,TB,D,H, VU,NR by AR at 6/8/2020 1639                               User Key     Initials Effective Dates Name Provider Type Discipline    AR 06/22/15 -  Deya Pedro OT Occupational Therapist OT                  OT Recommendation and Plan  Outcome Summary/Treatment Plan (OT)  Anticipated Discharge Disposition (OT): home with home health, home with 24/7 care  Therapy Frequency (OT Eval): daily  Plan of Care Review  Plan of Care Reviewed With: patient  Plan of Care Reviewed With: patient  Outcome Summary: Interscalene infusing at 6, no c/o pain. Pt tolerated L shoulder PROM , IR chest/ER 30. Pt ambulated 50 feet with min assist, desaturation to 79% on RA and pt symptomatic. Pt reports h/o recent falls, recommend PT eval. Pt lives alone and does not have assist, desire home with 24/hr private care.    Outcome Measures     Row Name 06/08/20 1639             How much help from another is currently needed...    Putting on and taking off regular lower body clothing?  2  -AR      Bathing (including washing, rinsing, and drying)  2  -AR      Toileting (which includes using toilet bed pan or urinal)  2  -AR      Putting on and taking off regular upper body clothing  2  -AR      Taking care of personal grooming (such as brushing teeth)  3  -AR      Eating meals  3  -AR      AM-PAC 6 Clicks Score (OT)  14  -AR         Functional Assessment    Outcome Measure Options  AM-PAC 6 Clicks Daily Activity (OT)  -AR        User Key  (r) = Recorded By, (t) = Taken By, (c) = Cosigned By    Initials Name Provider Type    AR Deya Pedro OT Occupational Therapist          Time Calculation:   Time Calculation- OT     Row Name 06/08/20 5072              Time Calculation- OT    OT Start Time  1639  -AR      Total Timed Code Minutes- OT  41 minute(s)  -AR      OT Received On  06/08/20  -AR      OT Goal Re-Cert Due Date  06/18/20  -AR         Timed Charges    72335 - OT Therapeutic Exercise Minutes  15  -AR      87597 - OT Therapeutic Activity Minutes  10  -AR      01762 - OT Self Care/Mgmt Minutes  16  -AR        User Key  (r) = Recorded By, (t) = Taken By, (c) = Cosigned By    Initials Name Provider Type    AR Deya Pedro OT Occupational Therapist        Therapy Charges for Today     Code Description Service Date Service Provider Modifiers Qty    16517320794 HC OT THER PROC EA 15 MIN 6/8/2020 Deya Pedro OT GO 1    01027132960 HC OT THERAPEUTIC ACT EA 15 MIN 6/8/2020 Deya Pedro OT GO 1    17134236205 HC OT SELF CARE/MGMT/TRAIN EA 15 MIN 6/8/2020 Deya Pedro, OT GO 1    13544524956 HC OT THER SUPP EA 15 MIN 6/8/2020 Deya Pedro OT GO 2    34230484853 HC OT EVAL MOD COMPLEXITY 3 6/8/2020 Deya Pedro, OT GO 1               Deya Pedro OT  6/8/2020

## 2020-06-08 NOTE — BRIEF OP NOTE
TOTAL SHOULDER ARTHROPLASTY  Progress Note    Aarti Wade  6/8/2020    Pre-op Diagnosis:   Primary osteoarthritis of left shoulder [746718]       Post-Op Diagnosis Codes:     * Primary osteoarthritis of left shoulder [M19.012]     * Left bicipital tenosynovitis [M75.22]    Procedure/CPT® Codes:  NH RECONSTR TOTAL SHOULDER IMPLANT [80949]  NH REPAIR BICEPS LONG TENDON [45086]    Procedure(s):  LEFT TOTAL SHOULDER ARTHROPLASTY    Surgeon(s):  Ketan Lazcano MD Marx, Sean, MD Cronin, Kevin, MD    Anesthesia: General with Block    Staff:   Circulator: Lacey Hope RN; Tere Kat RN  Scrub Person: Veronica Nichols  Vendor Representative: Leo Merlos  Nursing Assistant: Tal Park Dylan    Estimated Blood Loss: 150ml    Urine Voided: * No values recorded between 6/8/2020 12:55 PM and 6/8/2020  2:32 PM *    Specimens:                None          Drains: * No LDAs found *    Findings: per dictation    Complications: none      Ketan Lazcano MD     Date: 6/8/2020  Time: 14:34

## 2020-06-08 NOTE — H&P
Patient Name: Aarti Wade  MRN: [id number]  : 1955  DOS: 2020    Attending: Ketan Lazcano MD    Primary Care Provider: Jannette Chapa MD      Chief complaint: Left shoulder pain    Subjective   Patient is a pleasant 65 y.o. female presented for scheduled surgery by Dr. Lazcano.  She underwent left total shoulder arthroplasty under general anesthesia.  She tolerated surgery well and is admitted for further medical management.  Her shoulder has been painful since she had a fall at the end of December of last year.  She had limited range of motion and severe pain since.    When seen postop she is doing well.  Her pain is well controlled.  She denies nausea, shortness of breath or chest pain.  No history of DVT or PE.    (Above is noted, agree.  Seen her room afterwards, doing fairly well.  Good pain control.  No nausea, vomiting, or shortness of breath.  Has participated with OT who recommended PT.  She had desaturation on room air.  O2 sats are good on 2 L of oxygen via nasal cannula at 96%. )wy      Allergies   Allergen Reactions   • Levocetirizine Dihydrochloride Myalgia     Muscle aches/joint pain       Meds:  Medications Prior to Admission   Medication Sig Dispense Refill Last Dose   • acetaminophen (ACETAMINOPHEN 8 HOUR) 650 MG 8 hr tablet Take 650 mg by mouth 3 (Three) Times a Day.   2020 at 0530   • amLODIPine (NORVASC) 5 MG tablet Take 1 tablet by mouth Daily. 90 tablet 1 2020   • ascorbic acid (VITAMIN C) 1000 MG tablet Take 1,000 mg by mouth 2 (two) times a day.   2020   • bisoprolol (ZEBeta) 10 MG tablet Take 1 tablet by mouth Daily. 90 tablet 1 2020 at 0530   • Calcium Citrate-Vitamin D (CALCIUM + D PO) Take 2 tablets by mouth 2 (two) times a day.   2020   • citalopram (CeleXA) 40 MG tablet TAKE 1 TABLET BY MOUTH ONCE DAILY (Patient taking differently: Take 40 mg by mouth Daily.) 90 tablet 1 2020   • Coenzyme Q10 200 MG capsule Take 400 mg by mouth Daily.    "6/8/2020   • Fish Oil oil Take 1 capsule by mouth Daily.   6/8/2020   • Flaxseed, Linseed, (FLAXSEED OIL) 1000 MG capsule Take 1 capsule by mouth daily.   6/8/2020   • fluticasone (FLONASE) 50 MCG/ACT nasal spray instill 2 sprays into each nostril twice a day (Patient taking differently: 1 spray into the nostril(s) as directed by provider Daily.) 16 g 5 6/8/2020   • folic acid (FOLVITE) 1 MG tablet TAKE 1 TABLET BY MOUTH ONCE DAILY (Patient taking differently: Take 1 mg by mouth Daily.) 90 tablet 1 6/8/2020   • gabapentin (NEURONTIN) 100 MG capsule TAKE 3 CAPSULES BY MOUTH EVERY EVENING 90 capsule 2 6/7/2020   • hydroCHLOROthiazide (HYDRODIURIL) 12.5 MG tablet Take 1 tablet by mouth Daily. 90 tablet 3 6/7/2020   • Methylsulfonylmethane (MSM PO) Take 2 tablets by mouth daily.   6/8/2020   • Multiple Vitamins-Minerals (MULTIVITAMIN ADULT PO) Take 1 tablet by mouth daily.   6/8/2020   • olmesartan (BENICAR) 40 MG tablet TAKE 1 TABLET BY MOUTH EVERY DAY (Patient taking differently: Take 40 mg by mouth Daily.) 90 tablet 2 6/7/2020   • omeprazole (priLOSEC) 20 MG capsule Take 1 capsule by mouth Daily. 90 capsule 3 6/8/2020 at 0530   • rosuvastatin (CRESTOR) 20 MG tablet TAKE 1 TABLET BY MOUTH ONCE DAILY (Patient taking differently: Take 20 mg by mouth Every Night.) 90 tablet 2 6/7/2020   • traMADol (ULTRAM) 50 MG tablet Take 1 tablet by mouth 4 (Four) Times a Day Before Meals & at Bedtime As Needed for Severe Pain . 120 tablet 2 6/8/2020   • triamcinolone (KENALOG) 0.1 % cream Apply  topically to the appropriate area as directed 2 (Two) Times a Day. (Patient taking differently: Apply 1 application topically to the appropriate area as directed 2 (Two) Times a Day.) 15 g 0 Past Month   • URINARY HEALTH/CRANBERRY PO Take 1 tablet by mouth 2 (two) times a day. \"CRANACTIN\"   6/8/2020   • AMLACTIN 12 % lotion Apply 1 application topically to the appropriate area as directed As Needed for Irritation.  1 More than a month   • " aspirin 81 MG EC tablet Take 81 mg by mouth every morning. TOLD NOT TO STOP BEFORE SURGERY   6/4/2020   • clopidogrel (PLAVIX) 75 MG tablet Take 1 tablet by mouth Daily. (Patient taking differently: Take 75 mg by mouth Daily. Last dose 6/4/20 - Dr. Lazcano's office notified of last dose) 90 tablet 1 6/4/2020         History:   Past Medical History:   Diagnosis Date   • Anxiety    • Arthritis    • Arthritis of right shoulder region    • Chronic UTI    • Circulatory disorder    • Claudication (CMS/HCC)    • COPD (chronic obstructive pulmonary disease) (CMS/HCC)    • DA (degenerative arthritis)    • Depression    • diffuse fatty liver infiltration on CT 2009   • Gastrointestinal disorder    • GERD (gastroesophageal reflux disease)    • Head injury    • Hepatitis     UNKNOWN TYPE   • Hyperlipidemia    • Hypertension    • Inflammatory arthritis    • 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 HARRISON   • COLONOSCOPY      last 8 years ago.  Due to schedule   • EYE LENS IMPLANT SECONDARY Left    • FEMORAL FEMORAL BYPASS Right 8/22/2016    Procedure: RIGHT COMMON FEMORAL ENDARTERECTOMY WITH PATCH;  Surgeon: Severino Harrison MD;  Location: [facility] OR;  Service:    • FINGER SURGERY Left     LEFT INDEX FINGER   • INTERVENTIONAL RADIOLOGY PROCEDURE N/A 8/16/2016    Procedure: IR PTA femoral popliteal artery;  Surgeon: Severino Harrison MD;  Location:  [facility];  Service:    • INTERVENTIONAL RADIOLOGY PROCEDURE Left 1/7/2020    Procedure: LEFT ATHERECTOMY, BALLOON ANGIOPLASTY;  Surgeon: Severino Harrison MD;  Location:   [facility];  Service: Interventional Radiology   • TONSILLECTOMY       Family History   Problem Relation Age of Onset   • Osteoarthritis Mother    • Alzheimer's disease Mother    • Hypertension Father    • Heart attack Father    • Alcohol abuse Father    • No Known Problems Sister    • No Known Problems Brother    • No Known Problems Daughter    • No Known Problems Son    • Breast cancer Maternal Grandmother 50   • Breast cancer Paternal Grandmother 50   • No Known Problems Maternal Aunt    • No Known Problems Paternal Aunt    • Other Other    • Heart attack Other    • Coronary artery disease Paternal Grandfather    • Stroke Paternal Grandfather    • Ovarian cancer Neg Hx    • Endometrial cancer Neg Hx      Social History     Tobacco Use   • Smoking status: Former Smoker     Packs/day: 1.00     Years: 40.00     Pack years: 40.00     Types: Electronic Cigarette, Cigarettes     Last attempt to quit: 6/16/2016     Years since quitting: 3.9   • Smokeless tobacco: Never Used   • Tobacco comment: 1 PACK/DAY SMOKER UNTIL JUNE 2016   Substance Use Topics   • Alcohol use: Yes     Alcohol/week: 2.0 standard drinks     Types: 2 Shots of liquor per week     Comment: 2 DRINKS PER DAY   • Drug use: No   She lives alone and has no children.  She is a  and takes care of horses.    Review of Systems  Pertinent items are noted in HPI, all other systems reviewed and negative    Vital Signs  /63 (BP Location: Right arm, Patient Position: Lying)   Pulse 65   Temp 98.5 °F (36.9 °C) (Temporal)   Resp 18   SpO2 95%     Physical Exam:    General Appearance:    Alert, cooperative, in no acute distress   Head:    Normocephalic, without obvious abnormality, atraumatic   Eyes:            Lids and lashes normal, conjunctivae and sclerae normal, no   icterus, no pallor, corneas clear,    Ears:    Ears appear intact with no abnormalities noted   Throat:   No oral lesions, no thrush, oral mucosa moist   Neck:    No adenopathy, supple, trachea midline, no thyromegaly    Lungs:     Clear to auscultation,respirations regular, even and unlabored    Heart:    Regular rhythm and normal rate, normal S1 and S2, no murmur, no gallop   Abdomen:     Normal bowel sounds, no masses, no organomegaly, soft non-tender, non-distended, no guarding, no rebound  tenderness   Genitalia:    Deferred   Extremities:   LUE sling. Dressing CDI.  Good movement and cap refill left digits.  Some numbness left hand   Pulses:   Pulses palpable and equal bilaterally   Skin:   No bleeding, bruising or rash   Neurologic:   Cranial nerves 2 - 12 grossly intact.        I reviewed the patient's new clinical results.       Results from last 7 days   Lab Units 06/05/20  0938   WBC 10*3/mm3 6.52   HEMOGLOBIN g/dL 12.5   HEMATOCRIT % 39.1   PLATELETS 10*3/mm3 284     Results from last 7 days   Lab Units 06/08/20  1031 06/05/20  1014   SODIUM mmol/L  --  139   POTASSIUM mmol/L 5.0 5.8*   CHLORIDE mmol/L  --  103   CO2 mmol/L  --  23.0   BUN mg/dL  --  43*   CREATININE mg/dL  --  1.03*   CALCIUM mg/dL  --  9.3   GLUCOSE mg/dL  --  104*     Lab Results   Component Value Date    HGBA1C 5.50 06/05/2020         Assessment and Plan:     Status post total replacement of left shoulder    Benign essential hypertension    Mixed hyperlipidemia    Atherosclerosis of native artery of left lower extremity with intermittent claudication (CMS/HCC)    Chronic kidney disease, stage 3, mod decreased GFR (CMS/HCC)      Plan  1. PT/OT- NWB LUE  2. Pain control-prns   3. IS-encourage  4. DVT proph- [facility]  5. Bowel regimen  6. Resume home medications as appropriate  7. Monitor post-op labs  8. DC planning for home    HTN, Hyperlipidemia  - Continue home Norvasc, Zebeta, Cozaar and statin  - Hold HCTZ  - Monitor BP   - Holding parameters for BP meds  - Labetalol PRN for SBP>170    LLE atherosclerosis  - Resume aspirin and Plavix when okay with Dr. Lazcano  ( Resume aspirin AM, and plavix  on POD 2 if ok with )wy    CKD  - BMP in AM  Avoid nephrotoxic agents, maintain adequate blood pressure.CYRUS Hsu  06/08/20  16:23     I have personally performed the evaluation on this patient. My history is consistent  with HPI obtained. My exam findings are listed above. I have personally reviewed and discussed the above formulated treatment plan with pt and AH.CYRUS.wy

## 2020-06-08 NOTE — OP NOTE
DATE OF OPERATION: 06/08/20  PREOPERATIVE DIAGNOSIS: Left shoulder degenerative joint disease with pain and limitation of function and motion.    POSTOPERATIVE DIAGNOSES:  1. Left shoulder degenerative joint disease with pain and limitation of function and motion.    2. Biceps tenosynovitis.    PROCEDURES PERFORMED:  1. Left total shoulder arthroplasty.    2. Left biceps tenodesis.    SURGEON: Ketan Lazcano MD  ASSISTANTS:  1. Jon Trejo MD, PGY-6 Sports Fellow  2. Floyd Prieto MD, PGY-5   ANESTHESIA: General plus block.    ESTIMATED BLOOD LOSS:150mL.    COMPLICATIONS: None.    DISPOSITION: Recovery room in stable condition.    IMPLANTS: [employer] Equinoxe total shoulder system, 8 mm preserve stem press-fit, 1.5 replicator plate, 41 short humeral head, and small alpha cage glenoid peripherally cemented.    INDICATIONS: This is a 65-year-old female with left shoulder pain and limited function and motion secondary to DJD. They have failed conservative treatment and after a discussion of risks, benefits, and alternatives, wished to proceed with shoulder arthroplasty.  DESCRIPTION OF PROCEDURE: On the day of surgery, she identified the left shoulder as the correct operative extremity. This was initialed by the surgeon with the patients's acknowledgment. The patient underwent placement of an interscalene block and was taken to the operating room and placed in the supine position. Upon induction of adequate anesthesia, the patient was brought up to the beach chair position and the shoulder and upper extremity were prepped and draped in the usual sterile fashion. Timeout confirmed the correct patient and operative extremity as well as that antibiotics were on board. A standard deltopectoral approach to the shoulder was carried out. It was carried sharply through the skin and subcutaneous tissue. Medial and lateral flaps were developed over the deltopectoral fascia. The cephalic vein was identified and mobilized laterally  with the deltoid. The subdeltoid and subpectoral spaces were mobilized and a blunt retractor was placed deep to this. The clavipectoral fascia was opened on the lateral edge of the conjoined tendon and the retractor was moved deep to this. The leading edge of the pectoralis was released exposing the long head of the biceps. This was tenosynovitic. It was tenodesed to the pectoralis and released proximal to this. The 3 sisters were identified and coagulated. A subscapularis tenotomy was performed 1 cm medial to the lesser tuberosity and rotator interval was released to the glenoid exposing the humeral head. The inferior capsule was released directly off the humerus to allow greater than 90° of external rotation. A large inferior osteophyte was present which was removed with rongeur. The anatomic neck was exposed and the humeral head osteotomy was performed in approximately 30° of retroversion. The remainder of the osteophytes were removed. The canal was then entered, reamed, and broached. The final stem impacted in in approximately 30° of retroversion. A head protector was placed. The humerus was subluxed posteriorly. The glenoid exposed. Circumferential labral excision and capsular release were performed. A 270° mobilization of the subscapularis was carried out as well.  A centering hole was drilled. The glenoid was gently reamed and peripheral holes were drilled and trialing was carried out. The appropriate size was chosen. Thrombin-soaked Gelfoam was inserted into the holes to obtain hemostasis. Once the cement was prepared, Gelfoam was removed. Cement was inserted into the 3 peripheral holes and pressurized twice prior to impaction of the caged glenoid. Excellent fit was achieved with no rocking or instability. No excess cement was identified. The humerus was carefully subluxed back anteriorly. A 1.5 replicator plate was chosen and trialing was carried out. The appropriate head size and position were chosen and  allowed approximately 50% posterior subluxation with spontaneous reduction, 25% inferior subluxation with spontaneous reduction, and full passive range of motion. The joint was copiously irrigated with orthopedic irrigation mixed with Betadine after the final implants were assembled and locked into place. The shoulder was reduced. The subscapularis was meticulously repaired with #2 FiberWire as was the rotator interval. This was stable to approximately 30° of external rotation, but will be limited to 30° in the perioperative period. The deltopectoral interval was approximated with 0 Vicryl, the subcutaneous tissue with 2-0 Vicryl, and the skin with Monocryl and Dermabond. A sterile dressing was placed. Anesthesia was reversed and the patient was taken to the recovery room in stable condition. All instrument, needle, and sponge counts were correct.      Ketan Lazcano MD*

## 2020-06-08 NOTE — H&P
Pre-Op H&P  Aarti Wade  [id number]  1955    Chief complaint: Left shoulder pain    HPI:    Patient is a 65 y.o.female who presents with left shoulder pain.  Imaging revealed end-stage osteoarthritis of the glenohumeral joint with multiple subchondral cysts, complete bone on bone appearance, degenerative labral tearing, osteophyte formation and partial rotator cuff tearing.  Here today to undergo left total shoulder arthroplasty possible reverse.     Review of Systems:  General ROS: negative for chills, fever or skin lesions;  No changes since last office visit.  Neg for recent sick exposure  Cardiovascular ROS: no chest pain or dyspnea on exertion.  History of PAD s/p right fem endarterectomy with patch;left LE atherectomy; left subclavian stenosis   Respiratory ROS: no cough, shortness of breath, or wheezing.  History of COPD/tob use quit 2016    Allergies:   Allergies   Allergen Reactions   • Levocetirizine Dihydrochloride Myalgia     Muscle aches/joint pain       Home Meds:    No current facility-administered medications on file prior to encounter.      Current Outpatient Medications on File Prior to Encounter   Medication Sig Dispense Refill   • acetaminophen (ACETAMINOPHEN 8 HOUR) 650 MG 8 hr tablet Take 650 mg by mouth 3 (Three) Times a Day.     • amLODIPine (NORVASC) 5 MG tablet Take 1 tablet by mouth Daily. 90 tablet 1   • ascorbic acid (VITAMIN C) 1000 MG tablet Take 1,000 mg by mouth 2 (two) times a day.     • bisoprolol (ZEBeta) 10 MG tablet Take 1 tablet by mouth Daily. 90 tablet 1   • Calcium Citrate-Vitamin D (CALCIUM + D PO) Take 2 tablets by mouth 2 (two) times a day.     • Coenzyme Q10 200 MG capsule Take 400 mg by mouth Daily.     • Fish Oil oil Take 1 capsule by mouth Daily.     • Flaxseed, Linseed, (FLAXSEED OIL) 1000 MG capsule Take 1 capsule by mouth daily.     • fluticasone (FLONASE) 50 MCG/ACT nasal spray instill 2 sprays into each nostril twice a day (Patient taking differently: 1  "spray into the nostril(s) as directed by provider Daily.) 16 g 5   • gabapentin (NEURONTIN) 100 MG capsule TAKE 3 CAPSULES BY MOUTH EVERY EVENING 90 capsule 2   • hydroCHLOROthiazide (HYDRODIURIL) 12.5 MG tablet Take 1 tablet by mouth Daily. 90 tablet 3   • Methylsulfonylmethane (MSM PO) Take 2 tablets by mouth daily.     • Multiple Vitamins-Minerals (MULTIVITAMIN ADULT PO) Take 1 tablet by mouth daily.     • olmesartan (BENICAR) 40 MG tablet TAKE 1 TABLET BY MOUTH EVERY DAY (Patient taking differently: Take 40 mg by mouth Daily.) 90 tablet 2   • omeprazole (priLOSEC) 20 MG capsule Take 1 capsule by mouth Daily. 90 capsule 3   • rosuvastatin (CRESTOR) 20 MG tablet TAKE 1 TABLET BY MOUTH ONCE DAILY (Patient taking differently: Take 20 mg by mouth Every Night.) 90 tablet 2   • traMADol (ULTRAM) 50 MG tablet Take 1 tablet by mouth 4 (Four) Times a Day Before Meals & at Bedtime As Needed for Severe Pain . 120 tablet 2   • triamcinolone (KENALOG) 0.1 % cream Apply  topically to the appropriate area as directed 2 (Two) Times a Day. (Patient taking differently: Apply 1 application topically to the appropriate area as directed 2 (Two) Times a Day.) 15 g 0   • URINARY HEALTH/CRANBERRY PO Take 1 tablet by mouth 2 (two) times a day. \"CRANACTIN\"     • AMLACTIN 12 % lotion Apply 1 application topically to the appropriate area as directed As Needed for Irritation.  1   • aspirin 81 MG EC tablet Take 81 mg by mouth every morning. TOLD NOT TO STOP BEFORE SURGERY     • clopidogrel (PLAVIX) 75 MG tablet Take 1 tablet by mouth Daily. (Patient taking differently: Take 75 mg by mouth Daily. Last dose 6/4/20 - Dr. 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 HARRISON   • COLONOSCOPY      last 8 years ago.  Due to schedule   • EYE LENS IMPLANT SECONDARY Left    • FEMORAL FEMORAL BYPASS Right 8/22/2016    Procedure: RIGHT COMMON FEMORAL ENDARTERECTOMY WITH PATCH;  Surgeon: Severino Harrison MD;  Location:  [facility] OR;  Service:    • FINGER SURGERY Left     LEFT INDEX FINGER   • INTERVENTIONAL RADIOLOGY PROCEDURE N/A 8/16/2016    Procedure: IR PTA femoral popliteal artery;  Surgeon: Severino Harrison MD;  Location:  [facility];  Service:    • INTERVENTIONAL RADIOLOGY PROCEDURE Left 1/7/2020    Procedure: LEFT ATHERECTOMY, BALLOON ANGIOPLASTY;  Surgeon: Severino Harrison MD;  Location:  [facility] CATH INVASIVE LOCATION;  Service: Interventional Radiology   • TONSILLECTOMY       Social History:   Tobacco:   Social History     Tobacco Use   Smoking Status Former Smoker   • Packs/day: 1.00   • Years: 40.00   • Pack years: 40.00   • Types: Electronic Cigarette, Cigarettes   • Last attempt to quit: 6/16/2016   • Years since quitting: 3.9   Smokeless Tobacco Never Used   Tobacco Comment    1 PACK/DAY SMOKER UNTIL JUNE 2016      Alcohol:     Social History     Substance and Sexual Activity   Alcohol Use Yes   • Alcohol/week: 2.0 standard drinks   • Types: 2 Shots of  liquor per week    Comment: 2 DRINKS PER DAY       Vitals:           /55 (BP Location: Right arm, Patient Position: Lying)   Pulse 59   Temp 99 °F (37.2 °C) (Temporal)   Resp 18   SpO2 97%     Physical Exam:  General Appearance:    Alert, cooperative, no distress, appears stated age   Head:    Normocephalic, without obvious abnormality, atraumatic   Lungs:     Coarse to auscultation bilaterally, respirations unlabored    Heart:   Regular rate and rhythm, S1 and S2 normal, no murmur, rub    or gallop    Abdomen:    Soft, nontender.  +bowel sounds   Breast Exam:    deferred   Genitalia:    deferred   Extremities:   Extremities normal, atraumatic, no cyanosis or edema   Skin:   Skin color, texture, turgor normal, no rashes or lesions   Neurologic:   Grossly intact   Results Review  LABS:  Lab Results   Component Value Date    WBC 6.52 06/05/2020    HGB 12.5 06/05/2020    HCT 39.1 06/05/2020    MCV 94.4 06/05/2020     06/05/2020    NEUTROABS 4.48 01/03/2020    GLUCOSE 104 (H) 06/05/2020    BUN 43 (H) 06/05/2020    CREATININE 1.03 (H) 06/05/2020    EGFRIFNONA 53 (L) 06/05/2020     06/05/2020    K 5.0 06/08/2020     06/05/2020    CO2 23.0 06/05/2020    PHOS 4.6 (H) 01/03/2020    CALCIUM 9.3 06/05/2020    ALBUMIN 4.50 01/31/2020    AST 23 01/31/2020    ALT 16 01/31/2020    BILITOT 0.3 01/31/2020       RADIOLOGY:  Imaging Results (Last 72 Hours)     ** No results found for the last 72 hours. **          I reviewed the patient's new clinical results.    Cancer Staging (if applicable)  Cancer Patient: __ yes _x_no __unknown; If yes, clinical stage T:__ N:__M:__, stage group or __N/A    Impression: Left shoulder osteoarthritis     Plan: Left total shoulder arthroplasty, possible reverse    Annabel Santana, APRN   6/8/2020   10:45

## 2020-06-08 NOTE — ANESTHESIA POSTPROCEDURE EVALUATION
Patient: Aarti Wade    Procedure Summary     Date:  06/08/20 Room / Location:   [facility]    Anesthesia Start:  1255 Anesthesia Stop:  1546    Procedure:  LEFT TOTAL SHOULDER ARTHROPLASTY (Left Shoulder) Diagnosis:       Primary osteoarthritis of left shoulder      Left bicipital tenosynovitis      (Primary osteoarthritis of left shoulder [395611])    Surgeon:  Ketan Lazcano MD Provider:  Eliseo Damico MD    Anesthesia Type:  general with block ASA Status:  3          Anesthesia Type: general with block    Vitals  Vitals Value Taken Time   /63 6/8/2020  3:38 PM   Temp 98.5 °F (36.9 °C) 6/8/2020  3:30 PM   Pulse 67 6/8/2020  3:40 PM   Resp 18 6/8/2020  3:37 PM   SpO2 95 % 6/8/2020  3:40 PM   Vitals shown include unvalidated device data.        Post Anesthesia Care and Evaluation    Patient location during evaluation: PACU  Patient participation: complete - patient participated  Level of consciousness: awake and alert  Pain score: 0  Pain management: adequate  Airway patency: patent  Anesthetic complications: No anesthetic complications  PONV Status: none  Cardiovascular status: hemodynamically stable and acceptable  Respiratory status: nonlabored ventilation, acceptable and nasal cannula  Hydration status: acceptable

## 2020-06-08 NOTE — ANESTHESIA PROCEDURE NOTES
Airway  Urgency: elective    Date/Time: 6/8/2020 1:02 PM  Airway not difficult    General Information and Staff    Patient location during procedure: OR  CRNA: Michael Lafleur CRNA    Indications and Patient Condition  Indications for airway management: airway protection    Preoxygenated: yes  MILS not maintained throughout  Mask difficulty assessment: 1 - vent by mask    Final Airway Details  Final airway type: endotracheal airway      Successful airway: ETT  Cuffed: yes   Successful intubation technique: direct laryngoscopy  Endotracheal tube insertion site: oral  Blade: Ryley  Blade size: 3  ETT size (mm): 6.5  Cormack-Lehane Classification: grade IIa - partial view of glottis  Placement verified by: chest auscultation and capnometry   Cuff volume (mL): 8  Measured from: lips  ETT/EBT  to lips (cm): 20  Number of attempts at approach: 1  Assessment: lips, teeth, and gum same as pre-op and atraumatic intubation    Additional Comments  Negative epigastric sounds, Breath sound equal bilaterally with symmetric chest rise and fall

## 2020-06-08 NOTE — PLAN OF CARE
Problem: Patient Care Overview  Goal: Plan of Care Review  Flowsheets  Taken 6/8/2020 1718 by Anamaria Medley RN  Plan of Care Reviewed With: patient  Taken 6/8/2020 1749 by Deya Pedro, DREA  Outcome Summary: Interscalene infusing at 6, no c/o pain. Pt tolerated L shoulder PROM , IR chest/ER 30. Pt ambulated 50 feet with min assist, desaturation to 79% on RA and pt symptomatic. Pt reports h/o recent falls, recommend PT eval. Pt lives alone and does not have assist, she desires DC home with 24/hr private care yet has not made arrangements.

## 2020-06-08 NOTE — ANESTHESIA PREPROCEDURE EVALUATION
Anesthesia Evaluation     Patient summary reviewed and Nursing notes reviewed   NPO Solid Status: > 8 hours  NPO Liquid Status: > 2 hours           Airway   Mallampati: II  TM distance: >3 FB  Neck ROM: full  No difficulty expected  Dental - normal exam     Pulmonary - normal exam    breath sounds clear to auscultation  (+) a smoker (quit x 4 yrs) Former, COPD moderate,   Cardiovascular - normal exam    ECG reviewed  PT is on anticoagulation therapy  Rhythm: regular  Rate: normal    (+) hypertension, PVD (LLE claudication; Left subclavian stenosis; off plavix x 4 days),       Neuro/Psych  GI/Hepatic/Renal/Endo    (+)  GERD well controlled,  renal disease CRI,     Musculoskeletal     Abdominal    Substance History      OB/GYN          Other   arthritis,                      Anesthesia Plan    ASA 3     general with block   (Hyperkalemia 5.8, surgery pending K recheck; pt off plavix x 4 days, will plan for single shot interscalene block d/t bleeding risk)  intravenous induction     Anesthetic plan, all risks, benefits, and alternatives have been provided, discussed and informed consent has been obtained with: patient.    Plan discussed with CRNA.

## 2020-06-08 NOTE — ANESTHESIA PROCEDURE NOTES
Peripheral Block      Patient reassessed immediately prior to procedure    Patient location during procedure: OR  Start time: 6/8/2020 1:03 PM  Reason for block: at surgeon's request and post-op pain management  Performed by  CRNA: Michael Lafleur CRNA  Preanesthetic Checklist  Completed: patient identified, site marked, surgical consent, pre-op evaluation, timeout performed, IV checked, risks and benefits discussed and monitors and equipment checked  Prep:  Pt Position: supine  Sterile barriers:cap, gloves, gown and mask  Prep: ChloraPrep  Patient monitoring: blood pressure monitoring, continuous pulse oximetry and EKG  Procedure  Sedation:no  Performed under: general  Guidance:ultrasound guided and landmark technique  Images:still images obtained, printed/placed on chart    Laterality:left  Block Type:PECS I and PECS II  Injection Technique:single-shot  Needle Type:short-bevel  Needle Gauge:20 G  Resistance on Injection: none    Medications Used: bupivacaine PF (MARCAINE) 0.25 % injection, 30 mL  dexamethasone sodium phosphate injection, 2 mg  buprenorphine (BUPRENEX) injection, 0.3 mg  Med admintered at 6/8/2020 1:04 PM      Medications  Preservative Free Saline:10ml    Post Assessment  Injection Assessment: negative aspiration for heme and incremental injection  Patient Tolerance:comfortable throughout block  Complications:no  Additional Notes  The pt. Was placed in the Supine Position and GA was induced     The insertion site was prepped with CHG and Ultrasound guidance with In-Plane techniquewas  a 4inch BBraun 360 degree echogenic needle was visualized.  Normal Saline PSF was  utilized for hydrodissection of tissue. PECS 1 Block- Pectoralis Major and Minor where identified and LA was injected between PMM and PmM at the level of the 3rd Rib(10ml),  PECS 2-  Pectoralis Minor and Serratus muscle where identified and the needle was advanced laterally in-plane with the 4th rib as a backstop, pleura was  monitored.  LA was injected between SA and PmM at the level of 4th rib( 20ml).  LA injection spread was visualized, injection was incremental 1-5ml, normal or low injection pressure, no intravascular injection, no pneumothorax appreciated.  Thank You.

## 2020-06-08 NOTE — ANESTHESIA PROCEDURE NOTES
Peripheral Block    Pre-sedation assessment completed: 6/8/2020 10:56 AM    Patient reassessed immediately prior to procedure    Patient location during procedure: pre-op  Start time: 6/8/2020 10:56 AM  Stop time: 6/8/2020 11:01 AM  Reason for block: at surgeon's request and post-op pain management  Performed by  Anesthesiologist: Eliseo Damico MD  Assisted by: Bella Vivar RN  Preanesthetic Checklist  Completed: patient identified, site marked, surgical consent, pre-op evaluation, timeout performed, IV checked, risks and benefits discussed and monitors and equipment checked  Prep:  Pt Position: sitting  Sterile barriers:cap, gloves, mask and sterile barriers  Prep: ChloraPrep  Patient monitoring: blood pressure monitoring, continuous pulse oximetry and EKG  Procedure  Sedation:yes  Performed under: local infiltration  Guidance:ultrasound guided  Images:still images obtained, printed/placed on chart    Laterality:left  Block Type:interscalene  Injection Technique:single-shot  Needle Type:echogenic and short-bevel  Needle Gauge:20 G  Resistance on Injection: none  Catheter size: 20g.    Medications Used: fentaNYL citrate (PF) (SUBLIMAZE) injection, 100 mcg  bupivacaine PF (MARCAINE) 0.25 % injection, 20 mL  dexamethasone sodium phosphate injection, 2 mg  Med admintered at 6/8/2020 11:00 AM      Post Assessment  Injection Assessment: negative aspiration for heme, no paresthesia on injection and incremental injection  Patient Tolerance:comfortable throughout block  Complications:no  Additional Notes  Procedure:                 The pt was placed in semifowlers position with a slight tilt of the thorax contralateral to the insertion site.  The Insertion Site was prepped and draped in sterile fashion.  The pt was anesthetized with  IV Sedation( see meds) and  Skin and cutaneous tissue was infiltrated and anesthetized with 1% Lidocaine 3 mls via a 25g needle.  Utilizing ultrasound guidance, a BBraun 2 inch 18 g  Contiplex echogenic touhy needle was advanced in-plane.  Hydro dissection of tissue was achieved with Normal saline. Major vessels(carotid and Internal Jugular) where visualized as the brachial plexus was approached at the approximate level of C-7/ T-1.  Cervical 5 and Branches of Cervical 6 nerve roots where visualized and the needle tip was placed posterior at the level of C-6 roots.  LA spread was visualized and injection was made incrementally every 5 mls with aspiration. Injection pressure was normal or little, there was no intraneural injection, no vascular injection.      The BBraun 20 g wire stylet  catheter was then placed under US guidance on the posterior aspect of the Brachial Plexus.  Location of catheter was confirmed with NS injection visualized with US . The tuohy was then removed and the skin was sealed with Skin AFix at catheter insertion site.  Skin was prepped with mastisol and the labeled catheter  was secured with steristrips and a CHG tegaderm. Thank You.

## 2020-06-09 ENCOUNTER — ANESTHESIA (OUTPATIENT)
Dept: TELEMETRY | Facility: HOSPITAL | Age: 65
End: 2020-06-09

## 2020-06-09 ENCOUNTER — READMISSION MANAGEMENT (OUTPATIENT)
Dept: CALL CENTER | Facility: HOSPITAL | Age: 65
End: 2020-06-09

## 2020-06-09 ENCOUNTER — ANESTHESIA EVENT (OUTPATIENT)
Dept: TELEMETRY | Facility: HOSPITAL | Age: 65
End: 2020-06-09

## 2020-06-09 VITALS
RESPIRATION RATE: 16 BRPM | DIASTOLIC BLOOD PRESSURE: 65 MMHG | SYSTOLIC BLOOD PRESSURE: 124 MMHG | TEMPERATURE: 98.3 F | HEART RATE: 84 BPM | OXYGEN SATURATION: 87 %

## 2020-06-09 PROBLEM — G89.18 ACUTE POSTOPERATIVE PAIN: Status: ACTIVE | Noted: 2020-06-09

## 2020-06-09 LAB
ANION GAP SERPL CALCULATED.3IONS-SCNC: 13 MMOL/L (ref 5–15)
BASOPHILS # BLD AUTO: 0 10*3/MM3 (ref 0–0.2)
BASOPHILS NFR BLD AUTO: 0 % (ref 0–1.5)
BUN BLD-MCNC: 23 MG/DL (ref 8–23)
BUN/CREAT SERPL: 32.4 (ref 7–25)
CALCIUM SPEC-SCNC: 8.9 MG/DL (ref 8.6–10.5)
CHLORIDE SERPL-SCNC: 101 MMOL/L (ref 98–107)
CO2 SERPL-SCNC: 21 MMOL/L (ref 22–29)
CREAT BLD-MCNC: 0.71 MG/DL (ref 0.57–1)
DEPRECATED RDW RBC AUTO: 42.2 FL (ref 37–54)
EOSINOPHIL # BLD AUTO: 0 10*3/MM3 (ref 0–0.4)
EOSINOPHIL NFR BLD AUTO: 0 % (ref 0.3–6.2)
ERYTHROCYTE [DISTWIDTH] IN BLOOD BY AUTOMATED COUNT: 12.2 % (ref 12.3–15.4)
GFR SERPL CREATININE-BSD FRML MDRD: 82 ML/MIN/1.73
GLUCOSE BLD-MCNC: 138 MG/DL (ref 65–99)
HCT VFR BLD AUTO: 33.2 % (ref 34–46.6)
HGB BLD-MCNC: 10.7 G/DL (ref 12–15.9)
IMM GRANULOCYTES # BLD AUTO: 0.05 10*3/MM3 (ref 0–0.05)
IMM GRANULOCYTES NFR BLD AUTO: 0.5 % (ref 0–0.5)
LYMPHOCYTES # BLD AUTO: 0.42 10*3/MM3 (ref 0.7–3.1)
LYMPHOCYTES NFR BLD AUTO: 4.1 % (ref 19.6–45.3)
MCH RBC QN AUTO: 29.9 PG (ref 26.6–33)
MCHC RBC AUTO-ENTMCNC: 32.2 G/DL (ref 31.5–35.7)
MCV RBC AUTO: 92.7 FL (ref 79–97)
MONOCYTES # BLD AUTO: 0.48 10*3/MM3 (ref 0.1–0.9)
MONOCYTES NFR BLD AUTO: 4.7 % (ref 5–12)
NEUTROPHILS # BLD AUTO: 9.33 10*3/MM3 (ref 1.7–7)
NEUTROPHILS NFR BLD AUTO: 90.7 % (ref 42.7–76)
NRBC BLD AUTO-RTO: 0 /100 WBC (ref 0–0.2)
PLATELET # BLD AUTO: 240 10*3/MM3 (ref 140–450)
PMV BLD AUTO: 10.6 FL (ref 6–12)
POTASSIUM BLD-SCNC: 4.4 MMOL/L (ref 3.5–5.2)
RBC # BLD AUTO: 3.58 10*6/MM3 (ref 3.77–5.28)
SODIUM BLD-SCNC: 135 MMOL/L (ref 136–145)
WBC NRBC COR # BLD: 10.28 10*3/MM3 (ref 3.4–10.8)

## 2020-06-09 PROCEDURE — 97161 PT EVAL LOW COMPLEX 20 MIN: CPT

## 2020-06-09 PROCEDURE — 85025 COMPLETE CBC W/AUTO DIFF WBC: CPT | Performed by: ORTHOPAEDIC SURGERY

## 2020-06-09 PROCEDURE — 25010000002 ROPIVACINE HCL-NACL: Performed by: NURSE ANESTHETIST, CERTIFIED REGISTERED

## 2020-06-09 PROCEDURE — 25010000002 ROPIVACAINE PER 1 MG: Performed by: NURSE ANESTHETIST, CERTIFIED REGISTERED

## 2020-06-09 PROCEDURE — 97535 SELF CARE MNGMENT TRAINING: CPT | Performed by: OCCUPATIONAL THERAPIST

## 2020-06-09 PROCEDURE — 97110 THERAPEUTIC EXERCISES: CPT | Performed by: OCCUPATIONAL THERAPIST

## 2020-06-09 PROCEDURE — 80048 BASIC METABOLIC PNL TOTAL CA: CPT | Performed by: NURSE PRACTITIONER

## 2020-06-09 PROCEDURE — 97530 THERAPEUTIC ACTIVITIES: CPT | Performed by: OCCUPATIONAL THERAPIST

## 2020-06-09 RX ORDER — ROPIVACAINE HYDROCHLORIDE 2 MG/ML
INJECTION, SOLUTION EPIDURAL; INFILTRATION; PERINEURAL
Status: COMPLETED | OUTPATIENT
Start: 2020-06-09 | End: 2020-06-09

## 2020-06-09 RX ORDER — CLOPIDOGREL BISULFATE 75 MG/1
75 TABLET ORAL DAILY
Qty: 90 TABLET | Refills: 1
Start: 2020-06-09 | End: 2020-12-18 | Stop reason: SDUPTHER

## 2020-06-09 RX ORDER — DOCUSATE SODIUM 100 MG/1
100 CAPSULE, LIQUID FILLED ORAL 2 TIMES DAILY
Qty: 60 CAPSULE | Refills: 0 | Status: SHIPPED | OUTPATIENT
Start: 2020-06-09 | End: 2020-09-30 | Stop reason: HOSPADM

## 2020-06-09 RX ORDER — OXYCODONE HYDROCHLORIDE 5 MG/1
5 TABLET ORAL EVERY 4 HOURS PRN
Qty: 25 TABLET | Refills: 0 | Status: SHIPPED | OUTPATIENT
Start: 2020-06-09 | End: 2020-06-18

## 2020-06-09 RX ADMIN — OXYCODONE 5 MG: 5 TABLET ORAL at 13:07

## 2020-06-09 RX ADMIN — LOSARTAN POTASSIUM 100 MG: 50 TABLET, FILM COATED ORAL at 08:18

## 2020-06-09 RX ADMIN — BISOPROLOL FUMARATE 10 MG: 10 TABLET ORAL at 08:18

## 2020-06-09 RX ADMIN — ROPIVACAINE HYDROCHLORIDE 10 ML: 2 INJECTION, SOLUTION EPIDURAL; INFILTRATION at 08:10

## 2020-06-09 RX ADMIN — AMLODIPINE BESYLATE 5 MG: 5 TABLET ORAL at 08:18

## 2020-06-09 RX ADMIN — ROPIVACAINE HYDROCHLORIDE 6 ML/HR: 5 INJECTION, SOLUTION EPIDURAL; INFILTRATION; PERINEURAL at 11:05

## 2020-06-09 RX ADMIN — CITALOPRAM HYDROBROMIDE 40 MG: 40 TABLET ORAL at 08:18

## 2020-06-09 RX ADMIN — PANTOPRAZOLE SODIUM 40 MG: 40 TABLET, DELAYED RELEASE ORAL at 05:45

## 2020-06-09 RX ADMIN — ASPIRIN 81 MG: 81 TABLET, COATED ORAL at 05:45

## 2020-06-09 NOTE — PLAN OF CARE
Problem: Patient Care Overview  Goal: Plan of Care Review  6/9/2020 1552 by Deya Pedro, DREA  Flowsheets  Taken 6/9/2020 1516  Progress: declining  Taken 6/9/2020 1552  Plan of Care Reviewed With: patient;caregiver  Outcome Summary: Interscalene infusing at 6, post pain 10/10 left generalized shoulder. Educated caregiver on sling application and LUE PROM, however d/t pt's uncontrolled pain, caregiver requested to observe and not trial sling application or HEP. OT asked pt is she would consider not DC home this evening d/t uncontrolled pain. Pt stated she wanted DC home as soon as possible because she needed to get rest, she states she will be able to tolerate pain at home. Nurse notified of above. Kort to visit pt at home on 6/10 to continue training. Requested pt and caregiver call with any questions.

## 2020-06-09 NOTE — PROGRESS NOTES
Discharge Planning Assessment  [facility]     Patient Name: Aarti Wade  MRN: [id number]  Today's Date: 6/9/2020    Admit Date: 6/8/2020    Discharge Needs Assessment     Row Name 06/09/20 0831       Living Environment    Lives With  alone    Current Living Arrangements  home/apartment/condo    Primary Care Provided by  self    Provides Primary Care For  no one    Family Caregiver if Needed  sibling(s)    Family Caregiver Names  Vanessa Pro (sister) [phone]    Able to Return to Prior Arrangements  yes       Resource/Environmental Concerns    Resource/Environmental Concerns  home accessibility    Home Accessibility Concerns  stairs to enter home    Transportation Concerns  car, none       Transition Planning    Patient/Family Anticipates Transition to  home    Patient/Family Anticipated Services at Transition  none    Transportation Anticipated  family or friend will provide       Discharge Needs Assessment    Readmission Within the Last 30 Days  no previous admission in last 30 days    Concerns to be Addressed  denies needs/concerns at this time    Equipment Currently Used at Home  none    Anticipated Changes Related to Illness  none    Equipment Needed After Discharge  none        Discharge Plan     Row Name 06/09/20 0832       Plan    Plan  Home with private care givers    Patient/Family in Agreement with Plan  yes    Plan Comments  Spoke with patient at bedside. Lives alone in [place]. Contact is Vanessa Pro (sister) [phone]. Is independent with ADL's. No DME at home. Plan is home with private 24/7 caregivers. CM will continue to follow.    Final Discharge Disposition Code  01 - home or self-care        Destination      Coordination has not been started for this encounter.      Durable Medical Equipment      Coordination has not been started for this encounter.      Dialysis/Infusion      Coordination has not been started for this encounter.      Home Medical Care      Coordination has not  been started for this encounter.      Therapy      Coordination has not been started for this encounter.      Community Resources      Coordination has not been started for this encounter.        Expected Discharge Date and Time     Expected Discharge Date Expected Discharge Time    Jun 9, 2020         Demographic Summary     Row Name 06/09/20 0828       General Information    Admission Type  inpatient    Arrived From  PACU/recovery room    Referral Source  admission list    Reason for Consult  discharge planning    Preferred Language  English     Used During This Interaction  no       Contact Information    Permission Granted to Share Info With      Contact Information Obtained for      Contact Information Comments  PCP is Jannette Chapa MD        Functional Status     Row Name 06/09/20 0829       Functional Status    Usual Activity Tolerance  good    Current Activity Tolerance  good       Functional Status, IADL    Medications  independent    Meal Preparation  independent    Housekeeping  independent    Laundry  independent    Shopping  independent       Mental Status    General Appearance WDL  WDL       Mental Status Summary    Recent Changes in Mental Status/Cognitive Functioning  no changes       Employment/    Employment Status  employed full time        Psychosocial    No documentation.       Abuse/Neglect    No documentation.       Legal    No documentation.       Substance Abuse    No documentation.       Patient Forms    No documentation.           Akash Gasca RN

## 2020-06-09 NOTE — DISCHARGE PLACEMENT REQUEST
"Aarti Penn (65 y.o. Female)   Mauricio Gasca, RN Case Manager  [phone]    Date of Birth Social Security Number Address Home Phone MRN    1955  [facility] IMRSV  [place] [id number] [phone] [id number]    Hinduism Marital Status          Denominational Single       Admission Date Admission Type Admitting Provider Attending Provider Department, Room/Bed    20 Urgent Ketan Lazcano MD Sajadi, Kaveh Robert, MD [facility] 3G, S355/1    Discharge Date Discharge Disposition Discharge Destination         Home or Self Care              Attending Provider:  Ketan Lazcano MD    Allergies:  Levocetirizine Dihydrochloride    Isolation:  None   Infection:  None   Code Status:  CPR    Ht:  157.5 cm (62\")   Wt:  59 kg (130 lb)    Admission Cmt:  None   Principal Problem:  Status post total replacement of left shoulder [Z96.612]                 Active Insurance as of 2020     Primary Coverage     Payor Plan Insurance Group Employer/Plan Group    ANTHEM BLUE CROSS [facility] [employer] [facility] PPO [id number]     Payor Plan Address Payor Plan Phone Number Payor Plan Fax Number Effective Dates    [address] [phone]  2017 - None Entered    [place] [address]       Subscriber Name Subscriber Birth Date Member ID       AARTI PENN 1955 [id number]                 Emergency Contacts      (Rel.) Home Phone Work Phone Mobile Phone    Vanessa Pro (Other) [phone] -- [phone]    Lorrie Patino (Sister) -- -- [phone]    Zita Garrett (Friend) -- -- --        [facility] 3G  [address] [facility] [address]  Dept. Phone:  [phone]  Dept. Fax:   Date Ordered: 2020         Patient:  Aarti Penn MRN:  [id number]   [address] IM[address]  [facility] [address] :  1955  SSN:    Phone: [phone] Sex:  F     Weight: No weight on file.         Ht Readings from Last 1 Encounters:   20 " "157.5 cm (62\")         Home Oxygen Therapy          (Order ID: [id number])    Diagnosis:  COPD mixed type (CMS/HCC) (J44.9 [ICD-10-CM] 496 [ICD-9-CM])   Quantity:  1     Delivery Modality: Nasal Cannula  Liters Per Minute: 2  Duration: Continuous  Equipment:  Oxygen Concentrator &  &  Portable Gaseous Oxygen System & Portable Oxygen Contents Gaseous &  Conserving Regulator  Length of Need (99 Months = Lifetime): 3 Months        Authorizing Provider's Phone: [phone]   Authorizing Provider:Deya Navarrete APRN  Authorizing Provider's NPI: [id number]  Order Entered By: Deya Navarrete APRN 2020  1:40 PM     Electronically signed by: Deya Navarrete APRN 2020  1:40 PM                 History & Physical      Jesse Tolentino MD at 20 1537          Patient Name: Aarti Wade  MRN: [id number]  : 1955  DOS: 2020    Attending: Ketan Lazcano MD    Primary Care Provider: Jannette Chapa MD      Chief complaint: Left shoulder pain    Subjective   Patient is a pleasant 65 y.o. female presented for scheduled surgery by Dr. Lazcano.  She underwent left total shoulder arthroplasty under general anesthesia.  She tolerated surgery well and is admitted for further medical management.  Her shoulder has been painful since she had a fall at the end of December of last year.  She had limited range of motion and severe pain since.    When seen postop she is doing well.  Her pain is well controlled.  She denies nausea, shortness of breath or chest pain.  No history of DVT or PE.    (Above is noted, agree.  Seen her room afterwards, doing fairly well.  Good pain control.  No nausea, vomiting, or shortness of breath.  Has participated with OT who recommended PT.  She had desaturation on room air.  O2 sats are good on 2 L of oxygen via nasal cannula at 96%. )wy      Allergies   Allergen Reactions   • Levocetirizine Dihydrochloride Myalgia     Muscle aches/joint pain "       Meds:  Medications Prior to Admission   Medication Sig Dispense Refill Last Dose   • acetaminophen (ACETAMINOPHEN 8 HOUR) 650 MG 8 hr tablet Take 650 mg by mouth 3 (Three) Times a Day.   6/8/2020 at 0530   • amLODIPine (NORVASC) 5 MG tablet Take 1 tablet by mouth Daily. 90 tablet 1 6/7/2020   • ascorbic acid (VITAMIN C) 1000 MG tablet Take 1,000 mg by mouth 2 (two) times a day.   6/8/2020   • bisoprolol (ZEBeta) 10 MG tablet Take 1 tablet by mouth Daily. 90 tablet 1 6/8/2020 at 0530   • Calcium Citrate-Vitamin D (CALCIUM + D PO) Take 2 tablets by mouth 2 (two) times a day.   6/8/2020   • citalopram (CeleXA) 40 MG tablet TAKE 1 TABLET BY MOUTH ONCE DAILY (Patient taking differently: Take 40 mg by mouth Daily.) 90 tablet 1 6/7/2020   • Coenzyme Q10 200 MG capsule Take 400 mg by mouth Daily.   6/8/2020   • Fish Oil oil Take 1 capsule by mouth Daily.   6/8/2020   • Flaxseed, Linseed, (FLAXSEED OIL) 1000 MG capsule Take 1 capsule by mouth daily.   6/8/2020   • fluticasone (FLONASE) 50 MCG/ACT nasal spray instill 2 sprays into each nostril twice a day (Patient taking differently: 1 spray into the nostril(s) as directed by provider Daily.) 16 g 5 6/8/2020   • folic acid (FOLVITE) 1 MG tablet TAKE 1 TABLET BY MOUTH ONCE DAILY (Patient taking differently: Take 1 mg by mouth Daily.) 90 tablet 1 6/8/2020   • gabapentin (NEURONTIN) 100 MG capsule TAKE 3 CAPSULES BY MOUTH EVERY EVENING 90 capsule 2 6/7/2020   • hydroCHLOROthiazide (HYDRODIURIL) 12.5 MG tablet Take 1 tablet by mouth Daily. 90 tablet 3 6/7/2020   • Methylsulfonylmethane (MSM PO) Take 2 tablets by mouth daily.   6/8/2020   • Multiple Vitamins-Minerals (MULTIVITAMIN ADULT PO) Take 1 tablet by mouth daily.   6/8/2020   • olmesartan (BENICAR) 40 MG tablet TAKE 1 TABLET BY MOUTH EVERY DAY (Patient taking differently: Take 40 mg by mouth Daily.) 90 tablet 2 6/7/2020   • omeprazole (priLOSEC) 20 MG capsule Take 1 capsule by mouth Daily. 90 capsule 3 6/8/2020 at  "0530   • rosuvastatin (CRESTOR) 20 MG tablet TAKE 1 TABLET BY MOUTH ONCE DAILY (Patient taking differently: Take 20 mg by mouth Every Night.) 90 tablet 2 6/7/2020   • traMADol (ULTRAM) 50 MG tablet Take 1 tablet by mouth 4 (Four) Times a Day Before Meals & at Bedtime As Needed for Severe Pain . 120 tablet 2 6/8/2020   • triamcinolone (KENALOG) 0.1 % cream Apply  topically to the appropriate area as directed 2 (Two) Times a Day. (Patient taking differently: Apply 1 application topically to the appropriate area as directed 2 (Two) Times a Day.) 15 g 0 Past Month   • URINARY HEALTH/CRANBERRY PO Take 1 tablet by mouth 2 (two) times a day. \"CRANACTIN\"   6/8/2020   • AMLACTIN 12 % lotion Apply 1 application topically to the appropriate area as directed As Needed for Irritation.  1 More than a month   • aspirin 81 MG EC tablet Take 81 mg by mouth every morning. TOLD NOT TO STOP BEFORE SURGERY   6/4/2020   • clopidogrel (PLAVIX) 75 MG tablet Take 1 tablet by mouth Daily. (Patient taking differently: Take 75 mg by mouth Daily. Last dose 6/4/20 - Dr. Lazcano's office notified of last dose) 90 tablet 1 6/4/2020         History:   Past Medical History:   Diagnosis Date   • Anxiety    • Arthritis    • Arthritis of right shoulder region    • Chronic UTI    • Circulatory disorder    • Claudication (CMS/HCC)    • COPD (chronic obstructive pulmonary disease) (CMS/HCC)    • DA (degenerative arthritis)    • Depression    • diffuse fatty liver infiltration on CT 2009   • Gastrointestinal disorder    • GERD (gastroesophageal reflux disease)    • Head injury    • Hepatitis     UNKNOWN TYPE   • Hyperlipidemia    • Hypertension    • Inflammatory arthritis    • 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 HARRISON   • COLONOSCOPY      last 8 years ago.  Due to schedule   • EYE LENS IMPLANT SECONDARY Left    • FEMORAL FEMORAL BYPASS Right 8/22/2016    Procedure: RIGHT COMMON FEMORAL ENDARTERECTOMY WITH PATCH;  Surgeon: Severino Harrison MD;  Location:  [facility];  Service:    • FINGER SURGERY Left     LEFT INDEX FINGER   • INTERVENTIONAL RADIOLOGY PROCEDURE N/A 8/16/2016    Procedure: IR PTA femoral popliteal artery;  Surgeon: Severino Harrison MD;  Location:  [facility];  Service:    • INTERVENTIONAL RADIOLOGY PROCEDURE Left 1/7/2020    Procedure: LEFT ATHERECTOMY, BALLOON ANGIOPLASTY;  Surgeon: Severino Harrison MD;  Location:  [facility];  Service: Interventional Radiology   • TONSILLECTOMY       Family History   Problem Relation Age of Onset   • Osteoarthritis Mother    • Alzheimer's disease Mother    • Hypertension Father    • Heart attack Father    • Alcohol abuse Father    • No Known Problems Sister    • No Known Problems Brother    • No Known Problems Daughter    • No Known Problems Son    • Breast cancer Maternal Grandmother 50   • Breast cancer Paternal Grandmother 50   • No Known Problems Maternal Aunt    • No Known Problems Paternal Aunt    • Other Other    • Heart attack Other    • Coronary artery disease Paternal Grandfather    • Stroke Paternal Grandfather    • Ovarian cancer Neg Hx    • Endometrial cancer Neg Hx      Social History     Tobacco Use   • Smoking status: Former Smoker     Packs/day: 1.00     Years: 40.00     Pack years: 40.00     Types: Electronic Cigarette, Cigarettes     Last attempt to quit: 6/16/2016     Years since quitting: 3.9   • Smokeless tobacco: Never Used   • Tobacco comment: 1 PACK/DAY SMOKER UNTIL JUNE 2016   Substance Use Topics   • Alcohol use: Yes     Alcohol/week: 2.0 standard drinks      Types: 2 Shots of liquor per week     Comment: 2 DRINKS PER DAY   • Drug use: No   She lives alone and has no children.  She is a  and takes care of horses.    Review of Systems  Pertinent items are noted in HPI, all other systems reviewed and negative    Vital Signs  /63 (BP Location: Right arm, Patient Position: Lying)   Pulse 65   Temp 98.5 °F (36.9 °C) (Temporal)   Resp 18   SpO2 95%     Physical Exam:    General Appearance:    Alert, cooperative, in no acute distress   Head:    Normocephalic, without obvious abnormality, atraumatic   Eyes:            Lids and lashes normal, conjunctivae and sclerae normal, no   icterus, no pallor, corneas clear,    Ears:    Ears appear intact with no abnormalities noted   Throat:   No oral lesions, no thrush, oral mucosa moist   Neck:   No adenopathy, supple, trachea midline, no thyromegaly    Lungs:     Clear to auscultation,respirations regular, even and unlabored    Heart:    Regular rhythm and normal rate, normal S1 and S2, no murmur, no gallop   Abdomen:     Normal bowel sounds, no masses, no organomegaly, soft non-tender, non-distended, no guarding, no rebound  tenderness   Genitalia:    Deferred   Extremities:   LUE sling. Dressing CDI.  Good movement and cap refill left digits.  Some numbness left hand   Pulses:   Pulses palpable and equal bilaterally   Skin:   No bleeding, bruising or rash   Neurologic:   Cranial nerves 2 - 12 grossly intact.        I reviewed the patient's new clinical results.       Results from last 7 days   Lab Units 06/05/20  0938   WBC 10*3/mm3 6.52   HEMOGLOBIN g/dL 12.5   HEMATOCRIT % 39.1   PLATELETS 10*3/mm3 284     Results from last 7 days   Lab Units 06/08/20  1031 06/05/20  1014   SODIUM mmol/L  --  139   POTASSIUM mmol/L 5.0 5.8*   CHLORIDE mmol/L  --  103   CO2 mmol/L  --  23.0   BUN mg/dL  --  43*   CREATININE mg/dL  --  1.03*   CALCIUM mg/dL  --  9.3   GLUCOSE mg/dL  --  104*     Lab Results   Component Value  Date    HGBA1C 5.50 06/05/2020         Assessment and Plan:     Status post total replacement of left shoulder    Benign essential hypertension    Mixed hyperlipidemia    Atherosclerosis of native artery of left lower extremity with intermittent claudication (CMS/HCC)    Chronic kidney disease, stage 3, mod decreased GFR (CMS/HCC)      Plan  1. PT/OT- NWB LUE  2. Pain control-prns   3. IS-encourage  4. DVT proph- Mechs  5. Bowel regimen  6. Resume home medications as appropriate  7. Monitor post-op labs  8. DC planning for home    HTN, Hyperlipidemia  - Continue home Norvasc, Zebeta, Cozaar and statin  - Hold HCTZ  - Monitor BP   - Holding parameters for BP meds  - Labetalol PRN for SBP>170    LLE atherosclerosis  - Resume aspirin and Plavix when okay with Dr. Lazcano  ( Resume aspirin AM, and plavix on POD 2 if ok with )wy    CKD  - BMP in AM  Avoid nephrotoxic agents, maintain adequate blood pressure.CYRUS Hsu  06/08/20  16:23     I have personally performed the evaluation on this patient. My history is consistent  with HPI obtained. My exam findings are listed above. I have personally reviewed and discussed the above formulated treatment plan with pt and JAQUAN.[email or web address].wy      Electronically signed by Jesse Tolentino MD at 06/08/20 5894     Annabel Santana APRN at 06/08/20 1045          Pre-Op H&P  Aarti Wade  [id number]  1955    Chief complaint: Left shoulder pain    HPI:    Patient is a 65 y.o.female who presents with left shoulder pain.  Imaging revealed end-stage osteoarthritis of the glenohumeral joint with multiple subchondral cysts, complete bone on bone appearance, degenerative labral tearing, osteophyte formation and partial rotator cuff tearing.  Here today to undergo left total shoulder arthroplasty possible reverse.     Review of Systems:  General ROS: negative for chills, fever or skin lesions;  No changes since last office visit.  Neg for recent sick  exposure  Cardiovascular ROS: no chest pain or dyspnea on exertion.  History of PAD s/p right fem endarterectomy with patch;left LE atherectomy; left subclavian stenosis   Respiratory ROS: no cough, shortness of breath, or wheezing.  History of COPD/tob use quit 2016    Allergies:   Allergies   Allergen Reactions   • Levocetirizine Dihydrochloride Myalgia     Muscle aches/joint pain       Home Meds:    No current facility-administered medications on file prior to encounter.      Current Outpatient Medications on File Prior to Encounter   Medication Sig Dispense Refill   • acetaminophen (ACETAMINOPHEN 8 HOUR) 650 MG 8 hr tablet Take 650 mg by mouth 3 (Three) Times a Day.     • amLODIPine (NORVASC) 5 MG tablet Take 1 tablet by mouth Daily. 90 tablet 1   • ascorbic acid (VITAMIN C) 1000 MG tablet Take 1,000 mg by mouth 2 (two) times a day.     • bisoprolol (ZEBeta) 10 MG tablet Take 1 tablet by mouth Daily. 90 tablet 1   • Calcium Citrate-Vitamin D (CALCIUM + D PO) Take 2 tablets by mouth 2 (two) times a day.     • Coenzyme Q10 200 MG capsule Take 400 mg by mouth Daily.     • Fish Oil oil Take 1 capsule by mouth Daily.     • Flaxseed, Linseed, (FLAXSEED OIL) 1000 MG capsule Take 1 capsule by mouth daily.     • fluticasone (FLONASE) 50 MCG/ACT nasal spray instill 2 sprays into each nostril twice a day (Patient taking differently: 1 spray into the nostril(s) as directed by provider Daily.) 16 g 5   • gabapentin (NEURONTIN) 100 MG capsule TAKE 3 CAPSULES BY MOUTH EVERY EVENING 90 capsule 2   • hydroCHLOROthiazide (HYDRODIURIL) 12.5 MG tablet Take 1 tablet by mouth Daily. 90 tablet 3   • Methylsulfonylmethane (MSM PO) Take 2 tablets by mouth daily.     • Multiple Vitamins-Minerals (MULTIVITAMIN ADULT PO) Take 1 tablet by mouth daily.     • olmesartan (BENICAR) 40 MG tablet TAKE 1 TABLET BY MOUTH EVERY DAY (Patient taking differently: Take 40 mg by mouth Daily.) 90 tablet 2   • omeprazole (priLOSEC) 20 MG capsule Take 1  "capsule by mouth Daily. 90 capsule 3   • rosuvastatin (CRESTOR) 20 MG tablet TAKE 1 TABLET BY MOUTH ONCE DAILY (Patient taking differently: Take 20 mg by mouth Every Night.) 90 tablet 2   • traMADol (ULTRAM) 50 MG tablet Take 1 tablet by mouth 4 (Four) Times a Day Before Meals & at Bedtime As Needed for Severe Pain . 120 tablet 2   • triamcinolone (KENALOG) 0.1 % cream Apply  topically to the appropriate area as directed 2 (Two) Times a Day. (Patient taking differently: Apply 1 application topically to the appropriate area as directed 2 (Two) Times a Day.) 15 g 0   • URINARY HEALTH/CRANBERRY PO Take 1 tablet by mouth 2 (two) times a day. \"CRANACTIN\"     • AMLACTIN 12 % lotion Apply 1 application topically to the appropriate area as directed As Needed for Irritation.  1   • aspirin 81 MG EC tablet Take 81 mg by mouth every morning. TOLD NOT TO STOP BEFORE SURGERY     • clopidogrel (PLAVIX) 75 MG tablet Take 1 tablet by mouth Daily. (Patient taking differently: Take 75 mg by mouth Daily. Last dose 6/4/20 - Dr. 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 HARRISON   • COLONOSCOPY      last 8 years ago.  Due to schedule   • EYE LENS IMPLANT SECONDARY Left    • FEMORAL FEMORAL BYPASS Right 8/22/2016    Procedure: RIGHT COMMON FEMORAL ENDARTERECTOMY WITH PATCH;  Surgeon: Severino Harrison MD;  Location:  [facility];  Service:    • FINGER SURGERY Left     LEFT INDEX FINGER   • INTERVENTIONAL RADIOLOGY PROCEDURE N/A 8/16/2016    Procedure: IR PTA femoral popliteal artery;  Surgeon: Severino Harrsion MD;  Location:  [facility];  Service:    • INTERVENTIONAL RADIOLOGY PROCEDURE Left 1/7/2020    Procedure: LEFT ATHERECTOMY, BALLOON ANGIOPLASTY;  Surgeon: Severino Harrison MD;  Location:  [facility];  Service: Interventional Radiology   • TONSILLECTOMY       Social History:   Tobacco:   Social History     Tobacco Use   Smoking Status Former Smoker   • Packs/day: 1.00   • Years: 40.00   • Pack years: 40.00   • Types: Electronic Cigarette, Cigarettes   • Last attempt to quit: 6/16/2016   • Years since quitting: 3.9   Smokeless Tobacco Never Used   Tobacco Comment    1 PACK/DAY SMOKER UNTIL JUNE 2016      Alcohol:     Social History     Substance and Sexual Activity   Alcohol Use Yes   • Alcohol/week: 2.0 standard drinks   • Types: 2 Shots of liquor per week    Comment: 2 DRINKS PER DAY       Vitals:           /55 (BP Location: Right arm, Patient Position: Lying)   Pulse 59   Temp 99 °F (37.2 °C) (Temporal)   Resp 18   SpO2 97%     Physical Exam:  General Appearance:    Alert, cooperative, no distress, appears stated age   Head:    Normocephalic, without obvious abnormality, atraumatic   Lungs:     Coarse to auscultation bilaterally, respirations unlabored    Heart:   Regular rate and rhythm, S1 and S2 normal, no murmur, rub    or gallop    Abdomen:    Soft, nontender.  +bowel sounds   Breast Exam:    deferred   Genitalia:     deferred   Extremities:   Extremities normal, atraumatic, no cyanosis or edema   Skin:   Skin color, texture, turgor normal, no rashes or lesions   Neurologic:   Grossly intact   Results Review  LABS:  Lab Results   Component Value Date    WBC 6.52 06/05/2020    HGB 12.5 06/05/2020    HCT 39.1 06/05/2020    MCV 94.4 06/05/2020     06/05/2020    NEUTROABS 4.48 01/03/2020    GLUCOSE 104 (H) 06/05/2020    BUN 43 (H) 06/05/2020    CREATININE 1.03 (H) 06/05/2020    EGFRIFNONA 53 (L) 06/05/2020     06/05/2020    K 5.0 06/08/2020     06/05/2020    CO2 23.0 06/05/2020    PHOS 4.6 (H) 01/03/2020    CALCIUM 9.3 06/05/2020    ALBUMIN 4.50 01/31/2020    AST 23 01/31/2020    ALT 16 01/31/2020    BILITOT 0.3 01/31/2020       RADIOLOGY:  Imaging Results (Last 72 Hours)     ** No results found for the last 72 hours. **          I reviewed the patient's new clinical results.    Cancer Staging (if applicable)  Cancer Patient: __ yes _x_no __unknown; If yes, clinical stage T:__ N:__M:__, stage group or __N/A    Impression: Left shoulder osteoarthritis     Plan: Left total shoulder arthroplasty, possible reverse    Annabel Santana, APRN   6/8/2020   10:45    Electronically signed by Ketan Lazcano MD at 06/08/20 1233       Vital Signs (last day)     Date/Time   Temp   Temp src   Pulse   Resp   BP   Patient Position   SpO2    06/09/20 1348   --   --   --   --   --   --   (!) 87    06/09/20 1300   --   --   --   --   --   --   (!) 89    06/09/20 0835   --   --   84   16   124/65   --   --    06/09/20 0828   --   --   93   16   128/66   Sitting   92    06/09/20 0817   98.3 (36.8)   Oral   79   15   134/63   Lying   93    06/09/20 0721   98.2 (36.8)   Oral   84   16   162/67   Lying   94    06/09/20 0304   98 (36.7)   Oral   82   16   112/71   Lying   --    06/08/20 2331   98 (36.7)   Oral   70   16   119/53   Lying   94    06/08/20 1901   98.3 (36.8)   Oral   75   16   123/63   Lying   95    06/08/20 1537    --   --   65   18   115/63   Lying   95    06/08/20 1530   98.5 (36.9)   Temporal   69   18   119/50   Lying   94    06/08/20 1515   98.5 (36.9)   Temporal   72   18   128/52   Lying   97    06/08/20 1510   --   --   72   --   137/66   --   94    06/08/20 1505   --   --   74   --   121/53   --   96    06/08/20 1500   97.3 (36.3)   Temporal   71   18   120/98   Lying   94    06/08/20 1455   97.3 (36.3)   Temporal   78   18   133/65   Lying   96    06/08/20 1450   97.3 (36.3)   Temporal   84   18   125/71   Lying   94    06/08/20 1446   97.3 (36.3)   Temporal   88   18   149/66   Lying   94    06/08/20 1445   --   --   88   --   149/66   --   91    06/08/20 1020   99 (37.2)   Temporal   59   18   145/55   Lying   97                Current Facility-Administered Medications   Medication Dose Route Frequency Provider Last Rate Last Dose   • acetaminophen (TYLENOL) tablet 650 mg  650 mg Oral Q4H PRN Ketan Lazcano MD        Or   • acetaminophen (TYLENOL) suppository 650 mg  650 mg Rectal Q4H PRN Ketan Lazcano MD       • amLODIPine (NORVASC) tablet 5 mg  5 mg Oral Daily Deya Navarrete APRN   5 mg at 06/09/20 0818   • aspirin EC tablet 81 mg  81 mg Oral QAM Jesse Tolentino MD   81 mg at 06/09/20 0545   • bisoprolol (ZEBeta) tablet 10 mg  10 mg Oral Daily Deya Navarrete APRN   10 mg at 06/09/20 0818   • citalopram (CeleXA) tablet 40 mg  40 mg Oral Daily Deya Navarrete APRN   40 mg at 06/09/20 0818   • gabapentin (NEURONTIN) capsule 300 mg  300 mg Oral Nightly Jesse Tolentino MD   300 mg at 06/08/20 2103   • HYDROmorphone (DILAUDID) injection 0.5 mg  0.5 mg Intravenous Q2H PRN Ketan Lazcano MD        And   • naloxone (NARCAN) injection 0.1 mg  0.1 mg Intravenous Q5 Min PRN Ketan Lazcano MD       • labetalol (NORMODYNE,TRANDATE) injection 10 mg  10 mg Intravenous Q4H PRN Deya Navarrete APRN       • lactated ringers infusion  9 mL/hr Intravenous Continuous Eliseo Damico MD        • losartan (COZAAR) tablet 100 mg  100 mg Oral Q24H Deya Navarrete APRN   100 mg at 20 0818   • ondansetron (ZOFRAN) tablet 4 mg  4 mg Oral Q6H PRN Ketan Lazcano MD        Or   • ondansetron (ZOFRAN) injection 4 mg  4 mg Intravenous Q6H PRN Ketan Lazcano MD       • oxyCODONE (ROXICODONE) immediate release tablet 5 mg  5 mg Oral Q4H PRN Ketan Lazcano MD   5 mg at 20 1307   • pantoprazole (PROTONIX) EC tablet 40 mg  40 mg Oral QAM Deya Navarrete APRN   40 mg at 20 0545   • ropivacaine (Naropin) 0.2% in NS infusion (ARROW Pump)  6 mL/hr Peripheral Nerve Continuous Michael Lafleur CRNA 6 mL/hr at 20 1105 6 mL/hr at 20 1105   • rosuvastatin (CRESTOR) tablet 20 mg  20 mg Oral Nightly Deya Navarrete APRN   20 mg at 20 2103   • sodium chloride 0.45 % infusion  50 mL/hr Intravenous Continuous Ketan Lazcano MD 50 mL/hr at 20 1514 50 mL/hr at 20 1514   • sodium chloride 0.9 % bolus 500 mL  500 mL Intravenous TID PRN Deya Navarrete APRN       • sodium chloride 0.9 % bolus 500 mL  500 mL Intravenous TID PRN Deya Navarrete APRN            Discharge Summary      Deya Navarrete APRN at 20 0915          Patient Name: Aarti Wade  MRN: [id number]  : 1955  DOS: 2020    Attending: Ketan Lazcano MD    Primary Care Provider: Jannette Chapa MD    Date of Admission:.2020  9:28 AM    Date of Discharge:  2020    Discharge Diagnosis:   Status post total replacement of left shoulder    Benign essential hypertension    Mixed hyperlipidemia    Atherosclerosis of native artery of left lower extremity with intermittent claudication (CMS/HCC)    Chronic kidney disease, stage 3, mod decreased GFR (CMS/HCC)    Acute postoperative pain      Hospital Course    At admit:  Patient is a pleasant 65 y.o. female presented for scheduled surgery by Dr. Lazcano.  She underwent left total shoulder arthroplasty under  general anesthesia.  She tolerated surgery well and was admitted for further medical management.  Her shoulder has been painful since she had a fall at the end of December of last year.  She had limited range of motion and severe pain since.    After admit:  Patient was provided pain medications as needed for pain control.  Adjustments were made to pain medications to optimize postop pain management.   Risks and benefits of opiate medications discussed with patient.    The patient was seen by OT and was taught exercises for her left arm.  The patient used an IS for atelectasis prophylaxis and mechanicals for DVT prophylaxis.    Home medications were resumed as appropriate, and labs were monitored and remained fairly stable.     She did have decreased O2 saturations postop. She has baseline COPD. She will be arranged O2 for home and will follow-up with her PCP in a week to determine further O2 needs.    With the progress she has made, she is ready for DC home today.      Discussed with patient regarding plan and she shows understanding and agreement.        Procedures Performed  DATE OF OPERATION: 06/08/20  PREOPERATIVE DIAGNOSIS: Left shoulder degenerative joint disease with pain and limitation of function and motion.    POSTOPERATIVE DIAGNOSES:  1. Left shoulder degenerative joint disease with pain and limitation of function and motion.    2. Biceps tenosynovitis.    PROCEDURES PERFORMED:  1. Left total shoulder arthroplasty.    2. Left biceps tenodesis.    SURGEON: Ketan Lazcano MD    Pertinent Test Results:    I reviewed the patient's new clinical results.   Results from last 7 days   Lab Units 06/09/20  0518 06/05/20  0938   WBC 10*3/mm3 10.28 6.52   HEMOGLOBIN g/dL 10.7* 12.5   HEMATOCRIT % 33.2* 39.1   PLATELETS 10*3/mm3 240 284     Results from last 7 days   Lab Units 06/09/20  0518 06/08/20  1031 06/05/20  1014   SODIUM mmol/L 135*  --  139   POTASSIUM mmol/L 4.4 5.0 5.8*   CHLORIDE mmol/L 101  --  103   CO2  mmol/L 21.0*  --  23.0   BUN mg/dL 23  --  43*   CREATININE mg/dL 0.71  --  1.03*   CALCIUM mg/dL 8.9  --  9.3   GLUCOSE mg/dL 138*  --  104*     I reviewed the patient's new imaging including images and reports.      Occupational Therapy: Interscalene infusing at 6, no c/o pain. Pt tolerated L shoulder PROM , IR chest/ER 30. Pt ambulated 50 feet with min assist, desaturation to 79% on RA and pt symptomatic. Pt reports h/o recent falls, recommend PT eval. Pt lives alone and does not have assist, desire home with 24/hr private care.      Discharge Assessment:    Vital Signs  Visit Vitals  /65   Pulse 84   Temp 98.3 °F (36.8 °C) (Oral)   Resp 16   SpO2 92%     Temp (24hrs), Av.9 °F (36.6 °C), Min:97.3 °F (36.3 °C), Max:98.5 °F (36.9 °C)      General Appearance:    Alert, cooperative, in no acute distress   Lungs:     Clear to auscultation,respirations regular, even and   unlabored    Heart:    Regular rhythm and normal rate, normal S1 and S2    Abdomen:     Normal bowel sounds, no masses, no organomegaly, soft        non-tender, non-distended, no guarding, no rebound                 tenderness   Extremities:   LUE in a sling, CDI dressing. Distal pulses, cap refill, movements of fingers, wrist, intact.   Pulses:   Pulses palpable and equal bilaterally   Skin:   No bleeding, bruising or rash   Neurologic:   Cranial nerves 2 - 12 grossly intact,        Discharge Disposition: Home          Discharge Medications      New Medications      Instructions Start Date   docusate sodium 100 MG capsule  Commonly known as:  Colace   100 mg, Oral, 2 Times Daily      oxyCODONE 5 MG immediate release tablet  Commonly known as:  ROXICODONE   5 mg, Oral, Every 4 Hours PRN         Changes to Medications      Instructions Start Date   clopidogrel 75 MG tablet  Commonly known as:  PLAVIX  What changed:  additional instructions   75 mg, Oral, Daily, Resume 6/10/20      fluticasone 50 MCG/ACT nasal spray  Commonly known as:   "FLONASE  What changed:    · how much to take  · how to take this  · when to take this   instill 2 sprays into each nostril twice a day      rosuvastatin 20 MG tablet  Commonly known as:  CRESTOR  What changed:  when to take this   TAKE 1 TABLET BY MOUTH ONCE DAILY      triamcinolone 0.1 % cream  Commonly known as:  KENALOG  What changed:  how much to take   Topical, 2 Times Daily         Continue These Medications      Instructions Start Date   Acetaminophen 8 Hour 650 MG 8 hr tablet  Generic drug:  acetaminophen   650 mg, Oral, 3 Times Daily      AmLactin 12 % lotion  Generic drug:  ammonium lactate   1 application, Topical, As Needed      amLODIPine 5 MG tablet  Commonly known as:  NORVASC   5 mg, Oral, Daily      ascorbic acid 1000 MG tablet  Commonly known as:  VITAMIN C   1,000 mg, Oral, 2 Times Daily      aspirin 81 MG EC tablet   81 mg, Oral, Every Morning, TOLD NOT TO STOP BEFORE SURGERY      bisoprolol 10 MG tablet  Commonly known as:  ZEBeta   10 mg, Oral, Daily      CALCIUM + D PO   2 tablets, Oral, 2 Times Daily      citalopram 40 MG tablet  Commonly known as:  CeleXA   TAKE 1 TABLET BY MOUTH ONCE DAILY      Coenzyme Q10 200 MG capsule   400 mg, Oral, Daily      Fish Oil oil   1 capsule, Oral, Daily      Flaxseed Oil 1000 MG capsule   1 capsule, Oral, Daily      folic acid 1 MG tablet  Commonly known as:  FOLVITE   TAKE 1 TABLET BY MOUTH ONCE DAILY      gabapentin 100 MG capsule  Commonly known as:  NEURONTIN   300 mg, Oral, Every Evening      hydroCHLOROthiazide 12.5 MG tablet  Commonly known as:  HYDRODIURIL   12.5 mg, Oral, Daily      MSM PO   2 tablets, Oral, Daily      MULTIVITAMIN ADULT PO   1 tablet, Oral, Daily      olmesartan 40 MG tablet  Commonly known as:  BENICAR   TAKE 1 TABLET BY MOUTH EVERY DAY      omeprazole 20 MG capsule  Commonly known as:  priLOSEC   20 mg, Oral, Daily      URINARY HEALTH/CRANBERRY PO   1 tablet, Oral, 2 Times Daily, \"CRANACTIN\"         Stop These Medications  "   traMADol 50 MG tablet  Commonly known as:  ULTRAM            Discharge Diet:   Diet Instructions     Resume regular diet.               Activity at Discharge:   Activity Instructions     Wear sling at all times.               Follow-up Appointments  Dr. Lazcano per his orders  PCP 1 week- determin further O2 needs    Discharge took over 30 min    CYRUS Tellez  06/09/20  13:03     CC: Dr. Jannette Chapa    Electronically signed by Deya Navarrete APRN at 06/09/20 5984

## 2020-06-09 NOTE — OUTREACH NOTE
Prep Survey      Responses   [facility] patient discharged from?  [place]   Is LACE score < 7 ?  No   Eligibility  [facility]   Date of Admission  06/08/20   Date of Discharge  06/09/20   Discharge Disposition  Home or Self Care   Discharge diagnosis  Status post total replacement of left shoulder   COVID-19 Test Status  Negative   Does the patient have one of the following disease processes/diagnoses(primary or secondary)?  Total Joint Replacement   Does the patient have Home health ordered?  No [[facility] outpatient [facility]]   Is there a DME ordered?  Yes   What DME was ordered?  [employer] - O2   Prep survey completed?  Yes          Lorrie Hope RN

## 2020-06-09 NOTE — PLAN OF CARE
Problem: Patient Care Overview  Goal: Plan of Care Review  Outcome: Outcome(s) achieved  Flowsheets  Taken 6/9/2020 1149 by Anamaria Medley RN  Plan of Care Reviewed With: patient  Taken 6/9/2020 3627 by Volodymyr Barraza, RN  Outcome Summary: arrow @ 6ml/hr, voids well, ambulates to bathroom, 2L NC, pain managed with PRN medication, will continue to monitor for changes.  Note:   On room air now pain controlled will continue to monitor pt and ot bedside  vitals wnl site cdi

## 2020-06-09 NOTE — PROGRESS NOTES
Case Management Discharge Note      Final Note: Plan is home with [employer]. Friends will transport. Script for [employer] is on chart and has been faxed. Patient will arrange private caregivers for home. CM gave a list of companies that provide that service. Patient denies any other discharge needs.         Destination      No service has been selected for the patient.      Durable Medical Equipment      No service has been selected for the patient.      Dialysis/Infusion      No service has been selected for the patient.      Home Medical Care      No service has been selected for the patient.      Therapy - Selection Complete      Service Provider Request Status Selected Services Address Phone Number Fax Number    JOSEFINA ALFREDO [facility] [address] [place] [id number] [phone]      Community Resources      No service has been selected for the patient.             Final Discharge Disposition Code: 01 - home or self-care

## 2020-06-09 NOTE — THERAPY EVALUATION
Patient Name: Aarti Wade  : 1955    MRN: [id number]                              Today's Date: 2020       Admit Date: 2020    Visit Dx:     ICD-10-CM ICD-9-CM   1. Impaired mobility and ADLs Z74.09 V49.89    Z78.9    2. Status post total replacement of left shoulder Z96.612 V43.61   3. COPD mixed type (CMS/HCC) J44.9 496     Patient Active Problem List   Diagnosis   • Atherosclerosis of native artery of both lower extremities with intermittent claudication (CMS/HCC)   • Peripheral arterial disease (CMS/HCC)   • Benign essential hypertension   • Mixed hyperlipidemia   • Allergic rhinitis   • Gastroesophageal reflux disease without esophagitis   • Osteopenia of multiple sites   • Capsulitis   • Chronic low back pain   • COPD mixed type (CMS/HCC)   • Degenerative joint disease of shoulder region   • Depressive disorder   • Keratosis pilaris   • Subclavian steal syndrome   • Nocturnal leg cramps   • Atherosclerosis of native artery of left lower extremity with intermittent claudication (CMS/HCC)   • Postural dizziness with presyncope   • Hyperkalemia   • Bilateral hand pain   • Post-traumatic osteoarthritis of multiple joints   • Chronic kidney disease, stage 3, mod decreased GFR (CMS/HCC)   • Acute pain of left shoulder   • Status post total replacement of left shoulder   • Acute postoperative pain     Past Medical History:   Diagnosis Date   • Anxiety    • Arthritis    • Arthritis of right shoulder region    • Chronic UTI    • Circulatory disorder    • Claudication (CMS/HCC)    • COPD (chronic obstructive pulmonary disease) (CMS/HCC)    • DA (degenerative arthritis)    • Depression    • diffuse fatty liver infiltration on CT    • Gastrointestinal disorder    • GERD (gastroesophageal reflux disease)    • Head injury    • Hepatitis     UNKNOWN TYPE   • Hyperlipidemia    • Hypertension    • Inflammatory arthritis    • 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 HARRISON   • COLONOSCOPY      last 8 years ago.  Due to schedule   • EYE LENS IMPLANT SECONDARY Left    • FEMORAL FEMORAL BYPASS Right 8/22/2016    Procedure: RIGHT COMMON FEMORAL ENDARTERECTOMY WITH PATCH;  Surgeon: Severino Harrison MD;  Location: [facility] OR;  Service:    • FINGER SURGERY Left     LEFT INDEX FINGER   • INTERVENTIONAL RADIOLOGY PROCEDURE N/A 8/16/2016    Procedure: IR PTA femoral popliteal artery;  Surgeon: Severino Harrison MD;  Location: [facility] CATH INVASIVE LOCATION;  Service:    • INTERVENTIONAL RADIOLOGY PROCEDURE Left 1/7/2020    Procedure: LEFT ATHERECTOMY, BALLOON ANGIOPLASTY;  Surgeon: Severino Harrison MD;  Location: [facility] CATH INVASIVE LOCATION;  Service: Interventional Radiology   • TONSILLECTOMY     • TOTAL SHOULDER ARTHROPLASTY Left 6/8/2020    Procedure: TOTAL SHOULDER ARTHROPLASTY LEFT;  Surgeon: Ketan Lazcano MD;  Location: [facility] OR;  Service: Orthopedics;  Laterality: Left;  protector in: 1346  protector out: 1402     General Information     Row Name 06/09/20 1441          PT Evaluation Time/Intention    Document Type  therapy note (daily note)  -EJ     Mode of Treatment  physical therapy  -EJ     Row Name 06/09/20 1441          General Information    Patient Profile Reviewed?  yes  -EJ     Existing Precautions/Restrictions  fall;non-weight bearing;left;shoulder  -EJ     Row Name 06/09/20 1441          Relationship/Environment    Lives With  alone  -EJ     Row Name 06/09/20 1441          Resource/Environmental Concerns    Current Living Arrangements  home/apartment/condo  -EJ     Row Name 06/09/20 1441          Home Main  Entrance    Number of Stairs, Main Entrance  one  -EJ     Stair Railings, Main Entrance  none  -EJ     Row Name 06/09/20 1441          Cognitive Assessment/Intervention- PT/OT    Orientation Status (Cognition)  oriented x 4  -EJ     Row Name 06/09/20 1441          Safety Issues, Functional Mobility    Safety Issues Affecting Function (Mobility)  judgment;impulsivity;safety precautions follow-through/compliance;safety precaution awareness  -EJ     Impairments Affecting Function (Mobility)  sensation/sensory awareness  -EJ       User Key  (r) = Recorded By, (t) = Taken By, (c) = Cosigned By    Initials Name Provider Type    EJ Tere Murrell PT Physical Therapist        Mobility     Row Name 06/09/20 1443          Bed Mobility Assessment/Treatment    Bed Mobility Assessment/Treatment  supine-sit  -EJ     Supine-Sit [place] (Bed Mobility)  maximum assist (25% patient effort)  -EJ     Sit-Supine [place] (Bed Mobility)  contact guard;verbal cues  -EJ     Assistive Device (Bed Mobility)  head of bed elevated;bed rails  -EJ     Comment (Bed Mobility)  when PT arrived pt in very  awkward positionin in bed with LEs hanging off L EOB, head and L shouder in crook of bed where HOB elevated meets rest of bed. Pt required assist to get to sitting position. Able to scoot back with encouragement into chair after ambulation.  -EJ     Row Name 06/09/20 1443          Bed-Chair Transfer    Bed-Chair [place] (Transfers)  supervision;verbal cues  -EJ     Assistive Device (Bed-Chair Transfers)  walker, front-wheeled  -EJ     Row Name 06/09/20 1443          Sit-Stand Transfer    Sit-Stand [place] (Transfers)  supervision;verbal cues  -EJ     Row Name 06/09/20 1443          Gait/Stairs Assessment/Training    [place] Level (Gait)  contact guard  -EJ     Distance in Feet (Gait)  188  -EJ     Pattern (Gait)  step-through  -EJ     Comment (Gait/Stairs)  Pt ambulates with step through gait pattern, good pace, no  LOB. SpO2 noted to drop to 86% during gait, rebounded with cues for PLB during standing rest break x30 seconds long. SpO2 94% after pt returned to room and sat in chair.   -[facility] Name 06/09/20 1443          Mobility Assessment/Intervention    Extremity Weight-bearing Status  left upper extremity  -     Left Upper Extremity (Weight-bearing Status)  non weight-bearing (NWB)  -       User Key  (r) = Recorded By, (t) = Taken By, (c) = Cosigned By    Initials Name Provider Type     Tere Murrell PT Physical Therapist        Obj/Interventions     Row Name 06/09/20 1452          General ROM    GENERAL ROM COMMENTS  WNL BLE  -[facility] Name 06/09/20 1452          MMT (Manual Muscle Testing)    General MMT Comments  5/5 throughout BLEs.  -EJ     Row Name 06/09/20 1452          Sensory Assessment/Intervention    Sensory General Assessment  -- BLE sensation intact.   -       User Key  (r) = Recorded By, (t) = Taken By, (c) = Cosigned By    Initials Name Provider Type     Tere Murrell, PT Physical Therapist        Goals/Plan    No documentation.       Clinical Impression     Row Name 06/09/20 1452          Pain Assessment    Additional Documentation  Pain Scale: FACES Pre/Post-Treatment (Group)  -EJ     Row Name 06/09/20 1452          Pain Scale: Numbers Pre/Post-Treatment    Pain Location - Side  Left  -     Pain Location - Orientation  posterior  -EJ     Pain Location  shoulder  -     Pain Intervention(s)  Repositioned;Ambulation/increased activity  -EJ     Row Name 06/09/20 1452          Pain Scale: FACES Pre/Post-Treatment    Pain: FACES Scale, Pretreatment  6-->hurts even more  -     Pain: FACES Scale, Post-Treatment  6-->hurts even more  -[facility] Name 06/09/20 1452          Plan of Care Review    Plan of Care Reviewed With  patient  -     Outcome Summary  Pt ambulates 188 ft in renteria with CGA 2/2 pt c/o dizziness with ambulation, and SpO2 drop to 86% on room air. SpO2 rebounded to 90%  with cues for PLB with standing rest and 94 % on room air with short sitting rest. No LOB noted. Pt expected to d/c home with assist, HHPT, oxygen today.   -EJ     Row Name 06/09/20 1452          Vital Signs    Pre SpO2 (%)  95  -EJ     O2 Delivery Pre Treatment  supplemental O2  -EJ     Intra SpO2 (%)  86  -EJ     O2 Delivery Intra Treatment  room air  -EJ     Post SpO2 (%)  95  -EJ     O2 Delivery Post Treatment  supplemental O2  -EJ     Pre Patient Position  -- very poorly positioned, per report unable to reach call bell.  -EJ     Intra Patient Position  Standing  -EJ     Post Patient Position  Sitting  -EJ     Row Name 06/09/20 1452          Positioning and Restraints    Pre-Treatment Position  in bed  -EJ     Post Treatment Position  chair  -EJ     In Chair  call light within reach;encouraged to call for assist;notified nsg;reclined  -       User Key  (r) = Recorded By, (t) = Taken By, (c) = Cosigned By    Initials Name Provider Type    Tere Glasgow PT Physical Therapist        Outcome Measures     Row Name 06/09/20 1456          How much help from another person do you currently need...    Turning from your back to your side while in flat bed without using bedrails?  3  -EJ     Moving from lying on back to sitting on the side of a flat bed without bedrails?  3  -EJ     Moving to and from a bed to a chair (including a wheelchair)?  4  -EJ     Standing up from a chair using your arms (e.g., wheelchair, bedside chair)?  4  -EJ     Climbing 3-5 steps with a railing?  3  -EJ     To walk in hospital room?  3  -EJ     AM-PAC 6 Clicks Score (PT)  20  -     Row Name 06/09/20 1456          Functional Assessment    Outcome Measure Options  AM-PAC 6 Clicks Basic Mobility (PT)  -       User Key  (r) = Recorded By, (t) = Taken By, (c) = Cosigned By    Initials Name Provider Type    Tere Glasgow PT Physical Therapist        Physical Therapy Education                 Title: PT OT SLP Therapies (Done)      Topic: Physical Therapy (Done)     Point: Mobility training (Done)     Description:   Instruct learner(s) on safety and technique for assisting patient out of bed, chair or wheelchair.  Instruct in the proper use of assistive devices, such as walker, crutches, cane or brace.              Patient Friendly Description:   It's important to get you on your feet again, but we need to do so in a way that is safe for you. Falling has serious consequences, and your personal safety is the most important thing of all.        When it's time to get out of bed, one of us or a family member will sit next to you on the bed to give you support.     If your doctor or nurse tells you to use a walker, crutches, a cane, or a brace, be sure you use it every time you get out of bed, even if you think you don't need it.    Learning Progress Summary            Acceptance, E,TB, VU,NR by KALYAN at 6/9/2020 1456    Comment:  HHPT to continue tx.                   Point: Body mechanics (Done)     Description:   Instruct learner(s) on proper positioning and spine alignment for patient and/or caregiver during mobility tasks and/or exercises.              Learning Progress Summary            Acceptance, E,TB, VU,NR by  at 6/9/2020 1456    Comment:  HHPT to continue tx.                   Point: Precautions (Done)     Description:   Instruct learner(s) on prescribed precautions during mobility and gait tasks              Learning Progress Summary            Acceptance, E,TB, VU,NR by KALYAN at 6/9/2020 1456    Comment:  HHPT to continue tx.                               User Key     Initials Effective Dates Name Provider Type Discipline     11/20/18 -  Tere Murrell PT Physical Therapist PT              PT Recommendation and Plan     Plan of Care Reviewed With: patient  Outcome Summary: Pt ambulates 188 ft in renteria with CGA 2/2 pt c/o dizziness with ambulation, and SpO2 drop to 86% on room air. SpO2  rebounded to 90% with cues for PLB with standing rest and 94 % on room air with short sitting rest. No LOB noted. Pt expected to d/c home with assist, HHPT, oxygen today.      Time Calculation:   PT Charges     Row Name 06/09/20 1457             Time Calculation    Start Time  1415  -EJ      PT Received On  06/09/20  -EJ      PT Goal Re-Cert Due Date  06/19/20  -EJ         Time Calculation- PT    Total Timed Code Minutes- PT  0 minute(s)  -EJ        User Key  (r) = Recorded By, (t) = Taken By, (c) = Cosigned By    Initials Name Provider Type     Tere Murrell PT Physical Therapist        Therapy Charges for Today     Code Description Service Date Service Provider Modifiers Qty    28846043736 HC PT EVAL LOW COMPLEXITY 4 6/9/2020 Tere Murrell PT GP 1          PT G-Codes  Outcome Measure Options: AM-PAC 6 Clicks Basic Mobility (PT)  AM-PAC 6 Clicks Score (PT): 20  AM-PAC 6 Clicks Score (OT): 14    Tere Henson PT  6/9/2020

## 2020-06-09 NOTE — PLAN OF CARE
Problem: Patient Care Overview  Goal: Plan of Care Review  Flowsheets (Taken 6/9/2020 0427)  Progress: no change  Plan of Care Reviewed With: patient  Outcome Summary: arrow @ 6ml/hr, voids well, ambulates to bathroom, 2L NC, pain managed with PRN medication, will continue to monitor for changes.     Problem: Shoulder Arthroplasty (Adult)  Goal: Signs and Symptoms of Listed Potential Problems Will be Absent, Minimized or Managed (Shoulder Arthroplasty)  Flowsheets (Taken 6/9/2020 2517)  Problems Assessed (Shoulder Arthro): all  Problems Present (Shoulder Arthro): pain

## 2020-06-09 NOTE — ANESTHESIA PROCEDURE NOTES
Peripheral Block      Patient reassessed immediately prior to procedure    Patient location during procedure: pre-op  Start time: 6/9/2020 8:00 AM  Stop time: 6/9/2020 8:10 AM  Reason for block: at surgeon's request and post-op pain management  Performed by  CRNA: Michael Lafleur, CRNA  Assisted by: Bella Vivar RN  Preanesthetic Checklist  Completed: patient identified, site marked, surgical consent, pre-op evaluation, timeout performed, IV checked, risks and benefits discussed and monitors and equipment checked  Prep:  Pt Position: right lateral decubitus  Sterile barriers:cap, gloves, mask and sterile barriers  Prep: ChloraPrep  Patient monitoring: blood pressure monitoring, continuous pulse oximetry and EKG  Procedure  Sedation:no  Performed under: local infiltration  Guidance:ultrasound guided  Images:still images obtained, printed/placed on chart    Laterality:left  Block Type:interscalene  Injection Technique:catheter  Needle Type:Tuohy and echogenic  Needle Gauge:18 G  Resistance on Injection: none  Catheter Size:20 G (20g)  Cath Depth at skin: 9 cm    Medications Used: ropivacaine (NAROPIN) 0.2 % injection, 10 mL  Med admintered at 6/9/2020 8:10 AM      Medications  Preservative Free Saline:5ml    Post Assessment  Injection Assessment: negative aspiration for heme, no paresthesia on injection and incremental injection  Patient Tolerance:comfortable throughout block  Complications:no  Additional Notes  Procedure:                 The pt was placed in semifowlers position with a slight tilt of the thorax contralateral to the insertion site.  The Insertion Site was prepped and draped in sterile fashion.  The pt was anesthetized with  IV Sedation( see meds) and  Skin and cutaneous tissue was infiltrated and anesthetized with 1% Lidocaine 3 mls via a 25g needle.  Utilizing ultrasound guidance, a BBraun 2 inch 18 g Contiplex echogenic touhy needle was advanced in-plane.  Hydro dissection of tissue was  achieved with Normal saline. Major vessels(carotid and Internal Jugular) where visualized as the brachial plexus was approached at the approximate level of C-7/ T-1.  Cervical 5 and Branches of Cervical 6 nerve roots where visualized and the needle tip was placed posterior at the level of C-6 roots.  LA spread was visualized and injection was made incrementally every 5 mls with aspiration. Injection pressure was normal or little, there was no intraneural injection, no vascular injection.      The BBraun 20 g wire stylet  catheter was then placed under US guidance on the posterior aspect of the Brachial Plexus.  Location of catheter was confirmed with NS injection visualized with US . The tuohy was then removed and the skin was sealed with Skin AFix at catheter insertion site.  Skin was prepped with mastisol and the labeled catheter  was secured with steristrips and a CHG tegaderm. Thank You.

## 2020-06-09 NOTE — PROGRESS NOTES
Continued Stay Note  [facility]     Patient Name: Aarti Wade  MRN: [id number]  Today's Date: 6/9/2020    Admit Date: 6/8/2020    Discharge Plan     Row Name 06/09/20 1136       Plan    Plan  Home with [facility]    Patient/Family in Agreement with Plan  yes    Plan Comments  Home with [employer] and private caregivers. Daughter will transport. CM will continue to follow.    Final Discharge Disposition Code  01 - home or self-care        Discharge Codes    No documentation.       Expected Discharge Date and Time     Expected Discharge Date Expected Discharge Time    Jun 9, 2020             Akash Gasca RN

## 2020-06-09 NOTE — PLAN OF CARE
Problem: Patient Care Overview  Goal: Plan of Care Review  6/9/2020 1441 by Deya Pedro OT  Flowsheets  Taken 6/9/2020 1441 by Deya Pedro OT  Progress: improving  Outcome Summary: Interscalene infusing at 6, no c/o pain. Pt with poor recall of teaching content on sling, precautions and ADL retraining. OT reviewed all prior teaching. OT offered to call Dr. Lazcano's office and ask permission to doff body pillow from sling to increase of application, however pt states she desires to keep pillow attached. She also needs assist with HEP due to poor R shoulder ROM and states her friend can come by 2x/day to assist. OT requested friend come in for teaching, pt set up appointment for this in PM. Mild desat to 89% on RA. Planning for DC home with APURVA Capps and daily assist from friend following training with pt and friend.   Taken 6/9/2020 1149 by Anamaria Medley RN  Plan of Care Reviewed With: patient

## 2020-06-09 NOTE — PROGRESS NOTES
[facility]    Acute pain service Inpatient Progress Note    Patient Name: Aarti Wade  :  1955  MRN:  [id number]        Acute Pain  Service Inpatient Progress Note:    Analgesia:Fair  Pain Score:10  LOC: alert and awake  Resp Status: room air  Cardiac: VS stable  Comments: Patient seen on rounds this morning.  Patient received a single shot interscalene block yesterday for surgery due to her recent Plavix use.  I discussed her case with both Dr. Lazcano  and Dr. Severino Frausto this morning, we felt that she has been off of her Plavix now for 5 days and that would be reasonable to proceed with placing an interscalene catheter to manage her pain for the next 3 to 4 days.  I placed in her case interscalene catheter this morning, there was no bleeding or difficulties.  If she is able to have her neighbor stay with her we will send her home with the interscalene catheter.

## 2020-06-09 NOTE — DISCHARGE SUMMARY
Patient Name: Aarti Wade  MRN: [id number]  : 1955  DOS: 2020    Attending: Ketan Lazcano MD    Primary Care Provider: Jannette Chapa MD    Date of Admission:.2020  9:28 AM    Date of Discharge:  2020    Discharge Diagnosis:     Status post total replacement of left shoulder    Benign essential hypertension    Mixed hyperlipidemia    Atherosclerosis of native artery of left lower extremity with intermittent claudication (CMS/HCC)    Chronic kidney disease, stage 3, mod decreased GFR (CMS/HCC)    Acute postoperative pain      Hospital Course    At admit:  Patient is a pleasant 65 y.o. female presented for scheduled surgery by Dr. Lazcano.  She underwent left total shoulder arthroplasty under general anesthesia.  She tolerated surgery well and was admitted for further medical management.  Her shoulder has been painful since she had a fall at the end of December of last year.  She had limited range of motion and severe pain since.    After admit:  Patient was provided pain medications as needed for pain control.  Adjustments were made to pain medications to optimize postop pain management.   Risks and benefits of opiate medications discussed with patient.    The patient was seen by OT and was taught exercises for her left arm.  The patient used an IS for atelectasis prophylaxis and mechanicals for DVT prophylaxis.    Home medications were resumed as appropriate, and labs were monitored and remained fairly stable.     She did have decreased O2 saturations postop. She has baseline COPD. She will be arranged O2 for home and will follow-up with her PCP in a week to determine further O2 needs.    With the progress she has made, she is ready for DC home today.      Discussed with patient regarding plan and she shows understanding and agreement.        Procedures Performed  DATE OF OPERATION: 20  PREOPERATIVE DIAGNOSIS: Left shoulder degenerative joint disease with pain and limitation of  function and motion.    POSTOPERATIVE DIAGNOSES:  1. Left shoulder degenerative joint disease with pain and limitation of function and motion.    2. Biceps tenosynovitis.    PROCEDURES PERFORMED:  1. Left total shoulder arthroplasty.    2. Left biceps tenodesis.    SURGEON: Ketan Lazcano MD    Pertinent Test Results:    I reviewed the patient's new clinical results.   Results from last 7 days   Lab Units 20  0518 20  0938   WBC 10*3/mm3 10.28 6.52   HEMOGLOBIN g/dL 10.7* 12.5   HEMATOCRIT % 33.2* 39.1   PLATELETS 10*3/mm3 240 284     Results from last 7 days   Lab Units 20  0518 20  1031 20  1014   SODIUM mmol/L 135*  --  139   POTASSIUM mmol/L 4.4 5.0 5.8*   CHLORIDE mmol/L 101  --  103   CO2 mmol/L 21.0*  --  23.0   BUN mg/dL 23  --  43*   CREATININE mg/dL 0.71  --  1.03*   CALCIUM mg/dL 8.9  --  9.3   GLUCOSE mg/dL 138*  --  104*     I reviewed the patient's new imaging including images and reports.      Occupational Therapy: Interscalene infusing at 6, no c/o pain. Pt tolerated L shoulder PROM , IR chest/ER 30. Pt ambulated 50 feet with min assist, desaturation to 79% on RA and pt symptomatic. Pt reports h/o recent falls, recommend PT eval. Pt lives alone and does not have assist, desire home with 24/hr private care.      Discharge Assessment:    Vital Signs  Visit Vitals  /65   Pulse 84   Temp 98.3 °F (36.8 °C) (Oral)   Resp 16   SpO2 92%     Temp (24hrs), Av.9 °F (36.6 °C), Min:97.3 °F (36.3 °C), Max:98.5 °F (36.9 °C)      General Appearance:    Alert, cooperative, in no acute distress   Lungs:     Clear to auscultation,respirations regular, even and   unlabored    Heart:    Regular rhythm and normal rate, normal S1 and S2    Abdomen:     Normal bowel sounds, no masses, no organomegaly, soft        non-tender, non-distended, no guarding, no rebound                 tenderness   Extremities:   LUE in a sling, CDI dressing. Distal pulses, cap refill, movements of  fingers, wrist, intact.   Pulses:   Pulses palpable and equal bilaterally   Skin:   No bleeding, bruising or rash   Neurologic:   Cranial nerves 2 - 12 grossly intact, no gross motor deficit ( considering limited LUE due to surgery and sling)wy       Discharge Disposition: Home          Discharge Medications      New Medications      Instructions Start Date   docusate sodium 100 MG capsule  Commonly known as:  Colace   100 mg, Oral, 2 Times Daily      oxyCODONE 5 MG immediate release tablet  Commonly known as:  ROXICODONE   5 mg, Oral, Every 4 Hours PRN         Changes to Medications      Instructions Start Date   clopidogrel 75 MG tablet  Commonly known as:  PLAVIX  What changed:  additional instructions   75 mg, Oral, Daily, Resume 6/10/20      fluticasone 50 MCG/ACT nasal spray  Commonly known as:  FLONASE  What changed:    · how much to take  · how to take this  · when to take this   instill 2 sprays into each nostril twice a day      rosuvastatin 20 MG tablet  Commonly known as:  CRESTOR  What changed:  when to take this   TAKE 1 TABLET BY MOUTH ONCE DAILY      triamcinolone 0.1 % cream  Commonly known as:  KENALOG  What changed:  how much to take   Topical, 2 Times Daily         Continue These Medications      Instructions Start Date   Acetaminophen 8 Hour 650 MG 8 hr tablet  Generic drug:  acetaminophen   650 mg, Oral, 3 Times Daily      AmLactin 12 % lotion  Generic drug:  ammonium lactate   1 application, Topical, As Needed      amLODIPine 5 MG tablet  Commonly known as:  NORVASC   5 mg, Oral, Daily      ascorbic acid 1000 MG tablet  Commonly known as:  VITAMIN C   1,000 mg, Oral, 2 Times Daily      aspirin 81 MG EC tablet   81 mg, Oral, Every Morning, TOLD NOT TO STOP BEFORE SURGERY      bisoprolol 10 MG tablet  Commonly known as:  ZEBeta   10 mg, Oral, Daily      CALCIUM + D PO   2 tablets, Oral, 2 Times Daily      citalopram 40 MG tablet  Commonly known as:  CeleXA   TAKE 1 TABLET BY MOUTH ONCE DAILY     "  Coenzyme Q10 200 MG capsule   400 mg, Oral, Daily      Fish Oil oil   1 capsule, Oral, Daily      Flaxseed Oil 1000 MG capsule   1 capsule, Oral, Daily      folic acid 1 MG tablet  Commonly known as:  FOLVITE   TAKE 1 TABLET BY MOUTH ONCE DAILY      gabapentin 100 MG capsule  Commonly known as:  NEURONTIN   300 mg, Oral, Every Evening      hydroCHLOROthiazide 12.5 MG tablet  Commonly known as:  HYDRODIURIL   12.5 mg, Oral, Daily      MSM PO   2 tablets, Oral, Daily      MULTIVITAMIN ADULT PO   1 tablet, Oral, Daily      olmesartan 40 MG tablet  Commonly known as:  BENICAR   TAKE 1 TABLET BY MOUTH EVERY DAY      omeprazole 20 MG capsule  Commonly known as:  priLOSEC   20 mg, Oral, Daily      URINARY HEALTH/CRANBERRY PO   1 tablet, Oral, 2 Times Daily, \"CRANACTIN\"         Stop These Medications    traMADol 50 MG tablet  Commonly known as:  ULTRAM            Discharge Diet:   Diet Instructions     Resume regular diet.               Activity at Discharge:   Activity Instructions     Wear sling at all times.               Follow-up Appointments  Dr. Lazcano per his orders  PCP 1 week- determine further O2 needs    Discharge took over 30 min    CYRUS Tellez  06/09/20  13:03     I have personally performed the evaluation on this patient. My history is consistant  with above documentation . My exam finding are listed above. I have personally reviewed and discussed the above formulated discharge plan with patient and [email or web address].      CC: Dr. Jannette Chapa  "

## 2020-06-09 NOTE — THERAPY DISCHARGE NOTE
Acute Care - Occupational Therapy Treatment Note/Discharge  [facility]     Patient Name: Aarti Wade  : 1955  MRN: [id number]  Today's Date: 2020               Admit Date: 2020    Visit Dx:     ICD-10-CM ICD-9-CM   1. Impaired mobility and ADLs Z74.09 V49.89    Z78.9    2. Status post total replacement of left shoulder Z96.612 V43.61   3. COPD mixed type (CMS/HCC) J44.9 496     Patient Active Problem List   Diagnosis   • Atherosclerosis of native artery of both lower extremities with intermittent claudication (CMS/HCC)   • Peripheral arterial disease (CMS/HCC)   • Benign essential hypertension   • Mixed hyperlipidemia   • Allergic rhinitis   • Gastroesophageal reflux disease without esophagitis   • Osteopenia of multiple sites   • Capsulitis   • Chronic low back pain   • COPD mixed type (CMS/HCC)   • Degenerative joint disease of shoulder region   • Depressive disorder   • Keratosis pilaris   • Subclavian steal syndrome   • Nocturnal leg cramps   • Atherosclerosis of native artery of left lower extremity with intermittent claudication (CMS/HCC)   • Postural dizziness with presyncope   • Hyperkalemia   • Bilateral hand pain   • Post-traumatic osteoarthritis of multiple joints   • Chronic kidney disease, stage 3, mod decreased GFR (CMS/HCC)   • Acute pain of left shoulder   • Status post total replacement of left shoulder   • Acute postoperative pain       Therapy Treatment    Rehabilitation Treatment Summary     Row Name 20 1516 20 1034          Treatment Time/Intention    Discipline  occupational therapist  -AR  occupational therapist  -AR     Document Type  --  therapy note (daily note)  -AR     Subjective Information  --  no complaints  -AR     Existing Precautions/Restrictions  fall;non-weight bearing;left;shoulder;oxygen therapy device and L/min  -AR  fall;left;non-weight bearing;shoulder Kaylie, johannacalekaye, desat on RA  -AR     Treatment Considerations/Comments  pt seen for  caregiver training  -AR  interscalene placed this am   -AR     Recorded by [AR] Deya Pedro, OT 06/09/20 1551 [AR] Deya Pedro, OT 06/09/20 1438     Row Name 06/09/20 1516 06/09/20 1034          Vital Signs    Pre SpO2 (%)  92  -AR  94  -AR     O2 Delivery Pre Treatment  room air  -AR  supplemental O2  -AR     Intra SpO2 (%)  --  89  -AR     O2 Delivery Intra Treatment  --  room air  -AR     Post SpO2 (%)  94  -AR  93  -AR     O2 Delivery Post Treatment  room air  -AR  room air  -AR     Pre Patient Position  Sitting  -AR  Supine  -AR     Intra Patient Position  Sitting  -AR  Standing  -AR     Post Patient Position  Sitting  -AR  Sitting  -AR     Recorded by [AR] Deya Pedro, OT 06/09/20 1551 [AR] Deya Pedro, OT 06/09/20 1438     Row Name 06/09/20 1516 06/09/20 1034          Cognitive Assessment/Intervention- PT/OT    Affect/Mental Status (Cognitive)  WFL  -AR  WNL  -AR     Orientation Status (Cognition)  --  oriented x 4  -AR     Follows Commands (Cognition)  WFL  -AR  WNL  -AR     Attention Deficit (Cognitive)  mild deficit  -AR  mild deficit  -AR     Safety Deficit (Cognitive)  impulsivity;mild deficit  -AR  mild deficit  -AR     Recorded by [AR] Deya Pedro, OT 06/09/20 1551 [AR] Deya Pedro, OT 06/09/20 1438     Row Name 06/09/20 1516 06/09/20 1034          Safety Issues, Functional Mobility    Safety Issues Affecting Function (Mobility)  impulsivity  -AR  judgment;impulsivity;safety precaution awareness;safety precautions follow-through/compliance  -AR     Impairments Affecting Function (Mobility)  pain;sensation/sensory awareness  -AR  sensation/sensory awareness  -AR     Recorded by [AR] Deya Pedro, OT 06/09/20 1551 [AR] Deya Pedro, OT 06/09/20 1438     Row Name 06/09/20 1516 06/09/20 1034          Mobility Assessment/Intervention    Extremity Weight-bearing Status  left upper extremity  -AR  left upper extremity  -AR     Left Upper Extremity  (Weight-bearing Status)  non weight-bearing (NWB)  -AR  non weight-bearing (NWB)  -AR     Recorded by [AR] Deya Pedro, OT 06/09/20 1551 [AR] Deya Pedro, OT 06/09/20 1438     Row Name 06/09/20 1034             Bed Mobility Assessment/Treatment    Scooting/Bridging [place] (Bed Mobility)  supervision  -AR      Supine-Sit [place] (Bed Mobility)  minimum assist (75% patient effort);verbal cues  -AR      Comment (Bed Mobility)  Reviewed importance of maintaining NWB LUE LALY, reviewed safe sleeping position  -AR      Recorded by [AR] Deya Pedro, OT 06/09/20 1438      Row Name 06/09/20 1034             Functional Mobility    Functional Mobility- Ind. Level  independent  -AR      Functional Mobility-Distance (Feet)  100  -AR      Functional Mobility- Comment  Mild desat to 89%, no dyspnea  -AR      Recorded by [AR] Deya Pedro, OT 06/09/20 1438      Row Name 06/09/20 1034             Transfer Assessment/Treatment    Transfer Assessment/Treatment  sit-stand transfer;stand-sit transfer;bed-chair transfer;toilet transfer  -AR      Maintains Weight-bearing Status (Transfers)  able to maintain  -AR      Comment (Transfers)  Cues for attention of interscalene to avoid dislodgement  -AR      Recorded by [AR] Deya Pedro, OT 06/09/20 1438      Row Name 06/09/20 1034             Bed-Chair Transfer    Bed-Chair [place] (Transfers)  supervision;verbal cues  -AR      Recorded by [AR] Deya Pedro, OT 06/09/20 1438      Row Name 06/09/20 1034             Sit-Stand Transfer    Sit-Stand [place] (Transfers)  supervision;verbal cues  -AR      Recorded by [AR] Deya Pedro, OT 06/09/20 1438      Row Name 06/09/20 1034             Stand-Sit Transfer    Stand-Sit [place] (Transfers)  supervision;verbal cues  -AR      Recorded by [AR] Deya Pedro, OT 06/09/20 1438      Row Name 06/09/20 1034             Toilet Transfer    Type (Toilet Transfer)   sit-stand;stand-sit  -AR      [place] Level (Toilet Transfer)  supervision;verbal cues  -AR      Assistive Device (Toilet Transfer)  grab bars/safety frame;raised toilet seat  -AR      Recorded by [AR] Deya Pedro, OT 06/09/20 1438      Row Name 06/09/20 1516 06/09/20 1034          ADL Assessment/Intervention    04256 - OT Self Care/Mgmt Minutes  10  -AR  58  -AR     BADL Assessment/Intervention  upper body dressing  -AR  bathing;upper body dressing;lower body dressing;grooming;feeding;toileting  -AR2     Recorded by [AR] Deya Pedro, DREA 06/09/20 1551 [AR] Deya Pedro, DREA 06/09/20 1453  [AR2] Deya Pedro, OT 06/09/20 1438     Row Name 06/09/20 1034             Bathing Assessment/Intervention    Bathing [place] Level  bathing skills;upper body;maximum assist (25% patient effort)  -AR      Bathing Position  supported sitting  -AR      Comment (Bathing)  Educated pt on left shoulder precautions, ADL retraining to maintain, sling management and care of interscalene nerve catheter during ADLs to avoid dislodgement.   -AR      Recorded by [AR] Deya Pedro, DREA 06/09/20 1438      Row Name 06/09/20 1516 06/09/20 1034          Upper Body Dressing Assessment/Training    Upper Body Dressing [place] Level  doff;don;moderate assist (50% patient effort)  -AR  doff;don;front opening garment;pull-over garment;maximum assist (25% patient effort) mod assist don/doff sling  -AR     Assistive Devices (Upper Body Dressing)  one hand technique  -AR  one hand technique  -AR     Upper Body Dressing Position  supported sitting  -AR  supported sitting  -AR     Comment (Upper Body Dressing)  Pt and OT educated caregiver on sling application and correct fit. Pt limited with 10/10 during donning/doffing. Caregiver observed and declined trial.   -AR  Minimal recall of content taught on ADL retraining from evaluation, OT reviewed L shoulder precautions, ADL retraining to maintain, sling  management and care of interscalene nerve catheter during ADLs to avoid dislodgement. Post teaching, pt able to verbalize to OT with min cues correct sling application.   -AR     Recorded by [AR] Deya Pedro, OT 06/09/20 1551 [AR] Deya Pedro, OT 06/09/20 1438     Row Name 06/09/20 1034             Lower Body Dressing Assessment/Training    Lower Body Dressing [place] Level  don;pants/bottoms;undergarment;moderate assist (50% patient effort)  -AR      Lower Body Dressing Position  supported standing;supported sitting  -AR      Recorded by [AR] Deya Pedro, OT 06/09/20 1438      Row Name 06/09/20 1034             Grooming Assessment/Training    [place] Level (Grooming)  hair care, combing/brushing;maximum assist (25% patient effort);wash face, hands;supervision  -AR      Grooming Position  supported sitting;sink side  -AR      Recorded by [AR] Deya Pedro, OT 06/09/20 1438      Row Name 06/09/20 1034             Self-Feeding Assessment/Training    [place] Level (Feeding)  supervision;liquids to mouth  -AR      Position (Self-Feeding)  supine  -AR      Recorded by [AR] Deya Pedro, OT 06/09/20 1438      Row Name 06/09/20 1034             Toileting Assessment/Training    [place] Level (Toileting)  toileting skills;perform perineal hygiene;supervision;adjust/manage clothing;minimum assist (75% patient effort)  -AR      Assistive Devices (Toileting)  grab bar/safety frame;raised toilet seat  -AR      Toileting Position  supported standing;supported sitting  -AR      Recorded by [AR] Deya Pedro, OT 06/09/20 1438      Row Name 06/09/20 1034             BADL Safety/Performance    Impairments, BADL Safety/Performance  sensory awareness  -AR      Skilled BADL Treatment/Intervention  BADL process/adaptation training;compensatory training;kai/one-hand technique  -AR      Recorded by [AR] Deya Pedro, OT 06/09/20 1438      Row Name 06/09/20 1034              Motor Skills Assessment/Interventions    Additional Documentation  Balance (Group);Balance Interventions (Group);Fine Motor Testing & Training (Group);Therapeutic Exercise (Group);Therapeutic Exercise Interventions (Group)  -AR      Recorded by [AR] Deya Pedro, OT 06/09/20 1438      Row Name 06/09/20 1516 06/09/20 1034          Therapeutic Exercise    22629 - OT Therapeutic Exercise Minutes  10  -AR  20  -AR     93118 - OT Therapeutic Activity Minutes  --  8  -AR     Recorded by [AR] Deya Pedro, OT 06/09/20 1551 [AR] Deya Pedro, OT 06/09/20 1438     Row Name 06/09/20 1516 06/09/20 1034          Therapeutic Exercise    Upper Extremity Range of Motion (Therapeutic Exercise)  shoulder flexion/extension, left;shoulder internal/external rotation, left  -AR  shoulder flexion/extension, left;shoulder internal/external rotation, left;elbow flexion/extension, left;forearm supination/pronation, left;wrist flexion/extension, left scapular retractions- bilat  -AR     Hand (Therapeutic Exercise)  --  finger flexion/extension, left  -AR     Exercise Type (Therapeutic Exercise)  PROM (passive range of motion)  -AR  AROM (active range of motion);PROM (passive range of motion) SROM  -AR     Position (Therapeutic Exercise)  seated  -AR  seated  -AR     Sets/Reps (Therapeutic Exercise)  1/5  -AR  1/10  -AR     Comment (Therapeutic Exercise)  Educated pt and caregiver on L shoulder PROM. Caregiver declined trial d/t pt with uncontrolled pain. OT demo PROM on caregiver. OT asked pt is she would consider staying overnight d/t uncontrolled pain. Pt stating she desires to DC home ASAP to asleep.   -AR  Reviewed LUE HEP, including SROM yet deferred trial d/t residual numbness. Pt reports her friend will be coming over several times a day to assist with HEP and ADLS. OT requested friend come today for training. OT to f/u in PM for review of sling and HEP with friend.   -AR     Recorded by [AR] Deya Pedro  Akila, OT 06/09/20 1551 [AR] Deya Pedro, OT 06/09/20 1438     Row Name 06/09/20 1516 06/09/20 1034          Static Sitting Balance    Level of [place] (Unsupported Sitting, Static Balance)  independent  -AR  independent  -AR     Sitting Position (Unsupported Sitting, Static Balance)  --  sitting in chair  -AR     Recorded by [AR] Deya Pedro, OT 06/09/20 1551 [AR] Deya Pedro, OT 06/09/20 1438     Row Name 06/09/20 1516 06/09/20 1034          Static Standing Balance    Level of [place] (Supported Standing, Static Balance)  supervision  -AR  independent  -AR     Recorded by [AR] Deya Pedro, OT 06/09/20 1551 [AR] Deya Pedro, OT 06/09/20 1438     Row Name 06/09/20 1034             Fine Motor Testing & Training    Comment, Fine Motor Coordination  L opposition intact  -AR      Recorded by [AR] Deya Pedro, OT 06/09/20 1438      Row Name 06/09/20 1516 06/09/20 1034          Positioning and Restraints    Pre-Treatment Position  sitting in chair/recliner  -AR  in bed  -AR     Post Treatment Position  chair  -AR  bed  -AR     In Bed  --  notified nsg;supine;call light within reach;encouraged to call for assist;exit alarm on;with brace  -AR     In Chair  sitting;call light within reach;encouraged to call for assist;notified nsg;with brace;with family/caregiver  -AR  --     Recorded by [AR] Deya Pedro, OT 06/09/20 1551 [AR] Deya Pedro, OT 06/09/20 1438     Row Name 06/09/20 1034             Pain Assessment    Additional Documentation  Pain Scale: Numbers Pre/Post-Treatment (Group)  -AR      Recorded by [AR] Deya Pedro, OT 06/09/20 1438      Row Name 06/09/20 1516 06/09/20 1034          Pain Scale: Numbers Pre/Post-Treatment    Pain Scale: Numbers, Pretreatment  10/10  -AR  0/10 - no pain  -AR     Pain Scale: Numbers, Post-Treatment  10/10  -AR  0/10 - no pain  -AR     Pain Location - Side  Left  -AR  --     Pain Location - Orientation   generalized  -AR  --     Pain Location  shoulder  -AR  --     Pain Intervention(s)  Repositioned  -AR  --     Recorded by [AR] Deya Pedro, OT 06/09/20 1551 [AR] Deya Pedro, OT 06/09/20 1438     Row Name 06/09/20 1034             Light Touch Sensation Assessment    Left Upper Extremity: Light Touch Sensation Assessment  moderate impairment, 50 to 74% correct responses  -AR      Right Upper Extremity: Light Touch Sensation Assessment  intact  -AR      Recorded by [AR] Deya Pedro, OT 06/09/20 1438      Row Name 06/09/20 1034             Orthotic/Prosthetic Management    Orthosis Location  upper extremity orthosis  -AR      Recorded by [AR] Deya Pedro, OT 06/09/20 1438      Row Name 06/09/20 1516 06/09/20 1034          Upper Extremity Orthosis Management    Type (Upper Extremity Orthosis)  [place] ultra II sling  -AR  [place] Ultra II sling  -AR     Functional Design (Upper Extremity Orthosis)  static orthosis  -AR  static orthosis  -AR     Therapeutic Indications (Upper Extremity Orthosis)  post-op positioning/protection;stabilization and support  -AR  post-op positioning/protection;stabilization and support  -AR     Wearing Schedule (Upper Extremity Orthosis)  remove for exercise;remove for hygiene/bathing  -AR  remove for exercise;remove for hygiene/bathing  -AR     Orthosis Training (Upper Extremity Orthosis)  patient and caregiver;donning/doffing orthosis;activity limitations/precautions;orthosis adjustment;orthosis maintenance;purpose/goals of orthosis;sleeping precautions;wearing schedule;able to verbalize training  -AR  patient;all orthosis skills;activity limitations/precautions;cleaning/care of orthosis;donning/doffing orthosis;orthosis adjustment;orthosis maintenance;purpose/goals of orthosis;sensory precautions;skin inspection/precautions;sleeping precautions;wearing schedule;able to verbalize training;requires cues;requires assistance  -AR     Compliance/Wearing Issues  (Upper Extremity Orthosis)  patient/caregiver comprehend strategies  -AR  patient/caregiver comprehend strategies  -AR     Recorded by [AR] Deya Pedro OT 06/09/20 1551 [AR] Deya Pedro, OT 06/09/20 1438     Row Name                Wound 06/08/20 Left shoulder Incision    Wound - Properties Group Date first assessed: 06/08/20 [AC] Side: Left [AC] Location: shoulder [AC] Primary Wound Type: Incision [AC] Recorded by:  [AC] Lacey Hope RN 06/08/20 1328    Row Name 06/09/20 1516 06/09/20 1034          Plan of Care Review    Plan of Care Reviewed With  patient  -AR  patient  -AR     Progress  declining  -AR  --     Recorded by [AR] Deya Pedro OT 06/09/20 1551 [AR] Deya Pedro, OT 06/09/20 1438     Row Name 06/09/20 1516 06/09/20 1034          Outcome Summary/Treatment Plan (OT)    Daily Summary of Progress (OT)  prepare for discharge  -AR  progress toward functional goals as expected  -AR     Anticipated Discharge Disposition (OT)  home with 24/7 care;home with home health  -AR  home with assist;home with home health  -AR     Reason for Discharge (OT Discharge Summary)  patient discharged from this facility  -AR  --     Recorded by [AR] Deya Pedro, OT 06/09/20 1551 [AR] Deya Pedro, OT 06/09/20 1438       User Key  (r) = Recorded By, (t) = Taken By, (c) = Cosigned By    Initials Name Effective Dates Discipline    AR Deya Pedro, OT 06/22/15 -  OT    AC Lacey Hope RN 01/29/20 -  Nurse        Wound 06/08/20 Left shoulder Incision (Active)   Dressing Appearance dry;intact 6/9/2020  2:00 PM   Closure BRENDAN 6/9/2020  2:00 PM   Drainage Amount none 6/9/2020  2:00 PM       Rehab Goal Summary     Row Name 06/09/20 1516 06/09/20 1034          Transfer Goal 1 (OT)    Progress/Outcome (Transfer Goal 1, OT)  goal met  -AR  goal met  -AR        Dressing Goal 1 (OT)    Activity/Assistive Device (Dressing Goal 1, OT)  --  other (see comments) Pt and caregiver will don/doff  LUE sling  -AR     [place]/Cues Needed (Dressing Goal 1, OT)  --  verbal cues required;contact guard assist  -AR     Time Frame (Dressing Goal 1, OT)  --  short term goal (STG);5 days  -AR     Progress/Outcome (Dressing Goal 1, OT)  goal not met  -AR  goal revised this date;goal ongoing  -AR        ROM Goal 1 (OT)    ROM Goal 1 (OT)  --  Pt and caregiver will complete LUE HEP within physician parameters with supervision  -AR     Time Frame (ROM Goal 1, OT)  --  short term goal (STG);5 days  -AR     Progress/Outcome (ROM Goal 1, OT)  goal not met  -AR  goal revised this date;goal ongoing  -AR        Precaution Goal 1 (OT)    Progress/Outcome (Precaution Goal 1, OT)  goal met  -AR  goal met  -AR       User Key  (r) = Recorded By, (t) = Taken By, (c) = Cosigned By    Initials Name Provider Type Discipline    Deya Cavazos, OT Occupational Therapist OT          Occupational Therapy Education                 Title: PT OT SLP Therapies (Done)     Topic: Occupational Therapy (Done)     Point: ADL training (Done)     Description:   Instruct learner(s) on proper safety adaptation and remediation techniques during self care or transfers.   Instruct in proper use of assistive devices.              Learning Progress Summary           Patient Acceptance, E,D,H, VU by AR at 6/9/2020 1516    Eager, E,TB,D,H, VU,NR by AR at 6/9/2020 1034    Eager, E,TB,D,H, VU,NR by AR at 6/8/2020 1639   Caregiver Acceptance, E,D,H, VU by AR at 6/9/2020 1516                   Point: Home exercise program (Done)     Description:   Instruct learner(s) on appropriate technique for monitoring, assisting and/or progressing therapeutic exercises/activities.              Learning Progress Summary           Patient Acceptance, E,D,H, VU by AR at 6/9/2020 1516    Eager, E,TB,D,H, VU,NR by AR at 6/9/2020 1034    Eager, E,TB,D,H, VU,NR by AR at 6/8/2020 1639   Caregiver Acceptance, E,D,H, VU by AR at 6/9/2020 1516                   Point:  Precautions (Done)     Description:   Instruct learner(s) on prescribed precautions during self-care and functional transfers.              Learning Progress Summary           Patient Acceptance, E,D,H, VU by AR at 6/9/2020 1516    Eager, E,TB,D,H, VU,NR by AR at 6/9/2020 1034    Eager, E,TB,D,H, VU,NR by AR at 6/8/2020 1639   Caregiver Acceptance, E,D,H, VU by AR at 6/9/2020 1516                   Point: Body mechanics (Done)     Description:   Instruct learner(s) on proper positioning and spine alignment during self-care, functional mobility activities and/or exercises.              Learning Progress Summary           Patient Acceptance, E,D,H, VU by AR at 6/9/2020 1516    Eager, E,TB,D,H, VU,NR by AR at 6/9/2020 1034    Eager, E,TB,D,H, VU,NR by AR at 6/8/2020 1639   Caregiver Acceptance, E,D,H, VU by AR at 6/9/2020 1516                               User Key     Initials Effective Dates Name Provider Type Discipline    AR 06/22/15 -  Deya Pedro, OT Occupational Therapist OT                OT Recommendation and Plan  Outcome Summary/Treatment Plan (OT)  Daily Summary of Progress (OT): prepare for discharge  Anticipated Discharge Disposition (OT): home with 24/7 care, home with home health  Reason for Discharge (OT Discharge Summary): patient discharged from this facility  Therapy Frequency (OT Eval): daily  Daily Summary of Progress (OT): prepare for discharge  Plan of Care Review  Plan of Care Reviewed With: patient, caregiver  Plan of Care Reviewed With: patient, caregiver  Outcome Summary: Interscalene infusing at 6, post pain 10/10 left generalized shoulder. Eduated caregiver on sling application and LUE PROM, however d/t pt's uncontrolled pain, caregiver requested to observe and not trial. OT asked pt is she shoulde consider not DC home this evening d/t uncontrolled pain. Pt stated she wanted DC home as soon as possible because she needed to get rest, she states she will be able to tolerate pain at  home. Jefry to visit pt at home on 6/10. Requested pt and caregiver call with any questions.    Outcome Measures     Row Name 06/09/20 1516 06/09/20 1034 06/08/20 1639       How much help from another is currently needed...    Putting on and taking off regular lower body clothing?  2  -AR  2  -AR  2  -AR    Bathing (including washing, rinsing, and drying)  2  -AR  2  -AR  2  -AR    Toileting (which includes using toilet bed pan or urinal)  3  -AR  3  -AR  2  -AR    Putting on and taking off regular upper body clothing  2  -AR  2  -AR  2  -AR    Taking care of personal grooming (such as brushing teeth)  3  -AR  2  -AR  3  -AR    Eating meals  3  -AR  3  -AR  3  -AR    AM-PAC 6 Clicks Score (OT)  15  -AR  14  -AR  14  -AR       Functional Assessment    Outcome Measure Options  AM-PAC 6 Clicks Daily Activity (OT)  -AR  AM-PAC 6 Clicks Daily Activity (OT)  -AR  AM-PAC 6 Clicks Daily Activity (OT)  -AR      User Key  (r) = Recorded By, (t) = Taken By, (c) = Cosigned By    Initials Name Provider Type    Deya Cavazos, OT Occupational Therapist           Time Calculation:    Time Calculation- OT     Row Name 06/09/20 1516 06/09/20 1034          Time Calculation- OT    OT Start Time  1516  -AR  1034  -AR     Total Timed Code Minutes- OT  20 minute(s)  -AR  86 minute(s)  -AR     OT Received On  06/09/20  -AR  06/09/20  -AR     OT Goal Re-Cert Due Date  06/18/20  -AR  06/18/20  -AR        Timed Charges    05669 - OT Therapeutic Exercise Minutes  10  -AR  20  -AR     04682 - OT Therapeutic Activity Minutes  --  8  -AR     28231 - OT Self Care/Mgmt Minutes  10  -AR  58  -AR       User Key  (r) = Recorded By, (t) = Taken By, (c) = Cosigned By    Initials Name Provider Type    Deya Cavazos, OT Occupational Therapist        Therapy Suggested Charges     Code   Minutes Charges    03087 (CPT®) Hc Ot Neuromusc Re Education Ea 15 Min      95889 (CPT®) Hc Ot Ther Proc Ea 15 Min 10 1    42423 (CPT®) Hc Ot Therapeutic  Act Ea 15 Min      42683 (CPT®) Hc Ot Manual Therapy Ea 15 Min      50895 (CPT®) Hc Ot Iontophoresis Ea 15 Min      14626 (CPT®) Hc Ot Elec Stim Ea-Per 15 Min      83160 (CPT®) Hc Ot Ultrasound Ea 15 Min      09771 (CPT®) Hc Ot Self Care/Mgmt/Train Ea 15 Min 10     Total  20 1        Therapy Charges for Today     Code Description Service Date Service Provider Modifiers Qty    59327651727 HC OT THER PROC EA 15 MIN 6/8/2020 Deya Pedro, OT GO 1    86716589824 HC OT THERAPEUTIC ACT EA 15 MIN 6/8/2020 Deya Pedro, OT GO 1    46540861403 HC OT SELF CARE/MGMT/TRAIN EA 15 MIN 6/8/2020 Deya Pedro, OT GO 1    98625481597 HC OT THER SUPP EA 15 MIN 6/8/2020 Deya Pedro, OT GO 2    22670439249 HC OT EVAL MOD COMPLEXITY 3 6/8/2020 Deya Pedro, OT GO 1    45129692969 HC OT THER PROC EA 15 MIN 6/9/2020 Deya Pedro, OT GO 1    41315338696 HC OT THERAPEUTIC ACT EA 15 MIN 6/9/2020 Deya Pedro, OT GO 1    43738025349 HC OT SELF CARE/MGMT/TRAIN EA 15 MIN 6/9/2020 Deya Pedro, OT GO 4    42049482508 HC OT THER PROC EA 15 MIN 6/9/2020 Deya Pedro, OT GO 1               OT Discharge Summary  Anticipated Discharge Disposition (OT): home with 24/7 care, home with home health  Reason for Discharge: Discharge from facility  Outcomes Achieved: Patient able to partially acheive established goals  Discharge Destination: Home with home health, Home with assist    Deya Pedro OT  6/9/2020

## 2020-06-09 NOTE — THERAPY TREATMENT NOTE
Acute Care - Occupational Therapy Treatment Note  [facility]     Patient Name: Aarti Wade  : 1955  MRN: [id number]  Today's Date: 2020             Admit Date: 2020       ICD-10-CM ICD-9-CM   1. Impaired mobility and ADLs Z74.09 V49.89    Z78.9    2. Status post total replacement of left shoulder Z96.612 V43.61   3. COPD mixed type (CMS/HCC) J44.9 496     Patient Active Problem List   Diagnosis   • Atherosclerosis of native artery of both lower extremities with intermittent claudication (CMS/HCC)   • Peripheral arterial disease (CMS/HCC)   • Benign essential hypertension   • Mixed hyperlipidemia   • Allergic rhinitis   • Gastroesophageal reflux disease without esophagitis   • Osteopenia of multiple sites   • Capsulitis   • Chronic low back pain   • COPD mixed type (CMS/HCC)   • Degenerative joint disease of shoulder region   • Depressive disorder   • Keratosis pilaris   • Subclavian steal syndrome   • Nocturnal leg cramps   • Atherosclerosis of native artery of left lower extremity with intermittent claudication (CMS/HCC)   • Postural dizziness with presyncope   • Hyperkalemia   • Bilateral hand pain   • Post-traumatic osteoarthritis of multiple joints   • Chronic kidney disease, stage 3, mod decreased GFR (CMS/HCC)   • Acute pain of left shoulder   • Status post total replacement of left shoulder   • Acute postoperative pain     Past Medical History:   Diagnosis Date   • Anxiety    • Arthritis    • Arthritis of right shoulder region    • Chronic UTI    • Circulatory disorder    • Claudication (CMS/HCC)    • COPD (chronic obstructive pulmonary disease) (CMS/HCC)    • DA (degenerative arthritis)    • Depression    • diffuse fatty liver infiltration on CT    • Gastrointestinal disorder    • GERD (gastroesophageal reflux disease)    • Head injury    • Hepatitis     UNKNOWN TYPE   • Hyperlipidemia    • Hypertension    • Inflammatory arthritis    • 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 HARRISON   • COLONOSCOPY      last 8 years ago.  Due to schedule   • EYE LENS IMPLANT SECONDARY Left    • FEMORAL FEMORAL BYPASS Right 8/22/2016    Procedure: RIGHT COMMON FEMORAL ENDARTERECTOMY WITH PATCH;  Surgeon: Severino Harrison MD;  Location:  [facility];  Service:    • FINGER SURGERY Left     LEFT INDEX FINGER   • INTERVENTIONAL RADIOLOGY PROCEDURE N/A 8/16/2016    Procedure: IR PTA femoral popliteal artery;  Surgeon: Severino Harrison MD;  Location: [facility] CATH INVASIVE LOCATION;  Service:    • INTERVENTIONAL RADIOLOGY PROCEDURE Left 1/7/2020    Procedure: LEFT ATHERECTOMY, BALLOON ANGIOPLASTY;  Surgeon: Severino Harrison MD;  Location: [facility] CATH INVASIVE LOCATION;  Service: Interventional Radiology   • TONSILLECTOMY     • TOTAL SHOULDER ARTHROPLASTY Left 6/8/2020    Procedure: TOTAL SHOULDER ARTHROPLASTY LEFT;  Surgeon: Ketan Lazcano MD;  Location: [facility] OR;  Service: Orthopedics;  Laterality: Left;  protector in: 1346  protector out: 1402       Therapy Treatment    Rehabilitation Treatment Summary     Row Name 06/09/20 1034             Treatment Time/Intention    Discipline  occupational therapist  -AR      Document Type  therapy note (daily note)  -AR      Subjective Information  no complaints  -AR      Existing Precautions/Restrictions  fall;left;non-weight bearing;shoulder Kaylie, johannacalekaye, desat on RA  -AR      Treatment Considerations/Comments  interscalene placed this am   -AR      Recorded by [AR] Deya Pedro, DREA 06/09/20 1438      Row Name 06/09/20 1034             Vital Signs    Pre SpO2 (%)   94  -AR      O2 Delivery Pre Treatment  supplemental O2  -AR      Intra SpO2 (%)  89  -AR      O2 Delivery Intra Treatment  room air  -AR      Post SpO2 (%)  93  -AR      O2 Delivery Post Treatment  room air  -AR      Pre Patient Position  Supine  -AR      Intra Patient Position  Standing  -AR      Post Patient Position  Sitting  -AR      Recorded by [AR] Deya Pedro, OT 06/09/20 1438      Row Name 06/09/20 1034             Cognitive Assessment/Intervention- PT/OT    Affect/Mental Status (Cognitive)  WNL  -AR      Orientation Status (Cognition)  oriented x 4  -AR      Follows Commands (Cognition)  WNL  -AR      Attention Deficit (Cognitive)  mild deficit  -AR      Safety Deficit (Cognitive)  mild deficit  -AR      Recorded by [AR] Deya Pedro OT 06/09/20 1438      Row Name 06/09/20 1034             Safety Issues, Functional Mobility    Safety Issues Affecting Function (Mobility)  judgment;impulsivity;safety precaution awareness;safety precautions follow-through/compliance  -AR      Impairments Affecting Function (Mobility)  sensation/sensory awareness  -AR      Recorded by [AR] Deya Pedro OT 06/09/20 1438      Row Name 06/09/20 1034             Mobility Assessment/Intervention    Extremity Weight-bearing Status  left upper extremity  -AR      Left Upper Extremity (Weight-bearing Status)  non weight-bearing (NWB)  -AR      Recorded by [AR] Deya Pedro OT 06/09/20 1438      Row Name 06/09/20 1034             Bed Mobility Assessment/Treatment    Scooting/Bridging [place] (Bed Mobility)  supervision  -AR      Supine-Sit [place] (Bed Mobility)  minimum assist (75% patient effort);verbal cues  -AR      Comment (Bed Mobility)  Reviewed importance of maintaining NWB KEVIN RUFFIN, reviewed safe sleeping position  -AR      Recorded by [AR] Deya Pedro OT 06/09/20 1438      Row Name 06/09/20 1034             Functional Mobility    Functional Mobility- Ind. Level  independent   -AR      Functional Mobility-Distance (Feet)  100  -AR      Functional Mobility- Comment  Mild desat to 89%, no dyspnea  -AR      Recorded by [AR] Deya Pedro, OT 06/09/20 1438      Row Name 06/09/20 1034             Transfer Assessment/Treatment    Transfer Assessment/Treatment  sit-stand transfer;stand-sit transfer;bed-chair transfer;toilet transfer  -AR      Maintains Weight-bearing Status (Transfers)  able to maintain  -AR      Comment (Transfers)  Cues for attention of interscalene to avoid dislodgement  -AR      Recorded by [AR] Deya Pedro, OT 06/09/20 1438      Row Name 06/09/20 1034             Bed-Chair Transfer    Bed-Chair [place] (Transfers)  supervision;verbal cues  -AR      Recorded by [AR] Deya Pedro OT 06/09/20 1438      Row Name 06/09/20 1034             Sit-Stand Transfer    Sit-Stand [place] (Transfers)  supervision;verbal cues  -AR      Recorded by [AR] Deya Pedro OT 06/09/20 1438      Row Name 06/09/20 1034             Stand-Sit Transfer    Stand-Sit [place] (Transfers)  supervision;verbal cues  -AR      Recorded by [AR] Deya Pedro OT 06/09/20 1438      Row Name 06/09/20 1034             Toilet Transfer    Type (Toilet Transfer)  sit-stand;stand-sit  -AR      [place] Level (Toilet Transfer)  supervision;verbal cues  -AR      Assistive Device (Toilet Transfer)  grab bars/safety frame;raised toilet seat  -AR      Recorded by [AR] Deya Pedro OT 06/09/20 1438      Row Name 06/09/20 1034             ADL Assessment/Intervention    43122 - OT Self Care/Mgmt Minutes  58  -AR      BADL Assessment/Intervention  bathing;upper body dressing;lower body dressing;grooming;feeding;toileting  -AR2      Recorded by [AR] Deya Pedro OT 06/09/20 1453  [AR2] Deya Pedro OT 06/09/20 1438      Row Name 06/09/20 1034             Bathing Assessment/Intervention    Bathing [place] Level  bathing skills;upper body;maximum  assist (25% patient effort)  -AR      Bathing Position  supported sitting  -AR      Comment (Bathing)  Educated pt on left shoulder precautions, ADL retraining to maintain, sling management and care of interscalene nerve catheter during ADLs to avoid dislodgement.   -AR      Recorded by [AR] Deya Pedro, OT 06/09/20 1438      Row Name 06/09/20 1034             Upper Body Dressing Assessment/Training    Upper Body Dressing [place] Level  doff;don;front opening garment;pull-over garment;maximum assist (25% patient effort) mod assist don/doff sling  -AR      Assistive Devices (Upper Body Dressing)  one hand technique  -AR      Upper Body Dressing Position  supported sitting  -AR      Comment (Upper Body Dressing)  Minimal recall of content taught on ADL retraining from evaluation, OT reviewed L shoulder precautions, ADL retraining to maintain, sling management and care of interscalene nerve catheter during ADLs to avoid dislodgement. Post teaching, pt able to verbalize to OT with min cues correct sling application.   -AR      Recorded by [AR] Deya Pedro OT 06/09/20 1438      Row Name 06/09/20 1034             Lower Body Dressing Assessment/Training    Lower Body Dressing [place] Level  don;pants/bottoms;undergarment;moderate assist (50% patient effort)  -AR      Lower Body Dressing Position  supported standing;supported sitting  -AR      Recorded by [AR] Deya ePdro, OT 06/09/20 1438      Row Name 06/09/20 1034             Grooming Assessment/Training    [place] Level (Grooming)  hair care, combing/brushing;maximum assist (25% patient effort);wash face, hands;supervision  -AR      Grooming Position  supported sitting;sink side  -AR      Recorded by [AR] Deya Pedro OT 06/09/20 1438      Row Name 06/09/20 1034             Self-Feeding Assessment/Training    [place] Level (Feeding)  supervision;liquids to mouth  -AR      Position (Self-Feeding)  supine  -AR       Recorded by [AR] Deya Pedro, OT 06/09/20 1438      Row Name 06/09/20 1034             Toileting Assessment/Training    [place] Level (Toileting)  toileting skills;perform perineal hygiene;supervision;adjust/manage clothing;minimum assist (75% patient effort)  -AR      Assistive Devices (Toileting)  grab bar/safety frame;raised toilet seat  -AR      Toileting Position  supported standing;supported sitting  -AR      Recorded by [AR] Deya Pedro OT 06/09/20 1438      Row Name 06/09/20 1034             BADL Safety/Performance    Impairments, BADL Safety/Performance  sensory awareness  -AR      Skilled BADL Treatment/Intervention  BADL process/adaptation training;compensatory training;kai/one-hand technique  -AR      Recorded by [AR] Deya Pedro, OT 06/09/20 1438      Row Name 06/09/20 1034             Motor Skills Assessment/Interventions    Additional Documentation  Balance (Group);Balance Interventions (Group);Fine Motor Testing & Training (Group);Therapeutic Exercise (Group);Therapeutic Exercise Interventions (Group)  -AR      Recorded by [AR] Deya Pedro, OT 06/09/20 1438      Row Name 06/09/20 1034             Therapeutic Exercise    34232 - OT Therapeutic Exercise Minutes  20  -AR      53026 - OT Therapeutic Activity Minutes  8  -AR      Recorded by [AR] Deya Pedro, OT 06/09/20 1438      Row Name 06/09/20 1034             Therapeutic Exercise    Upper Extremity Range of Motion (Therapeutic Exercise)  shoulder flexion/extension, left;shoulder internal/external rotation, left;elbow flexion/extension, left;forearm supination/pronation, left;wrist flexion/extension, left scapular retractions- bilat  -AR      Hand (Therapeutic Exercise)  finger flexion/extension, left  -AR      Exercise Type (Therapeutic Exercise)  AROM (active range of motion);PROM (passive range of motion) SROM  -AR      Position (Therapeutic Exercise)  seated  -AR      Sets/Reps (Therapeutic Exercise)   1/10  -AR      Comment (Therapeutic Exercise)  Reviewed LUE HEP, including SROM yet deferred trial d/t residual numbness. Pt reports her friend will be coming over several times a day to assist with HEP and ADLS. OT requested friend come today for training. OT to f/u in PM for review of sling and HEP with friend.   -AR      Recorded by [AR] Deya Pedro, OT 06/09/20 1438      Row Name 06/09/20 1034             Static Sitting Balance    Level of [place] (Unsupported Sitting, Static Balance)  independent  -AR      Sitting Position (Unsupported Sitting, Static Balance)  sitting in chair  -AR      Recorded by [AR] Deya Pedro, OT 06/09/20 1438      Row Name 06/09/20 1034             Static Standing Balance    Level of [place] (Supported Standing, Static Balance)  independent  -AR      Recorded by [AR] Deya Pedro, OT 06/09/20 1438      Row Name 06/09/20 1034             Fine Motor Testing & Training    Comment, Fine Motor Coordination  L opposition intact  -AR      Recorded by [AR] Deya Pedro, OT 06/09/20 1438      Row Name 06/09/20 1034             Positioning and Restraints    Pre-Treatment Position  in bed  -AR      Post Treatment Position  bed  -AR      In Bed  notified nsg;supine;call light within reach;encouraged to call for assist;exit alarm on;with brace  -AR      Recorded by [AR] Deya Pedro, OT 06/09/20 1438      Row Name 06/09/20 1034             Pain Assessment    Additional Documentation  Pain Scale: Numbers Pre/Post-Treatment (Group)  -AR      Recorded by [AR] Deya Pedro, OT 06/09/20 1438      Row Name 06/09/20 1034             Pain Scale: Numbers Pre/Post-Treatment    Pain Scale: Numbers, Pretreatment  0/10 - no pain  -AR      Pain Scale: Numbers, Post-Treatment  0/10 - no pain  -AR      Recorded by [AR] Deya Pedro, OT 06/09/20 1438      Row Name 06/09/20 1034             Light Touch Sensation Assessment    Left Upper Extremity: Light  Touch Sensation Assessment  moderate impairment, 50 to 74% correct responses  -AR      Right Upper Extremity: Light Touch Sensation Assessment  intact  -AR      Recorded by [AR] Deya Pedro, DREA 06/09/20 1438      Row Name 06/09/20 1034             Orthotic/Prosthetic Management    Orthosis Location  upper extremity orthosis  -AR      Recorded by [AR] Deya Pedro OT 06/09/20 1438      Row Name 06/09/20 1034             Upper Extremity Orthosis Management    Type (Upper Extremity Orthosis)  Donjoy Ultra II sling  -AR      Functional Design (Upper Extremity Orthosis)  static orthosis  -AR      Therapeutic Indications (Upper Extremity Orthosis)  post-op positioning/protection;stabilization and support  -AR      Wearing Schedule (Upper Extremity Orthosis)  remove for exercise;remove for hygiene/bathing  -AR      Orthosis Training (Upper Extremity Orthosis)  patient;all orthosis skills;activity limitations/precautions;cleaning/care of orthosis;donning/doffing orthosis;orthosis adjustment;orthosis maintenance;purpose/goals of orthosis;sensory precautions;skin inspection/precautions;sleeping precautions;wearing schedule;able to verbalize training;requires cues;requires assistance  -AR      Compliance/Wearing Issues (Upper Extremity Orthosis)  patient/caregiver comprehend strategies  -AR      Recorded by [AR] Deya Pedro, OT 06/09/20 1438      Row Name                Wound 06/08/20 Left shoulder Incision    Wound - Properties Group Date first assessed: 06/08/20 [AC] Side: Left [AC] Location: shoulder [AC] Primary Wound Type: Incision [AC] Recorded by:  [AC] Lacey Hope RN 06/08/20 1328    Row Name 06/09/20 1034             Plan of Care Review    Plan of Care Reviewed With  patient  -AR      Recorded by [AR] Deya Pedro OT 06/09/20 1438      Row Name 06/09/20 1034             Outcome Summary/Treatment Plan (OT)    Daily Summary of Progress (OT)  progress toward functional goals as expected   -AR      Anticipated Discharge Disposition (OT)  home with assist;home with home health  -AR      Recorded by [AR] MayelaTitaDeyaelkin Wilburn, OT 06/09/20 1438        User Key  (r) = Recorded By, (t) = Taken By, (c) = Cosigned By    Initials Name Effective Dates Discipline    AR Mayela Deya Akila, OT 06/22/15 -  OT    Lacey Rivera RN 01/29/20 -  Nurse        Wound 06/08/20 Left shoulder Incision (Active)   Dressing Appearance dry;intact 6/9/2020  2:00 PM   Closure BRENDAN 6/9/2020  2:00 PM   Drainage Amount none 6/9/2020  2:00 PM     Rehab Goal Summary     Row Name 06/09/20 1034             Transfer Goal 1 (OT)    Progress/Outcome (Transfer Goal 1, OT)  goal met  -AR         Dressing Goal 1 (OT)    Activity/Assistive Device (Dressing Goal 1, OT)  other (see comments) Pt and caregiver will don/doff LUE sling  -AR      [place]/Cues Needed (Dressing Goal 1, OT)  verbal cues required;contact guard assist  -AR      Time Frame (Dressing Goal 1, OT)  short term goal (STG);5 days  -AR      Progress/Outcome (Dressing Goal 1, OT)  goal revised this date;goal ongoing  -AR         ROM Goal 1 (OT)    ROM Goal 1 (OT)  Pt and caregiver will complete LUE HEP within physician parameters with supervision  -AR      Time Frame (ROM Goal 1, OT)  short term goal (STG);5 days  -AR      Progress/Outcome (ROM Goal 1, OT)  goal revised this date;goal ongoing  -AR         Precaution Goal 1 (OT)    Progress/Outcome (Precaution Goal 1, OT)  goal met  -AR        User Key  (r) = Recorded By, (t) = Taken By, (c) = Cosigned By    Initials Name Provider Type Discipline    KAYLEE Pedro Deyaelkin Wilburn, OT Occupational Therapist OT        Occupational Therapy Education                 Title: PT OT SLP Therapies (Done)     Topic: Occupational Therapy (Done)     Point: ADL training (Done)     Description:   Instruct learner(s) on proper safety adaptation and remediation techniques during self care or transfers.   Instruct in proper use of assistive devices.               Learning Progress Summary           Patient Eager, E,TB,D,H, VU,NR by AR at 6/9/2020 1034    Eager, E,TB,D,H, VU,NR by AR at 6/8/2020 1639                   Point: Home exercise program (Done)     Description:   Instruct learner(s) on appropriate technique for monitoring, assisting and/or progressing therapeutic exercises/activities.              Learning Progress Summary           Patient Eager, E,TB,D,H, VU,NR by AR at 6/9/2020 1034    Eager, E,TB,D,H, VU,NR by AR at 6/8/2020 1639                   Point: Precautions (Done)     Description:   Instruct learner(s) on prescribed precautions during self-care and functional transfers.              Learning Progress Summary           Patient Eager, E,TB,D,H, VU,NR by AR at 6/9/2020 1034    Eager, E,TB,D,H, VU,NR by AR at 6/8/2020 1639                   Point: Body mechanics (Done)     Description:   Instruct learner(s) on proper positioning and spine alignment during self-care, functional mobility activities and/or exercises.              Learning Progress Summary           Patient Eager, E,TB,D,H, VU,NR by AR at 6/9/2020 1034    Eager, E,TB,D,H, VU,NR by AR at 6/8/2020 1639                               User Key     Initials Effective Dates Name Provider Type Discipline    AR 06/22/15 -  Deya Pedro, OT Occupational Therapist OT                OT Recommendation and Plan  Outcome Summary/Treatment Plan (OT)  Daily Summary of Progress (OT): progress toward functional goals as expected  Anticipated Discharge Disposition (OT): home with assist, home with home health  Therapy Frequency (OT Eval): daily  Daily Summary of Progress (OT): progress toward functional goals as expected  Plan of Care Review  Plan of Care Reviewed With: patient  Plan of Care Reviewed With: patient  Outcome Summary: Interscalene infusing at 6, no c/o pain. Pt with poor recall of teaching content on sling, precautions and ADL retraining. OT reviewed all prior teaching. OT offered to  call Dr. Lazcano's office and ask permission to doff sling from pillow to increase of application, however pt states she desires to keep pillow attached. She also needs assist with HEP, states her friend can come by 2x/day to assist. OT requested friend come in for teaching, OT will complete training with pt and friend at 3:00. Mild desat to 89% on RA. Planning for DC home with HH [facility] and daily assist from friend.  Outcome Measures     Row Name 06/09/20 1034 06/08/20 1639          How much help from another is currently needed...    Putting on and taking off regular lower body clothing?  2  -AR  2  -AR     Bathing (including washing, rinsing, and drying)  2  -AR  2  -AR     Toileting (which includes using toilet bed pan or urinal)  3  -AR  2  -AR     Putting on and taking off regular upper body clothing  2  -AR  2  -AR     Taking care of personal grooming (such as brushing teeth)  2  -AR  3  -AR     Eating meals  3  -AR  3  -AR     AM-PAC 6 Clicks Score (OT)  14  -AR  14  -AR        Functional Assessment    Outcome Measure Options  AM-PAC 6 Clicks Daily Activity (OT)  -AR  AM-PAC 6 Clicks Daily Activity (OT)  -AR       User Key  (r) = Recorded By, (t) = Taken By, (c) = Cosigned By    Initials Name Provider Type    Deya Cavazos OT Occupational Therapist           Time Calculation:   Time Calculation- OT     Row Name 06/09/20 1034             Time Calculation- OT    OT Start Time  1034  -AR      Total Timed Code Minutes- OT  86 minute(s)  -AR      OT Received On  06/09/20  -AR      OT Goal Re-Cert Due Date  06/18/20  -AR         Timed Charges    08326 - OT Therapeutic Exercise Minutes  20  -AR      58476 - OT Therapeutic Activity Minutes  8  -AR      55610 - OT Self Care/Mgmt Minutes  58  -AR        User Key  (r) = Recorded By, (t) = Taken By, (c) = Cosigned By    Initials Name Provider Type    Deya Cavazos OT Occupational Therapist        Therapy Charges for Today     Code Description Service  Date Service Provider Modifiers Qty    69097543374 HC OT THER PROC EA 15 MIN 6/8/2020 Deya Pedro, OT GO 1    26635510505 HC OT THERAPEUTIC ACT EA 15 MIN 6/8/2020 Deya Pedro, OT GO 1    84574653031 HC OT SELF CARE/MGMT/TRAIN EA 15 MIN 6/8/2020 Deya Pedro, OT GO 1    16528703234 HC OT THER SUPP EA 15 MIN 6/8/2020 Deya Pedro, OT GO 2    56812572435 HC OT EVAL MOD COMPLEXITY 3 6/8/2020 Deya Pedro, OT GO 1    35475359671 HC OT THER PROC EA 15 MIN 6/9/2020 Deya Pedro, OT GO 1    80543818888 HC OT THERAPEUTIC ACT EA 15 MIN 6/9/2020 Deya Pedro, OT GO 1    97806630506 HC OT SELF CARE/MGMT/TRAIN EA 15 MIN 6/9/2020 Deya Pedro, OT GO 4               Deya Pedro, OT  6/9/2020

## 2020-06-09 NOTE — PROGRESS NOTES
Case Management Discharge Note      Final Note: Plan is home with [facility] [employer]. Oxygen has been ordered and [employer] will deliver to the patient's room.         Destination      No service has been selected for the patient.      Durable Medical Equipment - Selection Complete      Service Provider Request Status Selected Services Address Phone Number Fax Number    [facility] Selected Durable Medical Equipment [address] [facility] [address] [phone]      Dialysis/Infusion      No service has been selected for the patient.      Home Medical Care      No service has been selected for the patient.      Therapy - Selection Complete      Service Provider Request Status Selected Services Address Phone Number Fax Number    [facility] Selected Outpatient Physical Therapy [address] [facility] [address] [phone]      Community Resources      No service has been selected for the patient.             Final Discharge Disposition Code: 01 - home or self-care

## 2020-06-09 NOTE — PLAN OF CARE
Problem: Patient Care Overview  Goal: Plan of Care Review  Flowsheets  Taken 6/9/2020 1441 by Deya Pedro OT  Progress: improving  Taken 6/9/2020 1452 by Tere Murrell PT  Plan of Care Reviewed With: patient  Outcome Summary: Pt ambulates 188 ft in renteria with CGA 2/2 pt c/o dizziness with ambulation, and SpO2 drop to 86% on room air. SpO2 rebounded to 90% with cues for PLB with standing rest and 94 % on room air with short sitting rest. No LOB noted. Pt expected to d/c home with assist, HHPT, oxygen today.

## 2020-06-09 NOTE — PROGRESS NOTES
Continued Stay Note  [facility]     Patient Name: Aarti Wade  MRN: [id number]  Today's Date: 6/9/2020    Admit Date: 6/8/2020    Discharge Plan     Row Name 06/09/20 1253       Plan    Plan  Home with [employer]    Patient/Family in Agreement with Plan  yes    Plan Comments  CM faxed script for [employer] Therapy Ice Machine to [employer] [phone]. CM will continue to follow.    Final Discharge Disposition Code  01 - home or self-care    Row Name 06/09/20 1136       Plan    Plan  Home with [employer]    Patient/Family in Agreement with Plan  yes    Plan Comments  Home with [employer] and private caregivers. Daughter will transport. CM will continue to follow.    Final Discharge Disposition Code  01 - home or self-care        Discharge Codes    No documentation.       Expected Discharge Date and Time     Expected Discharge Date Expected Discharge Time    Jun 9, 2020             Akash Gasca RN

## 2020-06-09 NOTE — PROGRESS NOTES
Orthopedic Daily Progress Note      CC: left shoulder pain    Pain well controlled; single shot block only yesterday which is wearing off  General: no fevers, chills  Abdomen: Denies nausea, vomiting, or diarrhea  Ambulated 50 feet with OT with minimal assistnace    No other complaints    Physical Exam:  I have reviewed the vital signs.  Temp:  [97.3 °F (36.3 °C)-99 °F (37.2 °C)] 98 °F (36.7 °C)  Heart Rate:  [59-88] 82  Resp:  [16-18] 16  BP: (112-149)/(50-98) 112/71    Objective  General Appearance:    Alert, cooperative, no distress  Extremities: No clubbing, cyanosis, or edema to lower extremities  Pulses:  2+ in distal surgical extremity  Skin: Dressing Clean/dry/intact      Results Review:    I have reviewed the labs, radiology results and diagnostic studies:    Left shoulder radiographs obtained and personally reviewed by me. Implants in appropriate position and alignment.     Results from last 7 days   Lab Units 06/05/20  0938   WBC 10*3/mm3 6.52   HEMOGLOBIN g/dL 12.5   PLATELETS 10*3/mm3 284     Results from last 7 days   Lab Units 06/08/20  1031 06/05/20  1014   SODIUM mmol/L  --  139   POTASSIUM mmol/L 5.0 5.8*   CO2 mmol/L  --  23.0   CREATININE mg/dL  --  1.03*   GLUCOSE mg/dL  --  104*       I have reviewed the medications.    Assessment/Problem List  POD# 1 S/p left TSA    Plan  PT/OT today  Pain control; Anesthesia to evaluate  Case management to see for discharge planning  Okay to restart ASA today, okay to restart Plavix POD#2  Sling at all times  Return to clinic in 1 week for a wound check  Okay to discharge from an orthopaedic standpoint      Floyd Prieto MD  06/09/20  06:27

## 2020-06-10 ENCOUNTER — TRANSITIONAL CARE MANAGEMENT TELEPHONE ENCOUNTER (OUTPATIENT)
Dept: CALL CENTER | Facility: HOSPITAL | Age: 65
End: 2020-06-10

## 2020-06-10 NOTE — OUTREACH NOTE
Call Center TCM Note      Responses   [facility] patient discharged from?  [place]   Does the patient have one of the following disease processes/diagnoses(primary or secondary)?  Total Joint Replacement   Joint surgery performed?  Shoulder   TCM attempt successful?  Yes   Discharge diagnosis  Status post total replacement of left shoulder   Does the patient have all medications related to this admission filled (includes all antibiotics, pain medications, etc.)  Yes   Is the patient taking all medications as directed (includes completed medication regime)?  Yes   Is the patient able to teach back alternate methods of pain control?  Ice, Correct alignment, Short, frequent activity, Shoulder-elevate above heart/ keep in sling as advised, Reposition   Does the patient have a follow up appointment with their surgeon?  No   What is preventing the patient from scheduling follow up appointments within 7 days of discharge?  Waiting on return call   Nursing Interventions  Educated patient on importance of making appointment   Has the patient kept scheduled appointments due by today?  Yes   Has home health visited the patient within 72 hours of discharge?  N/A   What DME was ordered?  [employer]   Has all DME been delivered?  Yes   Did the patient receive a copy of their discharge instructions?  Yes   Nursing interventions  Reviewed instructions with patient   What is the patient's perception of their functional status since discharge?  Improving   Is the patient able to teach back signs and symptoms of infection?  Temp >100.4 for 24h or longer, Blisters around incision, Severe discomfort or pain, Shortness of breath or chest pain, Changes in mobility, Increased swelling or redness around incision (not associated with surgical edema), Incisional drainage   Is the patient able to teach back how to prevent infection?  Check incision daily, Shower only as directed by surgeon, No lotion or creams, Monitor blood sugar  if diabetic, Wash hands before and after touching incision, Keep incision covered if drainage, Eat well-balanced diet, No tub baths, hot tub or swimming, Keep incision covered if pets in house   Is the patient able to teach back signs and symptoms of DVT?  Redness in calf, Swelling in calf, Area hot to touch, Severe pain in calf   Is the patient able to teach back home safety measures?  Accessibility to necessary areas in home, Modifications to reach items, Modifications with ADLs such as dressing, cooking, toileting, Ability to shower   Did the patient implement home safety suggestions from pre-surgery classes if attended?  N/A   Is the patient/caregiver able to teach back the hierarchy of who to call/visit for symptoms/problems? PCP, Specialist, Home health nurse, Urgent Care, ED, 911  Yes   TCM call completed?  Yes   Wrap up additional comments  Pt states pain level s/p TSR is manageable. Slept much better at home last night. Pt is not yet eating/drinking much but is encouraged to stay well hydrated. She does have help in home. Pt is waiing on call from surgeon office re: FWP, but does decline TCM with PCP at this time.           Anamaria Byrd MA    6/10/2020, 10:03

## 2020-06-10 NOTE — PROGRESS NOTES
Nerve Cath Post Op Call    Patient Name: Aarti Wade  :  1955  MRN:  [id number]  Date of Discharge:     Treatment Plan  Pt called, no answer but no option to leave a voice message. Will call back tomorrow.

## 2020-06-11 NOTE — PROGRESS NOTES
Nerve Cath Post Op Call    Patient Name: Aarti Wade  :  1955  MRN:  [id number]  Date of Discharge:     Nerve Cath Post Op Call:    Analgesia:Good  Pain Score:0/10  Side Effects:None  Catheter Site:clean  Patient Controlled ON Q pump infusion rate: 6ml/hr  Catheter Plan:Will continue with plan at home without changes and The patient was instructed to call ON CALL Anesthesia provider for any questions or problems  Patient/Family instructed to call ON CALL anesthesia provider for any questions or problems.  Patient Follow Up:

## 2020-06-12 NOTE — PROGRESS NOTES
Nerve Cath Post Op Call    Patient Name: Aarti Wade  :  1955  MRN:  [id number]  Date of Discharge:     Nerve Cath Post Op Call:    Analgesia:Good  Pain Score:3/10  Side Effects:None  Catheter Site:clean  Catheter Plan:Will continue with plan at home without changes and The patient was instructed to call ON CALL Anesthesia provider for any questions or problems  Patient/Family instructed to call ON CALL anesthesia provider for any questions or problems.  Patient Follow Up:  Patient provided the educational video about the nerve catheter and pain pump.  Video adequately prepared patient to manage pain pump at home.

## 2020-06-17 ENCOUNTER — READMISSION MANAGEMENT (OUTPATIENT)
Dept: CALL CENTER | Facility: HOSPITAL | Age: 65
End: 2020-06-17

## 2020-06-17 NOTE — OUTREACH NOTE
Total Joint Week 2 Survey      Responses   [facility] patient discharged from?  [place]   Does the patient have one of the following disease processes/diagnoses(primary or secondary)?  Total Joint Replacement   Joint surgery performed?  Shoulder   Week 2 attempt successful?  Yes   Call start time  1449   Call end time  1457   Has the patient been back in either the hospital or Emergency Department since discharge?  No   Discharge diagnosis  Status post total replacement of left shoulder   Medication alerts for this patient  Pt continues to take pain medication as needed.   Is the patient taking all medications as directed (includes completed medication regime)?  Yes   Comments regarding appointments  Pt saw surgeon yesterday.   Has the patient kept scheduled appointments due by today?  Yes   Has home health visited the patient within 72 hours of discharge?  N/A   Has the patient began therapy sessions (either in the home or as an out patient)?  Yes   Has the patient fallen since discharge?  No   What is the patient's perception of their functional status since discharge?  Improving   Additional teach back comments  Pt is doing better. She does live alone. HH nor therapy were approved per insurance. She is not happy about that.   Week 2 call completed?  Yes          Dalia Falk RN

## 2020-06-30 RX ORDER — FLUTICASONE PROPIONATE 50 MCG
SPRAY, SUSPENSION (ML) NASAL
Qty: 16 G | Refills: 5 | Status: SHIPPED | OUTPATIENT
Start: 2020-06-30 | End: 2020-12-15 | Stop reason: SDUPTHER

## 2020-07-01 ENCOUNTER — READMISSION MANAGEMENT (OUTPATIENT)
Dept: CALL CENTER | Facility: HOSPITAL | Age: 65
End: 2020-07-01

## 2020-07-01 NOTE — OUTREACH NOTE
Total Joint Month 1 Survey      Responses   [facility] patient discharged from?  [place]   Does the patient have one of the following disease processes/diagnoses(primary or secondary)?  Total Joint Replacement   Joint surgery performed?  Shoulder   Month 1 attempt successful?  Yes   Call start time  1342   Call end time  1346   Has the patient been back in either the hospital or Emergency Department since discharge?  No   Discharge diagnosis  Status post total replacement of left shoulder   Is patient permission given to speak with other caregiver?  No   Is the patient taking all medications as directed (includes completed medication regime)?  Yes   Has the patient kept scheduled appointments due by today?  Yes   Is the patient still receiving Home Health Services?  No   Is the patient still attending therapy sessions(either in the home or as an outpatient)?  No   Has the patient fallen since discharge?  No   What is the patient's perception of their functional status since discharge?  Improving   Month 1 call completed?  Yes   Wrap up additional comments  Pain rated 2 today.  She sees surgen later this month. She expects to start PT then.  She states she feels her insurance would have covered HH if someone would have pushed for it, she was disappointed at not getting that but a friend came and helped her.            Angi Masters, RN

## 2020-07-16 NOTE — PROGRESS NOTES
Subjective:     Encounter Date:07/17/2020    Patient ID: Aarti Wade is a 65 y.o. single white female, from [place], [place].  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      INTERNIST: Jannette Chapa MD  VASCULAR SURGEON: Severino Carbajal MD  UROLOGIST: Unknown (near [facility]?)  REMOTE ORTHOPEDIST: Lance Burgess MD  NEPHROLOGIST: MD Cathleen  ORTHOPEDIC SURGEON: Ketan Lazcano MD    Chief Complaint:   Chief Complaint   Patient presents with   • Pre-syncope       Problem List:  1. Peripheral arterial disease:  a. Subclavian steal syndrome, 2016   b. Abnormal right femoral arterial duplex and popliteal tibial arterial duplex, on ASA and Plavix, with right femoral endarterectomy August 2016 (Dr. Carbajal)  c. Residual class I claudication without recent TIA/presyncope  d. Carotid duplex 11/18/2019: LICA 0-49% with retrograde left vertebral artery flow demonstrated, R ICA 50-69%, proximal LICA plaque without significant stenosis  e. Renal artery duplex 12/11/2019: Parenchymal disease in bilateral arteries, no JAIMEE  f. Left atherectomy, balloon angioplasty 1/7/2020: Common femoral arteries patent with moderate stenosis.  The common iliac, external iliac and internal leg arteries patent without evidence of stenosis.  Mid common femoral artery exhibits an occlusive lesion.  The origin of the profundus femoris artery appears to be occluded.  The SFA is reconstituted at its origin via collateral vessels.  The SFA, popliteal artery, and trifurcation are all patent.  Dominant flow to the foot is via the posterior tibial artery with a patent peroneal and anterior tibial artery into the distal leg.  Left common femoral artery-SFA atherectomy with balloon angioplasty.  After atherectomy and drug-coated balloon angioplasty, the common femoral artery and SFA as well as the profunda femoris artery were patent  without evidence of hemodynamically significant stenosis  g. Residual class I claudication, January 2020, July 2020  2. Hypertensive cardiovascular disease:  a. Remote IV stress test over 10 years ago; negative per patient-data deficit  b. Residual class I symptoms/normal EKG with acceptable IV Lexiscan Cardiolite study suggestive of low probability for significant focal obstructive coronary artery disease  (LVEF 0.65), May 2017.  c. Echocardiogram 11/18/2019: Mild to moderate AR, mild TR, LA mildly dilated, LVEF 68%, mild calcification of the aortic valve mainly affecting the non-and left coronary cusps, LV diastolic function normal, normal RV cavity size, wall thickness, systolic function and septal motion noted.  Mild MAC is present.  Aortic valve exhibits moderate sclerosis without stenosis, no pulmonary hypertension, no pericardial effusion  d. Residual class I symptoms, January 2020, July 2020  3. Hypertension  4. Hyperlipidemia; 10.8% 10-year risk; 3.6% with treatment  5. COPD with mild-moderate exertional dyspnea  6. Former smoker; smoked 1 ppd x 40 years, quit in 2016  7. Syncopal episodes years ago associated with UTIs; last one a couple of years ago; data deficit  8. Depression  9. GERD  10. History of recurrent UTIs   11. Chronic low back pain  12. GABBY  13. Dizziness and presyncope January 2020  14. Surgical history:  a. Right femoral endarterectomy, August 2016  b. Lens implant, 1990s  c. Spontaneous pneumothorax, 1960  d. Left atherectomy and balloon angioplasty  e. Left total shoulder arthroplasty, June 2020    Allergies   Allergen Reactions   • Levocetirizine Dihydrochloride Myalgia     Muscle aches/joint pain       Current Outpatient Medications:   •  acetaminophen (ACETAMINOPHEN 8 HOUR) 650 MG 8 hr tablet, Take 650 mg by mouth 3 (Three) Times a Day., Disp: , Rfl:   •  AMLACTIN 12 % lotion, Apply 1 application topically to the appropriate area as directed As Needed for Irritation., Disp: , Rfl: 1  •   amLODIPine (NORVASC) 5 MG tablet, Take 1 tablet by mouth Daily., Disp: 90 tablet, Rfl: 1  •  ascorbic acid (VITAMIN C) 1000 MG tablet, Take 1,000 mg by mouth 2 (two) times a day., Disp: , Rfl:   •  aspirin 81 MG EC tablet, Take 81 mg by mouth every morning. TOLD NOT TO STOP BEFORE SURGERY, Disp: , Rfl:   •  bisoprolol (ZEBeta) 10 MG tablet, Take 1 tablet by mouth Daily., Disp: 90 tablet, Rfl: 1  •  Calcium Citrate-Vitamin D (CALCIUM + D PO), Take 2 tablets by mouth 2 (two) times a day., Disp: , Rfl:   •  citalopram (CeleXA) 40 MG tablet, TAKE 1 TABLET BY MOUTH ONCE DAILY (Patient taking differently: Take 40 mg by mouth Daily.), Disp: 90 tablet, Rfl: 1  •  clopidogrel (PLAVIX) 75 MG tablet, Take 1 tablet by mouth Daily. Resume 6/10/20, Disp: 90 tablet, Rfl: 1  •  Coenzyme Q10 200 MG capsule, Take 400 mg by mouth Daily., Disp: , Rfl:   •  docusate sodium (Colace) 100 MG capsule, Take 1 capsule by mouth 2 (Two) Times a Day. (Patient taking differently: Take 100 mg by mouth As Needed.), Disp: 60 capsule, Rfl: 0  •  Fish Oil oil, Take 1 capsule by mouth Daily., Disp: , Rfl:   •  Flaxseed, Linseed, (FLAXSEED OIL) 1000 MG capsule, Take 1 capsule by mouth daily., Disp: , Rfl:   •  fluticasone (FLONASE) 50 MCG/ACT nasal spray, INSTILL 2 SPRAYS INTO EACH NOSTRIL TWICE A DAY, Disp: 16 g, Rfl: 5  •  folic acid (FOLVITE) 1 MG tablet, TAKE 1 TABLET BY MOUTH ONCE DAILY (Patient taking differently: Take 1 mg by mouth Daily.), Disp: 90 tablet, Rfl: 1  •  gabapentin (NEURONTIN) 100 MG capsule, TAKE 3 CAPSULES BY MOUTH EVERY EVENING, Disp: 90 capsule, Rfl: 2  •  hydroCHLOROthiazide (HYDRODIURIL) 12.5 MG tablet, Take 1 tablet by mouth Daily., Disp: 90 tablet, Rfl: 3  •  Methylsulfonylmethane (MSM PO), Take 2 tablets by mouth daily., Disp: , Rfl:   •  Multiple Vitamins-Minerals (MULTIVITAMIN ADULT PO), Take 1 tablet by mouth daily., Disp: , Rfl:   •  olmesartan (BENICAR) 40 MG tablet, TAKE 1 TABLET BY MOUTH EVERY DAY (Patient taking  "differently: Take 40 mg by mouth Daily.), Disp: 90 tablet, Rfl: 2  •  omeprazole (priLOSEC) 20 MG capsule, Take 1 capsule by mouth Daily., Disp: 90 capsule, Rfl: 3  •  rosuvastatin (CRESTOR) 20 MG tablet, TAKE 1 TABLET BY MOUTH ONCE DAILY (Patient taking differently: Take 20 mg by mouth Every Night.), Disp: 90 tablet, Rfl: 2  •  traMADol (ULTRAM) 50 MG tablet, Take 50 mg by mouth 2 (two) times a day., Disp: , Rfl:   •  triamcinolone (KENALOG) 0.1 % cream, Apply  topically to the appropriate area as directed 2 (Two) Times a Day. (Patient taking differently: Apply 1 application topically to the appropriate area as directed 2 (Two) Times a Day.), Disp: 15 g, Rfl: 0  •  URINARY HEALTH/CRANBERRY PO, Take 1 tablet by mouth 2 (two) times a day. \"CRANACTIN\", Disp: , Rfl:     History of Present Illness: Patient returns for scheduled 6-month follow up. Since last visit, the patient had a left total shoulder arthroplasty on 06/08/2020.  The patient was vacuuming her floor and then she tripped on a sixpack and slipped on the floor because there was a slick spot and she fell and hit her head.  She had some mild disorientation briefly and states that she \"tore up my shoulder.\"  The patient did not have any complications with her surgery other than before she had surgery her potassium was 5 and she states that they almost did not do her surgery.  She had a recheck the next day and her potassium was back down to 4.4.  She denies any chest pain, increased shortness of breath, palpitations, dizziness, presyncope, or syncope.  She is requesting a Z-Dion because she has a lot of phlegm in the mornings and she does not feel that Mucinex works.  She states that ever since she had surgery she has noticed this and she attributes it to her past use of tobacco.  The patient states that she has not started rehab yet for her shoulder and she is hindered by her activity. Patient otherwise denies chest pain, shortness of breath, PND, edema, " "palpitations, syncope or presyncope at this time.        Review of Systems   Respiratory: Positive for cough and sputum production.    Musculoskeletal: Positive for joint pain and joint swelling.        Left shoulder replacement      Obtained and negative except as outlined in problem list and HPI.    Procedures       Objective:       Vitals:    07/17/20 1531 07/17/20 1535   BP: 124/50 118/52   BP Location: Right arm Right arm   Patient Position: Sitting Standing   Pulse: 50    Temp: 97.7 °F (36.5 °C)    SpO2: 95%    Weight: 59.4 kg (131 lb)    Height: 157.5 cm (62\")      Body mass index is 23.96 kg/m².  Last weight: 131 lbs    Physical Exam   Constitutional: She is oriented to person, place, and time. She appears well-developed and well-nourished.   Left sling immobilizer in place   Neck: No JVD present. Carotid bruit is not present. No thyromegaly present.   Cardiovascular: Regular rhythm, S1 normal and S2 normal. Exam reveals no gallop, no S3 and no friction rub.   Murmur heard.   Medium-pitched harsh early systolic murmur is present with a grade of 2/6 at the lower left sternal border.  Pulses:       Dorsalis pedis pulses are 1+ on the right side, and 1+ on the left side.        Posterior tibial pulses are 1+ on the right side, and 1+ on the left side.   Pulmonary/Chest: Effort normal. She has decreased breath sounds. She has no wheezes. She has no rhonchi. She has rales in the right lower field and the left lower field.   Abdominal: Soft. She exhibits no mass. There is no hepatosplenomegaly. There is no tenderness. There is no guarding.   Musculoskeletal: Normal range of motion. She exhibits no edema.   Lymphadenopathy:     She has no cervical adenopathy.   Neurological: She is alert and oriented to person, place, and time.   Skin: Skin is warm, dry and intact. No rash noted.   Vitals reviewed.        Lab Review:   Lab Results   Component Value Date    GLUCOSE 138 (H) 06/09/2020    BUN 23 06/09/2020    " CREATININE 0.71 06/09/2020    EGFRIFNONA 82 06/09/2020    BCR 32.4 (H) 06/09/2020    CO2 21.0 (L) 06/09/2020    CALCIUM 8.9 06/09/2020    ALBUMIN 4.50 01/31/2020    AST 23 01/31/2020    ALT 16 01/31/2020       Lab Results   Component Value Date    WBC 10.28 06/09/2020    HGB 10.7 (L) 06/09/2020    HCT 33.2 (L) 06/09/2020    MCV 92.7 06/09/2020     06/09/2020       Lab Results   Component Value Date    HGBA1C 5.50 06/05/2020       Lab Results   Component Value Date    TSH 1.770 07/19/2019       Lab Results   Component Value Date    CHOL 118 01/31/2020    CHOL 107 07/19/2019    CHOL 128 01/14/2019     Lab Results   Component Value Date    TRIG 56 01/31/2020    TRIG 66 07/19/2019    TRIG 95 01/14/2019     Lab Results   Component Value Date    HDL 54 01/31/2020    HDL 56 07/19/2019    HDL 56 01/14/2019     Lab Results   Component Value Date    LDL 53 01/31/2020    LDL 38 07/19/2019    LDL 53 01/14/2019     Event Monitor, 01/15/2020 (reviewed with patient by letter):  Heart rate ranged from 45 to 81 BPM with an average HR of 58 BPM. One diary entry that was normal sinus rhythm. No episodes of atrial fibrillation/ flutter, supraventricular tachycardia, heart block, pause, or ventricular tachycardia. No ventricular ectopy. 11,869 PAC's which were all noncomplex, averaging 41 beats per hour and totaling roughly 1.2% of all total beats.     Mammogram Screening, 03/04/2020: No findings suspicious for malignancy.     XR Chest, 06/05/2020: No acute cardiopulmonary process.    XR Left Shoulder, 06/08/2020: Status post total left shoulder arthroplasty with no hardware complication or malalignment identified of the prosthesis.        Assessment:       Overall continued acceptable course with no new interim cardiopulmonary complaints with acceptable functional status. We will defer additional diagnostic or therapeutic intervention from a cardiac perspective at this time.  The patient is recovering from her left shoulder  arthroplasty in June 2020.  The patient has had increased phlegm since surgery so we order her a Z-Dion.       Diagnosis Plan   1. Atherosclerosis of native artery of both lower extremities with intermittent claudication (CMS/HCC)  No recurrent angina pectoris or CHF on current activity schedule; continue current treatment   2. Peripheral arterial disease (CMS/HCC)  Stable   3. Benign essential hypertension  Controlled, continue current cardiac medications   4. Mixed hyperlipidemia  Excellent lipid panel January 2020          Plan:         1. Patient to continue current medications and close follow up with the above providers.  2. Tentative cardiology follow up in January 2021 or patient may return sooner PRN.  3. Z pack      Scribed for Floyd Ernandez MD by Cynthia Powell, APRN. 7/17/2020  16:05    I, Floyd Ernandez MD, [facility], personally performed the services described in this documentation as scribed by the above named individual in my presence, and it is both accurate and complete. At 4:06 PM on 07/17/2020

## 2020-07-17 ENCOUNTER — OFFICE VISIT (OUTPATIENT)
Dept: CARDIOLOGY | Facility: CLINIC | Age: 65
End: 2020-07-17

## 2020-07-17 VITALS
WEIGHT: 131 LBS | HEIGHT: 62 IN | TEMPERATURE: 97.7 F | OXYGEN SATURATION: 95 % | SYSTOLIC BLOOD PRESSURE: 118 MMHG | DIASTOLIC BLOOD PRESSURE: 52 MMHG | HEART RATE: 50 BPM | BODY MASS INDEX: 24.11 KG/M2

## 2020-07-17 DIAGNOSIS — I70.213 ATHEROSCLEROSIS OF NATIVE ARTERY OF BOTH LOWER EXTREMITIES WITH INTERMITTENT CLAUDICATION (HCC): Primary | Chronic | ICD-10-CM

## 2020-07-17 DIAGNOSIS — I73.9 PERIPHERAL ARTERIAL DISEASE (HCC): ICD-10-CM

## 2020-07-17 DIAGNOSIS — E78.2 MIXED HYPERLIPIDEMIA: ICD-10-CM

## 2020-07-17 DIAGNOSIS — I10 BENIGN ESSENTIAL HYPERTENSION: ICD-10-CM

## 2020-07-17 PROCEDURE — 99214 OFFICE O/P EST MOD 30 MIN: CPT | Performed by: INTERNAL MEDICINE

## 2020-07-17 RX ORDER — AZITHROMYCIN 250 MG/1
250 TABLET, FILM COATED ORAL DAILY
Qty: 6 TABLET | Refills: 0 | Status: SHIPPED | OUTPATIENT
Start: 2020-07-17 | End: 2020-09-30

## 2020-07-17 RX ORDER — TRAMADOL HYDROCHLORIDE 50 MG/1
50 TABLET ORAL 2 TIMES DAILY
COMMUNITY
End: 2020-12-15 | Stop reason: SDUPTHER

## 2020-07-29 DIAGNOSIS — M25.512 CHRONIC PAIN OF BOTH SHOULDERS: Primary | ICD-10-CM

## 2020-07-29 DIAGNOSIS — M25.511 CHRONIC PAIN OF BOTH SHOULDERS: Primary | ICD-10-CM

## 2020-07-29 DIAGNOSIS — G89.29 CHRONIC PAIN OF BOTH SHOULDERS: Primary | ICD-10-CM

## 2020-07-29 DIAGNOSIS — M15.3 POST-TRAUMATIC OSTEOARTHRITIS OF MULTIPLE JOINTS: ICD-10-CM

## 2020-07-29 RX ORDER — GABAPENTIN 100 MG/1
300 CAPSULE ORAL EVERY EVENING
Qty: 90 CAPSULE | Refills: 2 | Status: SHIPPED | OUTPATIENT
Start: 2020-07-29 | End: 2020-11-10 | Stop reason: SDUPTHER

## 2020-07-30 RX ORDER — BISOPROLOL FUMARATE 10 MG/1
10 TABLET, FILM COATED ORAL DAILY
Qty: 90 TABLET | Refills: 1 | Status: SHIPPED | OUTPATIENT
Start: 2020-07-30 | End: 2020-12-18 | Stop reason: SDUPTHER

## 2020-08-05 ENCOUNTER — READMISSION MANAGEMENT (OUTPATIENT)
Dept: CALL CENTER | Facility: HOSPITAL | Age: 65
End: 2020-08-05

## 2020-08-05 NOTE — OUTREACH NOTE
Total Joint Month 2 Survey      Responses   [facility] patient discharged from?  [place]   Does the patient have one of the following disease processes/diagnoses(primary or secondary)?  Total Joint Replacement   Joint surgery performed?  Shoulder   Month 2 attempt successful?  No   Unsuccessful attempts  Attempt 1          June Khan RN

## 2020-08-11 ENCOUNTER — READMISSION MANAGEMENT (OUTPATIENT)
Dept: CALL CENTER | Facility: HOSPITAL | Age: 65
End: 2020-08-11

## 2020-08-11 NOTE — OUTREACH NOTE
Total Joint Month 2 Survey      Responses   [facility] patient discharged from?  [place]   Does the patient have one of the following disease processes/diagnoses(primary or secondary)?  Total Joint Replacement   Joint surgery performed?  Shoulder   Month 2 attempt successful?  Yes   Call start time  1425   Call end time  1429   Has the patient been back in either the hospital or Emergency Department since discharge?  No   Discharge diagnosis  Status post total replacement of left shoulder   Is the patient taking all medications as directed (includes completed medication regime)?  Yes   Has the patient kept scheduled appointments due by today?  Yes   Comments  Patient has seen surgeon   Is the patient still receiving Home Health Services?  No   Is the patient still attending therapy sessions(either in the home or as an outpatient)?  Yes [Pt states she is doing PT 2x per week.]   Has the patient fallen since discharge?  No   If the patient has fallen, were there any injuries?  No   Comments  Pt reports she is having mild bleeding at incision site.  PT has assessed and patient states she is very dry.     Month 2 Call Completed?  Yes   Wrap up additional comments  Patient states overall she is doing very well.  Denies any needs during this call.          Laura Burnett RN

## 2020-08-27 RX ORDER — CELECOXIB 200 MG/1
CAPSULE ORAL
Qty: 90 CAPSULE | OUTPATIENT
Start: 2020-08-27

## 2020-09-15 ENCOUNTER — READMISSION MANAGEMENT (OUTPATIENT)
Dept: CALL CENTER | Facility: HOSPITAL | Age: 65
End: 2020-09-15

## 2020-09-15 NOTE — OUTREACH NOTE
Total Joint Month 3 Survey      Responses   [facility] patient discharged from?  [place]   Does the patient have one of the following disease processes/diagnoses(primary or secondary)?  Total Joint Replacement   Joint surgery performed?  Shoulder   Month 3 attempt successful?  No   Unsuccessful attempts  Attempt 1          Gabby Ng RN

## 2020-09-22 ENCOUNTER — READMISSION MANAGEMENT (OUTPATIENT)
Dept: CALL CENTER | Facility: HOSPITAL | Age: 65
End: 2020-09-22

## 2020-09-22 NOTE — OUTREACH NOTE
Total Joint Month 3 Survey      Responses   [facility] patient discharged from?  [place]   Does the patient have one of the following disease processes/diagnoses(primary or secondary)?  Total Joint Replacement   Joint surgery performed?  Shoulder   Month 3 attempt successful?  Yes   Call start time  0941   Call end time  0944   Has the patient been back in either the hospital or Emergency Department since discharge?  No   Discharge diagnosis  Status post total replacement of left shoulder   Is the patient taking all medications as directed (includes completed medication regime)?  Yes   Has the patient kept scheduled appointments due by today?  Yes   Is the patient still receiving Home Health Services?  N/A   Is the patient still attending therapy sessions(either in the home or as an outpatient)?  Yes   Has the patient fallen since discharge?  No   What is the patient's perception of their functional status since discharge?  Improving   Graduated  Yes   Did the patient feel the follow up calls were helpful during their recovery period?  Yes   Was the number of calls appropriate?  Yes   Wrap up additional comments  Pain 3 today.  Still getting PT.  This was harder than she thought it would be but finally doing better.  Takes tramadol twice a day.  She has started working with her horses again and getting back to her normal life.           Angi Masters, RN

## 2020-09-23 RX ORDER — CLOPIDOGREL BISULFATE 75 MG/1
TABLET ORAL
Qty: 90 TABLET | Refills: 1 | OUTPATIENT
Start: 2020-09-23

## 2020-09-23 NOTE — TELEPHONE ENCOUNTER
Caller: Aarti Wade    Relationship: Self    Best call back number: [phone]     Medication needed:   Requested Prescriptions     Pending Prescriptions Disp Refills   • clopidogrel (PLAVIX) 75 MG tablet [Pharmacy Med Name: CLOPIDOGREL 75MG TABLETS] 90 tablet 1     Sig: TAKE 1 TABLET BY MOUTH DAILY       When do you need the refill by: TODAY    What details did the patient provide when requesting the medication: N/A    Does the patient have less than a 3 day supply:  [] Yes  [x] No    What is the patient's preferred pharmacy: [id number] [address]  [facility] [phone] [facility] [phone] FX

## 2020-09-30 ENCOUNTER — OFFICE VISIT (OUTPATIENT)
Dept: INTERNAL MEDICINE | Facility: CLINIC | Age: 65
End: 2020-09-30

## 2020-09-30 VITALS
BODY MASS INDEX: 25.06 KG/M2 | WEIGHT: 136.2 LBS | HEART RATE: 60 BPM | DIASTOLIC BLOOD PRESSURE: 58 MMHG | TEMPERATURE: 98.2 F | SYSTOLIC BLOOD PRESSURE: 128 MMHG | HEIGHT: 62 IN

## 2020-09-30 DIAGNOSIS — M85.89 OSTEOPENIA OF MULTIPLE SITES: ICD-10-CM

## 2020-09-30 DIAGNOSIS — I70.213 ATHEROSCLEROSIS OF NATIVE ARTERY OF BOTH LOWER EXTREMITIES WITH INTERMITTENT CLAUDICATION (HCC): Chronic | ICD-10-CM

## 2020-09-30 DIAGNOSIS — M15.3 POST-TRAUMATIC OSTEOARTHRITIS OF MULTIPLE JOINTS: ICD-10-CM

## 2020-09-30 DIAGNOSIS — Z00.00 MEDICARE ANNUAL WELLNESS VISIT, SUBSEQUENT: Primary | ICD-10-CM

## 2020-09-30 DIAGNOSIS — N18.30 CHRONIC KIDNEY DISEASE, STAGE 3, MOD DECREASED GFR (HCC): Chronic | ICD-10-CM

## 2020-09-30 DIAGNOSIS — E78.2 MIXED HYPERLIPIDEMIA: ICD-10-CM

## 2020-09-30 DIAGNOSIS — I10 BENIGN ESSENTIAL HYPERTENSION: ICD-10-CM

## 2020-09-30 DIAGNOSIS — R05.9 COUGH: ICD-10-CM

## 2020-09-30 DIAGNOSIS — Z00.00 ANNUAL PHYSICAL EXAM: ICD-10-CM

## 2020-09-30 DIAGNOSIS — G89.18 ACUTE POSTOPERATIVE PAIN: ICD-10-CM

## 2020-09-30 DIAGNOSIS — N95.9 MENOPAUSAL AND POSTMENOPAUSAL DISORDER: ICD-10-CM

## 2020-09-30 DIAGNOSIS — K21.9 GASTROESOPHAGEAL REFLUX DISEASE WITHOUT ESOPHAGITIS: ICD-10-CM

## 2020-09-30 PROCEDURE — 90694 VACC AIIV4 NO PRSRV 0.5ML IM: CPT | Performed by: INTERNAL MEDICINE

## 2020-09-30 PROCEDURE — 93000 ELECTROCARDIOGRAM COMPLETE: CPT | Performed by: INTERNAL MEDICINE

## 2020-09-30 PROCEDURE — 90471 IMMUNIZATION ADMIN: CPT | Performed by: INTERNAL MEDICINE

## 2020-09-30 PROCEDURE — 99397 PER PM REEVAL EST PAT 65+ YR: CPT | Performed by: INTERNAL MEDICINE

## 2020-09-30 PROCEDURE — G0439 PPPS, SUBSEQ VISIT: HCPCS | Performed by: INTERNAL MEDICINE

## 2020-09-30 NOTE — PROGRESS NOTES
QUICK REFERENCE INFORMATION:  The ABCs of the Annual Wellness Visit    Subsequent Medicare Wellness Visit    HEALTH RISK ASSESSMENT    1955    Recent Hospitalizations:  Recently treated at the following:  [facility].  2020 right shoulder surgery at [facility].  By Dr. Lazcano        Current Medical Providers:  Patient Care Team:  Jannette Chapa MD as PCP - General  Jannette Chapa MD as PCP - Family Medicine  Severino Carbajal MD as Consulting Physician (Vascular Surgery)  Ketan Lazcano MD as Consulting Physician (Orthopedic Surgery)  Floyd Ernandez MD as Consulting Physician (Cardiology)  Lorenzo Good MD as Consulting Physician (Ophthalmology)        Smoking Status:  Social History     Tobacco Use   Smoking Status Former Smoker   • Packs/day: 1.00   • Years: 40.00   • Pack years: 40.00   • Types: Electronic Cigarette, Cigarettes   • Quit date: 2016   • Years since quittin.2   Smokeless Tobacco Never Used   Tobacco Comment    1 PACK/DAY SMOKER UNTIL 2016       Alcohol Consumption:  Social History     Substance and Sexual Activity   Alcohol Use Yes   • Alcohol/week: 2.0 standard drinks   • Types: 2 Shots of liquor per week    Comment: 2 DRINKS PER DAY       Depression Screen:   PHQ-2/PHQ-9 Depression Screening 2020   Little interest or pleasure in doing things 0   Feeling down, depressed, or hopeless 0   Total Score 0       Health Habits and Functional and Cognitive Screening:  No flowsheet data found.    Fall Risk Screen:  STEADI Fall Risk Assessment was completed, and patient is at MODERATE risk for falls. Assessment completed on:2020    ACE III MINI        Does the patient have evidence of cognitive impairment? No    Aspirin use counseling: Does not need ASA (and currently is not on it)    Recent Lab Results:  CMP:  Lab Results   Component Value Date    BUN 23 2020    CREATININE 0.71 2020    EGFRIFNONA 82 2020    BCR 32.4 (H)  06/09/2020     (L) 06/09/2020    K 4.4 06/09/2020    CO2 21.0 (L) 06/09/2020    CALCIUM 8.9 06/09/2020    ALBUMIN 4.50 01/31/2020    BILITOT 0.3 01/31/2020    ALKPHOS 45 01/31/2020    AST 23 01/31/2020    ALT 16 01/31/2020     HbA1c:  Lab Results   Component Value Date    HGBA1C 5.50 06/05/2020    HGBA1C 5.50 01/03/2020     Microalbumin:  Lab Results   Component Value Date    MICROALBUR 11.4 07/19/2019     Lipid Panel  Lab Results   Component Value Date    CHOL 118 01/31/2020    TRIG 56 01/31/2020    HDL 54 01/31/2020    LDL 53 01/31/2020    AST 23 01/31/2020    ALT 16 01/31/2020       Visual Acuity:  No exam data present    Age-appropriate Screening Schedule:  Refer to the list below for future screening recommendations based on patient's age, sex and/or medical conditions. Orders for these recommended tests are listed in the plan section. The patient has been provided with a written plan.    Age appropriate preventive counseling done including age appropriate vaccines,regular  Mammogram and self breast exam, pap smear, colonoscopy, regular dental visits, mental health, injury prevention such as wearing seat belt and preventing falls, healthy  nutrition, healthy weight, regular physical exercise. Alcohol use is moderate.  Tobacco history-none. Drug use-none.  STD's-not at risk.          Health Maintenance   Topic Date Due   • COLONOSCOPY  05/12/2014   • ZOSTER VACCINE (2 of 3) 11/29/2017   • DXA SCAN  10/30/2019   • INFLUENZA VACCINE  08/01/2020   • LIPID PANEL  01/31/2021   • MAMMOGRAM  03/04/2022   • TDAP/TD VACCINES (2 - Td) 08/05/2025        Subjective   History of Present Illness    Aarti Wade is a 65 y.o. female who presents for a Subsequent Wellness Visit and cough since June shoulder surgery, and follow-up chronic conditions including arthritis, blood pressures,Peripheral vascular disease, hyperlipidemia,.    HPI  Cough since shoulder surgery in June.  She denies any sinus congestion or drainage.   She feels she needs to clear sputum from her lungs.  She does not actually get out any.  She denies shortness of breath or wheeze.  No fever or chills.  Dr. Ernandez did give her a Z-Dion in June and she felt that helped some at first, but then the symptoms were still there when she finished it.  Cough is worse in the mornings.  She had not had a morning cough since quitting smoking in 2016.  Her chest x-ray before the June surgery was clear.  She has been on bronchodilators in the past when she was still smoking.  It seemed to help then.    CHRONIC CONDITIONS    BPs CONTROLLED ON MEDS.    aFTER L SHOUlder surgery doing well.  Tramadol and Tylenol helps some with pain.  She is doing physical therapy.    For hyperlipidemia, taking rosuvastatin every evening.  Trying to eat less fats and sugars.  She has just resumed taking care of her horses at the barn since her shoulder surgery.  Increasing physical activity.    For osteopenia, she does do weightbearing exercises working at the barn and walking and on her feet a lot.  Taking calcium and vitamin D.    The following portions of the patient's history were reviewed and updated as appropriate: allergies, current medications, past family history, past medical history, past social history, past surgical history and problem list.    Outpatient Medications Prior to Visit   Medication Sig Dispense Refill   • acetaminophen (ACETAMINOPHEN 8 HOUR) 650 MG 8 hr tablet Take 650 mg by mouth 3 (Three) Times a Day. Twice a day     • AMLACTIN 12 % lotion Apply 1 application topically to the appropriate area as directed As Needed for Irritation.  1   • amLODIPine (NORVASC) 5 MG tablet Take 1 tablet by mouth Daily. 90 tablet 1   • ascorbic acid (VITAMIN C) 1000 MG tablet Take 1,000 mg by mouth 2 (two) times a day.     • aspirin 81 MG EC tablet Take 81 mg by mouth every morning. TOLD NOT TO STOP BEFORE SURGERY     • bisoprolol (ZEBeta) 10 MG tablet TAKE 1 TABLET BY MOUTH DAILY 90 tablet 1    • Calcium Citrate-Vitamin D (CALCIUM + D PO) Take 2 tablets by mouth 2 (two) times a day.     • citalopram (CeleXA) 40 MG tablet TAKE 1 TABLET BY MOUTH ONCE DAILY (Patient taking differently: Take 40 mg by mouth Daily.) 90 tablet 1   • clopidogrel (PLAVIX) 75 MG tablet Take 1 tablet by mouth Daily. Resume 6/10/20 90 tablet 1   • Coenzyme Q10 200 MG capsule Take 400 mg by mouth Daily.     • Fish Oil oil Take 1 capsule by mouth Daily.     • Flaxseed, Linseed, (FLAXSEED OIL) 1000 MG capsule Take 1 capsule by mouth daily.     • fluticasone (FLONASE) 50 MCG/ACT nasal spray INSTILL 2 SPRAYS INTO EACH NOSTRIL TWICE A DAY 16 g 5   • folic acid (FOLVITE) 1 MG tablet TAKE 1 TABLET BY MOUTH ONCE DAILY (Patient taking differently: Take 1 mg by mouth Daily.) 90 tablet 1   • gabapentin (NEURONTIN) 100 MG capsule Take 3 capsules by mouth Every Evening. 90 capsule 2   • hydroCHLOROthiazide (HYDRODIURIL) 12.5 MG tablet Take 1 tablet by mouth Daily. 90 tablet 3   • Methylsulfonylmethane (MSM PO) Take 2 tablets by mouth daily.     • Multiple Vitamins-Minerals (MULTIVITAMIN ADULT PO) Take 1 tablet by mouth daily.     • Nutritional Supplements (NUTRITIONAL SUPPLEMENT PO) Take  by mouth. NeoCell   3 tsp once a day     • olmesartan (BENICAR) 40 MG tablet TAKE 1 TABLET BY MOUTH EVERY DAY (Patient taking differently: Take 40 mg by mouth Daily.) 90 tablet 2   • omeprazole (priLOSEC) 20 MG capsule Take 1 capsule by mouth Daily. 90 capsule 3   • rosuvastatin (CRESTOR) 20 MG tablet TAKE 1 TABLET BY MOUTH ONCE DAILY (Patient taking differently: Take 20 mg by mouth Every Night.) 90 tablet 2   • traMADol (ULTRAM) 50 MG tablet Take 50 mg by mouth 2 (two) times a day.     • triamcinolone (KENALOG) 0.1 % cream Apply  topically to the appropriate area as directed 2 (Two) Times a Day. (Patient taking differently: Apply 1 application topically to the appropriate area as directed 2 (Two) Times a Day.) 15 g 0   • URINARY HEALTH/CRANBERRY PO Take 1  "tablet by mouth 2 (two) times a day. \"CRANACTIN\"     • azithromycin (Zithromax Z-Dion) 250 MG tablet Take 1 tablet by mouth Daily. Take 2 tablets by mouth the first day, then 1 tablet daily for 4 days. 6 tablet 0   • docusate sodium (Colace) 100 MG capsule Take 1 capsule by mouth 2 (Two) Times a Day. (Patient taking differently: Take 100 mg by mouth As Needed.) 60 capsule 0     No facility-administered medications prior to visit.        Patient Active Problem List   Diagnosis   • Atherosclerosis of native artery of both lower extremities with intermittent claudication (CMS/HCC)   • Peripheral arterial disease (CMS/HCC)   • Benign essential hypertension   • Mixed hyperlipidemia   • Allergic rhinitis   • Gastroesophageal reflux disease without esophagitis   • Osteopenia of multiple sites   • Capsulitis   • Chronic low back pain   • COPD mixed type (CMS/HCC)   • Degenerative joint disease of shoulder region   • Depressive disorder   • Keratosis pilaris   • Subclavian steal syndrome   • Nocturnal leg cramps   • Atherosclerosis of native artery of left lower extremity with intermittent claudication (CMS/HCC)   • Postural dizziness with presyncope   • Hyperkalemia   • Bilateral hand pain   • Post-traumatic osteoarthritis of multiple joints   • Chronic kidney disease, stage 3, mod decreased GFR (CMS/HCC)   • Acute pain of left shoulder   • Status post total replacement of left shoulder   • Acute postoperative pain   • Cough   • Medicare annual wellness visit, subsequent   • Annual physical exam       Advance Care Planning:  ACP discussion was held with the patient during this visit. Patient does not have an advance directive, declines further assistance.    Identification of Risk Factors:  Risk factors include: Cardiovascular risk.    Review of Systems   Constitutional: Negative for chills, fatigue and fever.   HENT: Negative for congestion, ear pain and sinus pressure.    Eyes: Negative for visual disturbance. "   Respiratory: Positive for cough. Negative for chest tightness, shortness of breath and wheezing.    Cardiovascular: Negative for chest pain, palpitations and leg swelling.   Gastrointestinal: Negative for abdominal pain, blood in stool and constipation.   Endocrine: Negative for cold intolerance and heat intolerance.   Genitourinary: Negative for dysuria and frequency.   Musculoskeletal: Positive for arthralgias. Negative for gait problem.   Skin: Negative for color change and rash.   Allergic/Immunologic: Negative for environmental allergies.   Neurological: Negative for dizziness and headaches.   Hematological: Negative for adenopathy. Does not bruise/bleed easily.   Psychiatric/Behavioral: Negative for dysphoric mood and suicidal ideas. The patient is not nervous/anxious.        Compared to one year ago, the patient feels her physical health is the same.  Compared to one year ago, the patient feels her mental health is the same.    Objective     Physical Exam  Vitals signs and nursing note reviewed.   Constitutional:       Appearance: She is well-developed.   HENT:      Head: Normocephalic.   Eyes:      Conjunctiva/sclera: Conjunctivae normal.      Pupils: Pupils are equal, round, and reactive to light.   Neck:      Musculoskeletal: Normal range of motion and neck supple.      Thyroid: No thyromegaly.   Cardiovascular:      Rate and Rhythm: Normal rate and regular rhythm.      Heart sounds: Normal heart sounds.   Pulmonary:      Effort: Pulmonary effort is normal.      Breath sounds: Decreased breath sounds present. No wheezing or rhonchi.      Comments: Dry soft crackles throughout both lung fields.  Possibly worse on the right.  Chest:      Breasts:         Right: No inverted nipple, mass, nipple discharge, skin change or tenderness.         Left: No inverted nipple, mass, nipple discharge, skin change or tenderness.   Abdominal:      General: Bowel sounds are normal.      Palpations: Abdomen is soft.       "Tenderness: There is no abdominal tenderness.   Musculoskeletal:      Right shoulder: She exhibits tenderness and deformity. She exhibits normal range of motion.      Left shoulder: She exhibits decreased range of motion, tenderness and deformity. She exhibits no swelling.      Right hand: She exhibits deformity. She exhibits no tenderness.      Left hand: She exhibits deformity. She exhibits no tenderness.      Comments: Bilateral primary osteoarthritis of hands.  Also posttraumatic arthritis of multiple joints.    Well-healed incision after left shoulder surgery.   Lymphadenopathy:      Cervical: No cervical adenopathy.   Skin:     General: Skin is warm and dry.      Findings: No rash.   Neurological:      Mental Status: She is alert and oriented to person, place, and time.      Cranial Nerves: No cranial nerve deficit.      Sensory: No sensory deficit.      Coordination: Coordination normal.      Gait: Gait normal.   Psychiatric:         Speech: Speech normal.         Behavior: Behavior normal.         Thought Content: Thought content normal.         Judgment: Judgment normal.            ECG 12 Lead    Date/Time: 9/30/2020 4:44 PM  Performed by: Jannette Chapa MD  Authorized by: Jannette Chapa MD   Comparison: compared with previous ECG   Similar to previous ECG  Rhythm: sinus rhythm  Rate: normal  BPM: 52  Conduction: conduction normal  ST Segments: ST segments normal  T Waves: T waves normal  QRS axis: normal    Clinical impression: normal ECG             Vitals:    09/30/20 1534   BP: 128/58   BP Location: Left arm   Patient Position: Sitting   Cuff Size: Adult   Pulse: 60   Temp: 98.2 °F (36.8 °C)   TempSrc: Temporal   Weight: 61.8 kg (136 lb 3.2 oz)  Comment: WITH BOOTS   Height: 157.5 cm (62.01\")   PainSc:   5   PainLoc: Shoulder  Comment: Right       Patient's Body mass index is 24.9 kg/m². BMI is within normal parameters. No follow-up required..      Assessment/Plan   Problem List Items Addressed " This Visit        Cardiovascular and Mediastinum    Atherosclerosis of native artery of both lower extremities with intermittent claudication (CMS/HCC) (Chronic)    Overview     9/30/2020 Jannette Chapa MD    Patient is status post left lower extremity atherectomy and balloon angioplasty by Dr. Carbajal on 1/7/2020.    Continue daily Plavix and aspirin.  Continue rosuvastatin nightly.    Continue to improve low-fat low sugar diet and get plenty of exercise.         Benign essential hypertension    Overview     9/30/2020 Jannette Chapa MD    Continue bisoprolol and hydrochlorothiazide and olmesartan daily.  Continue to avoid salt in the diet.  Continue to avoid sodas.         Relevant Medications    hydroCHLOROthiazide (HYDRODIURIL) 12.5 MG tablet    olmesartan (BENICAR) 40 MG tablet    amLODIPine (NORVASC) 5 MG tablet    bisoprolol (ZEBeta) 10 MG tablet    Other Relevant Orders    CBC & Differential    Comprehensive Metabolic Panel    Urinalysis With Culture If Indicated -    Microalbumin / Creatinine Urine Ratio - Urine, Clean Catch    Mixed hyperlipidemia    Overview     9/30/2020 Jannette Chapa MD    Continue rosuvastatin every evening.  Continue low-fat diet and regular exercise and physical activity.         Relevant Medications    rosuvastatin (CRESTOR) 20 MG tablet    Other Relevant Orders    Lipid Panel    TSH       Respiratory    Cough    Overview     9/30/2020 Jannette Chapa MD    Acute cough since Autumn shoulder surgery.  Worse in the mornings.  Without fever, chills, wheezing, or shortness of breath.  It seemed to improve some after taking Z-Dion in June.  Physical exam today reveals decreased breath sounds bilaterally with soft dry crackles in both lung fields, possibly worse on the right than the left.    We will try Brio Ellipta inhaler once a day for 2 weeks.  She will let us know if it is helping.            Digestive    Gastroesophageal reflux disease without esophagitis    Overview      9/30/2020 Jannette Chapa MD    Continue omeprazole daily.  Continue to avoid eating close to bedtime and avoid large meals.         Relevant Medications    omeprazole (priLOSEC) 20 MG capsule       Nervous and Auditory    Acute postoperative pain    Overview     9/30/2020 Jannette Chapa MD    Status post left shoulder surgery June 2, 2020.  Continue rehab.  Follow-up with Dr. Fagan as planned.            Musculoskeletal and Integument    Osteopenia of multiple sites    Overview     9/30/2020 Jannette Chapa MD    Continue calcium with vitamin D.  Do some weightbearing exercises including walking and using hand weights or lifting things at the barn every day.    DEXA bone density test ordered.         Relevant Orders    Vitamin D 25 Hydroxy    Post-traumatic osteoarthritis of multiple joints    Overview     2/1/2020 Jannette Chapa MD    Continue gabapentin  3 capsules every evening.  Continue tramadol 1/2 to 1 tablet up to 4 times a day as needed.  May also take tylenol with it as needed.         Relevant Medications    gabapentin (NEURONTIN) 100 MG capsule       Genitourinary    Chronic kidney disease, stage 3, mod decreased GFR (CMS/HCC) (Chronic)    Overview     9/30/2020 Jannette Chapa MD    Continue drinking lots of fluids, especially water.  Continue to avoid NSAIDs.  We will continue to monitor labs.           Relevant Medications    hydroCHLOROthiazide (HYDRODIURIL) 12.5 MG tablet       Other    Medicare annual wellness visit, subsequent - Primary    Overview     DEXA ordered.  Mammogram is up-to-date.    I reminded the patient she is due for a colonoscopy and she said she would consider it.         Annual physical exam    Overview     9/30/2020 Jannette Chapa MD    Flu shot given today.  She is up-to-date on other vaccinations at except for Shingrix which she will get at her pharmacy.           Other Visit Diagnoses     Menopausal and postmenopausal disorder        Relevant Orders    DEXA  Bone Density Axial        Patient Self-Management and Personalized Health Advice  The patient has been provided with information about: diet, exercise and prevention of cardiac or vascular disease and preventive services including:   · Annual Wellness Visit (AWV).    Outpatient Encounter Medications as of 9/30/2020   Medication Sig Dispense Refill   • acetaminophen (ACETAMINOPHEN 8 HOUR) 650 MG 8 hr tablet Take 650 mg by mouth 3 (Three) Times a Day. Twice a day     • AMLACTIN 12 % lotion Apply 1 application topically to the appropriate area as directed As Needed for Irritation.  1   • amLODIPine (NORVASC) 5 MG tablet Take 1 tablet by mouth Daily. 90 tablet 1   • ascorbic acid (VITAMIN C) 1000 MG tablet Take 1,000 mg by mouth 2 (two) times a day.     • aspirin 81 MG EC tablet Take 81 mg by mouth every morning. TOLD NOT TO STOP BEFORE SURGERY     • bisoprolol (ZEBeta) 10 MG tablet TAKE 1 TABLET BY MOUTH DAILY 90 tablet 1   • Calcium Citrate-Vitamin D (CALCIUM + D PO) Take 2 tablets by mouth 2 (two) times a day.     • citalopram (CeleXA) 40 MG tablet TAKE 1 TABLET BY MOUTH ONCE DAILY (Patient taking differently: Take 40 mg by mouth Daily.) 90 tablet 1   • clopidogrel (PLAVIX) 75 MG tablet Take 1 tablet by mouth Daily. Resume 6/10/20 90 tablet 1   • Coenzyme Q10 200 MG capsule Take 400 mg by mouth Daily.     • Fish Oil oil Take 1 capsule by mouth Daily.     • Flaxseed, Linseed, (FLAXSEED OIL) 1000 MG capsule Take 1 capsule by mouth daily.     • fluticasone (FLONASE) 50 MCG/ACT nasal spray INSTILL 2 SPRAYS INTO EACH NOSTRIL TWICE A DAY 16 g 5   • folic acid (FOLVITE) 1 MG tablet TAKE 1 TABLET BY MOUTH ONCE DAILY (Patient taking differently: Take 1 mg by mouth Daily.) 90 tablet 1   • gabapentin (NEURONTIN) 100 MG capsule Take 3 capsules by mouth Every Evening. 90 capsule 2   • hydroCHLOROthiazide (HYDRODIURIL) 12.5 MG tablet Take 1 tablet by mouth Daily. 90 tablet 3   • Methylsulfonylmethane (MSM PO) Take 2 tablets by  "mouth daily.     • Multiple Vitamins-Minerals (MULTIVITAMIN ADULT PO) Take 1 tablet by mouth daily.     • Nutritional Supplements (NUTRITIONAL SUPPLEMENT PO) Take  by mouth. NeoCell   3 tsp once a day     • olmesartan (BENICAR) 40 MG tablet TAKE 1 TABLET BY MOUTH EVERY DAY (Patient taking differently: Take 40 mg by mouth Daily.) 90 tablet 2   • omeprazole (priLOSEC) 20 MG capsule Take 1 capsule by mouth Daily. 90 capsule 3   • rosuvastatin (CRESTOR) 20 MG tablet TAKE 1 TABLET BY MOUTH ONCE DAILY (Patient taking differently: Take 20 mg by mouth Every Night.) 90 tablet 2   • traMADol (ULTRAM) 50 MG tablet Take 50 mg by mouth 2 (two) times a day.     • triamcinolone (KENALOG) 0.1 % cream Apply  topically to the appropriate area as directed 2 (Two) Times a Day. (Patient taking differently: Apply 1 application topically to the appropriate area as directed 2 (Two) Times a Day.) 15 g 0   • URINARY HEALTH/CRANBERRY PO Take 1 tablet by mouth 2 (two) times a day. \"CRANACTIN\"     • Fluticasone Furoate-Vilanterol (Breo Ellipta) 200-25 MCG/INH inhaler Inhale 1 puff Daily. 1 inhaler 0   • [DISCONTINUED] azithromycin (Zithromax Z-Dion) 250 MG tablet Take 1 tablet by mouth Daily. Take 2 tablets by mouth the first day, then 1 tablet daily for 4 days. 6 tablet 0   • [DISCONTINUED] docusate sodium (Colace) 100 MG capsule Take 1 capsule by mouth 2 (Two) Times a Day. (Patient taking differently: Take 100 mg by mouth As Needed.) 60 capsule 0     No facility-administered encounter medications on file as of 9/30/2020.        Reviewed use of high risk medication in the elderly: yes  Reviewed for potential of harmful drug interactions in the elderly: yes    Follow Up:  Return in about 3 months (around 12/30/2020) for Recheck.     There are no Patient Instructions on file for this visit.    An After Visit Summary and PPPS with all of these plans were given to the patient.         "

## 2020-09-30 NOTE — PATIENT INSTRUCTIONS
Problem List Items Addressed This Visit        Cardiovascular and Mediastinum    Atherosclerosis of native artery of both lower extremities with intermittent claudication (CMS/HCC) (Chronic)    Overview     9/30/2020 Jannette Chapa MD    Patient is status post left lower extremity atherectomy and balloon angioplasty by Dr. Carbajal on 1/7/2020.    Continue daily Plavix and aspirin.  Continue rosuvastatin nightly.    Continue to improve low-fat low sugar diet and get plenty of exercise.         Benign essential hypertension    Overview     9/30/2020 Jannette Chapa MD    Continue bisoprolol and hydrochlorothiazide and olmesartan daily.  Continue to avoid salt in the diet.  Continue to avoid sodas.         Relevant Medications    hydroCHLOROthiazide (HYDRODIURIL) 12.5 MG tablet    olmesartan (BENICAR) 40 MG tablet    amLODIPine (NORVASC) 5 MG tablet    bisoprolol (ZEBeta) 10 MG tablet    Other Relevant Orders    CBC & Differential    Comprehensive Metabolic Panel    Urinalysis With Culture If Indicated -    Microalbumin / Creatinine Urine Ratio - Urine, Clean Catch    Mixed hyperlipidemia    Overview     9/30/2020 Jannette Chapa MD    Continue rosuvastatin every evening.  Continue low-fat diet and regular exercise and physical activity.         Relevant Medications    rosuvastatin (CRESTOR) 20 MG tablet    Other Relevant Orders    Lipid Panel    TSH       Respiratory    Cough    Overview     9/30/2020 Jannette Chapa MD    Acute cough since Autumn shoulder surgery.  Worse in the mornings.  Without fever, chills, wheezing, or shortness of breath.  It seemed to improve some after taking Z-Dion in June.  Physical exam today reveals decreased breath sounds bilaterally with soft dry crackles in both lung fields, possibly worse on the right than the left.    We will try Brio Ellipta inhaler once a day for 2 weeks.  She will let us know if it is helping.            Digestive    Gastroesophageal reflux disease  without esophagitis    Overview     9/30/2020 Jannette Chapa MD    Continue omeprazole daily.  Continue to avoid eating close to bedtime and avoid large meals.         Relevant Medications    omeprazole (priLOSEC) 20 MG capsule       Nervous and Auditory    Acute postoperative pain    Overview     9/30/2020 Jannette Chapa MD    Status post left shoulder surgery June 2, 2020.  Continue rehab.  Follow-up with Dr. Fagan as planned.            Musculoskeletal and Integument    Osteopenia of multiple sites    Overview     9/30/2020 Jannette Chapa MD    Continue calcium with vitamin D.  Do some weightbearing exercises including walking and using hand weights or lifting things at the barn every day.    DEXA bone density test ordered.         Relevant Orders    Vitamin D 25 Hydroxy    Post-traumatic osteoarthritis of multiple joints    Overview     2/1/2020 Jannette Chapa MD    Continue gabapentin  3 capsules every evening.  Continue tramadol 1/2 to 1 tablet up to 4 times a day as needed.  May also take tylenol with it as needed.         Relevant Medications    gabapentin (NEURONTIN) 100 MG capsule       Genitourinary    Chronic kidney disease, stage 3, mod decreased GFR (CMS/HCC) (Chronic)    Overview     9/30/2020 Jannette Chapa MD    Continue drinking lots of fluids, especially water.  Continue to avoid NSAIDs.  We will continue to monitor labs.           Relevant Medications    hydroCHLOROthiazide (HYDRODIURIL) 12.5 MG tablet       Other    Medicare annual wellness visit, subsequent - Primary    Overview     DEXA ordered.  Mammogram is up-to-date.    I reminded the patient she is due for a colonoscopy and she said she would consider it.         Annual physical exam    Overview     9/30/2020 Jannette Chapa MD    Flu shot given today.  She is up-to-date on other vaccinations at except for Shingrix which she will get at her pharmacy.           Other Visit Diagnoses     Menopausal and postmenopausal  disorder        Relevant Orders    DEXA Bone Density Axial

## 2020-10-01 ENCOUNTER — TELEPHONE (OUTPATIENT)
Dept: INTERNAL MEDICINE | Facility: CLINIC | Age: 65
End: 2020-10-01

## 2020-10-01 NOTE — TELEPHONE ENCOUNTER
PT WANTED DR. SEN TO KNOW THAT THE BREO IS WORKING VERY WELL. SHE WILL GET HER FASTING LABS DONE TOMORROW 10/02/20

## 2020-10-09 ENCOUNTER — LAB (OUTPATIENT)
Dept: LAB | Facility: HOSPITAL | Age: 65
End: 2020-10-09

## 2020-10-09 DIAGNOSIS — E78.2 MIXED HYPERLIPIDEMIA: ICD-10-CM

## 2020-10-09 DIAGNOSIS — I10 BENIGN ESSENTIAL HYPERTENSION: ICD-10-CM

## 2020-10-09 DIAGNOSIS — M85.89 OSTEOPENIA OF MULTIPLE SITES: ICD-10-CM

## 2020-10-09 LAB
ALBUMIN SERPL-MCNC: 5.1 G/DL (ref 3.5–5.2)
ALBUMIN/GLOB SERPL: 1.8 G/DL
ALP SERPL-CCNC: 61 U/L (ref 39–117)
ALT SERPL W P-5'-P-CCNC: 44 U/L (ref 1–33)
ANION GAP SERPL CALCULATED.3IONS-SCNC: 9.5 MMOL/L (ref 5–15)
AST SERPL-CCNC: 28 U/L (ref 1–32)
BILIRUB SERPL-MCNC: 0.3 MG/DL (ref 0–1.2)
BUN SERPL-MCNC: 30 MG/DL (ref 8–23)
BUN/CREAT SERPL: 31.9 (ref 7–25)
CALCIUM SPEC-SCNC: 9.5 MG/DL (ref 8.6–10.5)
CHLORIDE SERPL-SCNC: 105 MMOL/L (ref 98–107)
CHOLEST SERPL-MCNC: 136 MG/DL (ref 0–200)
CO2 SERPL-SCNC: 25.5 MMOL/L (ref 22–29)
CREAT SERPL-MCNC: 0.94 MG/DL (ref 0.57–1)
GFR SERPL CREATININE-BSD FRML MDRD: 59 ML/MIN/1.73
GLOBULIN UR ELPH-MCNC: 2.9 GM/DL
GLUCOSE SERPL-MCNC: 97 MG/DL (ref 65–99)
HDLC SERPL-MCNC: 68 MG/DL (ref 40–60)
LDLC SERPL CALC-MCNC: 54 MG/DL (ref 0–100)
LDLC/HDLC SERPL: 0.8 {RATIO}
POTASSIUM SERPL-SCNC: 5.4 MMOL/L (ref 3.5–5.2)
PROT SERPL-MCNC: 8 G/DL (ref 6–8.5)
SODIUM SERPL-SCNC: 140 MMOL/L (ref 136–145)
TRIGL SERPL-MCNC: 67 MG/DL (ref 0–150)
TSH SERPL DL<=0.05 MIU/L-ACNC: 2.21 UIU/ML (ref 0.27–4.2)
VLDLC SERPL-MCNC: 14 MG/DL (ref 5–40)

## 2020-10-09 PROCEDURE — 87086 URINE CULTURE/COLONY COUNT: CPT

## 2020-10-09 PROCEDURE — 80061 LIPID PANEL: CPT

## 2020-10-09 PROCEDURE — 82043 UR ALBUMIN QUANTITATIVE: CPT

## 2020-10-09 PROCEDURE — 82306 VITAMIN D 25 HYDROXY: CPT

## 2020-10-09 PROCEDURE — 81001 URINALYSIS AUTO W/SCOPE: CPT

## 2020-10-09 PROCEDURE — 80053 COMPREHEN METABOLIC PANEL: CPT

## 2020-10-09 PROCEDURE — 82570 ASSAY OF URINE CREATININE: CPT

## 2020-10-09 PROCEDURE — 84443 ASSAY THYROID STIM HORMONE: CPT

## 2020-10-09 PROCEDURE — 85025 COMPLETE CBC W/AUTO DIFF WBC: CPT

## 2020-10-10 LAB
25(OH)D3 SERPL-MCNC: 62.6 NG/ML (ref 30–100)
ALBUMIN UR-MCNC: 13 MG/DL
BACTERIA UR QL AUTO: ABNORMAL /HPF
BASOPHILS # BLD AUTO: 0.04 10*3/MM3 (ref 0–0.2)
BASOPHILS NFR BLD AUTO: 0.6 % (ref 0–1.5)
BILIRUB UR QL STRIP: NEGATIVE
CLARITY UR: CLEAR
COLOR UR: YELLOW
CREAT UR-MCNC: 70.3 MG/DL
DEPRECATED RDW RBC AUTO: 42.9 FL (ref 37–54)
EOSINOPHIL # BLD AUTO: 0.25 10*3/MM3 (ref 0–0.4)
EOSINOPHIL NFR BLD AUTO: 3.7 % (ref 0.3–6.2)
ERYTHROCYTE [DISTWIDTH] IN BLOOD BY AUTOMATED COUNT: 12.8 % (ref 12.3–15.4)
GLUCOSE UR STRIP-MCNC: NEGATIVE MG/DL
HCT VFR BLD AUTO: 38.9 % (ref 34–46.6)
HGB BLD-MCNC: 12.7 G/DL (ref 12–15.9)
HGB UR QL STRIP.AUTO: NEGATIVE
HYALINE CASTS UR QL AUTO: ABNORMAL /LPF
IMM GRANULOCYTES # BLD AUTO: 0.02 10*3/MM3 (ref 0–0.05)
IMM GRANULOCYTES NFR BLD AUTO: 0.3 % (ref 0–0.5)
KETONES UR QL STRIP: NEGATIVE
LEUKOCYTE ESTERASE UR QL STRIP.AUTO: ABNORMAL
LYMPHOCYTES # BLD AUTO: 0.98 10*3/MM3 (ref 0.7–3.1)
LYMPHOCYTES NFR BLD AUTO: 14.6 % (ref 19.6–45.3)
MCH RBC QN AUTO: 30 PG (ref 26.6–33)
MCHC RBC AUTO-ENTMCNC: 32.6 G/DL (ref 31.5–35.7)
MCV RBC AUTO: 92 FL (ref 79–97)
MICROALBUMIN/CREAT UR: 184.9 MG/G
MONOCYTES # BLD AUTO: 0.62 10*3/MM3 (ref 0.1–0.9)
MONOCYTES NFR BLD AUTO: 9.3 % (ref 5–12)
NEUTROPHILS NFR BLD AUTO: 4.79 10*3/MM3 (ref 1.7–7)
NEUTROPHILS NFR BLD AUTO: 71.5 % (ref 42.7–76)
NITRITE UR QL STRIP: NEGATIVE
NRBC BLD AUTO-RTO: 0 /100 WBC (ref 0–0.2)
PH UR STRIP.AUTO: 6 [PH] (ref 5–8)
PLATELET # BLD AUTO: 296 10*3/MM3 (ref 140–450)
PMV BLD AUTO: 10.4 FL (ref 6–12)
PROT UR QL STRIP: ABNORMAL
RBC # BLD AUTO: 4.23 10*6/MM3 (ref 3.77–5.28)
RBC # UR: ABNORMAL /HPF
REF LAB TEST METHOD: ABNORMAL
SP GR UR STRIP: 1.02 (ref 1–1.03)
SQUAMOUS #/AREA URNS HPF: ABNORMAL /HPF
UROBILINOGEN UR QL STRIP: ABNORMAL
WBC # BLD AUTO: 6.7 10*3/MM3 (ref 3.4–10.8)
WBC UR QL AUTO: ABNORMAL /HPF

## 2020-10-11 LAB — BACTERIA SPEC AEROBE CULT: NORMAL

## 2020-10-13 ENCOUNTER — TELEPHONE (OUTPATIENT)
Dept: INTERNAL MEDICINE | Facility: CLINIC | Age: 65
End: 2020-10-13

## 2020-10-13 NOTE — TELEPHONE ENCOUNTER
PT STATES THAT THE BREO INHALER WORKED FOR HER AND PT IS REQUESTING A PRESCRIPTION BE SENT TO PHARMACY.      PHARMACY CONFIRMED  PLEASE [facility]  [phone]

## 2020-11-10 DIAGNOSIS — M25.512 CHRONIC PAIN OF BOTH SHOULDERS: ICD-10-CM

## 2020-11-10 DIAGNOSIS — M25.511 CHRONIC PAIN OF BOTH SHOULDERS: ICD-10-CM

## 2020-11-10 DIAGNOSIS — M15.3 POST-TRAUMATIC OSTEOARTHRITIS OF MULTIPLE JOINTS: ICD-10-CM

## 2020-11-10 DIAGNOSIS — G89.29 CHRONIC PAIN OF BOTH SHOULDERS: ICD-10-CM

## 2020-11-10 RX ORDER — GABAPENTIN 100 MG/1
300 CAPSULE ORAL EVERY EVENING
Qty: 90 CAPSULE | Refills: 2 | Status: SHIPPED | OUTPATIENT
Start: 2020-11-10 | End: 2021-02-08 | Stop reason: SDUPTHER

## 2020-11-10 RX ORDER — GABAPENTIN 100 MG/1
300 CAPSULE ORAL EVERY EVENING
Qty: 90 CAPSULE | Refills: 2 | Status: CANCELLED | OUTPATIENT
Start: 2020-11-10

## 2020-12-01 RX ORDER — AMLODIPINE BESYLATE 5 MG/1
5 TABLET ORAL DAILY
Qty: 90 TABLET | Refills: 1 | Status: SHIPPED | OUTPATIENT
Start: 2020-12-01 | End: 2021-06-01

## 2020-12-09 RX ORDER — CITALOPRAM 40 MG/1
40 TABLET ORAL DAILY
Qty: 90 TABLET | Refills: 1 | Status: SHIPPED | OUTPATIENT
Start: 2020-12-09 | End: 2021-06-14

## 2020-12-09 NOTE — TELEPHONE ENCOUNTER
Caller: Aarti Wade    Relationship: Self    Best call back number: [phone]    Medication needed:   Requested Prescriptions     Pending Prescriptions Disp Refills   • citalopram (CeleXA) 40 MG tablet 90 tablet 1     Sig: Take 1 tablet by mouth Daily.       When do you need the refill by: 12/09/2020    What details did the patient provide when requesting the medication: patient will be out of medication today     Does the patient have less than a 3 day supply:  [x] Yes  [] No    What is the patient's preferred pharmacy: [id number] [address] AT [facility] [address] [facility] [phone] [facility] [phone] FX

## 2020-12-15 DIAGNOSIS — M15.3 POST-TRAUMATIC OSTEOARTHRITIS OF MULTIPLE JOINTS: Primary | ICD-10-CM

## 2020-12-15 RX ORDER — FLUTICASONE PROPIONATE 50 MCG
1 SPRAY, SUSPENSION (ML) NASAL 2 TIMES DAILY
Qty: 48 G | Refills: 3 | Status: SHIPPED | OUTPATIENT
Start: 2020-12-15 | End: 2022-01-11

## 2020-12-15 RX ORDER — TRAMADOL HYDROCHLORIDE 50 MG/1
50 TABLET ORAL 2 TIMES DAILY
Qty: 180 TABLET | Refills: 2 | Status: SHIPPED | OUTPATIENT
Start: 2020-12-15 | End: 2021-02-08 | Stop reason: SDUPTHER

## 2020-12-15 NOTE — TELEPHONE ENCOUNTER
PT CALLED TO CHECK THE STATUS OF THE REFILL ON OF traMADol (ULTRAM) 50 MG tablet.     CALLBACK:[phone]

## 2020-12-18 ENCOUNTER — OFFICE VISIT (OUTPATIENT)
Dept: INTERNAL MEDICINE | Facility: CLINIC | Age: 65
End: 2020-12-18

## 2020-12-18 VITALS
SYSTOLIC BLOOD PRESSURE: 130 MMHG | TEMPERATURE: 97.7 F | WEIGHT: 134.4 LBS | HEIGHT: 62 IN | HEART RATE: 59 BPM | DIASTOLIC BLOOD PRESSURE: 70 MMHG | BODY MASS INDEX: 24.73 KG/M2

## 2020-12-18 DIAGNOSIS — E78.2 MIXED HYPERLIPIDEMIA: ICD-10-CM

## 2020-12-18 DIAGNOSIS — I10 BENIGN ESSENTIAL HYPERTENSION: ICD-10-CM

## 2020-12-18 DIAGNOSIS — M85.89 OSTEOPENIA OF MULTIPLE SITES: ICD-10-CM

## 2020-12-18 DIAGNOSIS — M19.111 POST-TRAUMATIC OSTEOARTHRITIS OF RIGHT SHOULDER: Primary | Chronic | ICD-10-CM

## 2020-12-18 DIAGNOSIS — M77.9 CAPSULITIS: ICD-10-CM

## 2020-12-18 PROCEDURE — 99214 OFFICE O/P EST MOD 30 MIN: CPT | Performed by: INTERNAL MEDICINE

## 2020-12-18 RX ORDER — CLOPIDOGREL BISULFATE 75 MG/1
75 TABLET ORAL DAILY
Qty: 90 TABLET | Refills: 1 | Status: SHIPPED | OUTPATIENT
Start: 2020-12-18 | End: 2021-06-18

## 2020-12-18 RX ORDER — ROSUVASTATIN CALCIUM 20 MG/1
20 TABLET, COATED ORAL NIGHTLY
Qty: 90 TABLET | Refills: 1 | Status: SHIPPED | OUTPATIENT
Start: 2020-12-18 | End: 2021-07-20

## 2020-12-18 RX ORDER — HYDROCHLOROTHIAZIDE 12.5 MG/1
12.5 TABLET ORAL DAILY
Qty: 90 TABLET | Refills: 3 | Status: SHIPPED | OUTPATIENT
Start: 2020-12-18 | End: 2021-10-01 | Stop reason: SDUPTHER

## 2020-12-18 RX ORDER — BISOPROLOL FUMARATE 10 MG/1
10 TABLET, FILM COATED ORAL DAILY
Qty: 90 TABLET | Refills: 1 | Status: SHIPPED | OUTPATIENT
Start: 2020-12-18 | End: 2021-04-28 | Stop reason: SDUPTHER

## 2020-12-18 NOTE — PROGRESS NOTES
[facility]     Aarti Wade  1955   [id number]      Patient Care Team:  Jannette Chapa MD as PCP - General  Jannette Chapa MD as PCP - Family Medicine  Severino Carbajal MD as Consulting Physician (Vascular Surgery)  Ketan Lazcano MD as Consulting Physician (Orthopedic Surgery)  Floyd Ernandez MD as Consulting Physician (Cardiology)  Lorenzo Good MD as Consulting Physician (Ophthalmology)    Chief Complaint   Patient presents with   • R Shoulder Pain     f/u, workers comp            HPI  Patient is a 65 y.o. female presents with R shoulder pain. She doesn't want to have surgery yet.  S/p L shoulder replacement. Just finished PT for that 2 wks ago. Tramadol helps some with pain. Usually takes it twice a day.  Acetaminophen with it helps.       CHRONIC CONDITIONS  Trying to drink lots of fluids for kidneys.     BPs usually well controlled.  She has not checked it lately at home.  Taking amlodipine,bisoprolol, HCTZ, olmesartan.  No side effects with medications.    Taking crestor every evening.  Eats pretty low-fat diet most of the time.  No side effects with Crestor.  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    L hip pain on and off helped some by chiropractor.     Osteopenia, DEXA will be this month.     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 HARRISON   • COLONOSCOPY      last 8 years ago.  Due to schedule   • EYE LENS IMPLANT SECONDARY Left    • FEMORAL FEMORAL BYPASS Right 8/22/2016    Procedure: RIGHT COMMON FEMORAL ENDARTERECTOMY WITH PATCH;  Surgeon: Severino Harrison MD;  Location: [facility] OR;  Service:    • FINGER SURGERY Left     LEFT INDEX FINGER   • INTERVENTIONAL RADIOLOGY PROCEDURE N/A 8/16/2016    Procedure: IR PTA femoral popliteal artery;  Surgeon: Severino Harrison MD;  Location: [facility] CATH INVASIVE LOCATION;  Service:    • INTERVENTIONAL RADIOLOGY PROCEDURE Left 1/7/2020    Procedure: LEFT ATHERECTOMY, BALLOON ANGIOPLASTY;  Surgeon: Severino Harrison MD;  Location: [facility] CATH INVASIVE LOCATION;  Service: Interventional Radiology   • TONSILLECTOMY     • TOTAL SHOULDER ARTHROPLASTY Left 6/8/2020    Procedure: TOTAL SHOULDER ARTHROPLASTY LEFT;  Surgeon: Ketan Lazcano MD;  Location: [facility] OR;  Service: Orthopedics;  Laterality: Left;  protector in: 1346  protector out: 1402       Family History   Problem Relation Age of Onset   • Osteoarthritis Mother    • Alzheimer's disease Mother    • Hypertension Father    • Heart attack Father    • Alcohol abuse Father    • No Known Problems Sister    • No Known Problems Brother    • No Known Problems Daughter    • No Known Problems Son    • Breast cancer Maternal Grandmother 50   • Breast cancer Paternal Grandmother 50   • No Known Problems Maternal Aunt    • No Known Problems Paternal Aunt    • Other Other    • Heart attack Other    • Coronary artery disease Paternal Grandfather    • Stroke Paternal Grandfather    • Ovarian cancer Neg Hx    • Endometrial cancer Neg Hx   "      Social History     Socioeconomic History   • Marital status: Single     Spouse name: Not on file   • Number of children: Not on file   • Years of education: Not on file   • Highest education level: Not on file   Tobacco Use   • Smoking status: Former Smoker     Packs/day: 1.00     Years: 40.00     Pack years: 40.00     Types: Electronic Cigarette, Cigarettes     Quit date: 2016     Years since quittin.5   • Smokeless tobacco: Never Used   • Tobacco comment: 1 PACK/DAY SMOKER UNTIL 2016   Substance and Sexual Activity   • Alcohol use: Yes     Alcohol/week: 2.0 standard drinks     Types: 2 Shots of liquor per week     Comment: 2 DRINKS PER DAY   • Drug use: No   • Sexual activity: Defer       Allergies   Allergen Reactions   • Levocetirizine Dihydrochloride Myalgia     Muscle aches/joint pain       Review of Systems:     Review of Systems   Constitutional: Negative for chills, fatigue and fever.   Respiratory: Negative for cough, shortness of breath and wheezing.    Cardiovascular: Negative for chest pain, palpitations and leg swelling.   Gastrointestinal: Negative for abdominal pain, blood in stool, constipation and diarrhea.   Genitourinary: Negative for dysuria and frequency.   Musculoskeletal: Positive for arthralgias. Negative for gait problem.   Psychiatric/Behavioral: Negative for dysphoric mood. The patient is not nervous/anxious.        Vital Signs  Vitals:    20 1307 20 1340   BP: 162/65 130/70   BP Location: Right arm    Patient Position: Sitting    Cuff Size: Adult    Pulse: 59    Temp: 97.7 °F (36.5 °C)    TempSrc: Infrared    Weight: 61 kg (134 lb 6.4 oz)    Height: 157.5 cm (62\")    PainSc: 0-No pain      Body mass index is 24.58 kg/m².      Current Outpatient Medications:   •  acetaminophen (ACETAMINOPHEN 8 HOUR) 650 MG 8 hr tablet, Take 650 mg by mouth 3 (Three) Times a Day. Twice a day, Disp: , Rfl:   •  AMLACTIN 12 % lotion, Apply 1 application topically to the " appropriate area as directed As Needed for Irritation., Disp: , Rfl: 1  •  amLODIPine (NORVASC) 5 MG tablet, Take 1 tablet by mouth Daily., Disp: 90 tablet, Rfl: 1  •  ascorbic acid (VITAMIN C) 1000 MG tablet, Take 1,000 mg by mouth 2 (two) times a day., Disp: , Rfl:   •  aspirin 81 MG EC tablet, Take 81 mg by mouth every morning. TOLD NOT TO STOP BEFORE SURGERY, Disp: , Rfl:   •  bisoprolol (ZEBeta) 10 MG tablet, Take 1 tablet by mouth Daily., Disp: 90 tablet, Rfl: 1  •  Calcium Citrate-Vitamin D (CALCIUM + D PO), Take 2 tablets by mouth 2 (two) times a day., Disp: , Rfl:   •  citalopram (CeleXA) 40 MG tablet, Take 1 tablet by mouth Daily., Disp: 90 tablet, Rfl: 1  •  clopidogrel (PLAVIX) 75 MG tablet, Take 1 tablet by mouth Daily. Resume 6/10/20, Disp: 90 tablet, Rfl: 1  •  Coenzyme Q10 200 MG capsule, Take 400 mg by mouth Daily., Disp: , Rfl:   •  Fish Oil oil, Take 1 capsule by mouth Daily., Disp: , Rfl:   •  Flaxseed, Linseed, (FLAXSEED OIL) 1000 MG capsule, Take 1 capsule by mouth daily., Disp: , Rfl:   •  fluticasone (FLONASE) 50 MCG/ACT nasal spray, 1 spray by Each Nare route 2 (Two) Times a Day., Disp: 48 g, Rfl: 3  •  Fluticasone Furoate-Vilanterol (Breo Ellipta) 200-25 MCG/INH inhaler, Inhale 1 puff Daily., Disp: 1 inhaler, Rfl: 11  •  folic acid (FOLVITE) 1 MG tablet, TAKE 1 TABLET BY MOUTH ONCE DAILY (Patient taking differently: Take 1 mg by mouth Daily.), Disp: 90 tablet, Rfl: 1  •  gabapentin (NEURONTIN) 100 MG capsule, Take 3 capsules by mouth Every Evening., Disp: 90 capsule, Rfl: 2  •  hydroCHLOROthiazide (HYDRODIURIL) 12.5 MG tablet, Take 1 tablet by mouth Daily., Disp: 90 tablet, Rfl: 3  •  Methylsulfonylmethane (MSM PO), Take 2 tablets by mouth daily., Disp: , Rfl:   •  Multiple Vitamins-Minerals (MULTIVITAMIN ADULT PO), Take 1 tablet by mouth daily., Disp: , Rfl:   •  Nutritional Supplements (NUTRITIONAL SUPPLEMENT PO), Take  by mouth. NeoCell   3 tsp once a day, Disp: , Rfl:   •  olmesartan  "(BENICAR) 40 MG tablet, TAKE 1 TABLET BY MOUTH EVERY DAY (Patient taking differently: Take 40 mg by mouth Daily.), Disp: 90 tablet, Rfl: 2  •  omeprazole (priLOSEC) 20 MG capsule, Take 1 capsule by mouth Daily., Disp: 90 capsule, Rfl: 3  •  rosuvastatin (CRESTOR) 20 MG tablet, Take 1 tablet by mouth Every Night., Disp: 90 tablet, Rfl: 1  •  traMADol (ULTRAM) 50 MG tablet, Take 1 tablet by mouth 2 (two) times a day., Disp: 180 tablet, Rfl: 2  •  triamcinolone (KENALOG) 0.1 % cream, Apply  topically to the appropriate area as directed 2 (Two) Times a Day. (Patient taking differently: Apply 1 application topically to the appropriate area as directed 2 (Two) Times a Day.), Disp: 15 g, Rfl: 0  •  URINARY HEALTH/CRANBERRY PO, Take 1 tablet by mouth 2 (two) times a day. \"CRANACTIN\", Disp: , Rfl:     Physical Exam:    Physical Exam  Vitals signs and nursing note reviewed.   Constitutional:       Appearance: She is well-developed.   HENT:      Head: Normocephalic.   Eyes:      Conjunctiva/sclera: Conjunctivae normal.      Pupils: Pupils are equal, round, and reactive to light.   Neck:      Musculoskeletal: Normal range of motion and neck supple.      Thyroid: No thyromegaly.   Cardiovascular:      Rate and Rhythm: Normal rate and regular rhythm.      Heart sounds: Normal heart sounds.   Pulmonary:      Effort: Pulmonary effort is normal.      Breath sounds: Normal breath sounds. No wheezing.   Musculoskeletal:      Right shoulder: She exhibits decreased range of motion, tenderness and deformity.      Left shoulder: She exhibits tenderness and deformity.      Comments: S/p L shoulder replacement.    Severe osteoarthritis of multiple joints of hands.       Lymphadenopathy:      Cervical: No cervical adenopathy.   Skin:     General: Skin is warm and dry.   Neurological:      Mental Status: She is alert and oriented to person, place, and time.   Psychiatric:         Thought Content: Thought content normal.          ACE III MINI "        Results Review:    None    CMP:  Lab Results   Component Value Date    BUN 30 (H) 10/09/2020    CREATININE 0.94 10/09/2020    EGFRIFNONA 59 (L) 10/09/2020    BCR 31.9 (H) 10/09/2020     10/09/2020    K 5.4 (H) 10/09/2020    CO2 25.5 10/09/2020    CALCIUM 9.5 10/09/2020    ALBUMIN 5.10 10/09/2020    BILITOT 0.3 10/09/2020    ALKPHOS 61 10/09/2020    AST 28 10/09/2020    ALT 44 (H) 10/09/2020     HbA1c:  Lab Results   Component Value Date    HGBA1C 5.50 06/05/2020    HGBA1C 5.50 01/03/2020     Microalbumin:  Lab Results   Component Value Date    MICROALBUR 13.0 10/09/2020     Lipid Panel  Lab Results   Component Value Date    CHOL 136 10/09/2020    TRIG 67 10/09/2020    HDL 68 (H) 10/09/2020    LDL 54 10/09/2020    AST 28 10/09/2020    ALT 44 (H) 10/09/2020       Medication Review: Medications reviewed and noted  Patient Instructions   Problem List Items Addressed This Visit        Cardiovascular and Mediastinum    Benign essential hypertension    Overview     12/18/2020 Jannette Chapa MD    Continue amlodipine, bisoprolol, hydrochlorothiazide, and olmesartan daily.      Continue to avoid salt in the diet.  Continue to avoid sodas.         Relevant Medications    olmesartan (BENICAR) 40 MG tablet    amLODIPine (NORVASC) 5 MG tablet    hydroCHLOROthiazide (HYDRODIURIL) 12.5 MG tablet    bisoprolol (ZEBeta) 10 MG tablet    Mixed hyperlipidemia    Overview     12/18/2020 Jannette Chapa MD    Continue rosuvastatin every evening.      Continue low-fat diet and regular exercise and physical activity.         Relevant Medications    rosuvastatin (CRESTOR) 20 MG tablet       Musculoskeletal and Integument    Osteopenia of multiple sites    Overview     9/30/2020 Jannette Chapa MD    Continue calcium with vitamin D.  Do some weightbearing exercises including walking and using hand weights or lifting things at the barn every day.    DEXA bone density test ordered.         Post-traumatic osteoarthritis of  right shoulder - Primary (Chronic)    Overview     12/18/2020 Jannette Chapa MD    Continue tramadol and Tylenol as needed for pain.  She will follow-up with Dr. Lazcano when she is ready for shoulder replacement.    Moist heat may help some to relax muscles.  Do a few stretches from past physical therapy every day.         Capsulitis    Overview     12/18/2020 Jannette Chapa MD    Adhesive capsulitis of right shoulder.  Doing some gentle range of motion of the shoulder daily can help.  Avoid lifting heavy objects.                  Diagnosis Plan   1. Post-traumatic osteoarthritis of right shoulder     2. Capsulitis     3. Benign essential hypertension     4. Mixed hyperlipidemia  rosuvastatin (CRESTOR) 20 MG tablet   5. Osteopenia of multiple sites         Plan of care reviewed with patient at the conclusion of today's visit. Education was provided regarding diagnosis, management, and any prescribed or recommended OTC medications.Patient verbalizes understanding of and agreement with management plan.         Jannette Chapa MD

## 2020-12-18 NOTE — PATIENT INSTRUCTIONS
Patient Instructions   Problem List Items Addressed This Visit        Cardiovascular and Mediastinum    Benign essential hypertension    Overview     12/18/2020 Jannette Chapa MD    Continue amlodipine, bisoprolol, hydrochlorothiazide, and olmesartan daily.      Continue to avoid salt in the diet.  Continue to avoid sodas.         Relevant Medications    olmesartan (BENICAR) 40 MG tablet    amLODIPine (NORVASC) 5 MG tablet    hydroCHLOROthiazide (HYDRODIURIL) 12.5 MG tablet    bisoprolol (ZEBeta) 10 MG tablet    Mixed hyperlipidemia    Overview     12/18/2020 Jannette Chapa MD    Continue rosuvastatin every evening.      Continue low-fat diet and regular exercise and physical activity.         Relevant Medications    rosuvastatin (CRESTOR) 20 MG tablet       Musculoskeletal and Integument    Osteopenia of multiple sites    Overview     9/30/2020 Jannette Chapa MD    Continue calcium with vitamin D.  Do some weightbearing exercises including walking and using hand weights or lifting things at the barn every day.    DEXA bone density test ordered.         Post-traumatic osteoarthritis of right shoulder - Primary (Chronic)    Overview     12/18/2020 Jannette Chapa MD    Continue tramadol and Tylenol as needed for pain.  She will follow-up with Dr. Lazcano when she is ready for shoulder replacement.    Moist heat may help some to relax muscles.  Do a few stretches from past physical therapy every day.         Capsulitis    Overview     12/18/2020 Jannette Chapa MD    Adhesive capsulitis of right shoulder.  Doing some gentle range of motion of the shoulder daily can help.  Avoid lifting heavy objects.

## 2020-12-28 ENCOUNTER — HOSPITAL ENCOUNTER (OUTPATIENT)
Dept: BONE DENSITY | Facility: HOSPITAL | Age: 65
Discharge: HOME | End: 2020-12-28
Admitting: INTERNAL MEDICINE

## 2020-12-28 PROCEDURE — 77080 DXA BONE DENSITY AXIAL: CPT

## 2021-01-05 DIAGNOSIS — E72.11 HYPERHOMOCYSTEINEMIA (HCC): ICD-10-CM

## 2021-01-05 DIAGNOSIS — I10 BENIGN ESSENTIAL HYPERTENSION: Chronic | ICD-10-CM

## 2021-01-05 RX ORDER — FOLIC ACID 1 MG/1
1000 TABLET ORAL DAILY
Qty: 90 TABLET | Refills: 1 | Status: SHIPPED | OUTPATIENT
Start: 2021-01-05 | End: 2021-07-08

## 2021-01-05 RX ORDER — OLMESARTAN MEDOXOMIL 40 MG/1
40 TABLET ORAL DAILY
Qty: 90 TABLET | Refills: 1 | Status: SHIPPED | OUTPATIENT
Start: 2021-01-05 | End: 2021-08-10

## 2021-01-26 NOTE — PROGRESS NOTES
Subjective:     Encounter Date:01/28/2021      Patient ID: Aarti Wade is a 66 y.o. single white female, from [place], [place].  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      INTERNIST: Jannette Chapa MD  VASCULAR SURGEON: Severino Carbajal MD  UROLOGIST: Unknown (near [facility]?)  REMOTE ORTHOPEDIST: Lance Burgess MD  NEPHROLOGIST: MD Cathleen  ORTHOPEDIC SURGEON: Ketan Lazcano MD .    Chief Complaint:   Chief Complaint   Patient presents with   • Atherosclerosis of native artery of both lower extremities w     Problem List:  1. Peripheral arterial disease:  a. Subclavian steal syndrome, 2016   b. Abnormal right femoral arterial duplex and popliteal tibial arterial duplex, on ASA and Plavix, with right femoral endarterectomy August 2016 (Dr. Carbajal)  c. Residual class I claudication without recent TIA/presyncope  d. Carotid duplex 11/18/2019: LICA 0-49% with retrograde left vertebral artery flow demonstrated, R ICA 50-69%, proximal LICA plaque without significant stenosis  e. Renal artery duplex 12/11/2019: Parenchymal disease in bilateral arteries, no JAIMEE  f. Left atherectomy, balloon angioplasty 1/7/2020: Common femoral arteries patent with moderate stenosis.  The common iliac, external iliac and internal leg arteries patent without evidence of stenosis.  Mid common femoral artery exhibits an occlusive lesion.  The origin of the profundus femoris artery appears to be occluded.  The SFA is reconstituted at its origin via collateral vessels.  The SFA, popliteal artery, and trifurcation are all patent.  Dominant flow to the foot is via the posterior tibial artery with a patent peroneal and anterior tibial artery into the distal leg.  Left common femoral artery-SFA atherectomy with balloon angioplasty.  After atherectomy and drug-coated balloon angioplasty, the common femoral  artery and SFA as well as the profunda femoris artery were patent without evidence of hemodynamically significant stenosis  g. ABIs October 2020 unchanged from previous study February 2020, right 0.74, left 0.87  h. Residual class I claudication, January 2020, July 2020  2. Hypertensive cardiovascular disease:  a. Remote IV stress test over 10 years ago; negative per patient-data deficit  b. Residual class I symptoms/normal EKG with acceptable IV Lexiscan Cardiolite study suggestive of low probability for significant focal obstructive coronary artery disease  (LVEF 0.65), May 2017.  c. Echocardiogram 11/18/2019: Mild to moderate AR, mild TR, LA mildly dilated, LVEF 68%, mild calcification of the aortic valve mainly affecting the non-and left coronary cusps, LV diastolic function normal, normal RV cavity size, wall thickness, systolic function and septal motion noted.  Mild MAC is present.  Aortic valve exhibits moderate sclerosis without stenosis, no pulmonary hypertension, no pericardial effusion  d. Residual class I symptoms, January 2020, July 2020, January 2021  3. Hypertension  4. Hyperlipidemia; 10.8% 10-year risk; 3.6% with treatment  5. COPD with mild-moderate exertional dyspnea  6. Former smoker; smoked 1 ppd x 40 years, quit in 2016  7. Syncopal episodes years ago associated with UTIs; last one a couple of years ago; data deficit  8. Depression  9. GERD  10. History of recurrent UTIs   11. Chronic low back pain  12. GABBY  13. Dizziness and presyncope January 2020  14. Surgical history:  a. Right femoral endarterectomy, August 2016  b. Lens implant, 1990s  c. Spontaneous pneumothorax, 1960  d. Left atherectomy and balloon angioplasty  e. Left total shoulder arthroplasty, June 2020    Allergies   Allergen Reactions   • Levocetirizine Dihydrochloride Myalgia     Muscle aches/joint pain         Current Outpatient Medications:   •  acetaminophen (ACETAMINOPHEN 8 HOUR) 650 MG 8 hr tablet, Take 650 mg by mouth 3  (Three) Times a Day. Twice a day, Disp: , Rfl:   •  AMLACTIN 12 % lotion, Apply 1 application topically to the appropriate area as directed As Needed for Irritation., Disp: , Rfl: 1  •  amLODIPine (NORVASC) 5 MG tablet, Take 1 tablet by mouth Daily., Disp: 90 tablet, Rfl: 1  •  ascorbic acid (VITAMIN C) 1000 MG tablet, Take 1,000 mg by mouth 2 (two) times a day., Disp: , Rfl:   •  aspirin 81 MG EC tablet, Take 81 mg by mouth every morning. TOLD NOT TO STOP BEFORE SURGERY, Disp: , Rfl:   •  bisoprolol (ZEBeta) 10 MG tablet, Take 1 tablet by mouth Daily., Disp: 90 tablet, Rfl: 1  •  Calcium Citrate-Vitamin D (CALCIUM + D PO), Take 2 tablets by mouth Daily., Disp: , Rfl:   •  citalopram (CeleXA) 40 MG tablet, Take 1 tablet by mouth Daily., Disp: 90 tablet, Rfl: 1  •  clopidogrel (PLAVIX) 75 MG tablet, Take 1 tablet by mouth Daily. Resume 6/10/20, Disp: 90 tablet, Rfl: 1  •  Coenzyme Q10 200 MG capsule, Take 400 mg by mouth Daily., Disp: , Rfl:   •  Fish Oil oil, Take 1 capsule by mouth Daily., Disp: , Rfl:   •  Flaxseed, Linseed, (FLAXSEED OIL) 1000 MG capsule, Take 1 capsule by mouth daily., Disp: , Rfl:   •  fluticasone (FLONASE) 50 MCG/ACT nasal spray, 1 spray by Each Nare route 2 (Two) Times a Day., Disp: 48 g, Rfl: 3  •  Fluticasone Furoate-Vilanterol (Breo Ellipta) 200-25 MCG/INH inhaler, Inhale 1 puff Daily., Disp: 1 inhaler, Rfl: 11  •  folic acid (FOLVITE) 1 MG tablet, Take 1 tablet by mouth Daily., Disp: 90 tablet, Rfl: 1  •  gabapentin (NEURONTIN) 100 MG capsule, Take 3 capsules by mouth Every Evening., Disp: 90 capsule, Rfl: 2  •  hydroCHLOROthiazide (HYDRODIURIL) 12.5 MG tablet, Take 1 tablet by mouth Daily., Disp: 90 tablet, Rfl: 3  •  Methylsulfonylmethane (MSM PO), Take 2 tablets by mouth daily., Disp: , Rfl:   •  Multiple Vitamins-Minerals (MULTIVITAMIN ADULT PO), Take 1 tablet by mouth daily., Disp: , Rfl:   •  Nutritional Supplements (NUTRITIONAL SUPPLEMENT PO), Take  by mouth. NeoCell   3 tsp once a  "day, Disp: , Rfl:   •  olmesartan (BENICAR) 40 MG tablet, Take 1 tablet by mouth Daily., Disp: 90 tablet, Rfl: 1  •  omeprazole (priLOSEC) 20 MG capsule, Take 1 capsule by mouth Daily., Disp: 90 capsule, Rfl: 3  •  rosuvastatin (CRESTOR) 20 MG tablet, Take 1 tablet by mouth Every Night., Disp: 90 tablet, Rfl: 1  •  traMADol (ULTRAM) 50 MG tablet, Take 1 tablet by mouth 2 (two) times a day., Disp: 180 tablet, Rfl: 2  •  triamcinolone (KENALOG) 0.1 % cream, Apply  topically to the appropriate area as directed 2 (Two) Times a Day. (Patient taking differently: Apply 1 application topically to the appropriate area as directed Daily As Needed.), Disp: 15 g, Rfl: 0  •  URINARY HEALTH/CRANBERRY PO, Take 1 tablet by mouth 2 (two) times a day. \"CRANACTIN\", Disp: , Rfl:     HISTORY OF PRESENT ILLNESS:  The patient is here for 6-month follow-up.  She had a left shoulder replacement June 2020.  Patient had ABIs in October 2020 that were unchanged from prior studies February 2020, right 0.74, left 0.87.  She denies any chest pain, shortness of breath, palpitations, dizziness, presyncope, worsening claudication, numbness, or syncope.  She has some right shoulder pain but does not want to have an operation.  She has not had any recent laboratory testing.  She is now retired from social work.  She is hoping to obtain her Covid vaccination soon.      Review of Systems   All other systems reviewed and are negative.     All other systems reviewed and otherwise negative.    Procedures       Objective:       Vitals:    01/28/21 1557   BP: 130/64   BP Location: Right arm   Patient Position: Sitting   Cuff Size: Adult   Pulse: 56   SpO2: 95%   Weight: 60.3 kg (133 lb)   Height: 157.5 cm (62\")     Body mass index is 24.33 kg/m².  Last weight July 2020 was 131 pounds  Constitutional:       Appearance: Healthy appearance. Not in distress.   Neck:      Vascular: No JVR. JVD normal.   Pulmonary:      Effort: Pulmonary effort is normal.      " Breath sounds: Normal breath sounds. No wheezing. No rhonchi. No rales.   Chest:      Chest wall: Not tender to palpatation.   Cardiovascular:      Murmurs: There is a grade 2/6 mid frequency harsh early systolic murmur at the LLSB.   Pulses:     Dorsalis pedis: 1+ bilaterally.     Posterior tibial: 1+ bilaterally.  Abdominal:      General: Bowel sounds are normal.      Palpations: Abdomen is soft.      Tenderness: There is no abdominal tenderness.   Musculoskeletal: Normal range of motion.         General: No tenderness.   Skin:     General: Skin is warm and dry.   Neurological:      General: No focal deficit present.      Mental Status: Alert and oriented to person, place and time.           Lab Review:   Lab Results   Component Value Date    GLUCOSE 97 10/09/2020    BUN 30 (H) 10/09/2020    CREATININE 0.94 10/09/2020    EGFRIFNONA 59 (L) 10/09/2020    BCR 31.9 (H) 10/09/2020    CO2 25.5 10/09/2020    CALCIUM 9.5 10/09/2020    ALBUMIN 5.10 10/09/2020    AST 28 10/09/2020    ALT 44 (H) 10/09/2020       Lab Results   Component Value Date    WBC 6.70 10/09/2020    HGB 12.7 10/09/2020    HCT 38.9 10/09/2020    MCV 92.0 10/09/2020     10/09/2020       Lab Results   Component Value Date    HGBA1C 5.50 06/05/2020       Lab Results   Component Value Date    TSH 2.210 10/09/2020       Lab Results   Component Value Date    CHOL 136 10/09/2020    CHOL 118 01/31/2020     Lab Results   Component Value Date    TRIG 67 10/09/2020    TRIG 56 01/31/2020     Lab Results   Component Value Date    HDL 68 (H) 10/09/2020    HDL 54 01/31/2020     Lab Results   Component Value Date    LDL 54 10/09/2020    LDL 53 01/31/2020       Advance Care Planning   ACP discussion was held with the patient during this visit. Patient does not have an advance directive, declines further assistance.          Assessment:     Overall continued acceptable course with no new interim cardiopulmonary complaints with acceptable functional status. We will  defer additional diagnostic or therapeutic intervention from a cardiac perspective at this time.       Diagnosis Plan   1. Peripheral arterial disease (CMS/HCC)   No worsening claudication   2. Benign essential hypertension   Controlled, continue current cardiac medications   3. Mixed hyperlipidemia   Acceptable lipid panel October 2020, continue rosuvastatin          Plan:         1. Patient to continue current medications and close follow up with the above providers.  2. Tentative cardiology follow up in July 2021 or patient may return sooner PRN.      Electronically signed by CYRUS Mcwilliams, 01/28/21, 4:16 PM EST.

## 2021-01-28 ENCOUNTER — OFFICE VISIT (OUTPATIENT)
Dept: CARDIOLOGY | Facility: CLINIC | Age: 66
End: 2021-01-28

## 2021-01-28 VITALS
DIASTOLIC BLOOD PRESSURE: 64 MMHG | HEIGHT: 62 IN | BODY MASS INDEX: 24.48 KG/M2 | HEART RATE: 56 BPM | SYSTOLIC BLOOD PRESSURE: 130 MMHG | WEIGHT: 133 LBS | OXYGEN SATURATION: 95 %

## 2021-01-28 DIAGNOSIS — E78.2 MIXED HYPERLIPIDEMIA: ICD-10-CM

## 2021-01-28 DIAGNOSIS — I10 BENIGN ESSENTIAL HYPERTENSION: ICD-10-CM

## 2021-01-28 DIAGNOSIS — I73.9 PERIPHERAL ARTERIAL DISEASE (HCC): Primary | ICD-10-CM

## 2021-01-28 PROCEDURE — 99214 OFFICE O/P EST MOD 30 MIN: CPT | Performed by: NURSE PRACTITIONER

## 2021-02-03 ENCOUNTER — TELEPHONE (OUTPATIENT)
Dept: INTERNAL MEDICINE | Facility: CLINIC | Age: 66
End: 2021-02-03

## 2021-02-03 NOTE — TELEPHONE ENCOUNTER
Spoke with pt and she requests to see Jenny specifically for her workers comp shoulder because of billing. Appt scheduled for 2/5/21 at 2:30pm.

## 2021-02-04 PROBLEM — G89.29 CHRONIC RIGHT SHOULDER PAIN: Chronic | Status: ACTIVE | Noted: 2021-02-04

## 2021-02-04 PROBLEM — M25.511 CHRONIC RIGHT SHOULDER PAIN: Chronic | Status: ACTIVE | Noted: 2021-02-04

## 2021-02-08 ENCOUNTER — OFFICE VISIT (OUTPATIENT)
Dept: INTERNAL MEDICINE | Facility: CLINIC | Age: 66
End: 2021-02-08

## 2021-02-08 VITALS
HEIGHT: 62 IN | BODY MASS INDEX: 25.03 KG/M2 | HEART RATE: 64 BPM | DIASTOLIC BLOOD PRESSURE: 62 MMHG | WEIGHT: 136 LBS | SYSTOLIC BLOOD PRESSURE: 136 MMHG | TEMPERATURE: 97.8 F

## 2021-02-08 DIAGNOSIS — Z79.899 LONG-TERM USE OF HIGH-RISK MEDICATION: ICD-10-CM

## 2021-02-08 DIAGNOSIS — M19.111 POST-TRAUMATIC OSTEOARTHRITIS OF RIGHT SHOULDER: Primary | ICD-10-CM

## 2021-02-08 PROCEDURE — 99214 OFFICE O/P EST MOD 30 MIN: CPT | Performed by: NURSE PRACTITIONER

## 2021-02-08 RX ORDER — TRAMADOL HYDROCHLORIDE 50 MG/1
50 TABLET ORAL 2 TIMES DAILY
Qty: 180 TABLET | Refills: 2 | Status: SHIPPED | OUTPATIENT
Start: 2021-02-08 | End: 2021-02-09 | Stop reason: SDUPTHER

## 2021-02-08 RX ORDER — GABAPENTIN 100 MG/1
300 CAPSULE ORAL EVERY EVENING
Qty: 90 CAPSULE | Refills: 2 | Status: SHIPPED | OUTPATIENT
Start: 2021-02-08 | End: 2021-05-11

## 2021-02-08 NOTE — PROGRESS NOTES
Aarti Wade  1955  [id number]  Patient Care Team:  Jannette Chapa MD as PCP - General  Jannette Chapa MD as PCP - Family Medicine  Severino Carbajal MD as Consulting Physician (Vascular Surgery)  Ketan Lazcano MD as Consulting Physician (Orthopedic Surgery)  Floyd Ernandez MD as Consulting Physician (Cardiology)  Lorenzo Good MD as Consulting Physician (Ophthalmology)    Aarti Wade is a pleasant 66 y.o. female who presents for evaluation of Shoulder Pain (Right shoulder pain (workers comp))    Chief Complaint   Patient presents with   • Shoulder Pain     Right shoulder pain (workers comp)       HPI:   Routine visit for chronic right shoulder pain.  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  Saw Dr. Burgess yrs ago until he retired then saw one of his partners.   pcp here ([facility]) has been following her since. Saw Dr. Lazcano for a an acute injury of left shoulder tripping on vacuum and had surgical repair on left side.   Had to stop celebrex within the last few yrs secondary to kidney insufficiency and hypertension.  The gabapentin has been prescribed for nighttime shoulder pain.  She started tramadol for pain when it was worse off NSAIDS and after left sided injury. The tramadol has continued to work well for the chronic right shoulder pain.    Past Medical History:   Diagnosis Date   • Anxiety    • Arthritis    • Arthritis of right shoulder region    • Chronic UTI    • Circulatory disorder    • Claudication (CMS/HCC)    • COPD (chronic obstructive pulmonary disease) (CMS/HCC)    • DA (degenerative arthritis)    • Depression    • diffuse fatty liver infiltration on CT 2009   • Gastrointestinal disorder    • GERD (gastroesophageal reflux disease)    • Head injury    • Hepatitis     UNKNOWN TYPE   • Hyperlipidemia    • Hypertension    •  Inflammatory arthritis    • 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    • Osteopenia of multiple sites    • Osteoporosis    • PAD (peripheral artery disease) (CMS/[facility])    • Smoker    • Spontaneous pneumothorax 1960   • Subclavian artery stenosis, left (CMS/[facility])    • Subclavian steal syndrome 2016   • Wears dentures     TOP ONLY     Past Surgical History:   Procedure Laterality Date   • ANGIOGRAM - CONVERTED  08/16/2016    AA WITH RUNOFF PER DR. HARRISON   • COLONOSCOPY      last 8 years ago.  Due to schedule   • EYE LENS IMPLANT SECONDARY Left    • FEMORAL FEMORAL BYPASS Right 8/22/2016    Procedure: RIGHT COMMON FEMORAL ENDARTERECTOMY WITH PATCH;  Surgeon: Severino Harrison MD;  Location: [facility];  Service:    • FINGER SURGERY Left     LEFT INDEX FINGER   • INTERVENTIONAL RADIOLOGY PROCEDURE N/A 8/16/2016    Procedure: IR PTA femoral popliteal artery;  Surgeon: Severino Harrison MD;  Location: [facility] C[facility] INVASIVE LOCATION;  Service:    • INTERVENTIONAL RADIOLOGY PROCEDURE Left 1/7/2020    Procedure: LEFT ATHERECTOMY, BALLOON ANGIOPLASTY;  Surgeon: Severino Harrison MD;  Location: [facility] CATH INVASIVE LOCATION;  Service: Interventional Radiology   • TONSILLECTOMY     • TOTAL SHOULDER ARTHROPLASTY Left 6/8/2020    Procedure: TOTAL SHOULDER ARTHROPLASTY LEFT;  Surgeon: Ketan Lazcano MD;  Location: [facility] OR;  Service: Orthopedics;  Laterality: Left;  protector in: 1346  protector out: 1402     Family History   Problem Relation Age of Onset   • Osteoarthritis Mother    • Alzheimer's disease Mother    • Hypertension Father    • Heart attack Father    • Alcohol abuse Father    • No Known Problems Sister    • No Known Problems Daughter    • No Known Problems Son    • Breast cancer Maternal Grandmother 50   • Breast cancer Paternal Grandmother 50   • No Known Problems Maternal Aunt    • No Known Problems Paternal Aunt    • Other Other    • Heart attack Other    • Coronary artery disease Paternal  Grandfather    • Stroke Paternal Grandfather    • No Known Problems Sister    • Ovarian cancer Neg Hx    • Endometrial cancer Neg Hx      Social History     Tobacco Use   Smoking Status Former Smoker   • Packs/day: 1.00   • Years: 40.00   • Pack years: 40.00   • Types: Electronic Cigarette, Cigarettes   • Quit date: 2016   • Years since quittin.6   Smokeless Tobacco Never Used   Tobacco Comment    1 PACK/DAY SMOKER UNTIL 2016     Allergies   Allergen Reactions   • Levocetirizine Dihydrochloride Myalgia     Muscle aches/joint pain       Current Outpatient Medications:   •  acetaminophen (Acetaminophen 8 Hour) 650 MG 8 hr tablet, Take 650 mg by mouth 2 (two) times a day., Disp: , Rfl:   •  AMLACTIN 12 % lotion, Apply 1 application topically to the appropriate area as directed As Needed for Irritation., Disp: , Rfl: 1  •  amLODIPine (NORVASC) 5 MG tablet, Take 1 tablet by mouth Daily., Disp: 90 tablet, Rfl: 1  •  ascorbic acid (VITAMIN C) 1000 MG tablet, Take 1,000 mg by mouth Daily., Disp: , Rfl:   •  aspirin 81 MG EC tablet, Take 81 mg by mouth every morning. TOLD NOT TO STOP BEFORE SURGERY, Disp: , Rfl:   •  bisoprolol (ZEBeta) 10 MG tablet, Take 1 tablet by mouth Daily., Disp: 90 tablet, Rfl: 1  •  Calcium Citrate-Vitamin D (CALCIUM + D PO), Take 2 tablets by mouth Daily., Disp: , Rfl:   •  citalopram (CeleXA) 40 MG tablet, Take 1 tablet by mouth Daily., Disp: 90 tablet, Rfl: 1  •  clopidogrel (PLAVIX) 75 MG tablet, Take 1 tablet by mouth Daily. Resume 6/10/20, Disp: 90 tablet, Rfl: 1  •  Coenzyme Q10 200 MG capsule, Take 400 mg by mouth Daily., Disp: , Rfl:   •  Fish Oil oil, Take 1 capsule by mouth Daily., Disp: , Rfl:   •  Flaxseed, Linseed, (FLAXSEED OIL) 1000 MG capsule, Take 1 capsule by mouth daily., Disp: , Rfl:   •  fluticasone (FLONASE) 50 MCG/ACT nasal spray, 1 spray by Each Nare route 2 (Two) Times a Day., Disp: 48 g, Rfl: 3  •  Fluticasone Furoate-Vilanterol (Breo Ellipta) 200-25 MCG/INH  "inhaler, Inhale 1 puff Daily., Disp: 1 inhaler, Rfl: 11  •  folic acid (FOLVITE) 1 MG tablet, Take 1 tablet by mouth Daily., Disp: 90 tablet, Rfl: 1  •  gabapentin (NEURONTIN) 100 MG capsule, Take 3 capsules by mouth Every Evening., Disp: 90 capsule, Rfl: 2  •  hydroCHLOROthiazide (HYDRODIURIL) 12.5 MG tablet, Take 1 tablet by mouth Daily., Disp: 90 tablet, Rfl: 3  •  Methylsulfonylmethane (MSM PO), Take 2 tablets by mouth daily., Disp: , Rfl:   •  Multiple Vitamins-Minerals (MULTIVITAMIN ADULT PO), Take 1 tablet by mouth daily., Disp: , Rfl:   •  Nutritional Supplements (NUTRITIONAL SUPPLEMENT PO), NeoCell   3 tsp once a day , Disp: , Rfl:   •  olmesartan (BENICAR) 40 MG tablet, Take 1 tablet by mouth Daily., Disp: 90 tablet, Rfl: 1  •  omeprazole (priLOSEC) 20 MG capsule, Take 1 capsule by mouth Daily., Disp: 90 capsule, Rfl: 3  •  rosuvastatin (CRESTOR) 20 MG tablet, Take 1 tablet by mouth Every Night., Disp: 90 tablet, Rfl: 1  •  traMADol (ULTRAM) 50 MG tablet, Take 1 tablet by mouth 2 (two) times a day., Disp: 180 tablet, Rfl: 2  •  triamcinolone (KENALOG) 0.1 % cream, Apply  topically to the appropriate area as directed 2 (Two) Times a Day. (Patient taking differently: Apply 1 application topically to the appropriate area as directed Daily As Needed.), Disp: 15 g, Rfl: 0  •  URINARY HEALTH/CRANBERRY PO, Take 1 tablet by mouth 2 (two) times a day. \"CRANACTIN\", Disp: , Rfl:     Review of Systems   Constitutional: Negative for chills, fatigue and fever.   HENT: Negative for congestion, ear pain and sinus pressure.    Respiratory: Negative for cough, chest tightness, shortness of breath and wheezing.    Cardiovascular: Negative for chest pain and palpitations.   Gastrointestinal: Negative for abdominal pain, blood in stool and constipation.   Skin: Negative for color change.   Allergic/Immunologic: Negative for environmental allergies.   Neurological: Negative for dizziness, speech difficulty and headache. " "  Psychiatric/Behavioral: Negative for decreased concentration. The patient is not nervous/anxious.      /62 (BP Location: Right arm, Patient Position: Sitting, Cuff Size: Adult)   Pulse 64   Temp 97.8 °F (36.6 °C) (Temporal)   Ht 157.5 cm (62.01\")   Wt 61.7 kg (136 lb)   BMI 24.87 kg/m²     Physical Exam  Vitals signs reviewed.   Constitutional:       Appearance: She is well-developed.   Pulmonary:      Effort: Pulmonary effort is normal.   Skin:     General: Skin is warm and dry.   Neurological:      Mental Status: She is alert.   Psychiatric:         Behavior: Behavior normal.         Procedures    Results Review:  None    PHQ-9 Total Score: 0    Assessment/Plan:  Diagnoses and all orders for this visit:    1. Post-traumatic osteoarthritis of right shoulder (Primary)  Comments:  continue gabapentin and tramadol for pain  Orders:  -     gabapentin (NEURONTIN) 100 MG capsule; Take 3 capsules by mouth Every Evening.  Dispense: 90 capsule; Refill: 2  -     traMADol (ULTRAM) 50 MG tablet; Take 1 tablet by mouth 2 (two) times a day.  Dispense: 180 tablet; Refill: 2    2. Long-term use of high-risk medication  -     Pain Management Profile (13 Drugs) Urine - Urine, Clean Catch; Future       Patient Instructions   Continue gabapentin nightly and tramadol twice daily for shoulder pain.  This patient is on a controlled substance which improves symptoms/quality of life and is aware of the risks, benefits and possible side-effects current treatment. The patient denies any medication side-effects at this time. A controlled substance agreement will be obtained or is currently on file. We reviewed required monitoring for controlled substances including but not limited to quarterly follow-up visits, annual depression screening, and urine drug screens to which the patient is agreeable. A WOOD report has been or shortly will be reviewed. There are no signs of deviation or misuse.         Plan of care reviewed with " patient at the conclusion of today's visit. Education was provided regarding diagnosis, management and any prescribed or recommended OTC medications.  Patient verbalizes understanding of and agreement with management plan.    No follow-ups on file.    *Note that portions of this note were completed with a voice recognition program.  Efforts were made to edit the dictation but occasionally words are transcribed.    Jenny Hurtado, APRN

## 2021-02-08 NOTE — PATIENT INSTRUCTIONS
Continue gabapentin nightly and tramadol twice daily for shoulder pain.  This patient is on a controlled substance which improves symptoms/quality of life and is aware of the risks, benefits and possible side-effects current treatment. The patient denies any medication side-effects at this time. A controlled substance agreement will be obtained or is currently on file. We reviewed required monitoring for controlled substances including but not limited to quarterly follow-up visits, annual depression screening, and urine drug screens to which the patient is agreeable. A WOOD report has been or shortly will be reviewed. There are no signs of deviation or misuse.

## 2021-02-09 ENCOUNTER — TELEPHONE (OUTPATIENT)
Dept: INTERNAL MEDICINE | Facility: CLINIC | Age: 66
End: 2021-02-09

## 2021-02-09 ENCOUNTER — PRIOR AUTHORIZATION (OUTPATIENT)
Dept: INTERNAL MEDICINE | Facility: CLINIC | Age: 66
End: 2021-02-09

## 2021-02-09 DIAGNOSIS — M19.111 POST-TRAUMATIC OSTEOARTHRITIS OF RIGHT SHOULDER: ICD-10-CM

## 2021-02-09 RX ORDER — TRAMADOL HYDROCHLORIDE 50 MG/1
50 TABLET ORAL 2 TIMES DAILY
Qty: 60 TABLET | Refills: 0 | Status: SHIPPED | OUTPATIENT
Start: 2021-02-09 | End: 2021-08-30

## 2021-02-09 NOTE — TELEPHONE ENCOUNTER
Patient returned call.  Can you send a 30 day supply to [facility] . Raeann can use a good rx coupon for $11.00

## 2021-02-09 NOTE — TELEPHONE ENCOUNTER
I think she was going to go ahead and pick it up yesterday you may want to check with her before we do a PA

## 2021-02-09 NOTE — TELEPHONE ENCOUNTER
Received PA request for tramadol. Good RX for #180 is $40.26. Please advise.     ID: [id number]  Group: [id number]  BIN: [id number]  PCN: [id number]

## 2021-02-10 ENCOUNTER — TELEPHONE (OUTPATIENT)
Dept: INTERNAL MEDICINE | Facility: CLINIC | Age: 66
End: 2021-02-10

## 2021-02-10 NOTE — TELEPHONE ENCOUNTER
They called asking what panels we wanted to be done on UDS. I forgot to fadia these on req forms. I gave them info. Nothing further needed at this time.

## 2021-02-18 ENCOUNTER — TRANSCRIBE ORDERS (OUTPATIENT)
Dept: ADMINISTRATIVE | Facility: HOSPITAL | Age: 66
End: 2021-02-18

## 2021-02-18 DIAGNOSIS — Z12.31 VISIT FOR SCREENING MAMMOGRAM: Primary | ICD-10-CM

## 2021-04-08 ENCOUNTER — APPOINTMENT (OUTPATIENT)
Dept: MAMMOGRAPHY | Facility: HOSPITAL | Age: 66
End: 2021-04-08

## 2021-04-27 ENCOUNTER — TELEPHONE (OUTPATIENT)
Dept: INTERNAL MEDICINE | Facility: CLINIC | Age: 66
End: 2021-04-27

## 2021-04-27 DIAGNOSIS — I73.9 PERIPHERAL ARTERIAL DISEASE (HCC): Primary | ICD-10-CM

## 2021-04-27 NOTE — TELEPHONE ENCOUNTER
Caller: Aarti Wade    Relationship: Self    Best call back number: [phone]       What is the provider, practice or medical service name: [facility]     What is the office location: [facility]      Any additional details: PATIENT'S DR JENNINGS, HAS RETIRED.  PATIENT STATES THE OFFICE NEEDS A NEW REFERRAL SO THEY CAN GET A VEINOUS DOPPLER FOR BOTH LEGS.

## 2021-04-27 NOTE — TELEPHONE ENCOUNTER
I ordered SAIDA and Doppler at [facility].  She should get a phone call within the next 2 weeks with date and time

## 2021-04-28 ENCOUNTER — TELEPHONE (OUTPATIENT)
Dept: INTERNAL MEDICINE | Facility: CLINIC | Age: 66
End: 2021-04-28

## 2021-04-28 RX ORDER — BISOPROLOL FUMARATE 10 MG/1
10 TABLET, FILM COATED ORAL DAILY
Qty: 90 TABLET | Refills: 1 | Status: SHIPPED | OUTPATIENT
Start: 2021-04-28 | End: 2021-10-01 | Stop reason: SDUPTHER

## 2021-04-28 NOTE — TELEPHONE ENCOUNTER
Caller: [id number] - [place], [place] - [address] AT [facility] [address] [facility] [phone] [facility] [phone] FX    Relationship: Pharmacy ([facility])    Best call back number: [phone]    What is the best time to reach you: ASAP        What was the call regarding: bisoprolol (ZEBeta) 10 MG tablet   PHARMACIST CALLED STATED THAT 60 TABLETS WOULD COST PATIENT $20.00 AND SO WOULD GETTING 90 TABLETS SO SHE IS REQUESTING A NEW PRESCRIPTION WITH THE QUANTITY BEING 90 TABLETS. PLEASE CALL [facility] WITH AUTHORIZATION     Do you require a callback: YES, ASAP

## 2021-04-30 DIAGNOSIS — I73.9 PERIPHERAL ARTERIAL DISEASE (HCC): ICD-10-CM

## 2021-05-03 DIAGNOSIS — G45.8 SUBCLAVIAN STEAL SYNDROME: ICD-10-CM

## 2021-05-03 DIAGNOSIS — I73.9 PERIPHERAL VASCULAR DISEASE OF EXTREMITY WITH CLAUDICATION (HCC): Primary | ICD-10-CM

## 2021-05-07 ENCOUNTER — HOSPITAL ENCOUNTER (OUTPATIENT)
Dept: MAMMOGRAPHY | Facility: HOSPITAL | Age: 66
Discharge: HOME | End: 2021-05-07
Admitting: INTERNAL MEDICINE

## 2021-05-07 DIAGNOSIS — Z12.31 VISIT FOR SCREENING MAMMOGRAM: ICD-10-CM

## 2021-05-07 PROCEDURE — 77063 BREAST TOMOSYNTHESIS BI: CPT

## 2021-05-07 PROCEDURE — 77067 SCR MAMMO BI INCL CAD: CPT

## 2021-05-07 PROCEDURE — 77063 BREAST TOMOSYNTHESIS BI: CPT | Performed by: RADIOLOGY

## 2021-05-07 PROCEDURE — 77067 SCR MAMMO BI INCL CAD: CPT | Performed by: RADIOLOGY

## 2021-05-11 ENCOUNTER — TELEPHONE (OUTPATIENT)
Dept: INTERNAL MEDICINE | Facility: CLINIC | Age: 66
End: 2021-05-11

## 2021-05-11 DIAGNOSIS — M19.111 POST-TRAUMATIC OSTEOARTHRITIS OF RIGHT SHOULDER: ICD-10-CM

## 2021-05-11 RX ORDER — GABAPENTIN 100 MG/1
300 CAPSULE ORAL EVERY EVENING
Qty: 90 CAPSULE | Refills: 2 | Status: SHIPPED | OUTPATIENT
Start: 2021-05-11 | End: 2021-08-30

## 2021-05-11 NOTE — TELEPHONE ENCOUNTER
CALLING FOR REFILL ON GABAPENTIN AND SHE STATED THAT THE PHARMACIST WOULD BE SENDING A REQUEST ALSO, EVEN THOUGH IT IS TOO EARLY FOR HER REFILL, SHE JUST WANTED TO LET US KNOW THAT THE PHARMACY WOULD BE CONTACTING US.

## 2021-06-01 RX ORDER — AMLODIPINE BESYLATE 5 MG/1
5 TABLET ORAL DAILY
Qty: 90 TABLET | Refills: 1 | Status: SHIPPED | OUTPATIENT
Start: 2021-06-01 | End: 2021-10-01 | Stop reason: SDUPTHER

## 2021-06-14 RX ORDER — CITALOPRAM 40 MG/1
40 TABLET ORAL DAILY
Qty: 90 TABLET | Refills: 1 | Status: SHIPPED | OUTPATIENT
Start: 2021-06-14 | End: 2021-10-01 | Stop reason: SDUPTHER

## 2021-06-18 RX ORDER — CLOPIDOGREL BISULFATE 75 MG/1
TABLET ORAL
Qty: 90 TABLET | Refills: 1 | Status: SHIPPED | OUTPATIENT
Start: 2021-06-18 | End: 2021-10-01 | Stop reason: SDUPTHER

## 2021-07-08 DIAGNOSIS — E72.11 HYPERHOMOCYSTEINEMIA (HCC): ICD-10-CM

## 2021-07-08 RX ORDER — FOLIC ACID 1 MG/1
1000 TABLET ORAL DAILY
Qty: 90 TABLET | Refills: 1 | Status: SHIPPED | OUTPATIENT
Start: 2021-07-08 | End: 2021-10-01 | Stop reason: SDUPTHER

## 2021-07-20 DIAGNOSIS — E78.2 MIXED HYPERLIPIDEMIA: ICD-10-CM

## 2021-07-20 RX ORDER — ROSUVASTATIN CALCIUM 20 MG/1
20 TABLET, COATED ORAL NIGHTLY
Qty: 90 TABLET | Refills: 1 | Status: SHIPPED | OUTPATIENT
Start: 2021-07-20 | End: 2022-02-04 | Stop reason: DRUGHIGH

## 2021-08-04 ENCOUNTER — OFFICE VISIT (OUTPATIENT)
Dept: CARDIOLOGY | Facility: CLINIC | Age: 66
End: 2021-08-04

## 2021-08-04 VITALS
HEART RATE: 58 BPM | SYSTOLIC BLOOD PRESSURE: 120 MMHG | DIASTOLIC BLOOD PRESSURE: 59 MMHG | OXYGEN SATURATION: 99 % | HEIGHT: 62 IN | WEIGHT: 127 LBS | BODY MASS INDEX: 23.37 KG/M2

## 2021-08-04 DIAGNOSIS — E78.5 DYSLIPIDEMIA: ICD-10-CM

## 2021-08-04 DIAGNOSIS — I73.9 PERIPHERAL ARTERIAL DISEASE (HCC): Primary | ICD-10-CM

## 2021-08-04 DIAGNOSIS — I10 BENIGN ESSENTIAL HYPERTENSION: ICD-10-CM

## 2021-08-04 PROCEDURE — 99214 OFFICE O/P EST MOD 30 MIN: CPT | Performed by: INTERNAL MEDICINE

## 2021-08-04 NOTE — PROGRESS NOTES
Subjective:     Encounter Date:08/04/2021    Patient ID: Aarti Wade is a 66 y.o. single white female, from [place], [place].  Recently retired and she has her Masters in Social Work and worked for [employer] (works with [employer]), and now she raises/sells race horses.      INTERNIST: Jannette Chapa MD  VASCULAR SURGEON: Severino Carbajal MD, Francisco Magana MD  UROLOGIST: Unknown (near [facility]?)  REMOTE ORTHOPEDIST: Lance Burgess MD  NEPHROLOGIST: Unknown  ORTHOPEDIC SURGEON: Ketan Lazcano MD    Chief Complaint:   Chief Complaint   Patient presents with   • Peripheral Vascular Disease     f/u       Problem List:  1. Peripheral arterial disease:  a. Subclavian steal syndrome, 2016   b. Abnormal right femoral arterial duplex and popliteal tibial arterial duplex, on ASA and Plavix, with right femoral endarterectomy August 2016 (Dr. Carbajal)  c. Residual class I claudication without recent TIA/presyncope  d. Carotid duplex 11/18/2019: LICA 0-49% with retrograde left vertebral artery flow demonstrated, R ICA 50-69%, proximal LICA plaque without significant stenosis  e. Renal artery duplex 12/11/2019: Parenchymal disease in bilateral arteries, no JAIMEE  f. Left atherectomy, balloon angioplasty 1/7/2020: Common femoral arteries patent with moderate stenosis.  The common iliac, external iliac and internal leg arteries patent without evidence of stenosis.  Mid common femoral artery exhibits an occlusive lesion.  The origin of the profundus femoris artery appears to be occluded.  The SFA is reconstituted at its origin via collateral vessels.  The SFA, popliteal artery, and trifurcation are all patent.  Dominant flow to the foot is via the posterior tibial artery with a patent peroneal and anterior tibial artery into the distal leg.  Left common femoral artery-SFA atherectomy with balloon angioplasty.  After atherectomy and drug-coated balloon angioplasty, the common femoral artery and SFA as  well as the profunda femoris artery were patent without evidence of hemodynamically significant stenosis  g. ABIs October 2020 unchanged from previous study February 2020, right 0.74, left 0.87  h. Residual class I claudication, January 2020, July 2020, August 2021  2. Hypertensive cardiovascular disease:  a. Remote IV stress test over 10 years ago; negative per patient-data deficit  b. Residual class I symptoms/normal EKG with acceptable IV Lexiscan Cardiolite study suggestive of low probability for significant focal obstructive coronary artery disease  (LVEF 0.65), May 2017.  c. Echocardiogram 11/18/2019: Mild to moderate AR, mild TR, LA mildly dilated, LVEF 68%, mild calcification of the aortic valve mainly affecting the non-and left coronary cusps, LV diastolic function normal, normal RV cavity size, wall thickness, systolic function and septal motion noted.  Mild MAC is present.  Aortic valve exhibits moderate sclerosis without stenosis, no pulmonary hypertension, no pericardial effusion  d. Residual class I symptoms, January 2020, July 2020, January 2021, August 2021  3. Hypertension  4. Hyperlipidemia; 10.8% 10-year risk; 3.6% with treatment  5. COPD with mild-moderate exertional dyspnea  6. Former smoker; smoked 1 ppd x 40 years, quit in 2016  7. Syncopal episodes years ago associated with UTIs; last one a couple of years ago; data deficit  8. Depression  9. GERD  10. History of recurrent UTIs   11. Chronic low back pain  12. GABBY  13. Dizziness and presyncope January 2020  14. Surgical history:  a. Right femoral endarterectomy, August 2016  b. Lens implant, 1990s  c. Spontaneous pneumothorax, 1960  d. Left atherectomy and balloon angioplasty  e. Left total shoulder arthroplasty, June 2020    Allergies   Allergen Reactions   • Levocetirizine Dihydrochloride Myalgia     Muscle aches/joint pain       Current Outpatient Medications:   •  acetaminophen (Acetaminophen 8 Hour) 650 MG 8 hr tablet, Take 650 mg by  mouth 2 (two) times a day., Disp: , Rfl:   •  AMLACTIN 12 % lotion, Apply 1 application topically to the appropriate area as directed As Needed for Irritation., Disp: , Rfl: 1  •  amLODIPine (NORVASC) 5 MG tablet, TAKE 1 TABLET BY MOUTH DAILY, Disp: 90 tablet, Rfl: 1  •  ascorbic acid (VITAMIN C) 1000 MG tablet, Take 1,000 mg by mouth Daily., Disp: , Rfl:   •  aspirin 81 MG EC tablet, Take 81 mg by mouth every morning. TOLD NOT TO STOP BEFORE SURGERY, Disp: , Rfl:   •  bisoprolol (ZEBeta) 10 MG tablet, Take 1 tablet by mouth Daily., Disp: 90 tablet, Rfl: 1  •  Calcium Citrate-Vitamin D (CALCIUM + D PO), Take 2 tablets by mouth Daily., Disp: , Rfl:   •  citalopram (CeleXA) 40 MG tablet, TAKE 1 TABLET BY MOUTH DAILY, Disp: 90 tablet, Rfl: 1  •  clopidogrel (PLAVIX) 75 MG tablet, TAKE 1 TABLET BY MOUTH EVERY DAY, Disp: 90 tablet, Rfl: 1  •  Coenzyme Q10 200 MG capsule, Take 400 mg by mouth Daily., Disp: , Rfl:   •  Fish Oil oil, Take 1 capsule by mouth Daily., Disp: , Rfl:   •  fluticasone (FLONASE) 50 MCG/ACT nasal spray, 1 spray by Each Nare route 2 (Two) Times a Day., Disp: 48 g, Rfl: 3  •  Fluticasone Furoate-Vilanterol (Breo Ellipta) 200-25 MCG/INH inhaler, Inhale 1 puff Daily., Disp: 1 inhaler, Rfl: 11  •  folic acid (FOLVITE) 1 MG tablet, TAKE 1 TABLET BY MOUTH DAILY, Disp: 90 tablet, Rfl: 1  •  gabapentin (NEURONTIN) 100 MG capsule, TAKE 3 CAPSULES BY MOUTH EVERY EVENING, Disp: 90 capsule, Rfl: 2  •  hydroCHLOROthiazide (HYDRODIURIL) 12.5 MG tablet, Take 1 tablet by mouth Daily., Disp: 90 tablet, Rfl: 3  •  Methylsulfonylmethane (MSM PO), Take 2 tablets by mouth daily., Disp: , Rfl:   •  Multiple Vitamins-Minerals (MULTIVITAMIN ADULT PO), Take 1 tablet by mouth daily., Disp: , Rfl:   •  Nutritional Supplements (NUTRITIONAL SUPPLEMENT PO), NeoCell   3 tsp once a day , Disp: , Rfl:   •  olmesartan (BENICAR) 40 MG tablet, Take 1 tablet by mouth Daily., Disp: 90 tablet, Rfl: 1  •  omeprazole (priLOSEC) 20 MG  "capsule, Take 1 capsule by mouth Daily., Disp: 90 capsule, Rfl: 3  •  rosuvastatin (CRESTOR) 20 MG tablet, TAKE 1 TABLET BY MOUTH EVERY NIGHT, Disp: 90 tablet, Rfl: 1  •  traMADol (ULTRAM) 50 MG tablet, Take 1 tablet by mouth 2 (two) times a day., Disp: 60 tablet, Rfl: 0  •  triamcinolone (KENALOG) 0.1 % cream, Apply  topically to the appropriate area as directed 2 (Two) Times a Day. (Patient taking differently: Apply 1 application topically to the appropriate area as directed Daily As Needed.), Disp: 15 g, Rfl: 0  •  URINARY HEALTH/CRANBERRY PO, Take 1 tablet by mouth 2 (two) times a day. \"CRANACTIN\", Disp: , Rfl:   •  Flaxseed, Linseed, (FLAXSEED OIL) 1000 MG capsule, Take 1 capsule by mouth daily., Disp: , Rfl:     History of Present Illness: Patient returns for scheduled 7-month follow up.  The patient retired from doing work with [employer] as a  in December 2020.  She is now focused on raising/selling race horses.  She denies much claudication and is supposed to follow-up with Dr. Magana for repeat scans of her lower extremities around December 2021.  She denies any chest pain, shortness of breath, palpitations, dizziness, presyncope, or syncope.  She has had both of her Covid vaccinations.  She has not had any recent laboratory testing.  She has not smoked since 2016.  She has an appointment with her internist in October 2021. Patient has had no additional interim ER visits, hospitalizations, serious illnesses, or surgeries.         Review of Systems   All other systems reviewed and are negative.     Obtained and negative except as outlined in problem list and HPI.    Procedures       Objective:       Vitals:    08/04/21 1328 08/04/21 1329   BP: 117/52 120/59   BP Location: Right arm Right arm   Patient Position: Sitting Standing   Pulse: 60 58   SpO2: 99%    Weight: 57.6 kg (127 lb)    Height: 157.5 cm (62\")      Body mass index is 23.23 kg/m².  Last weight: 133 lbs    Vitals reviewed. "   Constitutional:       Appearance: Well-developed.   Neck:      Thyroid: No thyromegaly.      Vascular: No carotid bruit or JVD.      Lymphadenopathy: No cervical adenopathy.   Pulmonary:      Effort: Pulmonary effort is normal.      Breath sounds: Normal breath sounds. No wheezing. No rhonchi. No rales.   Cardiovascular:      Regular rhythm.      Murmurs: There is a grade 2/6 mid frequency harsh early systolic murmur at the LLSB.      No gallop. No S3 gallop.   Pulses:     Dorsalis pedis: 1+ on the left side.     Posterior tibial: 1+ bilaterally.  Edema:     Peripheral edema absent.   Abdominal:      Palpations: Abdomen is soft. There is no abdominal mass.      Tenderness: There is no abdominal tenderness.   Musculoskeletal: Normal range of motion. Skin:     General: Skin is warm and dry.      Findings: No rash.   Neurological:      Mental Status: Alert and oriented to person, place, and time.           Lab Review:     No recent laboratory studies available for review today.    Mammogram, 5/7/2021: BI-RADS CATEGORY 1, NEGATIVE.    10/09/2020: Lipid panel: Cholesterol 136, triglycerides 67, HDL 68, LDL 54      Assessment:       Overall continued acceptable course with no new interim cardiopulmonary complaints with acceptable functional status. We will defer additional diagnostic or therapeutic intervention from a cardiac perspective at this time.  I encouraged the patient to follow-up with Dr. Magana for her peripheral arterial disease.     Diagnosis Plan   1. Peripheral arterial disease (CMS/HCC)   Stable claudication with upcoming appt with Dr. Magana December 2021   2. Benign essential hypertension  Controlled, continue current cardiac medications   3. Dyslipidemia  No new labs to review, continue rosuvastatin          Plan:         1. Patient to continue current medications and close follow up with the above providers.  2. Tentative cardiology follow up in February 2022 or patient may return sooner  PRN.    Scribed for Floyd Ernandez MD by Cynthia Powell, APRN. 8/4/2021  13:59 EDT    I, Floyd Ernandez MD, [facility], personally performed the services described in this documentation as scribed by the above named individual in my presence, and it is both accurate and complete. At 14:02 EDT on 08/04/2021

## 2021-08-09 ENCOUNTER — OFFICE VISIT (OUTPATIENT)
Dept: INTERNAL MEDICINE | Facility: CLINIC | Age: 66
End: 2021-08-09

## 2021-08-09 VITALS
HEART RATE: 60 BPM | HEIGHT: 62 IN | WEIGHT: 120 LBS | BODY MASS INDEX: 22.08 KG/M2 | SYSTOLIC BLOOD PRESSURE: 124 MMHG | DIASTOLIC BLOOD PRESSURE: 58 MMHG | TEMPERATURE: 97.8 F

## 2021-08-09 DIAGNOSIS — M19.111 POST-TRAUMATIC OSTEOARTHRITIS OF RIGHT SHOULDER: Primary | Chronic | ICD-10-CM

## 2021-08-09 PROCEDURE — 99213 OFFICE O/P EST LOW 20 MIN: CPT | Performed by: INTERNAL MEDICINE

## 2021-08-09 NOTE — PATIENT INSTRUCTIONS
This patient is on a controlled substance which improves symptoms/quality of life and is aware of the risks, benefits and possible side-effects current treatment. The patient denies any medication side-effects at this time. A controlled substance agreement will be obtained or is currently on file. We reviewed required monitoring for controlled substances including but not limited to quarterly follow-up visits, annual depression screening, and urine drug screens to which the patient is agreeable. A WOOD report has been or shortly will be reviewed. There are no signs of deviation or misuse.

## 2021-08-09 NOTE — PROGRESS NOTES
Aarti Wade  1955  [id number]  Patient Care Team:  Jannette Chapa MD as PCP - General  Jannette Chapa MD as PCP - Family Medicine  Severino Carbajal MD as Consulting Physician (Vascular Surgery)  Ketan Lazcano MD as Consulting Physician (Orthopedic Surgery)  Floyd Ernandez MD as Consulting Physician (Cardiology)  Lorenzo Good MD as Consulting Physician (Ophthalmology)    Aarti Wade is a pleasant 66 y.o. female who presents for evaluation of Shoulder Pain (follow up on shoulder pain for workers comp)    Chief Complaint   Patient presents with   • Shoulder Pain     follow up on shoulder pain for workers comp       HPI:   Here for routine 6 mo follow up.  Uses tramadol BID and gabapentin 300mg HS.  Routine visit for chronic right shoulder pain.  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  Saw Dr. Burgess yrs ago until he retired then saw one of his partners.   pcp here ([facility]) has been following her since. Saw Dr. Lazcano for a an acute injury of left shoulder tripping on vacuum and had surgical repair on left side.   Had to stop celebrex within the last few yrs secondary to kidney insufficiency and hypertension.  The gabapentin has been prescribed for nighttime shoulder pain.  She started tramadol for pain when it was worse off NSAIDS and after left sided injury. The tramadol has continued to work well for the chronic right shoulder pain.    Saw Dr. Ernandez last week.  Friday had cough, diarrhea, lightheaded.  Now improved, had 6 lb wt loss between these visits. Today back to baseline.  Past Medical History:   Diagnosis Date   • Anxiety    • Arthritis    • Arthritis of right shoulder region    • Chronic UTI    • Circulatory disorder    • Claudication (CMS/HCC)    • COPD (chronic obstructive pulmonary disease) (CMS/HCC)    • DA (degenerative arthritis)     • Depression    • diffuse fatty liver infiltration on CT 2009   • Gastrointestinal disorder    • GERD (gastroesophageal reflux disease)    • Head injury    • Hepatitis     UNKNOWN TYPE   • Hyperlipidemia    • Hypertension    • Inflammatory arthritis    • 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 HARRISON   • COLONOSCOPY      last 8 years ago.  Due to schedule   • EYE LENS IMPLANT SECONDARY Left    • FEMORAL FEMORAL BYPASS Right 8/22/2016    Procedure: RIGHT COMMON FEMORAL ENDARTERECTOMY WITH PATCH;  Surgeon: Severino Harrison MD;  Location: [email or web address] OR;  Service:    • FINGER SURGERY Left     LEFT INDEX FINGER   • INTERVENTIONAL RADIOLOGY PROCEDURE N/A 8/16/2016    Procedure: IR PTA femoral popliteal artery;  Surgeon: Severino Harrison MD;  Location: [email or web address] CATH INVASIVE LOCATION;  Service:    • INTERVENTIONAL RADIOLOGY PROCEDURE Left 1/7/2020    Procedure: LEFT ATHERECTOMY, BALLOON ANGIOPLASTY;  Surgeon: Severino Harrison MD;  Location: [email or web address] CATH INVASIVE LOCATION;  Service: Interventional Radiology   • TONSILLECTOMY     • TOTAL SHOULDER ARTHROPLASTY Left 6/8/2020    Procedure: TOTAL SHOULDER ARTHROPLASTY LEFT;  Surgeon: Ketan Lazcano MD;  Location: [email or web address] OR;  Service: Orthopedics;  Laterality: Left;  protector in: 1346  protector out: 1402     Family History   Problem Relation Age of Onset   • Osteoarthritis Mother    • Alzheimer's disease Mother    • Hypertension Father    • Heart attack Father    • Alcohol abuse Father    • No Known Problems Sister    • No Known Problems Daughter    • No Known Problems Son    •  Breast cancer Maternal Grandmother 50   • Breast cancer Paternal Grandmother 50   • No Known Problems Maternal Aunt    • No Known Problems Paternal Aunt    • Other Other    • Heart attack Other    • Coronary artery disease Paternal Grandfather    • Stroke Paternal Grandfather    • No Known Problems Sister    • Ovarian cancer Neg Hx    • Endometrial cancer Neg Hx      Social History     Tobacco Use   Smoking Status Former Smoker   • Packs/day: 1.00   • Years: 40.00   • Pack years: 40.00   • Types: Electronic Cigarette, Cigarettes   • Quit date: 2016   • Years since quittin.1   Smokeless Tobacco Never Used   Tobacco Comment    1 PACK/DAY SMOKER UNTIL 2016     Allergies   Allergen Reactions   • Levocetirizine Dihydrochloride Myalgia     Muscle aches/joint pain       Current Outpatient Medications:   •  acetaminophen (Acetaminophen 8 Hour) 650 MG 8 hr tablet, Take 650 mg by mouth 2 (two) times a day., Disp: , Rfl:   •  AMLACTIN 12 % lotion, Apply 1 application topically to the appropriate area as directed As Needed for Irritation., Disp: , Rfl: 1  •  amLODIPine (NORVASC) 5 MG tablet, TAKE 1 TABLET BY MOUTH DAILY, Disp: 90 tablet, Rfl: 1  •  ascorbic acid (VITAMIN C) 1000 MG tablet, Take 1,000 mg by mouth Daily., Disp: , Rfl:   •  aspirin 81 MG EC tablet, Take 81 mg by mouth every morning. TOLD NOT TO STOP BEFORE SURGERY, Disp: , Rfl:   •  bisoprolol (ZEBeta) 10 MG tablet, Take 1 tablet by mouth Daily., Disp: 90 tablet, Rfl: 1  •  Calcium Citrate-Vitamin D (CALCIUM + D PO), Take 2 tablets by mouth Daily., Disp: , Rfl:   •  citalopram (CeleXA) 40 MG tablet, TAKE 1 TABLET BY MOUTH DAILY, Disp: 90 tablet, Rfl: 1  •  clopidogrel (PLAVIX) 75 MG tablet, TAKE 1 TABLET BY MOUTH EVERY DAY, Disp: 90 tablet, Rfl: 1  •  Coenzyme Q10 200 MG capsule, Take 400 mg by mouth Daily., Disp: , Rfl:   •  Fish Oil oil, Take 1 capsule by mouth Daily., Disp: , Rfl:   •  Flaxseed, Linseed, (FLAXSEED OIL) 1000 MG capsule, Take 1  "capsule by mouth daily., Disp: , Rfl:   •  fluticasone (FLONASE) 50 MCG/ACT nasal spray, 1 spray by Each Nare route 2 (Two) Times a Day., Disp: 48 g, Rfl: 3  •  Fluticasone Furoate-Vilanterol (Breo Ellipta) 200-25 MCG/INH inhaler, Inhale 1 puff Daily., Disp: 1 inhaler, Rfl: 11  •  folic acid (FOLVITE) 1 MG tablet, TAKE 1 TABLET BY MOUTH DAILY, Disp: 90 tablet, Rfl: 1  •  gabapentin (NEURONTIN) 100 MG capsule, TAKE 3 CAPSULES BY MOUTH EVERY EVENING, Disp: 90 capsule, Rfl: 2  •  hydroCHLOROthiazide (HYDRODIURIL) 12.5 MG tablet, Take 1 tablet by mouth Daily., Disp: 90 tablet, Rfl: 3  •  Methylsulfonylmethane (MSM PO), Take 2 tablets by mouth daily., Disp: , Rfl:   •  Multiple Vitamins-Minerals (MULTIVITAMIN ADULT PO), Take 1 tablet by mouth daily., Disp: , Rfl:   •  Nutritional Supplements (NUTRITIONAL SUPPLEMENT PO), NeoCell   3 tsp once a day , Disp: , Rfl:   •  olmesartan (BENICAR) 40 MG tablet, Take 1 tablet by mouth Daily., Disp: 90 tablet, Rfl: 1  •  omeprazole (priLOSEC) 20 MG capsule, Take 1 capsule by mouth Daily., Disp: 90 capsule, Rfl: 3  •  rosuvastatin (CRESTOR) 20 MG tablet, TAKE 1 TABLET BY MOUTH EVERY NIGHT, Disp: 90 tablet, Rfl: 1  •  traMADol (ULTRAM) 50 MG tablet, Take 1 tablet by mouth 2 (two) times a day., Disp: 60 tablet, Rfl: 0  •  triamcinolone (KENALOG) 0.1 % cream, Apply  topically to the appropriate area as directed 2 (Two) Times a Day. (Patient taking differently: Apply 1 application topically to the appropriate area as directed Daily As Needed.), Disp: 15 g, Rfl: 0  •  URINARY HEALTH/CRANBERRY PO, Take 1 tablet by mouth 2 (two) times a day. \"CRANACTIN\", Disp: , Rfl:     Review of Systems  /58 (BP Location: Right arm, Patient Position: Sitting, Cuff Size: Adult)   Pulse 60   Temp 97.8 °F (36.6 °C) (Temporal)   Ht 157.5 cm (62.01\")   Wt 54.4 kg (120 lb)   BMI 21.94 kg/m²     Physical Exam  Vitals reviewed.   Constitutional:       Appearance: She is well-developed.   Pulmonary:      " Effort: Pulmonary effort is normal.   Skin:     General: Skin is warm and dry.   Neurological:      Mental Status: She is alert.   Psychiatric:         Mood and Affect: Mood normal.         Behavior: Behavior normal.         Thought Content: Thought content normal.         Judgment: Judgment normal.         Procedures    Results Review:  None    PHQ-9 Total Score:      Assessment/Plan:  Diagnoses and all orders for this visit:    1. Post-traumatic osteoarthritis of right shoulder (Primary)       Patient Instructions   This patient is on a controlled substance which improves symptoms/quality of life and is aware of the risks, benefits and possible side-effects current treatment. The patient denies any medication side-effects at this time. A controlled substance agreement will be obtained or is currently on file. We reviewed required monitoring for controlled substances including but not limited to quarterly follow-up visits, annual depression screening, and urine drug screens to which the patient is agreeable. A WOOD report has been or shortly will be reviewed. There are no signs of deviation or misuse.         Plan of care reviewed with patient at the conclusion of today's visit. Education was provided regarding diagnosis, management and any prescribed or recommended OTC medications.  Patient verbalizes understanding of and agreement with management plan.    Return for Next scheduled follow up, Labs next visit.    *Note that portions of this note were completed with a voice recognition program.  Efforts were made to edit the dictation but occasionally words are transcribed.    CYRUS Gallegos           Rhombic Flap Text: The defect edges were debeveled with a #15 scalpel blade.  Given the location of the defect and the proximity to free margins a rhombic flap was deemed most appropriate.  Using a sterile surgical marker, an appropriate rhombic flap was drawn incorporating the defect.    The area thus outlined was incised deep to adipose tissue with a #15 scalpel blade.  The skin margins were undermined to an appropriate distance in all directions utilizing iris scissors.

## 2021-08-10 DIAGNOSIS — I10 BENIGN ESSENTIAL HYPERTENSION: Chronic | ICD-10-CM

## 2021-08-10 RX ORDER — OLMESARTAN MEDOXOMIL 40 MG/1
40 TABLET ORAL DAILY
Qty: 90 TABLET | Refills: 1 | Status: SHIPPED | OUTPATIENT
Start: 2021-08-10 | End: 2021-10-10 | Stop reason: SDUPTHER

## 2021-08-19 PROCEDURE — U0004 COV-19 TEST NON-CDC HGH THRU: HCPCS | Performed by: FAMILY MEDICINE

## 2021-08-22 ENCOUNTER — TELEPHONE (OUTPATIENT)
Dept: URGENT CARE | Facility: CLINIC | Age: 66
End: 2021-08-22

## 2021-08-22 NOTE — TELEPHONE ENCOUNTER
----- Message from Mauricio Munoz MD sent at 8/22/2021 10:03 AM EDT -----  COVID is positive.  Please inform patient and have them continue quarantine per CDC guidelines.-Mauricio Munoz MD

## 2021-08-23 DIAGNOSIS — J20.9 ACUTE BRONCHITIS, UNSPECIFIED ORGANISM: ICD-10-CM

## 2021-08-23 RX ORDER — AZITHROMYCIN 250 MG/1
TABLET, FILM COATED ORAL
Qty: 6 TABLET | Refills: 0 | Status: SHIPPED | OUTPATIENT
Start: 2021-08-23 | End: 2021-10-01

## 2021-08-23 RX ORDER — DEXTROMETHORPHAN HYDROBROMIDE AND PROMETHAZINE HYDROCHLORIDE 15; 6.25 MG/5ML; MG/5ML
5 SYRUP ORAL 4 TIMES DAILY PRN
Qty: 118 ML | Refills: 0 | Status: SHIPPED | OUTPATIENT
Start: 2021-08-23 | End: 2022-02-09

## 2021-08-23 NOTE — TELEPHONE ENCOUNTER
Caller: Mauro Aarti NELIAD    Relationship: Self    Best call back number: [phone]    Medication needed:   Requested Prescriptions     Pending Prescriptions Disp Refills   • azithromycin (Zithromax) 250 MG tablet 6 tablet 0     Sig: Take 2 tablets the first day, then 1 tablet daily on days 2 through 5.   • promethazine-dextromethorphan (PROMETHAZINE-DM) 6.25-15 MG/5ML syrup 118 mL 0     Sig: Take 5 mL by mouth 4 (Four) Times a Day As Needed for Cough.       When do you need the refill by: ASAP    What additional details did the patient provide when requesting the medication: PATIENT IS NOT BETTER. COVID POSITIVE     Does the patient have less than a 3 day supply:  [x] Yes  [] No    What is the patient's preferred pharmacy: [id number] [address] AT [facility] [address]  - [phone] [facility] [phone] FX

## 2021-08-23 NOTE — TELEPHONE ENCOUNTER
Called and spoke with patient. She tested positive for covid. Seen at [facility] 08/19/21. She took her last dose of the azithromycin today. She states she is feeling better but has chest congestion and productive cough with white phlegm. She is worried about this sitting/bronchitis since she is a former smoker.  Prior to going to [facility] she self prescribed bactrim 1 tab TID for about 2-3 days and her last dose was 08/17/21.

## 2021-08-30 DIAGNOSIS — M19.111 POST-TRAUMATIC OSTEOARTHRITIS OF RIGHT SHOULDER: ICD-10-CM

## 2021-08-30 RX ORDER — GABAPENTIN 100 MG/1
300 CAPSULE ORAL EVERY EVENING
Qty: 90 CAPSULE | Refills: 2 | Status: SHIPPED | OUTPATIENT
Start: 2021-08-30 | End: 2021-12-21

## 2021-08-30 RX ORDER — TRAMADOL HYDROCHLORIDE 50 MG/1
TABLET ORAL
Qty: 180 TABLET | Refills: 2 | Status: SHIPPED | OUTPATIENT
Start: 2021-08-30 | End: 2022-03-23

## 2021-08-30 NOTE — TELEPHONE ENCOUNTER
Rx Refill Note  Requested Prescriptions     Pending Prescriptions Disp Refills   • gabapentin (NEURONTIN) 100 MG capsule [Pharmacy Med Name: GABAPENTIN 100MG CAPSULES] 90 capsule 2     Sig: TAKE 3 CAPSULES BY MOUTH EVERY EVENING      Last office visit with prescribing clinician: 08/09/21      Next office visit with prescribing clinician: 10/1/2021     LA: 05/11/21 #90 2R             Mercedes Trammell LPN  08/30/21, 11:25 EDT

## 2021-08-30 NOTE — TELEPHONE ENCOUNTER
Rx Refill Note  Requested Prescriptions     Pending Prescriptions Disp Refills   • traMADol (ULTRAM) 50 MG tablet [Pharmacy Med Name: TRAMADOL 50MG TABLETS] 180 tablet      Sig: TAKE 1 TABLET BY MOUTH TWICE DAILY      Last office visit with prescribing clinician: 8/9/2021      Next office visit with prescribing clinician: 2/9/2022   LA: 02/09/21 #60 0r        {TIP  Please add Last Relevant Lab Date if appropriate:23}     Mercedes Trammell LPN  08/30/21, 11:26 EDT

## 2021-08-30 NOTE — TELEPHONE ENCOUNTER
Caller: WadeAarti    Relationship: Self    Best call back number: [phone]     Medication needed:   Requested Prescriptions     Pending Prescriptions Disp Refills   • traMADol (ULTRAM) 50 MG tablet [Pharmacy Med Name: TRAMADOL 50MG TABLETS] 180 tablet      Sig: TAKE 1 TABLET BY MOUTH TWICE DAILY       When do you need the refill by:     What additional details did the patient provide when requesting the medication:   PATIENT IS OUT OF MEDICATION     Does the patient have less than a 3 day supply:  [x] Yes  [] No    What is the patient's preferred pharmacy: [facility] #[id number] Formerly [facility] [address] AT [facility] [address] [facility] [phone] [facility] [phone] FX

## 2021-08-30 NOTE — TELEPHONE ENCOUNTER
Caller: Aarti Wade    Relationship: Self    Best call back number: [phone]      Medication needed:   Requested Prescriptions     Pending Prescriptions Disp Refills   • gabapentin (NEURONTIN) 100 MG capsule [Pharmacy Med Name: GABAPENTIN 100MG CAPSULES] 90 capsule 2     Sig: TAKE 3 CAPSULES BY MOUTH EVERY EVENING       When do you need the refill by:     What additional details did the patient provide when requesting the medication:   PATIENT IS OUT OF MEDICATION     Does the patient have less than a 3 day supply:  [x] Yes  [] No    What is the patient's preferred pharmacy: [id number] Formerly [facility] [address] AT [facility] [address] [facility] [phone] [facility] [phone] FX

## 2021-10-01 ENCOUNTER — OFFICE VISIT (OUTPATIENT)
Dept: INTERNAL MEDICINE | Facility: CLINIC | Age: 66
End: 2021-10-01

## 2021-10-01 VITALS
TEMPERATURE: 98.4 F | BODY MASS INDEX: 22.34 KG/M2 | OXYGEN SATURATION: 95 % | SYSTOLIC BLOOD PRESSURE: 118 MMHG | HEART RATE: 56 BPM | RESPIRATION RATE: 16 BRPM | DIASTOLIC BLOOD PRESSURE: 58 MMHG | WEIGHT: 121.4 LBS | HEIGHT: 62 IN

## 2021-10-01 DIAGNOSIS — I10 BENIGN ESSENTIAL HYPERTENSION: ICD-10-CM

## 2021-10-01 DIAGNOSIS — F32.A DEPRESSIVE DISORDER: ICD-10-CM

## 2021-10-01 DIAGNOSIS — I73.9 PERIPHERAL VASCULAR DISEASE OF EXTREMITY WITH CLAUDICATION (HCC): ICD-10-CM

## 2021-10-01 DIAGNOSIS — I70.212 ATHEROSCLEROSIS OF NATIVE ARTERY OF LEFT LOWER EXTREMITY WITH INTERMITTENT CLAUDICATION (HCC): ICD-10-CM

## 2021-10-01 DIAGNOSIS — Z00.00 MEDICARE ANNUAL WELLNESS VISIT, SUBSEQUENT: Primary | ICD-10-CM

## 2021-10-01 DIAGNOSIS — J44.9 COPD MIXED TYPE (HCC): ICD-10-CM

## 2021-10-01 DIAGNOSIS — R42 POSTURAL DIZZINESS WITH PRESYNCOPE: ICD-10-CM

## 2021-10-01 DIAGNOSIS — N18.31 STAGE 3A CHRONIC KIDNEY DISEASE (HCC): Chronic | ICD-10-CM

## 2021-10-01 DIAGNOSIS — R55 POSTURAL DIZZINESS WITH PRESYNCOPE: ICD-10-CM

## 2021-10-01 DIAGNOSIS — M75.01 ADHESIVE CAPSULITIS OF RIGHT SHOULDER: Chronic | ICD-10-CM

## 2021-10-01 DIAGNOSIS — E78.2 MIXED HYPERLIPIDEMIA: ICD-10-CM

## 2021-10-01 DIAGNOSIS — E72.11 HYPERHOMOCYSTEINEMIA (HCC): ICD-10-CM

## 2021-10-01 DIAGNOSIS — Z00.00 ANNUAL PHYSICAL EXAM: ICD-10-CM

## 2021-10-01 DIAGNOSIS — Z12.11 COLON CANCER SCREENING: ICD-10-CM

## 2021-10-01 DIAGNOSIS — K21.9 GASTROESOPHAGEAL REFLUX DISEASE WITHOUT ESOPHAGITIS: ICD-10-CM

## 2021-10-01 DIAGNOSIS — M85.89 OSTEOPENIA OF MULTIPLE SITES: ICD-10-CM

## 2021-10-01 PROBLEM — G89.18 ACUTE POSTOPERATIVE PAIN: Status: RESOLVED | Noted: 2020-06-09 | Resolved: 2021-10-01

## 2021-10-01 PROCEDURE — 99397 PER PM REEVAL EST PAT 65+ YR: CPT | Performed by: INTERNAL MEDICINE

## 2021-10-01 PROCEDURE — 1170F FXNL STATUS ASSESSED: CPT | Performed by: INTERNAL MEDICINE

## 2021-10-01 PROCEDURE — 1125F AMNT PAIN NOTED PAIN PRSNT: CPT | Performed by: INTERNAL MEDICINE

## 2021-10-01 PROCEDURE — 90662 IIV NO PRSV INCREASED AG IM: CPT | Performed by: INTERNAL MEDICINE

## 2021-10-01 PROCEDURE — 93000 ELECTROCARDIOGRAM COMPLETE: CPT | Performed by: INTERNAL MEDICINE

## 2021-10-01 PROCEDURE — 96160 PT-FOCUSED HLTH RISK ASSMT: CPT | Performed by: INTERNAL MEDICINE

## 2021-10-01 PROCEDURE — G0439 PPPS, SUBSEQ VISIT: HCPCS | Performed by: INTERNAL MEDICINE

## 2021-10-01 PROCEDURE — G0008 ADMIN INFLUENZA VIRUS VAC: HCPCS | Performed by: INTERNAL MEDICINE

## 2021-10-01 RX ORDER — FOLIC ACID 1 MG/1
1000 TABLET ORAL DAILY
Qty: 90 TABLET | Refills: 1 | Status: SHIPPED | OUTPATIENT
Start: 2021-10-01 | End: 2021-11-21

## 2021-10-01 RX ORDER — CLOPIDOGREL BISULFATE 75 MG/1
75 TABLET ORAL DAILY
Qty: 90 TABLET | Refills: 1 | Status: SHIPPED | OUTPATIENT
Start: 2021-10-01 | End: 2022-06-27

## 2021-10-01 RX ORDER — BISOPROLOL FUMARATE 10 MG/1
10 TABLET, FILM COATED ORAL DAILY
Qty: 90 TABLET | Refills: 1 | Status: SHIPPED | OUTPATIENT
Start: 2021-10-01 | End: 2022-04-18

## 2021-10-01 RX ORDER — HYDROCHLOROTHIAZIDE 12.5 MG/1
12.5 TABLET ORAL DAILY
Qty: 90 TABLET | Refills: 3 | Status: SHIPPED | OUTPATIENT
Start: 2021-10-01 | End: 2022-09-13

## 2021-10-01 RX ORDER — AMLODIPINE BESYLATE 5 MG/1
5 TABLET ORAL DAILY
Qty: 90 TABLET | Refills: 1 | Status: SHIPPED | OUTPATIENT
Start: 2021-10-01 | End: 2022-06-03

## 2021-10-01 RX ORDER — CITALOPRAM 40 MG/1
40 TABLET ORAL DAILY
Qty: 90 TABLET | Refills: 1 | Status: SHIPPED | OUTPATIENT
Start: 2021-10-01 | End: 2022-06-27

## 2021-10-01 NOTE — PATIENT INSTRUCTIONS
Patient Instructions   Problem List Items Addressed This Visit        Cardiac and Vasculature    Peripheral vascular disease of extremity with claudication (HCC)    Overview     10/1/2021 Jannette Chapa MD    Continue daily Plavix and aspirin.  Continue nightly statin.    Follow-up with Dr. Magana as planned.           Benign essential hypertension    Overview     10/1/2021 Jannette Chapa MD    Continue amlodipine, bisoprolol, hydrochlorothiazide, and olmesartan daily.      Continue to avoid salt in the diet.  Continue to avoid sodas.         Relevant Medications    olmesartan (BENICAR) 40 MG tablet    amLODIPine (NORVASC) 5 MG tablet    bisoprolol (ZEBeta) 10 MG tablet    hydroCHLOROthiazide (HYDRODIURIL) 12.5 MG tablet    Other Relevant Orders    CBC & Differential    Comprehensive Metabolic Panel    Urinalysis With Microscopic - Urine, Clean Catch    Microalbumin / Creatinine Urine Ratio - Urine, Clean Catch    Mixed hyperlipidemia    Overview     10/1/2021 Jannette Chapa MD    Continue rosuvastatin every evening.      Continue low-fat diet and regular exercise and physical activity.         Relevant Medications    rosuvastatin (CRESTOR) 20 MG tablet    Other Relevant Orders    Lipid Panel    TSH    Atherosclerosis of native artery of left lower extremity with intermittent claudication (HCC)    Overview     10/1/2021 Jannette Chapa MD    Continue daily Plavix and aspirin.  Continue nightly statin.    Follow-up with Dr. Magana as planned.            Endocrine and Metabolic    Hyperhomocysteinemia (HCC)    Relevant Medications    folic acid (FOLVITE) 1 MG tablet       Gastrointestinal Abdominal     Gastroesophageal reflux disease without esophagitis    Overview     10/1/2021 Jannette Chapa MD    Continue omeprazole daily.  Continue to avoid eating close to bedtime and avoid large meals.         Relevant Medications    omeprazole (priLOSEC) 20 MG capsule       Genitourinary and Reproductive     Chronic  kidney disease, stage 3, mod decreased GFR (HCC) (Chronic)    Overview     10/1/2021 Jannette Chapa MD    Continue drinking lots of fluids, especially water.  Continue to avoid NSAIDs.  We will continue to monitor labs.           Relevant Medications    hydroCHLOROthiazide (HYDRODIURIL) 12.5 MG tablet       Health Encounters    Medicare annual wellness visit, subsequent - Primary    Overview     10/1/2021 Jannette Chapa MD    Flu shot given today.  She is up-to-date on other vaccinations.    She will get the third Covid vaccine at least a couple weeks from now, then wait a month before getting Shingrix at her pharmacy.    Colon cancer screening needed this year.  Cologuard ordered.    She is up-to-date on DEXA and mammogram.         Annual physical exam    Overview     10/1/2021 Jannette Chapa MD    Flu shot given today.  She is up-to-date on other vaccinations.    She will get the third Covid vaccine at least a couple weeks from now, then wait a month before getting Shingrix at her pharmacy.    Colon cancer screening needed this year.  Cologuard ordered.    She is up-to-date on DEXA and mammogram.            Mental Health    Depressive disorder    Overview     10/1/2021 Jannette Chapa MD    Continue citalopram 40 mg daily.    Physical activity and exercise also help.  Keeping up with friends and family also help.         Relevant Medications    citalopram (CeleXA) 40 MG tablet       Musculoskeletal and Injuries    Adhesive capsulitis of right shoulder (Chronic)    Overview     10/1/2021 Jannette Chapa MD    Adhesive capsulitis of right shoulder.  Doing some gentle range of motion of the shoulder daily can help.  Avoid lifting heavy objects.Moist heat may help some.    Continue Tylenol and tramadol as needed.    She will consider surgical repair.         Osteopenia of multiple sites    Overview     10/1/2021 Jannette Chapa MD    12/28/2020 DEXA showed mild improvement in osteopenia of the hip.  T  score in the left total hip is now -1.1 and in  the left femoral neck is -2.0.  (Osteoporosis is -2.5 or less.)    Continue calcium with vitamin D3.  Do some weightbearing exercises including walking and using hand weights or lifting things at the barn every day.             Relevant Orders    Vitamin D 25 Hydroxy       Pulmonary and Pneumonias    COPD mixed type (HCC)    Overview     10/1/2021 Jannette Chpaa MD    History of tobacco use.  Continue using Breo Ellipta inhaler  once a day.           Relevant Medications    Fluticasone Furoate-Vilanterol (Breo Ellipta) 200-25 MCG/INH inhaler    fluticasone (FLONASE) 50 MCG/ACT nasal spray    promethazine-dextromethorphan (PROMETHAZINE-DM) 6.25-15 MG/5ML syrup       Symptoms and Signs    Postural dizziness with presyncope      Other Visit Diagnoses     Colon cancer screening        Relevant Orders    Cologuard - Stool, Per Rectum          Peripheral Vascular Disease    Peripheral vascular disease (PVD) is a disease of the blood vessels. PVD may also be called peripheral artery disease (PAD) or poor circulation. PVD is the blocking or hardening of the arteries anywhere within the circulatory system beyond the heart. This can result in a decreased supply of blood to the arms, legs, and internal organs, such as the stomach or kidneys. However, PVD most often affects a person's lower legs and feet. Without treatment, PVD often worsens.  PVD can lead to acute limb ischemia. This occurs when an arm or leg suddenly has trouble getting enough blood. This is a medical emergency.  What are the causes?  The most common cause of PVD is atherosclerosis. This is a buildup of fatty material and other substances (plaque)inside your arteries. Pieces of plaque can break off from the walls of an artery and become stuck in a smaller artery, blocking blood flow and possibly causing acute limb ischemia.  Other common causes of PVD include:  · Blood clots that form inside the blood  vessels.  · Injuries to blood vessels.  · Diseases that cause inflammation of blood vessels or cause blood vessel tightening (spasms).  What increases the risk?  The following factors may make you more likely to develop this condition:  · A family history of PVD.  · Common medical conditions, including:  ? High cholesterol.  ? Diabetes.  ? High blood pressure (hypertension).  ? Heart disease.  ? Known atherosclerotic disease in another area of the body.  ? Past injury, such as burns or a broken bone.  · Other medical conditions, such as:  ? Buerger's disease. This is caused by inflamed blood vessels in your hands and feet.  ? Some forms of arthritis.  ? Birth defects that affect the arteries in your legs.  ? Kidney disease.  · Using tobacco and nicotine products.  · Not getting enough exercise.  · Obesity.  · Being age 65 or older, or being age 50 or older and having the other risk factors.  What are the signs or symptoms?  This condition may cause different symptoms. Your symptoms depend on what body part is not getting enough blood. Common signs and symptoms include:  · Cramps in your buttocks, legs, and feet.  · Intermittent claudication. This is pain and weakness in your legs during activity that resolves with rest.  · Leg pain at rest and leg numbness, tingling, or weakness.  · Coldness in a leg or foot, especially when compared to the other leg or foot.  · Skin or hair changes. These can include:  ? Hair loss.  ? Shiny skin.  ? Pale or bluish skin.  ? Thick toenails.  · Inability to get or maintain an erection (erectile dysfunction).  · Tiredness (fatigue).  · Weak pulse or no pulse in the feet.  People with PVD are more likely to develop open wounds (ulcers) and sores on their toes, feet, or legs. The ulcers or sores may take longer than normal to heal.  How is this diagnosed?  PVD is diagnosed based on your signs and symptoms, a physical exam, and your medical history. You may also have other tests to find  the cause. Tests include:  · Ankle-brachial index test.This test compares the blood pressure readings of the legs and arms.  ? This may also include an exercise ankle-brachial index test in which you walk on a treadmill to check your symptoms.  · Doppler ultrasound. This takes pictures of blood flow through your blood vessels.  · Imaging studies that use dye to show blood flow. These are:  ? CT angiogram.  ? Magnetic resonance angiogram, or MRA.  How is this treated?  Treatment for PVD depends on the cause of your condition, how severe your symptoms are, and your age. Underlying causes need to be treated and controlled. These include long-term (chronic) conditions, such as diabetes, high cholesterol, and hypertension. Treatment may include:  · Lifestyle changes, such as:  ? Quitting tobacco use.  ? Exercising regularly.  ? Following a low-fat, low-cholesterol diet.  ? Not drinking alcohol.  · Taking medicines, such as:  ? Blood thinners to prevent blood clots.  ? Medicines to improve blood flow.  ? Medicines to improve cholesterol levels.  · Procedures, such as:  ? Angioplasty. This uses an inflated balloon to open a blocked artery and improve blood flow.  ? Stent implant. This inserts a small mesh tube to keep a blocked artery open.  ? Peripheral bypass surgery. This reroutes blood flow around a blocked artery.  ? Surgery to remove dead tissue from an infected wound (debridement).  ? Amputation. This is surgical removal of the affected limb. It may be necessary in cases of acute limb ischemia when medical or surgical treatments have not helped.  Follow these instructions at home:  Medicines  · Take over-the-counter and prescription medicines only as told by your health care provider.  · If you are taking blood thinners:  ? Talk with your health care provider before you take any medicines that contain aspirin or NSAIDs, such as ibuprofen. These medicines increase your risk for dangerous bleeding.  ? Take your  "medicine exactly as told, at the same time every day.  ? Avoid activities that could cause injury or bruising, and follow instructions about how to prevent falls.  ? Wear a medical alert bracelet or carry a card that lists what medicines you take.  Lifestyle         · Exercise regularly. Ask your health care provider about some good activities for you.  · Talk with your health care provider about maintaining a healthy weight. If needed, ask about losing weight.  · Eat a diet that is low in fat and cholesterol. If you need help, talk with your health care provider.  · Do not drink alcohol.  · Do not use any products that contain nicotine or tobacco. These products include cigarettes, chewing tobacco, and vaping devices, such as e-cigarettes. If you need help quitting, ask your health care provider.  General instructions  · Take good care of your feet. To do this:  ? Wear comfortable shoes that fit well.  ? Check your feet often for any cuts or sores.  · Get an annual influenza vaccine.  · Keep all follow-up visits. This is important.  Where to find more information  · Society for Vascular Surgery: vascular.org  · American Heart Association: heart.org  · National Heart, Lung, and Blood [place]: nhlbi.nih.gov  Contact a health care provider if:  · You have leg cramps while walking.  · You have leg pain when you rest.  · Your leg or foot feels cold.  · Your skin changes color.  · You have erectile dysfunction.  · You have cuts or sores on your legs or feet that do not heal.  Get help right away if:  · You have sudden changes in color and feeling of your arms or legs, such as:  ? Your arm or leg turns cold, numb, and blue.  ? Your arm or leg becomes red, warm, swollen, painful, or numb.  · You have any symptoms of a stroke. \"BE FAST\" is an easy way to remember the main warning signs of a stroke:  ? B - Balance. Signs are dizziness, sudden trouble walking, or loss of balance.  ? E - Eyes. Signs are trouble seeing or a " sudden change in vision.  ? F - Face. Signs are sudden weakness or numbness of the face, or the face or eyelid drooping on one side.  ? A - Arms. Signs are weakness or numbness in an arm. This happens suddenly and usually on one side of the body.  ? S - Speech. Signs are sudden trouble speaking, slurred speech, or trouble understanding what people say.  ? T - Time. Time to call emergency services. Write down what time symptoms started.  · You have other signs of a stroke, such as:  ? A sudden, severe headache with no known cause.  ? Nausea or vomiting.  ? Seizure.  · You have chest pain or trouble breathing.  These symptoms may represent a serious problem that is an emergency. Do not wait to see if the symptoms will go away. Get medical help right away. Call your local emergency services (911 in the [place] Do not drive yourself to the hospital.  Summary  · Peripheral vascular disease (PVD) is a disease of the blood vessels.  · PVD is the blocking or hardening of the arteries anywhere within the circulatory system beyond the heart.  · PVD may cause different symptoms. Your symptoms depend on what part of your body is not getting enough blood.  · Treatment for PVD depends on what caused it, how severe your symptoms are, and your age.  This information is not intended to replace advice given to you by your health care provider. Make sure you discuss any questions you have with your health care provider.  Document Revised: 06/21/2021 Document Reviewed: 06/21/2021  [employer] Patient Education © 2021 [employer]

## 2021-10-01 NOTE — PROGRESS NOTES
"  The ABCs of the Annual Wellness Visit  Initial Medicare Wellness Visit    Chief Complaint   Patient presents with   • Medicare Wellness-subsequent   • Hypertension     f.u   • Hyperlipidemia   • Chronic Kidney Disease     stage 3   • COPD       Subjective   History of Present Illness:  Aarti Wade is a 66 y.o. female who presents for an Initial Medicare Wellness Visit.    HPI  2 episodes of presyncope-once when first got covid symptoms and 2nd time was very soon after recovered.  Both episodes happened when she walked from outside on a very hot lyndsey day to inside air conditioned [employer].  She is trying to drink more water.  She is trying to eat regular meals.      She has not checked blood pressures at home recently but agrees to do that.  She is taking her blood pressure medications regularly.    CHRONIC CONDITIONS:    Tries to eat healthier diet, but doesn't cook much at home.  Difficult to cook for 1 person.    Pain in legs with walking seems worse \"when not eating right\".  She will see Dr. Magana again in a few months to follow-up on peripheral vascular disease.  Taking Plavix and aspirin daily.    Pain in joints helped by tramadol and tylenol. Denies side effects.  Right shoulder is the worst joint right now.  She does need surgery on it but is reluctant to take 3 months off her activities and taking care of her horses to do it.    Breathing doing well with Breo Ellipta inhaler daily.    Citalopram is working well for anxiety and depression.  She feels well.      HEALTH RISK ASSESSMENT    Recent Hospitalizations:  No hospitalization(s) within the last year.    Current Medical Providers:  Patient Care Team:  Jannette Chapa MD as PCP - General  Jannette Chapa MD as PCP - Family Medicine  Severino Carbajal MD as Consulting Physician (Vascular Surgery)  Ketan Lazcano MD as Consulting Physician (Orthopedic Surgery)  Floyd Ernandez MD as Consulting Physician " (Cardiology)  Lorenzo Good MD as Consulting Physician (Ophthalmology)    Smoking Status:  Social History     Tobacco Use   Smoking Status Former Smoker   • Packs/day: 1.00   • Years: 40.00   • Pack years: 40.00   • Types: Electronic Cigarette, Cigarettes   • Quit date: 2016   • Years since quittin.2   Smokeless Tobacco Never Used   Tobacco Comment    1 PACK/DAY SMOKER UNTIL 2016       Alcohol Consumption:  Social History     Substance and Sexual Activity   Alcohol Use Yes   • Alcohol/week: 2.0 standard drinks   • Types: 2 Shots of liquor per week    Comment: 2 DRINKS PER DAY       Depression Screen:   PHQ-2/PHQ-9 Depression Screening 10/1/2021   Little interest or pleasure in doing things 0   Feeling down, depressed, or hopeless 0   Total Score 0       Fall Risk Screen:  RIGOBERTO Fall Risk Assessment was completed, and patient is at LOW risk for falls.Assessment completed on:10/1/2021    Health Habits and Functional and Cognitive Screening:  Functional & Cognitive Status 10/1/2021   Do you have difficulty preparing food and eating? No   Do you have difficulty bathing yourself, getting dressed or grooming yourself? No   Do you have difficulty using the toilet? No   Do you have difficulty moving around from place to place? No   Do you have trouble with steps or getting out of a bed or a chair? No   Current Diet Well Balanced Diet   Dental Exam Up to date        Dental Exam Comment    Eye Exam Up to date   Exercise (times per week) 7 times per week   Current Exercises Include (No Data)        Exercise Comment takes care of horses   Do you need help using the phone?  No   Are you deaf or do you have serious difficulty hearing?  No   Do you need help with transportation? No   Do you need help shopping? No   Do you need help preparing meals?  No   Do you need help with housework?  No   Do you need help with laundry? No   Do you need help taking your medications? No   Do you need help managing  money? No   Do you ever drive or ride in a car without wearing a seat belt? No   Have you felt unusual stress, anger or loneliness in the last month? No   Who do you live with? Alone   If you need help, do you have trouble finding someone available to you? No   Have you been bothered in the last four weeks by sexual problems? No   Do you have difficulty concentrating, remembering or making decisions? No       Does the patient have evidence of cognitive impairment? No    Asprin use counseling:Taking ASA appropriately as indicated    Age-appropriate Screening Schedule:  Refer to the list below for future screening recommendations based on patient's age, sex and/or medical conditions. Orders for these recommended tests are listed in the plan section. The patient has been provided with a written plan.    Health Maintenance   Topic Date Due   • ZOSTER VACCINE (2 of 3) 11/29/2017   • LIPID PANEL  10/09/2021   • DXA SCAN  12/28/2022   • MAMMOGRAM  05/07/2023   • TDAP/TD VACCINES (2 - Td or Tdap) 08/05/2025   • INFLUENZA VACCINE  Completed        The following portions of the patient's history were reviewed and updated as appropriate: allergies, current medications, past family history, past medical history, past social history, past surgical history and problem list.    Outpatient Medications Prior to Visit   Medication Sig Dispense Refill   • acetaminophen (Acetaminophen 8 Hour) 650 MG 8 hr tablet Take 650 mg by mouth 2 (two) times a day.     • AMLACTIN 12 % lotion Apply 1 application topically to the appropriate area as directed As Needed for Irritation.  1   • ascorbic acid (VITAMIN C) 1000 MG tablet Take 1,000 mg by mouth Daily.     • aspirin 81 MG EC tablet Take 81 mg by mouth every morning. TOLD NOT TO STOP BEFORE SURGERY     • Calcium Citrate-Vitamin D (CALCIUM + D PO) Take 2 tablets by mouth Daily.     • Coenzyme Q10 200 MG capsule Take 400 mg by mouth Daily.     • Fish Oil oil Take 1 capsule by mouth Daily.     •  "Flaxseed, Linseed, (FLAXSEED OIL) 1000 MG capsule Take 1 capsule by mouth daily.     • fluticasone (FLONASE) 50 MCG/ACT nasal spray 1 spray by Each Nare route 2 (Two) Times a Day. 48 g 3   • Fluticasone Furoate-Vilanterol (Breo Ellipta) 200-25 MCG/INH inhaler Inhale 1 puff Daily. 1 inhaler 11   • gabapentin (NEURONTIN) 100 MG capsule TAKE 3 CAPSULES BY MOUTH EVERY EVENING 90 capsule 2   • Methylsulfonylmethane (MSM PO) Take 2 tablets by mouth daily.     • Multiple Vitamins-Minerals (MULTIVITAMIN ADULT PO) Take 1 tablet by mouth daily.     • Nutritional Supplements (NUTRITIONAL SUPPLEMENT PO) [employer]   3 tsp once a day      • olmesartan (BENICAR) 40 MG tablet TAKE 1 TABLET BY MOUTH DAILY 90 tablet 1   • omeprazole (priLOSEC) 20 MG capsule Take 1 capsule by mouth Daily. 90 capsule 3   • promethazine-dextromethorphan (PROMETHAZINE-DM) 6.25-15 MG/5ML syrup Take 5 mL by mouth 4 (Four) Times a Day As Needed for Cough. 118 mL 0   • rosuvastatin (CRESTOR) 20 MG tablet TAKE 1 TABLET BY MOUTH EVERY NIGHT 90 tablet 1   • traMADol (ULTRAM) 50 MG tablet TAKE 1 TABLET BY MOUTH TWICE DAILY 180 tablet 2   • triamcinolone (KENALOG) 0.1 % cream Apply  topically to the appropriate area as directed 2 (Two) Times a Day. (Patient taking differently: Apply 1 application topically to the appropriate area as directed Daily As Needed.) 15 g 0   • URINARY HEALTH/CRANBERRY PO Take 1 tablet by mouth 2 (two) times a day. \"CRANACTIN\"     • amLODIPine (NORVASC) 5 MG tablet TAKE 1 TABLET BY MOUTH DAILY 90 tablet 1   • citalopram (CeleXA) 40 MG tablet TAKE 1 TABLET BY MOUTH DAILY 90 tablet 1   • clopidogrel (PLAVIX) 75 MG tablet TAKE 1 TABLET BY MOUTH EVERY DAY 90 tablet 1   • folic acid (FOLVITE) 1 MG tablet TAKE 1 TABLET BY MOUTH DAILY 90 tablet 1   • hydroCHLOROthiazide (HYDRODIURIL) 12.5 MG tablet Take 1 tablet by mouth Daily. 90 tablet 3   • azithromycin (Zithromax) 250 MG tablet Take 2 tablets the first day, then 1 tablet daily on days 2 " through 5. 6 tablet 0   • bisoprolol (ZEBeta) 10 MG tablet Take 1 tablet by mouth Daily. 90 tablet 1     No facility-administered medications prior to visit.       Patient Active Problem List   Diagnosis   • Atherosclerosis of native artery of both lower extremities with intermittent claudication (HCC)   • Peripheral vascular disease of extremity with claudication (HCC)   • Benign essential hypertension   • Mixed hyperlipidemia   • Allergic rhinitis   • Gastroesophageal reflux disease without esophagitis   • Osteopenia of multiple sites   • Adhesive capsulitis of right shoulder   • Chronic low back pain   • COPD mixed type ([facility])   • Post-traumatic osteoarthritis of right shoulder   • Depressive disorder   • Keratosis pilaris   • Subclavian steal syndrome   • Nocturnal leg cramps   • Atherosclerosis of native artery of left lower extremity with intermittent claudication (HCC)   • Postural dizziness with presyncope   • Hyperkalemia   • Bilateral hand pain   • Post-traumatic osteoarthritis of multiple joints   • Chronic kidney disease, stage 3, mod decreased GFR ([facility])   • Acute pain of left shoulder   • Status post total replacement of left shoulder   • Cough   • Medicare annual wellness visit, subsequent   • Annual physical exam   • Chronic right shoulder pain   • Hyperhomocysteinemia ([facility])       Advanced Care Planning:  ACP discussion was held with the patient during this visit. Patient has an advance directive in EMR which is still valid.     Review of Systems   Constitutional: Negative for chills, fatigue and fever.   HENT: Negative for congestion, ear pain, hearing loss and sinus pressure.    Eyes: Negative for visual disturbance.   Respiratory: Negative for cough, chest tightness, shortness of breath and wheezing.    Cardiovascular: Negative for chest pain, palpitations and leg swelling.   Gastrointestinal: Negative for abdominal pain, blood in stool and constipation.   Endocrine: Negative for cold intolerance  "and heat intolerance.   Genitourinary: Negative for dysuria and frequency.   Musculoskeletal: Positive for arthralgias. Negative for back pain and gait problem.        Leg pain with walking.   Skin: Negative for color change and rash.   Allergic/Immunologic: Negative for environmental allergies.   Neurological: Positive for dizziness. Negative for headaches.   Hematological: Negative for adenopathy. Does not bruise/bleed easily.   Psychiatric/Behavioral: Negative for dysphoric mood, sleep disturbance and suicidal ideas. The patient is not nervous/anxious.        Compared to one year ago, the patient feels her physical health is the same.  Compared to one year ago, the patient feels her mental health is the same.    Reviewed chart for potential of high risk medication in the elderly: yes  Reviewed chart for potential of harmful drug interactions in the elderly:yes    Objective         Vitals:    10/01/21 1327   BP: 118/58   BP Location: Right arm   Patient Position: Sitting   Cuff Size: Adult   Pulse: 56   Resp: 16   Temp: 98.4 °F (36.9 °C)   TempSrc: Infrared   SpO2: 95%   Weight: 55.1 kg (121 lb 6.4 oz)   Height: 157.5 cm (62\")   PainSc:   3   PainLoc: Shoulder  Comment: rt     Patient's Body mass index is 22.2 kg/m². indicating that she is within normal range (BMI 18.5-24.9). No BMI management plan needed..    Body mass index is 22.2 kg/m².  Discussed the patient's BMI with her. The BMI is in the acceptable range.    Physical Exam  Vitals and nursing note reviewed.   Constitutional:       Appearance: She is well-developed and normal weight.   HENT:      Head: Normocephalic.   Eyes:      Conjunctiva/sclera: Conjunctivae normal.      Pupils: Pupils are equal, round, and reactive to light.   Neck:      Thyroid: No thyromegaly.   Cardiovascular:      Rate and Rhythm: Normal rate and regular rhythm.      Pulses: Decreased pulses.      Heart sounds: Normal heart sounds.      Comments: Decreased peripheral pulses in the " legs.  Right worse than the left.  Pulmonary:      Effort: Pulmonary effort is normal.      Breath sounds: Normal breath sounds. No wheezing.   Chest:      Breasts:         Right: No inverted nipple, mass, nipple discharge, skin change or tenderness.         Left: No inverted nipple, mass, nipple discharge, skin change or tenderness.   Abdominal:      General: Bowel sounds are normal.      Palpations: Abdomen is soft.      Tenderness: There is no abdominal tenderness.   Musculoskeletal:      Right shoulder: Deformity and tenderness present. No swelling. Decreased range of motion.      Left shoulder: Deformity present. No swelling or tenderness. Normal range of motion.        Arms:       Cervical back: Normal range of motion and neck supple.   Lymphadenopathy:      Cervical: No cervical adenopathy.      Upper Body:      Right upper body: No supraclavicular, axillary or pectoral adenopathy.      Left upper body: No supraclavicular, axillary or pectoral adenopathy.   Skin:     General: Skin is warm and dry.      Findings: No rash.   Neurological:      Mental Status: She is alert and oriented to person, place, and time.      Cranial Nerves: No cranial nerve deficit.      Sensory: No sensory deficit.      Coordination: Coordination normal.      Gait: Gait normal.   Psychiatric:         Attention and Perception: Attention normal.         Mood and Affect: Mood normal.         Speech: Speech normal.         Behavior: Behavior normal.         Thought Content: Thought content normal.         Cognition and Memory: Cognition normal.         Judgment: Judgment normal.             ECG 12 Lead    Date/Time: 10/1/2021 3:02 PM  Performed by: Jannette Chapa MD  Authorized by: Jannette Chapa MD   Comparison: compared with previous ECG   Similar to previous ECG  Rhythm: sinus bradycardia  Rate: normal  BPM: 58  Conduction: conduction normal  ST Segments: ST segments normal  T Waves: T waves normal  QRS axis: normal    Clinical  impression: normal ECG              Assessment/Plan   Medicare Risks and Personalized Health Plan  CMS Preventative Services Quick Reference  Cardiovascular risk  Osteoporosis Risk    The above risks/problems have been discussed with the patient.  Pertinent information has been shared with the patient in the After Visit Summary.  Follow up plans and orders are seen below in the Assessment/Plan Section.  Patient Instructions   Problem List Items Addressed This Visit        Cardiac and Vasculature    Peripheral vascular disease of extremity with claudication (HCC)    Overview     10/1/2021 Jannette Chapa MD    Continue daily Plavix and aspirin.  Continue nightly statin.    Follow-up with Dr. Magana as planned.           Benign essential hypertension    Overview     10/1/2021 Jannette Chapa MD    Continue amlodipine, bisoprolol, hydrochlorothiazide, and olmesartan daily.      Continue to avoid salt in the diet.  Continue to avoid sodas.         Relevant Medications    olmesartan (BENICAR) 40 MG tablet    amLODIPine (NORVASC) 5 MG tablet    bisoprolol (ZEBeta) 10 MG tablet    hydroCHLOROthiazide (HYDRODIURIL) 12.5 MG tablet    Other Relevant Orders    CBC & Differential    Comprehensive Metabolic Panel    Urinalysis With Microscopic - Urine, Clean Catch    Microalbumin / Creatinine Urine Ratio - Urine, Clean Catch    Mixed hyperlipidemia    Overview     10/1/2021 Jannette Chapa MD    Continue rosuvastatin every evening.      Continue low-fat diet and regular exercise and physical activity.         Relevant Medications    rosuvastatin (CRESTOR) 20 MG tablet    Other Relevant Orders    Lipid Panel    TSH    Atherosclerosis of native artery of left lower extremity with intermittent claudication (HCC)    Overview     10/1/2021 Jannette Chapa MD    Continue daily Plavix and aspirin.  Continue nightly statin.    Follow-up with Dr. Magana as planned.            Endocrine and Metabolic    Hyperhomocysteinemia (HCC)     Relevant Medications    folic acid (FOLVITE) 1 MG tablet       Gastrointestinal Abdominal     Gastroesophageal reflux disease without esophagitis    Overview     10/1/2021 Jannette Chapa MD    Continue omeprazole daily.  Continue to avoid eating close to bedtime and avoid large meals.         Relevant Medications    omeprazole (priLOSEC) 20 MG capsule       Genitourinary and Reproductive     Chronic kidney disease, stage 3, mod decreased GFR (HCC) (Chronic)    Overview     10/1/2021 Jannette Chpaa MD    Continue drinking lots of fluids, especially water.  Continue to avoid NSAIDs.  We will continue to monitor labs.           Relevant Medications    hydroCHLOROthiazide (HYDRODIURIL) 12.5 MG tablet       Health Encounters    Medicare annual wellness visit, subsequent - Primary    Overview     10/1/2021 Jannette Chapa MD    Flu shot given today.  She is up-to-date on other vaccinations.    She will get the third Covid vaccine at least a couple weeks from now, then wait a month before getting Shingrix at her pharmacy.    Colon cancer screening needed this year.  Cologuard ordered.    She is up-to-date on DEXA and mammogram.         Annual physical exam    Overview     10/1/2021 Jannette Chapa MD    Flu shot given today.  She is up-to-date on other vaccinations.    She will get the third Covid vaccine at least a couple weeks from now, then wait a month before getting Shingrix at her pharmacy.    Colon cancer screening needed this year.  Cologuard ordered.    She is up-to-date on DEXA and mammogram.            Mental Health    Depressive disorder    Overview     10/1/2021 Jannette Chapa MD    Continue citalopram 40 mg daily.    Physical activity and exercise also help.  Keeping up with friends and family also help.         Relevant Medications    citalopram (CeleXA) 40 MG tablet       Musculoskeletal and Injuries    Adhesive capsulitis of right shoulder (Chronic)    Overview     10/1/2021 Jannette Chapa  MD    Adhesive capsulitis of right shoulder.  Doing some gentle range of motion of the shoulder daily can help.  Avoid lifting heavy objects.Moist heat may help some.    Continue Tylenol and tramadol as needed.    She will consider surgical repair.         Osteopenia of multiple sites    Overview     10/1/2021 Jannette Chapa MD    12/28/2020 DEXA showed mild improvement in osteopenia of the hip.  T score in the left total hip is now -1.1 and in  the left femoral neck is -2.0.  (Osteoporosis is -2.5 or less.)    Continue calcium with vitamin D3.  Do some weightbearing exercises including walking and using hand weights or lifting things at the barn every day.             Relevant Orders    Vitamin D 25 Hydroxy       Pulmonary and Pneumonias    COPD mixed type (HCC)    Overview     10/1/2021 Jannette Chapa MD    History of tobacco use.  Continue using Breo Ellipta inhaler  once a day.           Relevant Medications    Fluticasone Furoate-Vilanterol (Breo Ellipta) 200-25 MCG/INH inhaler    fluticasone (FLONASE) 50 MCG/ACT nasal spray    promethazine-dextromethorphan (PROMETHAZINE-DM) 6.25-15 MG/5ML syrup       Symptoms and Signs    Postural dizziness with presyncope      Other Visit Diagnoses     Colon cancer screening        Relevant Orders    Cologuard - Stool, Per Rectum           Follow Up:  Return in about 6 months (around 4/1/2022) for recheck fasting.     An After Visit Summary and PPPS were given to the patient.

## 2021-10-07 LAB
25(OH)D3+25(OH)D2 SERPL-MCNC: 63.7 NG/ML (ref 30–100)
ALBUMIN SERPL-MCNC: 4.6 G/DL (ref 3.5–5.2)
ALBUMIN/CREAT UR: 40 MG/G CREAT (ref 0–29)
ALBUMIN/GLOB SERPL: 2 G/DL
ALP SERPL-CCNC: 53 U/L (ref 39–117)
ALT SERPL-CCNC: 25 U/L (ref 1–33)
APPEARANCE UR: CLEAR
AST SERPL-CCNC: 25 U/L (ref 1–32)
BACTERIA #/AREA URNS HPF: NORMAL /HPF
BASOPHILS # BLD AUTO: 0.03 10*3/MM3 (ref 0–0.2)
BASOPHILS NFR BLD AUTO: 0.7 % (ref 0–1.5)
BILIRUB SERPL-MCNC: <0.2 MG/DL (ref 0–1.2)
BILIRUB UR QL STRIP: NEGATIVE
BUN SERPL-MCNC: 37 MG/DL (ref 8–23)
BUN/CREAT SERPL: 39.4 (ref 7–25)
CALCIUM SERPL-MCNC: 9.3 MG/DL (ref 8.6–10.5)
CASTS URNS MICRO: NORMAL
CHLORIDE SERPL-SCNC: 104 MMOL/L (ref 98–107)
CHOLEST SERPL-MCNC: 177 MG/DL (ref 0–200)
CO2 SERPL-SCNC: 20.9 MMOL/L (ref 22–29)
COLOR UR: YELLOW
CREAT SERPL-MCNC: 0.94 MG/DL (ref 0.57–1)
CREAT UR-MCNC: 80.7 MG/DL
EOSINOPHIL # BLD AUTO: 0.23 10*3/MM3 (ref 0–0.4)
EOSINOPHIL NFR BLD AUTO: 5.2 % (ref 0.3–6.2)
EPI CELLS #/AREA URNS HPF: NORMAL /HPF
ERYTHROCYTE [DISTWIDTH] IN BLOOD BY AUTOMATED COUNT: 12.5 % (ref 12.3–15.4)
GLOBULIN SER CALC-MCNC: 2.3 GM/DL
GLUCOSE SERPL-MCNC: 97 MG/DL (ref 65–99)
GLUCOSE UR QL: NEGATIVE
HCT VFR BLD AUTO: 30.8 % (ref 34–46.6)
HDLC SERPL-MCNC: 59 MG/DL (ref 40–60)
HGB BLD-MCNC: 10.1 G/DL (ref 12–15.9)
HGB UR QL STRIP: NEGATIVE
IMM GRANULOCYTES # BLD AUTO: 0.02 10*3/MM3 (ref 0–0.05)
IMM GRANULOCYTES NFR BLD AUTO: 0.5 % (ref 0–0.5)
KETONES UR QL STRIP: NEGATIVE
LDLC SERPL CALC-MCNC: 93 MG/DL (ref 0–100)
LEUKOCYTE ESTERASE UR QL STRIP: ABNORMAL
LYMPHOCYTES # BLD AUTO: 0.7 10*3/MM3 (ref 0.7–3.1)
LYMPHOCYTES NFR BLD AUTO: 15.8 % (ref 19.6–45.3)
MCH RBC QN AUTO: 32.2 PG (ref 26.6–33)
MCHC RBC AUTO-ENTMCNC: 32.8 G/DL (ref 31.5–35.7)
MCV RBC AUTO: 98.1 FL (ref 79–97)
MICROALBUMIN UR-MCNC: 32 UG/ML
MONOCYTES # BLD AUTO: 0.46 10*3/MM3 (ref 0.1–0.9)
MONOCYTES NFR BLD AUTO: 10.4 % (ref 5–12)
NEUTROPHILS # BLD AUTO: 2.98 10*3/MM3 (ref 1.7–7)
NEUTROPHILS NFR BLD AUTO: 67.4 % (ref 42.7–76)
NITRITE UR QL STRIP: NEGATIVE
NRBC BLD AUTO-RTO: 0 /100 WBC (ref 0–0.2)
PH UR STRIP: 5.5 [PH] (ref 5–8)
PLATELET # BLD AUTO: 278 10*3/MM3 (ref 140–450)
POTASSIUM SERPL-SCNC: 5.5 MMOL/L (ref 3.5–5.2)
PROT SERPL-MCNC: 6.9 G/DL (ref 6–8.5)
PROT UR QL STRIP: NEGATIVE
RBC # BLD AUTO: 3.14 10*6/MM3 (ref 3.77–5.28)
RBC #/AREA URNS HPF: NORMAL /HPF
SODIUM SERPL-SCNC: 137 MMOL/L (ref 136–145)
SP GR UR: 1.02 (ref 1–1.03)
TRIGL SERPL-MCNC: 144 MG/DL (ref 0–150)
TSH SERPL DL<=0.005 MIU/L-ACNC: 3.3 UIU/ML (ref 0.27–4.2)
UROBILINOGEN UR STRIP-MCNC: ABNORMAL MG/DL
VLDLC SERPL CALC-MCNC: 25 MG/DL (ref 5–40)
WBC # BLD AUTO: 4.42 10*3/MM3 (ref 3.4–10.8)
WBC #/AREA URNS HPF: NORMAL /HPF

## 2021-10-10 DIAGNOSIS — E78.2 MIXED HYPERLIPIDEMIA: ICD-10-CM

## 2021-10-10 DIAGNOSIS — I10 BENIGN ESSENTIAL HYPERTENSION: Chronic | ICD-10-CM

## 2021-10-10 DIAGNOSIS — D64.9 ANEMIA, UNSPECIFIED TYPE: Primary | ICD-10-CM

## 2021-10-10 RX ORDER — OLMESARTAN MEDOXOMIL 40 MG/1
20 TABLET ORAL DAILY
Qty: 90 TABLET | Refills: 1
Start: 2021-10-10 | End: 2021-12-03

## 2021-10-18 NOTE — TELEPHONE ENCOUNTER
Rx Refill Note  Requested Prescriptions     Pending Prescriptions Disp Refills   • Breo Ellipta 200-25 MCG/INH inhaler [Pharmacy Med Name: BREO ELLIPTA 200-25MCG ORAL INH(30)] 60 each      Sig: INHALE 1 PUFF BY MOUTH DAILY      Last office visit with prescribing clinician: 10/1/2021      Next office visit with prescribing clinician: 4/1/2022            Pinky Ramírez RN  10/18/21, 14:45 EDT

## 2021-11-04 ENCOUNTER — PATIENT MESSAGE (OUTPATIENT)
Dept: INTERNAL MEDICINE | Facility: CLINIC | Age: 66
End: 2021-11-04

## 2021-11-04 NOTE — TELEPHONE ENCOUNTER
From: Aarti Wade  To: Jannette Chapa MD  Sent: 11/4/2021 3:45 PM EDT  Subject: Anemia    Hi Dr. Chapa..just wanted to let you know that I started on an iron supplement by [employer]. It is 65 mg. ( 325 Ferrous Sulfate.) Is this okay for me to take and do you think it will help with the anemia. I have only been taking it a few days now. Thanks, Aarti Just wanted to let you know and to see what you think as to what I should or not take Thanks:)

## 2021-11-05 RX ORDER — FERROUS SULFATE 325(65) MG
325 TABLET ORAL
COMMUNITY
End: 2021-11-21

## 2021-11-05 NOTE — TELEPHONE ENCOUNTER
Please call patient and advise her to go by the lab to get the iron levels and B12 and folate checked as I recommended on her lab letter.    She may start her iron tablet after she gets the blood drawn. Please put that ferrous sulfate tablet on her med list. Also cut vitamin C 500 mg tablet on her list and advise her to get that at her drugstore.

## 2021-11-09 ENCOUNTER — LAB (OUTPATIENT)
Dept: LAB | Facility: HOSPITAL | Age: 66
End: 2021-11-09

## 2021-11-09 DIAGNOSIS — D64.9 ANEMIA, UNSPECIFIED TYPE: ICD-10-CM

## 2021-11-09 DIAGNOSIS — E78.2 MIXED HYPERLIPIDEMIA: ICD-10-CM

## 2021-11-09 LAB
BASOPHILS # BLD AUTO: 0.05 10*3/MM3 (ref 0–0.2)
BASOPHILS NFR BLD AUTO: 0.8 % (ref 0–1.5)
DEPRECATED RDW RBC AUTO: 42.4 FL (ref 37–54)
EOSINOPHIL # BLD AUTO: 0.23 10*3/MM3 (ref 0–0.4)
EOSINOPHIL NFR BLD AUTO: 3.5 % (ref 0.3–6.2)
ERYTHROCYTE [DISTWIDTH] IN BLOOD BY AUTOMATED COUNT: 12 % (ref 12.3–15.4)
HCT VFR BLD AUTO: 31.9 % (ref 34–46.6)
HGB BLD-MCNC: 10.6 G/DL (ref 12–15.9)
IMM GRANULOCYTES # BLD AUTO: 0.03 10*3/MM3 (ref 0–0.05)
IMM GRANULOCYTES NFR BLD AUTO: 0.5 % (ref 0–0.5)
LYMPHOCYTES # BLD AUTO: 0.83 10*3/MM3 (ref 0.7–3.1)
LYMPHOCYTES NFR BLD AUTO: 12.8 % (ref 19.6–45.3)
MCH RBC QN AUTO: 32.7 PG (ref 26.6–33)
MCHC RBC AUTO-ENTMCNC: 33.2 G/DL (ref 31.5–35.7)
MCV RBC AUTO: 98.5 FL (ref 79–97)
MONOCYTES # BLD AUTO: 0.73 10*3/MM3 (ref 0.1–0.9)
MONOCYTES NFR BLD AUTO: 11.3 % (ref 5–12)
NEUTROPHILS NFR BLD AUTO: 4.61 10*3/MM3 (ref 1.7–7)
NEUTROPHILS NFR BLD AUTO: 71.1 % (ref 42.7–76)
NRBC BLD AUTO-RTO: 0 /100 WBC (ref 0–0.2)
PLATELET # BLD AUTO: 289 10*3/MM3 (ref 140–450)
PMV BLD AUTO: 10.7 FL (ref 6–12)
RBC # BLD AUTO: 3.24 10*6/MM3 (ref 3.77–5.28)
WBC # BLD AUTO: 6.48 10*3/MM3 (ref 3.4–10.8)

## 2021-11-09 PROCEDURE — 83090 ASSAY OF HOMOCYSTEINE: CPT

## 2021-11-09 PROCEDURE — 82728 ASSAY OF FERRITIN: CPT

## 2021-11-09 PROCEDURE — 83540 ASSAY OF IRON: CPT

## 2021-11-09 PROCEDURE — 82607 VITAMIN B-12: CPT

## 2021-11-09 PROCEDURE — 85025 COMPLETE CBC W/AUTO DIFF WBC: CPT

## 2021-11-09 PROCEDURE — 84466 ASSAY OF TRANSFERRIN: CPT

## 2021-11-10 LAB
FERRITIN SERPL-MCNC: 521 NG/ML (ref 13–150)
HCYS SERPL-MCNC: 20.7 UMOL/L (ref 0–15)
IRON 24H UR-MRATE: 88 MCG/DL (ref 37–145)
IRON SATN MFR SERPL: 28 % (ref 20–50)
TIBC SERPL-MCNC: 317 MCG/DL (ref 298–536)
TRANSFERRIN SERPL-MCNC: 213 MG/DL (ref 200–360)
VIT B12 BLD-MCNC: 377 PG/ML (ref 211–946)

## 2021-11-21 RX ORDER — CYANOCOBALAMIN (VITAMIN B-12) 500 MCG
500 TABLET ORAL DAILY
Qty: 90 TABLET | Refills: 1 | Status: SHIPPED | OUTPATIENT
Start: 2021-11-21 | End: 2022-05-26

## 2021-11-21 RX ORDER — LEVOMEFOLATE CALCIUM 15 MG
15 TABLET ORAL DAILY
Qty: 30 TABLET | Refills: 5 | Status: SHIPPED | OUTPATIENT
Start: 2021-11-21 | End: 2021-12-10 | Stop reason: SDUPTHER

## 2021-11-22 ENCOUNTER — TELEPHONE (OUTPATIENT)
Dept: INTERNAL MEDICINE | Facility: CLINIC | Age: 66
End: 2021-11-22

## 2021-11-22 NOTE — TELEPHONE ENCOUNTER
----- Message from Jannette Chapa MD sent at 11/21/2021  5:13 PM EST -----  Anemia is stable.  B12 level is on the low side at 377.  Would like it to be greater than 500.  Homocystine level is high showing that folate (folic acid) is low.  Iron levels including ferritin are all okay.    You may stop the iron tablet.  Start taking B12 500 mcg tablet daily.  A prescription was sent to the pharmacy.  Replace the folic acid tablet from your regular pharmacy with l-methylfolate (active form of folic acid) from a specialty pharmacy.

## 2021-12-02 DIAGNOSIS — I10 BENIGN ESSENTIAL HYPERTENSION: Chronic | ICD-10-CM

## 2021-12-03 RX ORDER — OLMESARTAN MEDOXOMIL 40 MG/1
TABLET ORAL
Qty: 90 TABLET | Refills: 1 | Status: SHIPPED | OUTPATIENT
Start: 2021-12-03 | End: 2022-05-27

## 2021-12-03 NOTE — TELEPHONE ENCOUNTER
Rx Refill Note  Requested Prescriptions     Pending Prescriptions Disp Refills   • olmesartan (BENICAR) 40 MG tablet [Pharmacy Med Name: OLMESARTAN MEDOXOMIL 40MG TABLETS] 90 tablet 1     Sig: TAKE 1 TABLET BY MOUTH DAILY      Last office visit with prescribing clinician: 10/1/2021      Next office visit with prescribing clinician: 4/8/2022            Koki Artis LPN  12/03/21, 08:50 EST

## 2021-12-10 ENCOUNTER — TELEPHONE (OUTPATIENT)
Dept: INTERNAL MEDICINE | Facility: CLINIC | Age: 66
End: 2021-12-10

## 2021-12-10 RX ORDER — LEVOMEFOLATE CALCIUM 15 MG
15 TABLET ORAL DAILY
Qty: 30 TABLET | Refills: 5 | Status: CANCELLED | OUTPATIENT
Start: 2021-12-10

## 2021-12-10 RX ORDER — LEVOMEFOLATE CALCIUM 15 MG
15 TABLET ORAL DAILY
Qty: 30 TABLET | Refills: 5 | Status: SHIPPED | OUTPATIENT
Start: 2021-12-10 | End: 2022-01-11 | Stop reason: SDUPTHER

## 2021-12-10 NOTE — TELEPHONE ENCOUNTER
Caller: Aarti Wade    Relationship: Self    Best call back number: [phone]    What was the call regarding:   PATIENT WOULD LIKE A CALL BACK REGARDING QUESTIONS SHE HAS ABOUT HER MEDICATION     Do you require a callback: YES

## 2021-12-14 RX ORDER — CLOPIDOGREL BISULFATE 75 MG/1
TABLET ORAL
Qty: 90 TABLET | Refills: 1 | OUTPATIENT
Start: 2021-12-14

## 2021-12-14 NOTE — TELEPHONE ENCOUNTER
Rx Refill Note  Requested Prescriptions     Pending Prescriptions Disp Refills   • clopidogrel (PLAVIX) 75 MG tablet [Pharmacy Med Name: CLOPIDOGREL 75MG TABLETS] 90 tablet 1     Sig: TAKE 1 TABLET BY MOUTH EVERY DAY      Last office visit with prescribing clinician: 10/1/2021      Next office visit with prescribing clinician: 4/8/2022                Mercedes Trammell LPN  12/14/21, 09:40 EST

## 2021-12-21 DIAGNOSIS — M19.111 POST-TRAUMATIC OSTEOARTHRITIS OF RIGHT SHOULDER: ICD-10-CM

## 2021-12-21 RX ORDER — GABAPENTIN 100 MG/1
300 CAPSULE ORAL EVERY EVENING
Qty: 90 CAPSULE | Refills: 2 | Status: SHIPPED | OUTPATIENT
Start: 2021-12-21 | End: 2022-04-18

## 2021-12-21 NOTE — TELEPHONE ENCOUNTER
Rx Refill Note  Requested Prescriptions     Pending Prescriptions Disp Refills   • gabapentin (NEURONTIN) 100 MG capsule [Pharmacy Med Name: GABAPENTIN 100MG CAPSULES] 90 capsule      Sig: TAKE 3 CAPSULES BY MOUTH EVERY EVENING     Last refill:   8/30/21 #90/2  Last office visit with prescribing clinician: 8/9/2021      Next office visit with prescribing clinician: 2/9/2022            Pinky Ramírez RN  12/21/21, 10:20 EST

## 2022-01-11 ENCOUNTER — TELEPHONE (OUTPATIENT)
Dept: INTERNAL MEDICINE | Facility: CLINIC | Age: 67
End: 2022-01-11

## 2022-01-11 RX ORDER — FLUTICASONE PROPIONATE 50 MCG
SPRAY, SUSPENSION (ML) NASAL
Qty: 48 G | Refills: 3 | Status: SHIPPED | OUTPATIENT
Start: 2022-01-11 | End: 2023-04-04

## 2022-01-11 RX ORDER — LEVOMEFOLATE CALCIUM 15 MG
15 TABLET ORAL DAILY
Qty: 30 TABLET | Refills: 5 | Status: SHIPPED | OUTPATIENT
Start: 2022-01-11 | End: 2022-02-09

## 2022-01-11 NOTE — TELEPHONE ENCOUNTER
I sent L methylfolate to [facility] inb Dec. Sent it again now.  Please check to make sure patient gets it.

## 2022-01-11 NOTE — TELEPHONE ENCOUNTER
PATIENT STATES SHE GOT A LETTER ABOUT DOING THE COLOGUARD BUT SHE HAS ALREADY DONE THIS.      PATIENT IS ALSO WAITING ON FOLIC ACID PRESCRIPTION.      PLEASE CALL [phone]

## 2022-01-19 ENCOUNTER — TELEMEDICINE (OUTPATIENT)
Dept: INTERNAL MEDICINE | Facility: CLINIC | Age: 67
End: 2022-01-19

## 2022-01-19 DIAGNOSIS — G45.8 SUBCLAVIAN STEAL SYNDROME: ICD-10-CM

## 2022-01-19 DIAGNOSIS — I10 BENIGN ESSENTIAL HYPERTENSION: ICD-10-CM

## 2022-01-19 DIAGNOSIS — I73.9 PERIPHERAL VASCULAR DISEASE OF EXTREMITY WITH CLAUDICATION: ICD-10-CM

## 2022-01-19 DIAGNOSIS — N18.31 STAGE 3A CHRONIC KIDNEY DISEASE: Chronic | ICD-10-CM

## 2022-01-19 DIAGNOSIS — I65.23 BILATERAL CAROTID ARTERY STENOSIS: Primary | ICD-10-CM

## 2022-01-19 DIAGNOSIS — E72.11 HYPERHOMOCYSTEINEMIA: ICD-10-CM

## 2022-01-19 DIAGNOSIS — E78.2 MIXED HYPERLIPIDEMIA: ICD-10-CM

## 2022-01-19 DIAGNOSIS — I70.213 ATHEROSCLEROSIS OF NATIVE ARTERY OF BOTH LOWER EXTREMITIES WITH INTERMITTENT CLAUDICATION: Chronic | ICD-10-CM

## 2022-01-19 PROCEDURE — 99213 OFFICE O/P EST LOW 20 MIN: CPT | Performed by: INTERNAL MEDICINE

## 2022-01-19 NOTE — PROGRESS NOTES
[facility]     Aarti Wade  1955   [id number]      Patient Care Team:  Jannette Chapa MD as PCP - General  Jannette Chapa MD as PCP - Family Medicine  Severino Carbajal MD as Consulting Physician (Vascular Surgery)  Ketan Lazcano MD as Consulting Physician (Orthopedic Surgery)  Floyd Ernandez MD as Consulting Physician (Cardiology)  Lorenzo Good MD as Consulting Physician (Ophthalmology)    CC:carotid stenosis    You have chosen to receive care through a telehealth visit.  Do you consent to use a video/audio connection for your medical care today? Yes  Video visit accomplished by using providers cell phone by [employer] computer and the patient's cell phone at her home in [place].  Identity verified by full name, address, and date of birth.           HPI  Patient is a 67 y.o. female presents with carotid stenosis which is worse on the left than the right.  Dr. Magana did ultrasound followed by CT scan.  Blockage on the left was noted to be 80 to 90% by Dr. Magana.    Patient is already taking Plavix daily and rosuvastatin 20 mg nightly.    Patient is status post revascularization of left lower extremity by Dr. Carbajal in January 2020.      CHRONIC CONDITIONS  Blood pressures are controlled on amlodipine 5 mg daily, bisoprolol 10 mg daily, hydrochlorothiazide 12.5 mg daily, olmesartan 40 mg daily.    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    Taking L-methylfolate daily.  Trying to drink lots of fluids daily for chronic kidney disease.    Past Medical History:   Diagnosis Date   • Anxiety    • Arthritis    • Arthritis of right shoulder region    • Chronic UTI    • Circulatory disorder    • Claudication (HCC)    • COPD (chronic obstructive pulmonary disease) (HCC)    • DA (degenerative arthritis)    • Depression    • diffuse fatty liver infiltration on CT 2009   • Gastrointestinal  disorder    • GERD (gastroesophageal reflux disease)    • Head injury    • Hepatitis     UNKNOWN TYPE   • Hyperlipidemia    • Hypertension    • Inflammatory arthritis    • 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    • Osteopenia of multiple sites    • Osteoporosis    • PAD (peripheral artery disease) (HCC)    • Smoker    • Spontaneous pneumothorax 1960   • Subclavian artery stenosis, left (HCC)    • Subclavian steal syndrome 2016   • Wears dentures     TOP ONLY       Past Surgical History:   Procedure Laterality Date   • ANGIOGRAM - CONVERTED  08/16/2016    AA WITH RUNOFF PER DR. HARRISON   • COLONOSCOPY      last 8 years ago.  Due to schedule   • EYE LENS IMPLANT SECONDARY Left    • FEMORAL FEMORAL BYPASS Right 8/22/2016    Procedure: RIGHT COMMON FEMORAL ENDARTERECTOMY WITH PATCH;  Surgeon: Severino Harrison MD;  Location: [facility];  Service:    • FINGER SURGERY Left     LEFT INDEX FINGER   • INTERVENTIONAL RADIOLOGY PROCEDURE N/A 8/16/2016    Procedure: IR PTA femoral popliteal artery;  Surgeon: Severino Harrison MD;  Location: [facility] INVASIVE LOCATION;  Service:    • INTERVENTIONAL RADIOLOGY PROCEDURE Left 1/7/2020    Procedure: LEFT ATHERECTOMY, BALLOON ANGIOPLASTY;  Surgeon: Severino Harrison MD;  Location: [facility] CATH INVASIVE LOCATION;  Service: Interventional Radiology   • TONSILLECTOMY     • TOTAL SHOULDER ARTHROPLASTY Left 6/8/2020    Procedure: TOTAL SHOULDER ARTHROPLASTY LEFT;  Surgeon: Ketan Lazcano MD;  Location: [facility] OR;  Service: Orthopedics;  Laterality: Left;  protector in: 1346  protector out: 1402       Family History   Problem Relation Age of Onset   • Osteoarthritis Mother    • Alzheimer's disease Mother    • Hypertension Father    • Heart attack Father    • Alcohol abuse Father    • No Known Problems Sister    • No Known Problems Daughter    • No Known Problems Son    • Breast cancer Maternal Grandmother 50   • Breast cancer Paternal Grandmother 50   • No Known  Problems Maternal Aunt    • No Known Problems Paternal Aunt    • Other Other    • Heart attack Other    • Coronary artery disease Paternal Grandfather    • Stroke Paternal Grandfather    • No Known Problems Sister    • Ovarian cancer Neg Hx    • Endometrial cancer Neg Hx        Social History     Socioeconomic History   • Marital status: Single   Tobacco Use   • Smoking status: Former Smoker     Packs/day: 1.00     Years: 40.00     Pack years: 40.00     Types: Electronic Cigarette, Cigarettes     Quit date: 2016     Years since quittin.5   • Smokeless tobacco: Never Used   • Tobacco comment: 1 PACK/DAY SMOKER UNTIL 2016   Substance and Sexual Activity   • Alcohol use: Yes     Alcohol/week: 2.0 standard drinks     Types: 2 Shots of liquor per week     Comment: 2 DRINKS PER DAY   • Drug use: No   • Sexual activity: Defer       Allergies   Allergen Reactions   • Levocetirizine Dihydrochloride Myalgia     Muscle aches/joint pain       Review of Systems:     Review of Systems    Vital Signs  There were no vitals filed for this visit.  There is no height or weight on file to calculate BMI.  Patient's There is no height or weight on file to calculate BMI. indicating that she is within normal range (BMI 18.5-24.9). No BMI management plan needed..        Current Outpatient Medications:   •  acetaminophen (Acetaminophen 8 Hour) 650 MG 8 hr tablet, Take 650 mg by mouth 2 (two) times a day., Disp: , Rfl:   •  AMLACTIN 12 % lotion, Apply 1 application topically to the appropriate area as directed As Needed for Irritation., Disp: , Rfl: 1  •  amLODIPine (NORVASC) 5 MG tablet, Take 1 tablet by mouth Daily., Disp: 90 tablet, Rfl: 1  •  ascorbic acid (VITAMIN C) 1000 MG tablet, Take 1,000 mg by mouth Daily., Disp: , Rfl:   •  aspirin 81 MG EC tablet, Take 81 mg by mouth every morning. TOLD NOT TO STOP BEFORE SURGERY, Disp: , Rfl:   •  bisoprolol (ZEBeta) 10 MG tablet, Take 1 tablet by mouth Daily., Disp: 90 tablet,  Rfl: 1  •  Breo Ellipta 200-25 MCG/INH inhaler, INHALE 1 PUFF BY MOUTH DAILY, Disp: 60 each, Rfl: 11  •  Calcium Citrate-Vitamin D (CALCIUM + D PO), Take 2 tablets by mouth Daily., Disp: , Rfl:   •  citalopram (CeleXA) 40 MG tablet, Take 1 tablet by mouth Daily., Disp: 90 tablet, Rfl: 1  •  clopidogrel (PLAVIX) 75 MG tablet, Take 1 tablet by mouth Daily., Disp: 90 tablet, Rfl: 1  •  Coenzyme Q10 200 MG capsule, Take 400 mg by mouth Daily., Disp: , Rfl:   •  cyanocobalamin (VITAMIN B-12) 500 MCG tablet, Take 1 tablet by mouth Daily., Disp: 90 tablet, Rfl: 1  •  Fish Oil oil, Take 1 capsule by mouth Daily., Disp: , Rfl:   •  Flaxseed, Linseed, (FLAXSEED OIL) 1000 MG capsule, Take 1 capsule by mouth daily., Disp: , Rfl:   •  fluticasone (FLONASE) 50 MCG/ACT nasal spray, SHAKE LIQUID AND USE 1 SPRAY IN EACH NOSTRIL TWICE DAILY, Disp: 48 g, Rfl: 3  •  gabapentin (NEURONTIN) 100 MG capsule, TAKE 3 CAPSULES BY MOUTH EVERY EVENING, Disp: 90 capsule, Rfl: 2  •  hydroCHLOROthiazide (HYDRODIURIL) 12.5 MG tablet, Take 1 tablet by mouth Daily., Disp: 90 tablet, Rfl: 3  •  l-methylfolate 15 MG tablet tablet, Take 1 tablet by mouth Daily., Disp: 30 tablet, Rfl: 5  •  Methylsulfonylmethane (MSM PO), Take 2 tablets by mouth daily., Disp: , Rfl:   •  Multiple Vitamins-Minerals (MULTIVITAMIN ADULT PO), Take 1 tablet by mouth daily., Disp: , Rfl:   •  Nutritional Supplements (NUTRITIONAL SUPPLEMENT PO), [employer]   3 tsp once a day , Disp: , Rfl:   •  olmesartan (BENICAR) 40 MG tablet, TAKE 1 TABLET BY MOUTH DAILY, Disp: 90 tablet, Rfl: 1  •  omeprazole (priLOSEC) 20 MG capsule, Take 1 capsule by mouth Daily., Disp: 90 capsule, Rfl: 3  •  promethazine-dextromethorphan (PROMETHAZINE-DM) 6.25-15 MG/5ML syrup, Take 5 mL by mouth 4 (Four) Times a Day As Needed for Cough., Disp: 118 mL, Rfl: 0  •  rosuvastatin (CRESTOR) 20 MG tablet, TAKE 1 TABLET BY MOUTH EVERY NIGHT, Disp: 90 tablet, Rfl: 1  •  traMADol (ULTRAM) 50 MG tablet, TAKE 1 TABLET  "BY MOUTH TWICE DAILY, Disp: 180 tablet, Rfl: 2  •  triamcinolone (KENALOG) 0.1 % cream, Apply  topically to the appropriate area as directed 2 (Two) Times a Day. (Patient taking differently: Apply 1 application topically to the appropriate area as directed Daily As Needed.), Disp: 15 g, Rfl: 0  •  URINARY HEALTH/CRANBERRY PO, Take 1 tablet by mouth 2 (two) times a day. \"CRANACTIN\", Disp: , Rfl:     Physical Exam:    Physical Exam  Constitutional:       General: She is not in acute distress.     Appearance: She is normal weight.   Pulmonary:      Effort: Pulmonary effort is normal.   Neurological:      General: No focal deficit present.      Mental Status: She is alert and oriented to person, place, and time.   Psychiatric:         Mood and Affect: Mood normal.         Thought Content: Thought content normal.         Judgment: Judgment normal.          ACE III MINI        Results Review:    I reviewed the patient's new clinical results.    CMP:  Lab Results   Component Value Date    BUN 37 (H) 10/06/2021    CREATININE 0.94 10/06/2021    EGFRIFNONA 59 (L) 10/06/2021    EGFRIFAFRI 72 10/06/2021    BCR 39.4 (H) 10/06/2021     10/06/2021    K 5.5 (H) 10/06/2021    CO2 20.9 (L) 10/06/2021    CALCIUM 9.3 10/06/2021    PROTENTOTREF 6.9 10/06/2021    ALBUMIN 4.60 10/06/2021    LABGLOBREF 2.3 10/06/2021    LABIL2 2.0 10/06/2021    BILITOT <0.2 10/06/2021    ALKPHOS 53 10/06/2021    AST 25 10/06/2021    ALT 25 10/06/2021     HbA1c:  Lab Results   Component Value Date    HGBA1C 5.50 06/05/2020    HGBA1C 5.50 01/03/2020     Microalbumin:  Lab Results   Component Value Date    MICROALBUR 32.0 10/06/2021     Lipid Panel  Lab Results   Component Value Date    CHOL 136 10/09/2020    TRIG 144 10/06/2021    HDL 59 10/06/2021    LDL 93 10/06/2021    AST 25 10/06/2021    ALT 25 10/06/2021       Medication Review: Medications reviewed and noted  Patient Instructions   Problem List Items Addressed This Visit        Cardiac and " Vasculature    Atherosclerosis of native artery of both lower extremities with intermittent claudication (HCC) (Chronic)    Overview     1/19/2022 Jannette Chapa MD    Patient is status post left lower extremity atherectomy and balloon angioplasty by Dr. Carbajal on 1/7/2020.    Continue daily Plavix and aspirin.  Continue rosuvastatin nightly.    Continue to improve low-fat low sugar diet and get plenty of exercise.         Peripheral vascular disease of extremity with claudication (HCC)    Overview     1/19/2022 Jannette Chapa MD    Continue daily Plavix and aspirin.  Continue nightly rosuvastatin.    Follow-up with Dr. Magana as planned.           Benign essential hypertension    Overview     1/19/2022 Jannette Chapa MD    Continue amlodipine, bisoprolol, hydrochlorothiazide, and olmesartan daily.      Continue to avoid salt in the diet.  Continue to avoid sodas.         Relevant Medications    amLODIPine (NORVASC) 5 MG tablet    bisoprolol (ZEBeta) 10 MG tablet    hydroCHLOROthiazide (HYDRODIURIL) 12.5 MG tablet    olmesartan (BENICAR) 40 MG tablet    Mixed hyperlipidemia    Overview     1/19/2022 Jannette Chapa MD    Continue rosuvastatin every evening.      Continue low-fat diet and regular exercise and physical activity.         Relevant Medications    rosuvastatin (CRESTOR) 20 MG tablet    Subclavian steal syndrome    Overview     1/19/2022 MD Dr. Chino Cordon states that left subclavian stenosis is about 90%.         Bilateral carotid artery stenosis - Primary    Overview     1/19/2022 MD Dr. Chino Cordon did ultrasound followed by CT scan.  Blockage of the left ICA was noted to be 80 to 90% by Dr. Magana.  Right ICA with about 70% stenosis.  Left subclavian with about 90% stenosis.    I discussed benefits of carotid endarterectomy and prevention of stroke with the patient today.  She is willing to proceed with surgery.    Continue rosuvastatin nightly, clopidogrel  daily, good blood pressure control, and low-fat diet.                Endocrine and Metabolic    Hyperhomocysteinemia (HCC)    Overview     1/19/2022 Jannette Chapa MD    Continue L-methylfolate daily.            Genitourinary and Reproductive     Chronic kidney disease, stage 3, mod decreased GFR (HCC) (Chronic)    Overview     1/19/2022 Jannette Chapa MD    Continue drinking lots of fluids, especially water.  Continue to avoid NSAIDs.  We will continue to monitor labs.           Relevant Medications    hydroCHLOROthiazide (HYDRODIURIL) 12.5 MG tablet             Diagnosis Plan   1. Bilateral carotid artery stenosis     2. Benign essential hypertension     3. Subclavian steal syndrome     4. Mixed hyperlipidemia     5. Atherosclerosis of native artery of both lower extremities with intermittent claudication (HCC)     6. Peripheral vascular disease of extremity with claudication (HCC)     7. Hyperhomocysteinemia (HCC)     8. Stage 3a chronic kidney disease (HCC)       This visit has been rescheduled as a phone visit to comply with patient safety concerns in accordance with CDC recommendations. Total time of discussion was 20 minutes.          Plan of care reviewed with patient at the conclusion of today's visit. Education was provided regarding diagnosis, management, and any prescribed or recommended OTC medications.Patient verbalizes understanding of and agreement with management plan.         Jannette Chapa MD

## 2022-01-19 NOTE — PATIENT INSTRUCTIONS
Patient Instructions   Problem List Items Addressed This Visit        Cardiac and Vasculature    Atherosclerosis of native artery of both lower extremities with intermittent claudication (HCC) (Chronic)    Overview     1/19/2022 Jannette Chapa MD    Patient is status post left lower extremity atherectomy and balloon angioplasty by Dr. Carbajal on 1/7/2020.    Continue daily Plavix and aspirin.  Continue rosuvastatin nightly.    Continue to improve low-fat low sugar diet and get plenty of exercise.         Peripheral vascular disease of extremity with claudication (HCC)    Overview     1/19/2022 Jannette Chapa MD    Continue daily Plavix and aspirin.  Continue nightly rosuvastatin.    Follow-up with Dr. Magana as planned.           Benign essential hypertension    Overview     1/19/2022 Jannette Chapa MD    Continue amlodipine, bisoprolol, hydrochlorothiazide, and olmesartan daily.      Continue to avoid salt in the diet.  Continue to avoid sodas.         Relevant Medications    amLODIPine (NORVASC) 5 MG tablet    bisoprolol (ZEBeta) 10 MG tablet    hydroCHLOROthiazide (HYDRODIURIL) 12.5 MG tablet    olmesartan (BENICAR) 40 MG tablet    Mixed hyperlipidemia    Overview     1/19/2022 Jannette Chapa MD    Continue rosuvastatin every evening.      Continue low-fat diet and regular exercise and physical activity.         Relevant Medications    rosuvastatin (CRESTOR) 20 MG tablet    Subclavian steal syndrome    Overview     1/19/2022 MD Dr. Chino Cordon states that left subclavian stenosis is about 90%.         Bilateral carotid artery stenosis - Primary    Overview     1/19/2022 MD Dr. Chino Cordon did ultrasound followed by CT scan.  Blockage of the left ICA was noted to be 80 to 90% by Dr. Magana.  Right ICA with about 70% stenosis.  Left subclavian with about 90% stenosis.    I discussed benefits of carotid endarterectomy and prevention of stroke with the patient today.  She is  "willing to proceed with surgery.    Continue rosuvastatin nightly, clopidogrel daily, good blood pressure control, and low-fat diet.                Endocrine and Metabolic    Hyperhomocysteinemia (HCC)    Overview     1/19/2022 Jannette Chapa MD    Continue L-methylfolate daily.            Genitourinary and Reproductive     Chronic kidney disease, stage 3, mod decreased GFR (HCC) (Chronic)    Overview     1/19/2022 Jannette Chapa MD    Continue drinking lots of fluids, especially water.  Continue to avoid NSAIDs.  We will continue to monitor labs.           Relevant Medications    hydroCHLOROthiazide (HYDRODIURIL) 12.5 MG tablet             Carotid Artery Disease    Carotid artery disease, also called carotid artery stenosis, is the narrowing or blockage of one or both carotid arteries. The carotid arteries are the two main blood vessels on either side of the neck. They supply blood to the brain, other parts of the head, and the neck.  Carotid artery disease increases your risk for a stroke or a transient ischemic attack (TIA). A TIA is a \"mini-stroke\" that causes stroke-like symptoms that then go away quickly.  What are the causes?  This condition is mainly caused by a narrowing and hardening of the carotid arteries (atherosclerosis). The carotid arteries can become narrow or clogged with a buildup of fat, cholesterol, calcium, and other substances (plaque).  What increases the risk?  The following factors may make you more likely to develop this condition:  · Having certain medical conditions, such as:  ? High cholesterol.  ? High blood pressure (hypertension).  ? Diabetes.  ? Obesity.  · Smoking.  · A family history of cardiovascular disease.  · Inactivity or lack of regular exercise.  · Being male. Men have an increased risk of developing atherosclerosis earlier in life than women.  · Old age.  What are the signs or symptoms?  This condition may not have any signs or symptoms until a stroke or TIA occurs. " In some cases, your health care provider may be able to hear a whooshing sound (bruit). This can indicate a change in blood flow caused by plaque buildup. An eye exam can also help identify signs of the condition.  How is this diagnosed?  This condition may be diagnosed with a physical exam, your medical history, and your family's medical history. You may also have tests that look at the blood flow in your carotid arteries, such as:  · Carotid artery ultrasound, which uses sound waves to create pictures to show if the arteries are narrow or blocked.  · Tests that use a dye injected into a vein to highlight your arteries on images, such as:  ? Carotid or cerebral angiography, which uses X-rays.  ? Computerized tomographic angiography (CTA), which uses CT scans.  ? Magnetic resonance angiography (MRA), which uses MRI.  How is this treated?  This condition may be treated with a combination of treatments. Treatment options include:  · Lifestyle changes, such as:  ? Quitting smoking.  ? Exercising regularly or as told by your health care provider.  ? Eating a heart-healthy diet.  ? Managing stress.  ? Maintaining a healthy weight.  · Medicines to control blood pressure, cholesterol, and blood clotting.  · Surgery. You may have:  ? A carotid endarterectomy. This is a surgery to remove the blockages in the carotid arteries.  ? A carotid angioplasty with stenting. This is a procedure in which a small mesh tube (stent) is used to widen the blocked carotid arteries.  Follow these instructions at home:  Eating and drinking  Follow instructions about your diet from your health care provider. It is important to:  · Eat a healthy diet that is low in saturated fats and includes plenty of fresh fruits, vegetables, and lean meats.  · Avoid foods that are high in fat and salt (sodium).  · Avoid foods that are fried, overly processed, or have poor nutritional value.    Lifestyle    · Maintain a healthy weight.  · Do exercises as told  "by your health care provider to stay physically active. It is recommended that each week you get at least 150 minutes of moderate-intensity exercise or 75 minutes of exercise that takes a lot of effort.  · Do not use any products that contain nicotine or tobacco, such as cigarettes, e-cigarettes, and chewing tobacco. If you need help quitting, ask your health care provider.  · Do not drink alcohol if:  ? Your health care provider tells you not to drink.  ? You are pregnant, may be pregnant, or are planning to become pregnant.  · If you drink alcohol:  ? Limit how much you use to:  § 0-1 drink a day for women.  § 0-2 drinks a day for men.  ? Be aware of how much alcohol is in your drink. In the [place] one drink equals one 12 oz bottle of beer (355 mL), one 5 oz glass of wine (148 mL), or one 1½ oz glass of hard liquor (44 mL).  · Do not use drugs.  · Manage your stress. Ask your health care provider for stress management tips.    General instructions  · Take over-the-counter and prescription medicines only as told by your health care provider.  · Keep all follow-up visits as told by your health care provider. This is important.  Where to find more information  · American Heart Association: www.heart.org  Get help right away if:  · You have any symptoms of a stroke. \"BE FAST\" is an easy way to remember the main warning signs of a stroke:  ? B - Balance. Signs are dizziness, sudden trouble walking, or loss of balance.  ? E - Eyes. Signs are trouble seeing or a sudden change in vision.  ? F - Face. Signs are sudden weakness or numbness of the face, or the face or eyelid drooping on one side.  ? A - Arms. Signs are weakness or numbness in an arm. This happens suddenly and usually on one side of the body.  ? S - Speech. Signs are sudden trouble speaking, slurred speech, or trouble understanding what people say.  ? T - Time. Time to call emergency services. Write down what time symptoms started.  · You have other signs of a " stroke, such as:  ? A sudden, severe headache with no known cause.  ? Nausea or vomiting.  ? Seizure.  These symptoms may represent a serious problem that is an emergency. Do not wait to see if the symptoms will go away. Get medical help right away. Call your local emergency services (911 in the [place] Do not drive yourself to the hospital.  Summary  · Carotid artery disease, also called carotid artery stenosis, is the narrowing or blockage of one or both carotid arteries.  · Carotid artery disease increases your risk for a stroke or a transient ischemic attack (TIA).  · This condition can be treated with lifestyle changes, medicines, surgery, or a combination of these treatments.  · Get help right away if you have any symptoms of stroke. The acronym BEFAST is an easy way to remember the main warning signs of stroke.  This information is not intended to replace advice given to you by your health care provider. Make sure you discuss any questions you have with your health care provider.  Document Revised: 06/29/2020 Document Reviewed: 06/29/2020  Elsevier Patient Education © 2021 [employer]

## 2022-02-04 ENCOUNTER — OFFICE VISIT (OUTPATIENT)
Dept: CARDIOLOGY | Facility: CLINIC | Age: 67
End: 2022-02-04

## 2022-02-04 VITALS — BODY MASS INDEX: 22.26 KG/M2 | WEIGHT: 121 LBS | HEIGHT: 62 IN

## 2022-02-04 DIAGNOSIS — I10 BENIGN ESSENTIAL HYPERTENSION: ICD-10-CM

## 2022-02-04 DIAGNOSIS — I73.9 PERIPHERAL ARTERIAL DISEASE: Primary | ICD-10-CM

## 2022-02-04 DIAGNOSIS — E78.5 DYSLIPIDEMIA: ICD-10-CM

## 2022-02-04 PROCEDURE — 99441 PR PHYS/QHP TELEPHONE EVALUATION 5-10 MIN: CPT | Performed by: INTERNAL MEDICINE

## 2022-02-04 RX ORDER — LEVOMEFOLATE CALCIUM 15 MG
15 TABLET ORAL DAILY
COMMUNITY
Start: 2022-01-11 | End: 2022-02-09

## 2022-02-04 RX ORDER — FOLIC ACID 1 MG/1
1000 TABLET ORAL DAILY
COMMUNITY
Start: 2022-01-11 | End: 2022-05-20

## 2022-02-04 RX ORDER — ROSUVASTATIN CALCIUM 40 MG/1
40 TABLET, COATED ORAL DAILY
Qty: 90 TABLET | Refills: 3 | Status: SHIPPED | OUTPATIENT
Start: 2022-02-04 | End: 2023-02-15

## 2022-02-04 NOTE — PROGRESS NOTES
Subjective:     Encounter Date:02/04/2022    Patient ID: Aarti Wade is a 67 y.o. single white female, from [place], [place], recently retired and she has her Masters in Social Work and worked for [employer] (works with Alcoholics Anonymous), and now she raises/sells race horses.      INTERNIST: Jannette Chapa MD  VASCULAR SURGEON: Severino Carbajal MD, Francisco Magana MD  UROLOGIST: Unknown (near [facility] [place] [facility]?)  REMOTE ORTHOPEDIST: Lance Burgess MD  NEPHROLOGIST: Unknown  ORTHOPEDIC SURGEON: Ketan Lazcano MD    Chief Complaint:   Chief Complaint   Patient presents with   • Coronary Artery Disease   • Peripheral arterial disease   • Pre-syncope       Problem List:  1. Peripheral arterial disease:  a. Subclavian steal syndrome, 2016   b. Abnormal right femoral arterial duplex and popliteal tibial arterial duplex, on ASA and Plavix, with right femoral endarterectomy August 2016 (Dr. Carbajal)  c. Residual class I claudication without recent TIA/presyncope  d. Carotid duplex 11/18/2019: LICA 0-49% with retrograde left vertebral artery flow demonstrated, R ICA 50-69%, proximal LICA plaque without significant stenosis  e. Renal artery duplex 12/11/2019: Parenchymal disease in bilateral arteries, no JAIMEE  f. Left atherectomy, balloon angioplasty 1/7/2020: Common femoral arteries patent with moderate stenosis.  The common iliac, external iliac and internal leg arteries patent without evidence of stenosis.  Mid common femoral artery exhibits an occlusive lesion.  The origin of the profundus femoris artery appears to be occluded.  The SFA is reconstituted at its origin via collateral vessels.  The SFA, popliteal artery, and trifurcation are all patent.  Dominant flow to the foot is via the posterior tibial artery with a patent peroneal and anterior tibial artery into the distal leg.  Left common femoral artery-SFA atherectomy with balloon angioplasty.  After atherectomy and drug-coated balloon angioplasty, the  common femoral artery and SFA as well as the profunda femoris artery were patent without evidence of hemodynamically significant stenosis  g. ABIs October 2020 unchanged from previous study February 2020, right 0.74, left 0.87  h. Residual class I claudication, January 2020, July 2020, August 2021  2. Hypertensive cardiovascular disease:  a. Remote IV stress test over 10 years ago; negative per patient-data deficit  b. Residual class I symptoms/normal EKG with acceptable IV Lexiscan Cardiolite study suggestive of low probability for significant focal obstructive coronary artery disease  (LVEF 0.65), May 2017.  c. Echocardiogram 11/18/2019: Mild to moderate AR, mild TR, LA mildly dilated, LVEF 68%, mild calcification of the aortic valve mainly affecting the non-and left coronary cusps, LV diastolic function normal, normal RV cavity size, wall thickness, systolic function and septal motion noted.  Mild MAC is present.  Aortic valve exhibits moderate sclerosis without stenosis, no pulmonary hypertension, no pericardial effusion  d. Residual class I symptoms, January 2020, July 2020, January 2021, August 2021  3. Hypertension  4. Hyperlipidemia; 10.8% 10-year risk; 3.6% with treatment  5. COPD with mild-moderate exertional dyspnea  6. Former smoker; smoked 1 ppd x 40 years, quit in 2016  7. Syncopal episodes years ago associated with UTIs; last one a couple of years ago; data deficit  8. Depression  9. GERD  10. History of recurrent UTIs   11. Chronic low back pain  12. GABBY  13. Dizziness and presyncope January 2020  14. Breakthrough COVID infection, August 2021.  14. Surgical history:  a. Right femoral endarterectomy, August 2016  b. Lens implant, 1990s  c. Spontaneous pneumothorax, 1960  d. Left atherectomy and balloon angioplasty  e. Left total shoulder arthroplasty, June 2020  f. Anticipated carotid endarterectomy, March 2022    Allergies   Allergen Reactions   • Levocetirizine Dihydrochloride Myalgia     Muscle  aches/joint pain       Current Outpatient Medications:   •  acetaminophen (Acetaminophen 8 Hour) 650 MG 8 hr tablet, Take 650 mg by mouth 2 (two) times a day., Disp: , Rfl:   •  amLODIPine (NORVASC) 5 MG tablet, Take 1 tablet by mouth Daily., Disp: 90 tablet, Rfl: 1  •  ascorbic acid (VITAMIN C) 1000 MG tablet, Take 1,000 mg by mouth Daily., Disp: , Rfl:   •  aspirin 81 MG EC tablet, Take 81 mg by mouth every morning. TOLD NOT TO STOP BEFORE SURGERY, Disp: , Rfl:   •  bisoprolol (ZEBeta) 10 MG tablet, Take 1 tablet by mouth Daily., Disp: 90 tablet, Rfl: 1  •  Breo Ellipta 200-25 MCG/INH inhaler, INHALE 1 PUFF BY MOUTH DAILY, Disp: 60 each, Rfl: 11  •  Calcium Citrate-Vitamin D (CALCIUM + D PO), Take 2 tablets by mouth Daily., Disp: , Rfl:   •  citalopram (CeleXA) 40 MG tablet, Take 1 tablet by mouth Daily., Disp: 90 tablet, Rfl: 1  •  clopidogrel (PLAVIX) 75 MG tablet, Take 1 tablet by mouth Daily., Disp: 90 tablet, Rfl: 1  •  Coenzyme Q10 200 MG capsule, Take 400 mg by mouth Daily., Disp: , Rfl:   •  cyanocobalamin (VITAMIN B-12) 500 MCG tablet, Take 1 tablet by mouth Daily., Disp: 90 tablet, Rfl: 1  •  Fish Oil oil, Take 1 capsule by mouth Daily., Disp: , Rfl:   •  Flaxseed, Linseed, (FLAXSEED OIL) 1000 MG capsule, Take 1 capsule by mouth daily., Disp: , Rfl:   •  fluticasone (FLONASE) 50 MCG/ACT nasal spray, SHAKE LIQUID AND USE 1 SPRAY IN EACH NOSTRIL TWICE DAILY, Disp: 48 g, Rfl: 3  •  folic acid (FOLVITE) 1 MG tablet, Take 1,000 mcg by mouth Daily., Disp: , Rfl:   •  gabapentin (NEURONTIN) 100 MG capsule, TAKE 3 CAPSULES BY MOUTH EVERY EVENING, Disp: 90 capsule, Rfl: 2  •  hydroCHLOROthiazide (HYDRODIURIL) 12.5 MG tablet, Take 1 tablet by mouth Daily., Disp: 90 tablet, Rfl: 3  •  L-Methylfolate (Elfolate) 15 MG tablet, Take 15 mg by mouth Daily., Disp: , Rfl:   •  l-methylfolate 15 MG tablet tablet, Take 1 tablet by mouth Daily., Disp: 30 tablet, Rfl: 5  •  Methylsulfonylmethane (MSM PO), Take 2 tablets by  "mouth daily., Disp: , Rfl:   •  Multiple Vitamins-Minerals (MULTIVITAMIN ADULT PO), Take 1 tablet by mouth daily., Disp: , Rfl:   •  Nutritional Supplements (NUTRITIONAL SUPPLEMENT PO), [employer]   3 tsp once a day , Disp: , Rfl:   •  olmesartan (BENICAR) 40 MG tablet, TAKE 1 TABLET BY MOUTH DAILY, Disp: 90 tablet, Rfl: 1  •  omeprazole (priLOSEC) 20 MG capsule, Take 1 capsule by mouth Daily., Disp: 90 capsule, Rfl: 3  •  promethazine-dextromethorphan (PROMETHAZINE-DM) 6.25-15 MG/5ML syrup, Take 5 mL by mouth 4 (Four) Times a Day As Needed for Cough., Disp: 118 mL, Rfl: 0  •  rosuvastatin (CRESTOR) 20 MG tablet, TAKE 1 TABLET BY MOUTH EVERY NIGHT, Disp: 90 tablet, Rfl: 1  •  traMADol (ULTRAM) 50 MG tablet, TAKE 1 TABLET BY MOUTH TWICE DAILY, Disp: 180 tablet, Rfl: 2  •  triamcinolone (KENALOG) 0.1 % cream, Apply  topically to the appropriate area as directed 2 (Two) Times a Day. (Patient taking differently: Apply 1 application topically to the appropriate area as directed Daily As Needed.), Disp: 15 g, Rfl: 0  •  URINARY HEALTH/CRANBERRY PO, Take 1 tablet by mouth 2 (two) times a day. \"CRANACTIN\", Disp: , Rfl:   •  AMLACTIN 12 % lotion, Apply 1 application topically to the appropriate area as directed As Needed for Irritation., Disp: , Rfl: 1    History of Present Illness: Patient has telephone visit for scheduled 6-month follow up. She had breakthrough COVID infection in August 2021. She had symptoms of cough, sore throat, and cold symptoms. She was prescribed a Z-pack by [facility] but otherwise did not require medical treatment. Patient denies chest pain, shortness of breath, palpitations, edema, dizziness, and syncope. She recently had carotid duplex and neck CTA with Dr. Magana; data deficit. She reports she will undergo endarterectomy on 3 March 2022 at [facility]. She has had no additional interim ER visits, hospitalizations, serious illnesses, or surgeries.        ROS   Obtained and negative except as " "outlined in problem list and HPI.    Procedures       Objective:       Vitals:    02/04/22 1031   Weight: 54.9 kg (121 lb)   Height: 157.5 cm (62\")     Body mass index is 22.13 kg/m².  Last weight: 127 lbs    Physical Exam not able to be performed due to telephone visit.      Lab Review:   Lab Results   Component Value Date    GLUCOSE 97 10/06/2021    BUN 37 (H) 10/06/2021    CREATININE 0.94 10/06/2021    EGFRIFNONA 59 (L) 10/06/2021    EGFRIFAFRI 72 10/06/2021    BCR 39.4 (H) 10/06/2021     10/06/2021    K 5.5 (H) 10/06/2021     10/06/2021    CO2 20.9 (L) 10/06/2021    CALCIUM 9.3 10/06/2021    PROTENTOTREF 6.9 10/06/2021    ALBUMIN 4.60 10/06/2021    LABIL2 2.0 10/06/2021    AST 25 10/06/2021    ALT 25 10/06/2021       Lab Results   Component Value Date    WBC 6.48 11/09/2021    HGB 10.6 (L) 11/09/2021    HCT 31.9 (L) 11/09/2021    MCV 98.5 (H) 11/09/2021     11/09/2021       Lab Results   Component Value Date    TSH 3.300 10/06/2021       Lab Results   Component Value Date    CHLPL 177 10/06/2021     Lab Results   Component Value Date    TRIG 144 10/06/2021    TRIG 67 10/09/2020    TRIG 56 01/31/2020     Lab Results   Component Value Date    HDL 59 10/06/2021    HDL 68 (H) 10/09/2020    HDL 54 01/31/2020     Lab Results   Component Value Date    LDL 93 10/06/2021    LDL 54 10/09/2020    LDL 53 01/31/2020             Assessment:       Overall continued acceptable course with no new interim cardiopulmonary complaints with good functional status. We will defer additional diagnostic or therapeutic intervention from a cardiac perspective at this time other than to increase rosuvastatin to 40 mg daily.     Diagnosis Plan   1. Peripheral arterial disease (HCC)  She will undergo carotid endarterectomy with Dr. Magana in March 2022.   2. Benign essential hypertension  Well controlled. Continue current treatment.   3. Dyslipidemia  Fair control. Increase rosuvastatin to 40 mg daily.          Plan:     "     1. Patient to continue current medications and close follow up with the above providers.  2. Increase rosuvastatin to 40 mg daily.  3. Tentative cardiology follow up in August 2022 or patient may return sooner PRN.    I, Mike Adhikari, attest that this documentation has been prepared under the direction and in the presence of Floyd Ernandez MD 02/04/2022    I, Floyd Ernandez MD, [facility], personally performed the services described in this documentation as scribed by the above named individual in my presence, and it is both accurate and complete. At 10:12 EST on 02/04/2022    This patient has consented to a telehealth visit via telephone. The visit was scheduled as a telephone visit to comply with patient safety concerns in accordance with CDC recommendations.  All vitals recorded within this visit are reported by the patient.  I spent 25 minutes in total including but not limited to the 10 minutes spent in direct conversation with this patient.

## 2022-02-09 ENCOUNTER — TELEMEDICINE (OUTPATIENT)
Dept: INTERNAL MEDICINE | Facility: CLINIC | Age: 67
End: 2022-02-09

## 2022-02-09 DIAGNOSIS — M19.111 POST-TRAUMATIC OSTEOARTHRITIS OF RIGHT SHOULDER: Primary | Chronic | ICD-10-CM

## 2022-02-09 PROCEDURE — 99213 OFFICE O/P EST LOW 20 MIN: CPT | Performed by: INTERNAL MEDICINE

## 2022-02-09 NOTE — PROGRESS NOTES
Aarti Wade  1955  [id number]  Patient Care Team:  Jannette Chapa MD as PCP - General  Jannette Chapa MD as PCP - Family Medicine  Severino Carbajal MD as Consulting Physician (Vascular Surgery)  Ketan Lazcano MD as Consulting Physician (Orthopedic Surgery)  Floyd Ernandez MD as Consulting Physician (Cardiology)  Lorenzo Good MD as Consulting Physician (Ophthalmology)    Aarti Wade is a pleasant 67 y.o. female who presents for evaluation of Shoulder Pain  Unable to complete visit using a video connection to the patient. A phone visit was used to complete this visits. Total time of discussion was 15 minutes.  This video visit occurred during the Coronavirus (Covid-19) public health emergency.    I discussed with the patient the nature of our telemedicine visits that our team would provide follow-up care in person if/when needed.  You have chosen to receive care through a telehealth visit.  Do you consent to use a video/audio connection for your medical care today? Yes    Chief Complaint   Patient presents with   • Shoulder Pain       HPI:   Routine 6 mo follow up visit for right shoulder.  Uses tramadol BID and gabapentin 300mg HS. 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    Saw Dr. Burgess yrs ago until he retired then saw one of his partners.   pcp here ([facility]) has been following her since. Saw Dr. Lazcano for a an acute injury of left shoulder tripping on vacuum and had surgical repair on left side.   Had to stop celebrex within the last few yrs secondary to kidney insufficiency and hypertension.  The gabapentin has been prescribed for nighttime shoulder pain.  She started tramadol for pain when it got worse which has continued to work well for the chronic right shoulder pain.    Has carotid endarterectomy scheduled for 3/3/22 at [facility] with Dr. Magana  (she  was hesitant to come in to the office right now since Covid numbers are up)  Past Medical History:   Diagnosis Date   • Anxiety    • Arthritis    • Arthritis of right shoulder region    • Chronic UTI    • Circulatory disorder    • Claudication (HCC)    • COPD (chronic obstructive pulmonary disease) (HCC)    • DA (degenerative arthritis)    • Depression    • diffuse fatty liver infiltration on CT 2009   • Gastrointestinal disorder    • GERD (gastroesophageal reflux disease)    • Head injury    • Hepatitis     UNKNOWN TYPE   • Hyperlipidemia    • Hypertension    • Inflammatory arthritis    • 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 HARRISON   • COLONOSCOPY      last 8 years ago.  Due to schedule   • EYE LENS IMPLANT SECONDARY Left    • FEMORAL FEMORAL BYPASS Right 8/22/2016    Procedure: RIGHT COMMON FEMORAL ENDARTERECTOMY WITH PATCH;  Surgeon: Severino Harrison MD;  Location:  [facility] OR;  Service:    • FINGER SURGERY Left     LEFT INDEX FINGER   • INTERVENTIONAL RADIOLOGY PROCEDURE N/A 8/16/2016    Procedure: IR PTA femoral popliteal artery;  Surgeon: Severino Harrison MD;  Location: [facility] CATH INVASIVE LOCATION;  Service:    • INTERVENTIONAL RADIOLOGY PROCEDURE Left 1/7/2020    Procedure: LEFT ATHERECTOMY, BALLOON ANGIOPLASTY;  Surgeon: Severino Harrison MD;  Location: [facility] CATH INVASIVE LOCATION;  Service: Interventional Radiology   • TONSILLECTOMY     • TOTAL SHOULDER ARTHROPLASTY Left 6/8/2020    Procedure: TOTAL SHOULDER ARTHROPLASTY LEFT;  Surgeon: Ketan Lazcano MD;  Location: [facility] OR;  Service: Orthopedics;   Laterality: Left;  protector in: 1346  protector out: 1402     Family History   Problem Relation Age of Onset   • Osteoarthritis Mother    • Alzheimer's disease Mother    • Hypertension Father    • Heart attack Father    • Alcohol abuse Father    • No Known Problems Sister    • No Known Problems Daughter    • No Known Problems Son    • Breast cancer Maternal Grandmother 50   • Breast cancer Paternal Grandmother 50   • No Known Problems Maternal Aunt    • No Known Problems Paternal Aunt    • Other Other    • Heart attack Other    • Coronary artery disease Paternal Grandfather    • Stroke Paternal Grandfather    • No Known Problems Sister    • Ovarian cancer Neg Hx    • Endometrial cancer Neg Hx      Social History     Tobacco Use   Smoking Status Former Smoker   • Packs/day: 1.00   • Years: 40.00   • Pack years: 40.00   • Types: Electronic Cigarette, Cigarettes   • Quit date: 2016   • Years since quittin.6   Smokeless Tobacco Never Used   Tobacco Comment    1 PACK/DAY SMOKER UNTIL 2016     Allergies   Allergen Reactions   • Levocetirizine Dihydrochloride Myalgia     Muscle aches/joint pain       Current Outpatient Medications:   •  acetaminophen (Acetaminophen 8 Hour) 650 MG 8 hr tablet, Take 650 mg by mouth 2 (two) times a day., Disp: , Rfl:   •  amLODIPine (NORVASC) 5 MG tablet, Take 1 tablet by mouth Daily., Disp: 90 tablet, Rfl: 1  •  ascorbic acid (VITAMIN C) 1000 MG tablet, Take 1,000 mg by mouth Daily., Disp: , Rfl:   •  aspirin 81 MG EC tablet, Take 81 mg by mouth every morning. TOLD NOT TO STOP BEFORE SURGERY, Disp: , Rfl:   •  bisoprolol (ZEBeta) 10 MG tablet, Take 1 tablet by mouth Daily., Disp: 90 tablet, Rfl: 1  •  Breo Ellipta 200-25 MCG/INH inhaler, INHALE 1 PUFF BY MOUTH DAILY, Disp: 60 each, Rfl: 11  •  Calcium Citrate-Vitamin D (CALCIUM + D PO), Take 2 tablets by mouth Daily., Disp: , Rfl:   •  citalopram (CeleXA) 40 MG tablet, Take 1 tablet by mouth Daily., Disp: 90 tablet, Rfl: 1  •   "clopidogrel (PLAVIX) 75 MG tablet, Take 1 tablet by mouth Daily., Disp: 90 tablet, Rfl: 1  •  Coenzyme Q10 200 MG capsule, Take 400 mg by mouth Daily., Disp: , Rfl:   •  cyanocobalamin (VITAMIN B-12) 500 MCG tablet, Take 1 tablet by mouth Daily., Disp: 90 tablet, Rfl: 1  •  Fish Oil oil, Take 1 capsule by mouth Daily., Disp: , Rfl:   •  Flaxseed, Linseed, (FLAXSEED OIL) 1000 MG capsule, Take 1 capsule by mouth daily., Disp: , Rfl:   •  fluticasone (FLONASE) 50 MCG/ACT nasal spray, SHAKE LIQUID AND USE 1 SPRAY IN EACH NOSTRIL TWICE DAILY, Disp: 48 g, Rfl: 3  •  folic acid (FOLVITE) 1 MG tablet, Take 1,000 mcg by mouth Daily., Disp: , Rfl:   •  gabapentin (NEURONTIN) 100 MG capsule, TAKE 3 CAPSULES BY MOUTH EVERY EVENING, Disp: 90 capsule, Rfl: 2  •  hydroCHLOROthiazide (HYDRODIURIL) 12.5 MG tablet, Take 1 tablet by mouth Daily., Disp: 90 tablet, Rfl: 3  •  Methylsulfonylmethane (MSM PO), Take 2 tablets by mouth daily., Disp: , Rfl:   •  Multiple Vitamins-Minerals (MULTIVITAMIN ADULT PO), Take 1 tablet by mouth daily., Disp: , Rfl:   •  olmesartan (BENICAR) 40 MG tablet, TAKE 1 TABLET BY MOUTH DAILY, Disp: 90 tablet, Rfl: 1  •  omeprazole (priLOSEC) 20 MG capsule, Take 1 capsule by mouth Daily., Disp: 90 capsule, Rfl: 3  •  rosuvastatin (CRESTOR) 40 MG tablet, Take 1 tablet by mouth Daily., Disp: 90 tablet, Rfl: 3  •  traMADol (ULTRAM) 50 MG tablet, TAKE 1 TABLET BY MOUTH TWICE DAILY, Disp: 180 tablet, Rfl: 2  •  URINARY HEALTH/CRANBERRY PO, Take 1 tablet by mouth 2 (two) times a day. \"CRANACTIN\", Disp: , Rfl:     Review of Systems  There were no vitals taken for this visit.    Physical Exam    Procedures    PHQ-9 Total Score:      Assessment/Plan:  Diagnoses and all orders for this visit:    1. Post-traumatic osteoarthritis of right shoulder (Primary)  Comments:  Continue tramadol twice daily and gabapentin 300 mg at bedtime for pain.       Patient Instructions   This patient is on a controlled substance which " improves symptoms/quality of life and is aware of the risks, benefits and possible side-effects current treatment. The patient denies any medication side-effects at this time. A controlled substance agreement will be obtained or is currently on file. We reviewed required monitoring for controlled substances including but not limited to quarterly follow-up visits, annual depression screening, and urine drug screens to which the patient is agreeable. A WOOD report has been or shortly will be reviewed. There are no signs of deviation or misuse.         Plan of care reviewed with patient at the conclusion of today's visit. Education was provided regarding diagnosis, management and any prescribed or recommended OTC medications.  Patient verbalizes understanding of and agreement with management plan.    Return in about 6 months (around 8/9/2022) for Next scheduled follow up.    Dictated Utilizing Dragon Dictation.    CYRUS Gallegos

## 2022-03-03 ENCOUNTER — TELEPHONE (OUTPATIENT)
Dept: INTERNAL MEDICINE | Facility: CLINIC | Age: 67
End: 2022-03-03

## 2022-03-03 NOTE — TELEPHONE ENCOUNTER
Called pt, no voicemail to LM.    ----- Message from Jannette Chapa MD sent at 3/3/2022 10:48 AM EST -----  Dr. Magana sent chest x-ray results from 3/1/2022 which shows a left lower lobe pneumonia.  Labs were acceptable and did not show increase in white blood cell count.Please have her see [facility] today to decide whether she needs antibiotics.

## 2022-03-04 NOTE — TELEPHONE ENCOUNTER
Spoke with pt and she had her carotid artery surgery yesterday and was one her way home from the hospital. They told her about the pneumonia and prescribed amoxicillin. She has an appt her on 3/9/22.

## 2022-03-05 ENCOUNTER — READMISSION MANAGEMENT (OUTPATIENT)
Dept: CALL CENTER | Facility: HOSPITAL | Age: 67
End: 2022-03-05

## 2022-03-05 NOTE — OUTREACH NOTE
Prep Survey    Flowsheet Row Responses   [facility] patient discharged from? Non-BH   Is LACE score < 7 ? Non-BH Discharge   Emergency Room discharge w/ pulse ox? No   Eligibility TCM [facility]    Date of Discharge 03/04/22   Discharge Disposition Home or Self Care   Discharge diagnosis Unavailable   Does the patient have one of the following disease processes/diagnoses(primary or secondary)? Other   Prep survey completed? Yes          LUCINDA NICHOLE - Registered Nurse

## 2022-03-07 ENCOUNTER — TRANSITIONAL CARE MANAGEMENT TELEPHONE ENCOUNTER (OUTPATIENT)
Dept: CALL CENTER | Facility: HOSPITAL | Age: 67
End: 2022-03-07

## 2022-03-07 NOTE — OUTREACH NOTE
Call Center TCM Note    Flowsheet Row Responses   [facility] patient discharged from? Non-BH   Does the patient have one of the following disease processes/diagnoses(primary or secondary)? Other   TCM attempt successful? Yes  [Verbal release- sisters]   Call start time 1456   Call end time 1501   Discharge diagnosis Unavailable   Is the patient taking all medications as directed (includes completed medication regime)? Yes   Medication comments ordered ABT  and Pain med.    Does the patient have a primary care provider?  Yes   Does the patient have an appointment with their PCP within 7 days of discharge? Yes   Comments regarding PCP HOSP/ Rehab DC FU 3/9/22 @ 10 am   Has the patient kept scheduled appointments due by today? N/A   What DME was ordered? Has home O2    Psychosocial issues? No   Did the patient receive a copy of their discharge instructions? Yes   Nursing interventions Reviewed instructions with patient   What is the patient's perception of their health status since discharge? Improving   Is the patient/caregiver able to teach back signs and symptoms related to disease process for when to call PCP? Yes   Is the patient/caregiver able to teach back signs and symptoms related to disease process for when to call 911? Yes   Is the patient/caregiver able to teach back the hierarchy of who to call/visit for symptoms/problems? PCP, Specialist, Home health nurse, Urgent Care, ED, 911 Yes   TCM call completed? Yes   Wrap up additional comments Pt denies any s/s of infection at surgical site. No needs at this time.           Barbara Torres RN    3/7/2022, 15:02 EST

## 2022-03-09 ENCOUNTER — OFFICE VISIT (OUTPATIENT)
Dept: INTERNAL MEDICINE | Facility: CLINIC | Age: 67
End: 2022-03-09

## 2022-03-09 VITALS
WEIGHT: 122 LBS | TEMPERATURE: 99.1 F | DIASTOLIC BLOOD PRESSURE: 62 MMHG | BODY MASS INDEX: 22.45 KG/M2 | OXYGEN SATURATION: 92 % | SYSTOLIC BLOOD PRESSURE: 124 MMHG | HEART RATE: 57 BPM | HEIGHT: 62 IN

## 2022-03-09 DIAGNOSIS — I65.23 BILATERAL CAROTID ARTERY STENOSIS: Primary | ICD-10-CM

## 2022-03-09 DIAGNOSIS — I70.212 ATHEROSCLEROSIS OF NATIVE ARTERY OF LEFT LOWER EXTREMITY WITH INTERMITTENT CLAUDICATION: ICD-10-CM

## 2022-03-09 DIAGNOSIS — L60.3 DYSTROPHIC NAIL: ICD-10-CM

## 2022-03-09 DIAGNOSIS — E78.2 MIXED HYPERLIPIDEMIA: ICD-10-CM

## 2022-03-09 DIAGNOSIS — J44.9 COPD MIXED TYPE: ICD-10-CM

## 2022-03-09 DIAGNOSIS — I10 BENIGN ESSENTIAL HYPERTENSION: ICD-10-CM

## 2022-03-09 DIAGNOSIS — I73.9 PERIPHERAL VASCULAR DISEASE OF EXTREMITY WITH CLAUDICATION: ICD-10-CM

## 2022-03-09 DIAGNOSIS — R09.02 HYPOXIA: ICD-10-CM

## 2022-03-09 PROCEDURE — 99495 TRANSJ CARE MGMT MOD F2F 14D: CPT | Performed by: INTERNAL MEDICINE

## 2022-03-09 PROCEDURE — 1111F DSCHRG MED/CURRENT MED MERGE: CPT | Performed by: INTERNAL MEDICINE

## 2022-03-09 RX ORDER — TRAMADOL HYDROCHLORIDE 50 MG/1
50 TABLET ORAL
COMMUNITY
Start: 2022-02-16 | End: 2022-03-09

## 2022-03-09 RX ORDER — HYDROCODONE BITARTRATE AND ACETAMINOPHEN 5; 325 MG/1; MG/1
1 TABLET ORAL EVERY 6 HOURS PRN
COMMUNITY
Start: 2022-03-03 | End: 2022-04-08

## 2022-03-09 RX ORDER — AMOXICILLIN AND CLAVULANATE POTASSIUM 875; 125 MG/1; MG/1
TABLET, FILM COATED ORAL
COMMUNITY
Start: 2022-03-03 | End: 2022-04-08

## 2022-03-09 RX ORDER — FLUTICASONE PROPIONATE 50 MCG
1 SPRAY, SUSPENSION (ML) NASAL 2 TIMES DAILY
COMMUNITY
Start: 2022-01-11 | End: 2022-03-09

## 2022-03-09 NOTE — PROGRESS NOTES
"Transitional Care Follow Up Visit  Subjective     Aarti Wade is a 67 y.o. female who presents for a transitional care management visit.    Within 48 business hours after discharge our office contacted her via telephone to coordinate her care and needs.      I reviewed and discussed the details of that call along with the discharge summary, hospital problems, inpatient lab results, inpatient diagnostic studies, and consultation reports with Aarti.     Current outpatient and discharge medications have been reconciled for the patient.  Reviewed by:   Current outpatient and discharge medications have been reconciled for the patient.  Reviewed by:       Date of TCM Phone Call 6/9/2020 3/5/2022   Hospital [facility]    Date of Admission 6/8/2020 -   Date of Discharge 6/9/2020 3/4/2022   Discharge Disposition Home or Self Care Home or Self Care     Risk for Readmission (LACE) No data recorded    History of Present Illness   Course During Hospital Stay:    Patient is here for TCM visit after being admitted at [facility] on 3/3/2022 for left carotid endarterectomy due to severe carotid stenosis.  Surgery performed by Dr. Magana.    The patient presents in follow up after having a left carotid endarterectomy on 03/03/2022. She states that she is doing \"pretty decent.\" She continues to be on oxygen at home and while sleeping. She does not use an oxygen saturation monitor at home. Additionally, she has been prescribed Augmentin for bilateral lower pneumonia noted on a chest x-ray before her surgery. She denies feeling shortness of breath, but also states that she has not been very active since surgery.    The patient does state that her blood pressure was high while she was in the hospital but she notes that she was unable to sleep and she was in a lot of pain while in the hospital. She is on amlodipine, bisoprolol and hydrochlorothiazide for her blood pressure.     She is scheduled to " "see Dr. Magana on 04/05/2022. The patient's surgical site is has erythema at the distal part of the incision, she denies experiencing tenderness and notes that the oxygen lays on that area. There is no drainage from the surgical site. She denies any numbness or tingling in her fingers. She does note that she now has a pulse in her left foot, which she has not had for 5 years. The patient has restarted her aspirin and Plavix.     Hyperlipidemia  The patient continues on rosuvastatin 40 mg without any side effects.     Toenail discoloration  The patient states that she has discoloration on her bilateral toenails and she is inquiring if this is a fungus. Her toenails are thick. 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      The following portions of the patient's history were reviewed and updated as appropriate: allergies, current medications, past family history, past medical history, past social history, past surgical history and problem list.    Vitals:    03/09/22 1014   BP: 124/62   BP Location: Right arm   Patient Position: Sitting   Cuff Size: Adult   Pulse: 57   Temp: 99.1 °F (37.3 °C)   TempSrc: Infrared   SpO2: 92%   Weight: 55.3 kg (122 lb)   Height: 157.5 cm (62\")   PainSc:   4   PainLoc: Neck  Comment: incision site     Body mass index is 22.31 kg/m².  Patient's Body mass index is 22.31 kg/m². indicating that she is within normal range (BMI 18.5-24.9). No BMI management plan needed..    Review of Systems    Objective   Physical Exam  Vitals and nursing note reviewed.   Constitutional:       Appearance: She is well-developed and normal weight.   HENT:      Head: Normocephalic.   Eyes:      Conjunctiva/sclera: Conjunctivae normal.      Pupils: Pupils are equal, round, and reactive to light.   Neck:      Thyroid: No thyromegaly.     Cardiovascular:      Rate and Rhythm: Normal rate and regular rhythm.      Pulses:           Posterior tibial pulses are 2+ on " the right side and 1+ on the left side.      Heart sounds: Normal heart sounds.   Pulmonary:      Effort: Pulmonary effort is normal.      Breath sounds: Normal breath sounds. No wheezing or rhonchi.   Musculoskeletal:         General: Normal range of motion.      Cervical back: Normal range of motion and neck supple.      Right lower leg: No edema.      Left lower leg: No edema.   Lymphadenopathy:      Cervical: No cervical adenopathy.   Skin:     General: Skin is warm and dry.      Comments: Right great toenail and left second toenail are discolored and thickened.  There is no lifting of the nails from the nailbed.     Neurological:      Mental Status: She is alert and oriented to person, place, and time.      Gait: Gait is intact.   Psychiatric:         Attention and Perception: Attention normal.         Mood and Affect: Mood normal.         Thought Content: Thought content normal.         Assessment/Plan     Patient Instructions   Problem List Items Addressed This Visit        Cardiac and Vasculature    Bilateral carotid artery stenosis - Primary    Overview     Dr. Magana did ultrasound followed by CT scan.  Blockage of the left ICA was noted to be 80 to 90% by Dr. Magana.  Right ICA with about 70% stenosis.  Left subclavian with about 90% stenosis.  Status post 3/3/2022  left carotid endarterectomy  performed by Dr. Magana.    Taking rosuvastatin nightly, clopidogrel daily, 81 mg coated aspirin daily.           Current Assessment & Plan     Observe for increased erythema to the surgical site or increased pain. Advised that she can take a shower. Discussed that she should not drive for the next 3 to 4 days.            Peripheral vascular disease of extremity with claudication (HCC)    Overview     Taking daily Plavix and aspirin and nightly rosuvastatin.    Follow-up with Dr. Magana.           Current Assessment & Plan     Continue aspirin and Plavix daily.  Continue close follow-up with Dr. Magana.           Benign  essential hypertension    Overview     Taking amlodipine, bisoprolol, hydrochlorothiazide, and olmesartan daily.                 Current Assessment & Plan     Continue amlodipine, bisoprolol, hydrochlorothiazide, and olmesartan daily.      Continue to avoid salt in the diet.                 Relevant Medications    amLODIPine (NORVASC) 5 MG tablet    bisoprolol (ZEBeta) 10 MG tablet    hydroCHLOROthiazide (HYDRODIURIL) 12.5 MG tablet    olmesartan (BENICAR) 40 MG tablet    Mixed hyperlipidemia    Overview     Taking  rosuvastatin every evening.                 Current Assessment & Plan     Continue rosuvastatin every evening.    Continue to improve low-fat low carbohydrate diet.  Resume regular exercise after cleared by surgeon.           Relevant Medications    rosuvastatin (CRESTOR) 40 MG tablet    Atherosclerosis of native artery of left lower extremity with intermittent claudication (HCC)    Overview     Taking daily Plavix and aspirin and nightly statin.            Current Assessment & Plan     Continue aspirin, Plavix, and nightly statin.    Continue walking and physical activity.              Pulmonary and Pneumonias    COPD mixed type (HCC)    Overview     History of tobacco use.  Using Breo Ellipta inhaler once a day.             Current Assessment & Plan     COPD is stable.  Continue Breo inhaler daily.           Relevant Medications    Breo Ellipta 200-25 MCG/INH inhaler    fluticasone (FLONASE) 50 MCG/ACT nasal spray    Hypoxia    Overview     History of tobacco abuse and COPD.  Using Brio Ellipta inhaler daily.  Also diagnosed with bilateral lower lobe pneumonia and put on Augmentin.  Oxygen desaturation after carotid endarterectomy on 3/3/2022.  Patient was sent home on oxygen at 2 L per nasal cannula.           Current Assessment & Plan     Continue with use of oxygen, especially at nighttime. Discussed obtaining an oxygen saturation monitor. Her goal oxygen saturation is 95 percent or higher.                Skin    Dystrophic nail    Overview     Right great toenail and left second toenail are discolored and thickened.  There is no lifting of the nail from the nailbed.  This has the appearance of trauma.  There may also be some fungal component.             Current Assessment & Plan     Advised to apply tea tree oil to her toenails daily after soaking her feet in half vinegar and warm water.                    Diagnosis Plan   1. Bilateral carotid artery stenosis     2. Hypoxia     3. Benign essential hypertension     4. Mixed hyperlipidemia     5. Atherosclerosis of native artery of left lower extremity with intermittent claudication (HCC)     6. Peripheral vascular disease of extremity with claudication (HCC)     7. COPD mixed type (HCC)     8. Dystrophic nail       Follow up on 04/08/2022.          Transcribed from ambient dictation for Jannette Chapa MD by Nkechi Tran.  03/10/22   10:48 EST    Patient verbalized consent to the visit recording.

## 2022-03-10 NOTE — ASSESSMENT & PLAN NOTE
Observe for increased erythema to the surgical site or increased pain. Advised that she can take a shower. Discussed that she should not drive for the next 3 to 4 days.

## 2022-03-10 NOTE — ASSESSMENT & PLAN NOTE
Continue with use of oxygen, especially at nighttime. Discussed obtaining an oxygen saturation monitor. Her goal oxygen saturation is 95 percent or higher.

## 2022-03-10 NOTE — ASSESSMENT & PLAN NOTE
Advised to apply tea tree oil to her toenails daily after soaking her feet in half vinegar and warm water.

## 2022-03-13 NOTE — ASSESSMENT & PLAN NOTE
Continue rosuvastatin every evening.    Continue to improve low-fat low carbohydrate diet.  Resume regular exercise after cleared by surgeon.

## 2022-03-13 NOTE — ASSESSMENT & PLAN NOTE
Continue amlodipine, bisoprolol, hydrochlorothiazide, and olmesartan daily.      Continue to avoid salt in the diet.

## 2022-03-13 NOTE — PATIENT INSTRUCTIONS
Patient Instructions   Problem List Items Addressed This Visit          Cardiac and Vasculature    Bilateral carotid artery stenosis - Primary    Overview     Dr. Magana did ultrasound followed by CT scan.  Blockage of the left ICA was noted to be 80 to 90% by Dr. Magana.  Right ICA with about 70% stenosis.  Left subclavian with about 90% stenosis.  Status post 3/3/2022  left carotid endarterectomy  performed by Dr. Magana.    Taking rosuvastatin nightly, clopidogrel daily, 81 mg coated aspirin daily.           Current Assessment & Plan     Observe for increased erythema to the surgical site or increased pain. Advised that she can take a shower. Discussed that she should not drive for the next 3 to 4 days.            Peripheral vascular disease of extremity with claudication (HCC)    Overview     Taking daily Plavix and aspirin and nightly rosuvastatin.    Follow-up with Dr. Magana.           Current Assessment & Plan     Continue aspirin and Plavix daily.  Continue close follow-up with Dr. Magana.           Benign essential hypertension    Overview     Taking amlodipine, bisoprolol, hydrochlorothiazide, and olmesartan daily.                 Current Assessment & Plan     Continue amlodipine, bisoprolol, hydrochlorothiazide, and olmesartan daily.      Continue to avoid salt in the diet.                 Relevant Medications    amLODIPine (NORVASC) 5 MG tablet    bisoprolol (ZEBeta) 10 MG tablet    hydroCHLOROthiazide (HYDRODIURIL) 12.5 MG tablet    olmesartan (BENICAR) 40 MG tablet    Mixed hyperlipidemia    Overview     Taking  rosuvastatin every evening.                 Current Assessment & Plan     Continue rosuvastatin every evening.    Continue to improve low-fat low carbohydrate diet.  Resume regular exercise after cleared by surgeon.           Relevant Medications    rosuvastatin (CRESTOR) 40 MG tablet    Atherosclerosis of native artery of left lower extremity with intermittent claudication (HCC)    Overview      Taking daily Plavix and aspirin and nightly statin.            Current Assessment & Plan     Continue aspirin, Plavix, and nightly statin.    Continue walking and physical activity.              Pulmonary and Pneumonias    COPD mixed type (HCC)    Overview     History of tobacco use.  Using Breo Ellipta inhaler once a day.             Current Assessment & Plan     COPD is stable.  Continue Breo inhaler daily.           Relevant Medications    Breo Ellipta 200-25 MCG/INH inhaler    fluticasone (FLONASE) 50 MCG/ACT nasal spray    Hypoxia    Overview     History of tobacco abuse and COPD.  Using Brio Ellipta inhaler daily.  Also diagnosed with bilateral lower lobe pneumonia and put on Augmentin.  Oxygen desaturation after carotid endarterectomy on 3/3/2022.  Patient was sent home on oxygen at 2 L per nasal cannula.           Current Assessment & Plan     Continue with use of oxygen, especially at nighttime. Discussed obtaining an oxygen saturation monitor. Her goal oxygen saturation is 95 percent or higher.               Skin    Dystrophic nail    Overview     Right great toenail and left second toenail are discolored and thickened.  There is no lifting of the nail from the nailbed.  This has the appearance of trauma.  There may also be some fungal component.             Current Assessment & Plan     Advised to apply tea tree oil to her toenails daily after soaking her feet in half vinegar and warm water.

## 2022-03-23 DIAGNOSIS — M19.111 POST-TRAUMATIC OSTEOARTHRITIS OF RIGHT SHOULDER: ICD-10-CM

## 2022-03-23 RX ORDER — TRAMADOL HYDROCHLORIDE 50 MG/1
TABLET ORAL
Qty: 180 TABLET | Refills: 2 | Status: SHIPPED | OUTPATIENT
Start: 2022-03-23 | End: 2022-09-21

## 2022-03-23 NOTE — TELEPHONE ENCOUNTER
Rx Refill Note  Requested Prescriptions     Pending Prescriptions Disp Refills   • traMADol (ULTRAM) 50 MG tablet [Pharmacy Med Name: TRAMADOL 50MG TABLETS] 180 tablet      Sig: TAKE 1 TABLET BY MOUTH TWICE DAILY      Last office visit with prescribing clinician: 8/9/2021      Next office visit with prescribing clinician: Visit date not found            Koki Artis LPN  03/23/22, 10:57 EDT

## 2022-04-08 ENCOUNTER — OFFICE VISIT (OUTPATIENT)
Dept: INTERNAL MEDICINE | Facility: CLINIC | Age: 67
End: 2022-04-08

## 2022-04-08 VITALS
WEIGHT: 122.2 LBS | BODY MASS INDEX: 22.49 KG/M2 | OXYGEN SATURATION: 92 % | HEIGHT: 62 IN | SYSTOLIC BLOOD PRESSURE: 136 MMHG | DIASTOLIC BLOOD PRESSURE: 66 MMHG | HEART RATE: 67 BPM | TEMPERATURE: 97.1 F

## 2022-04-08 DIAGNOSIS — I73.9 PERIPHERAL VASCULAR DISEASE OF EXTREMITY WITH CLAUDICATION: ICD-10-CM

## 2022-04-08 DIAGNOSIS — I10 BENIGN ESSENTIAL HYPERTENSION: ICD-10-CM

## 2022-04-08 DIAGNOSIS — E78.2 MIXED HYPERLIPIDEMIA: ICD-10-CM

## 2022-04-08 DIAGNOSIS — R51.9 ACUTE NONINTRACTABLE HEADACHE, UNSPECIFIED HEADACHE TYPE: Chronic | ICD-10-CM

## 2022-04-08 DIAGNOSIS — R09.02 HYPOXIA: ICD-10-CM

## 2022-04-08 DIAGNOSIS — Z23 NEED FOR VACCINATION: ICD-10-CM

## 2022-04-08 DIAGNOSIS — J44.9 COPD MIXED TYPE: ICD-10-CM

## 2022-04-08 DIAGNOSIS — I65.23 BILATERAL CAROTID ARTERY STENOSIS: Primary | ICD-10-CM

## 2022-04-08 PROCEDURE — 91305 COVID-19 (PFIZER) 12+ YRS: CPT | Performed by: INTERNAL MEDICINE

## 2022-04-08 PROCEDURE — 99214 OFFICE O/P EST MOD 30 MIN: CPT | Performed by: INTERNAL MEDICINE

## 2022-04-08 PROCEDURE — 0054A COVID-19 (PFIZER) 12+ YRS: CPT | Performed by: INTERNAL MEDICINE

## 2022-04-08 NOTE — PROGRESS NOTES
[facility]     Aarti Wade  1955   [id number]      Patient Care Team:  Jannette Chapa MD as PCP - General  Jannette Chapa MD as PCP - Family Medicine  Severino Carbajal MD as Consulting Physician (Vascular Surgery)  Ketan Lazcano MD as Consulting Physician (Orthopedic Surgery)  Floyd Ernandez MD as Consulting Physician (Cardiology)  Lorenzo Good MD as Consulting Physician (Ophthalmology)    Chief Complaint   Patient presents with   • Hyperlipidemia   • Hypertension            HPI  Patient is a 67 y.o. female presents with follow-up left carotid endarterectomy due to severe carotid stenosis done on 3/3/2020 at [facility] by Dr. Magana.  Hypoxia during hospitalization.  Sent home on oxygen at 2 L per nasal cannula.  She also was diagnosed with bilateral lower pneumonia on the presurgical chest x-ray.  Patient Taking aspirin and Plavix for peripheral vascular disease.    Bilateral carotid artery stenosis  Approximately 3 weeks after her surgery, the patient reports that she had bought a new comforter for her dogs and sheets for herself  that she used without washing, and for the next 2 days, she began to have headaches. Then, after washing her sheets, her headaches resolved. However, on 04/05/2022, she had made a follow up appointment with Dr. Magana where she received an ultrasound checking her right carotid artery. After seeing her ultrasound, Dr. Magana had told her that her right carotid artery has worsened and she needed surgery as well, and he would be able to give her 2 to 3 months before surgery. She has a follow up appointment schedule in 05/24/2022, but she expresses interest in getting a second opinion.  She asked if changing her diet and lifestyle would give her more time before needing surgery.    Hypertension  She is taking her blood pressure medications regularly.   Today, her in-clinic blood pressure was 136/66 mm Hg. At home,her systolic blood pressure is  from 113 mm Hg to 120 mm Hg and her diastolic blood pressure is around 70 mm Hg.     Hypoxia  Today, the patient's in-clinic oxygen saturation level, and on 03/09/2022, was 92 percent. 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 She no longer uses supplemental oxygen nor does she check her oxygen saturation.    COPD   The patient continues treatment with Breo Ellipta inhaler daily. However, she believes that she still has phlegm due to an increasing need to clear her throat. Also, while taking her antibiotics for recent pneumonia, for 2 to 3 days she was not taking them twice daily, as prescribed, but she finished all the medication.         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 HARRISON   • COLONOSCOPY      last 8 years ago.  Due to schedule   • EYE LENS IMPLANT SECONDARY Left    • FEMORAL FEMORAL BYPASS Right 8/22/2016    Procedure: RIGHT COMMON FEMORAL ENDARTERECTOMY WITH PATCH;  Surgeon: Severino  PARAMJIT Carbajal MD;  Location:  [facility];  Service:    • FINGER SURGERY Left     LEFT INDEX FINGER   • INTERVENTIONAL RADIOLOGY PROCEDURE N/A 2016    Procedure: IR PTA femoral popliteal artery;  Surgeon: Severino Carbajal MD;  Location:  [facility];  Service:    • INTERVENTIONAL RADIOLOGY PROCEDURE Left 2020    Procedure: LEFT ATHERECTOMY, BALLOON ANGIOPLASTY;  Surgeon: Severino Carbajal MD;  Location:  [facility];  Service: Interventional Radiology   • TONSILLECTOMY     • TOTAL SHOULDER ARTHROPLASTY Left 2020    Procedure: TOTAL SHOULDER ARTHROPLASTY LEFT;  Surgeon: Ketan Lazcano MD;  Location:  [facility];  Service: Orthopedics;  Laterality: Left;  protector in: 1346  protector out: 1402       Family History   Problem Relation Age of Onset   • Osteoarthritis Mother    • Alzheimer's disease Mother    • Hypertension Father    • Heart attack Father    • Alcohol abuse Father    • No Known Problems Sister    • No Known Problems Daughter    • No Known Problems Son    • Breast cancer Maternal Grandmother 50   • Breast cancer Paternal Grandmother 50   • No Known Problems Maternal Aunt    • No Known Problems Paternal Aunt    • Other Other    • Heart attack Other    • Coronary artery disease Paternal Grandfather    • Stroke Paternal Grandfather    • No Known Problems Sister    • Ovarian cancer Neg Hx    • Endometrial cancer Neg Hx        Social History     Socioeconomic History   • Marital status: Single   Tobacco Use   • Smoking status: Former Smoker     Packs/day: 1.00     Years: 40.00     Pack years: 40.00     Types: Electronic Cigarette, Cigarettes     Quit date: 2016     Years since quittin.8   • Smokeless tobacco: Never Used   • Tobacco comment: 1 PACK/DAY SMOKER UNTIL 2016   Substance and Sexual Activity   • Alcohol use: Yes     Alcohol/week: 2.0 standard drinks     Types: 2 Shots of liquor per week     Comment: 2 DRINKS PER DAY   • Drug use: No   •  "Sexual activity: Defer       Allergies   Allergen Reactions   • Levocetirizine Dihydrochloride Myalgia     Muscle aches/joint pain       Review of Systems:     Review of Systems   Constitutional: Negative for chills, fatigue and fever.   HENT: Negative for congestion, ear pain and sinus pressure.    Respiratory: Negative for cough, chest tightness, shortness of breath and wheezing.    Cardiovascular: Negative for chest pain and palpitations.   Gastrointestinal: Negative for abdominal pain, blood in stool and constipation.   Skin: Negative for color change.   Allergic/Immunologic: Negative for environmental allergies.   Neurological: Negative for dizziness, speech difficulty and headache.   Psychiatric/Behavioral: Negative for decreased concentration. The patient is not nervous/anxious.        Vital Signs  Vitals:    04/08/22 1343   BP: 136/66   BP Location: Right arm   Patient Position: Sitting   Cuff Size: Adult   Pulse: 67   Temp: 97.1 °F (36.2 °C)   TempSrc: Infrared   SpO2: 92%   Weight: 55.4 kg (122 lb 3.2 oz)   Height: 157.5 cm (62\")   PainSc: 0-No pain     Body mass index is 22.35 kg/m².  Patient's Body mass index is 22.35 kg/m². indicating that she is within normal range (BMI 18.5-24.9). No BMI management plan needed..        Current Outpatient Medications:   •  acetaminophen (Acetaminophen 8 Hour) 650 MG 8 hr tablet, Take 650 mg by mouth 2 (two) times a day., Disp: , Rfl:   •  amLODIPine (NORVASC) 5 MG tablet, Take 1 tablet by mouth Daily., Disp: 90 tablet, Rfl: 1  •  ascorbic acid (VITAMIN C) 1000 MG tablet, Take 1,000 mg by mouth Daily., Disp: , Rfl:   •  aspirin 81 MG EC tablet, Take 81 mg by mouth every morning. TOLD NOT TO STOP BEFORE SURGERY, Disp: , Rfl:   •  bisoprolol (ZEBeta) 10 MG tablet, Take 1 tablet by mouth Daily., Disp: 90 tablet, Rfl: 1  •  Breo Ellipta 200-25 MCG/INH inhaler, INHALE 1 PUFF BY MOUTH DAILY, Disp: 60 each, Rfl: 11  •  Calcium Citrate-Vitamin D (CALCIUM + D PO), Take 2 tablets " "by mouth Daily., Disp: , Rfl:   •  citalopram (CeleXA) 40 MG tablet, Take 1 tablet by mouth Daily., Disp: 90 tablet, Rfl: 1  •  clopidogrel (PLAVIX) 75 MG tablet, Take 1 tablet by mouth Daily., Disp: 90 tablet, Rfl: 1  •  Coenzyme Q10 200 MG capsule, Take 400 mg by mouth Daily., Disp: , Rfl:   •  cyanocobalamin (VITAMIN B-12) 500 MCG tablet, Take 1 tablet by mouth Daily., Disp: 90 tablet, Rfl: 1  •  Fish Oil oil, Take 1 capsule by mouth Daily., Disp: , Rfl:   •  Flaxseed, Linseed, (FLAXSEED OIL) 1000 MG capsule, Take 1 capsule by mouth daily., Disp: , Rfl:   •  fluticasone (FLONASE) 50 MCG/ACT nasal spray, SHAKE LIQUID AND USE 1 SPRAY IN EACH NOSTRIL TWICE DAILY, Disp: 48 g, Rfl: 3  •  folic acid (FOLVITE) 1 MG tablet, Take 1,000 mcg by mouth Daily., Disp: , Rfl:   •  gabapentin (NEURONTIN) 100 MG capsule, TAKE 3 CAPSULES BY MOUTH EVERY EVENING, Disp: 90 capsule, Rfl: 2  •  hydroCHLOROthiazide (HYDRODIURIL) 12.5 MG tablet, Take 1 tablet by mouth Daily., Disp: 90 tablet, Rfl: 3  •  Methylsulfonylmethane (MSM PO), Take 2 tablets by mouth daily., Disp: , Rfl:   •  Multiple Vitamins-Minerals (MULTIVITAMIN ADULT PO), Take 1 tablet by mouth daily., Disp: , Rfl:   •  olmesartan (BENICAR) 40 MG tablet, TAKE 1 TABLET BY MOUTH DAILY, Disp: 90 tablet, Rfl: 1  •  omeprazole (priLOSEC) 20 MG capsule, Take 1 capsule by mouth Daily., Disp: 90 capsule, Rfl: 3  •  rosuvastatin (CRESTOR) 40 MG tablet, Take 1 tablet by mouth Daily., Disp: 90 tablet, Rfl: 3  •  traMADol (ULTRAM) 50 MG tablet, TAKE 1 TABLET BY MOUTH TWICE DAILY, Disp: 180 tablet, Rfl: 2  •  URINARY HEALTH/CRANBERRY PO, Take 1 tablet by mouth 2 (Two) Times a Day. \"CRANACTIN\", Disp: , Rfl:     Physical Exam:    Physical Exam  Vitals and nursing note reviewed.   Constitutional:       Appearance: She is well-developed.   HENT:      Head: Normocephalic.   Eyes:      Conjunctiva/sclera: Conjunctivae normal.      Pupils: Pupils are equal, round, and reactive to light.   Neck: "      Thyroid: No thyromegaly.   Cardiovascular:      Rate and Rhythm: Normal rate and regular rhythm.      Heart sounds: Normal heart sounds.      Comments: -Incision on Left carotid endarterectomy is well healed.   Cardiothoracic peripheral pulses are intact but decreased.    Pulmonary:      Effort: Pulmonary effort is normal.      Breath sounds: Normal breath sounds. No wheezing.   Musculoskeletal:         General: Normal range of motion.      Cervical back: Normal range of motion and neck supple.   Lymphadenopathy:      Cervical: No cervical adenopathy.   Skin:     General: Skin is warm and dry.   Neurological:      Mental Status: She is alert and oriented to person, place, and time.   Psychiatric:         Thought Content: Thought content normal.          ACE III MINI        Results Review:    None    CMP:  Lab Results   Component Value Date    BUN 37 (H) 10/06/2021    CREATININE 0.94 10/06/2021    EGFRIFNONA 59 (L) 10/06/2021    EGFRIFAFRI 72 10/06/2021    BCR 39.4 (H) 10/06/2021     10/06/2021    K 5.5 (H) 10/06/2021    CO2 20.9 (L) 10/06/2021    CALCIUM 9.3 10/06/2021    PROTENTOTREF 6.9 10/06/2021    ALBUMIN 4.60 10/06/2021    LABGLOBREF 2.3 10/06/2021    LABIL2 2.0 10/06/2021    BILITOT <0.2 10/06/2021    ALKPHOS 53 10/06/2021    AST 25 10/06/2021    ALT 25 10/06/2021     HbA1c:  Lab Results   Component Value Date    HGBA1C 5.50 06/05/2020    HGBA1C 5.50 01/03/2020     Microalbumin:  Lab Results   Component Value Date    MICROALBUR 32.0 10/06/2021     Lipid Panel  Lab Results   Component Value Date    CHOL 136 10/09/2020    TRIG 144 10/06/2021    HDL 59 10/06/2021    LDL 93 10/06/2021    AST 25 10/06/2021    ALT 25 10/06/2021       Medication Review: Medications reviewed and noted  Patient Instructions   Problem List Items Addressed This Visit        Cardiac and Vasculature    Peripheral vascular disease of extremity with claudication (HCC)    Overview     Taking daily Plavix and aspirin and nightly  rosuvastatin.             Current Assessment & Plan     She will continue Plavix and aspirin as prescribed at night.            Mixed hyperlipidemia    Overview     Taking  rosuvastatin every evening.                 Current Assessment & Plan     She will continue Rosuvastatin as prescribed.   I encouraged her to continue a low sodium diet and avoid drinking coke.              Relevant Medications    rosuvastatin (CRESTOR) 40 MG tablet    Bilateral carotid artery stenosis - Primary    Overview     Dr. Magana did ultrasound followed by CT scan.  Blockage of the left ICA was noted to be 80 to 90% by Dr. Magana.  Right ICA with about 70% stenosis.  Left subclavian with about 90% stenosis.  Status post 3/3/2022  left carotid endarterectomy  performed by Dr. Magana.    Taking rosuvastatin nightly, clopidogrel daily, 81 mg coated aspirin daily.           Current Assessment & Plan     She will continue Rosuvastatin nightly along with Clopidogrel and Aspirin 81 mg daily.   I am referring her to Dr. Worthington at [facility] for a second opinion on a right carotid endarterectomy.    I encouraged her to continue improving her low-fat diet and stay compliant with regular exercise.            Relevant Orders    Ambulatory Referral to Vascular Surgery    Benign essential hypertension    Overview     Taking amlodipine, bisoprolol, hydrochlorothiazide, and olmesartan daily.                 Current Assessment & Plan     She will continue Amlodipine, Bisoprolol, hydrochlorothiazide and Olmesartan as prescribed.              Relevant Medications    amLODIPine (NORVASC) 5 MG tablet    bisoprolol (ZEBeta) 10 MG tablet    hydroCHLOROthiazide (HYDRODIURIL) 12.5 MG tablet    olmesartan (BENICAR) 40 MG tablet       Neuro    Acute nonintractable headache (Chronic)    Overview     Headache occurred for a few days after she put some new sheets on the bed without washing them.  Headache resolved when she took them off and washed them.               Pulmonary and Pneumonias    COPD mixed type (HCC)    Overview     History of tobacco use.  Using Breo Ellipta inhaler once a day.             Current Assessment & Plan     Continue Brio Ellipta inhaler daily.             Relevant Medications    Breo Ellipta 200-25 MCG/INH inhaler    fluticasone (FLONASE) 50 MCG/ACT nasal spray    Hypoxia    Overview     History of tobacco abuse and COPD.  Using Brio Ellipta inhaler daily.  Also diagnosed with bilateral lower lobe pneumonia and put on Augmentin.  Oxygen desaturation after carotid endarterectomy on 3/3/2022.  Patient was sent home on oxygen at 2 L per nasal cannula.           Current Assessment & Plan     Continue Brio Ellipta inhaler daily.  I advised her to watch for shortness of breath with exercise or signs of lack of blood flow to the brain like dizziness, lightheadedness, feeling foggy headed, trouble thinking.              Other Visit Diagnoses     Need for vaccination        Relevant Orders    COVID-19 Vaccine (Pfizer) Gray Cap (Completed)             Diagnosis Plan   1. Bilateral carotid artery stenosis  Ambulatory Referral to Vascular Surgery   2. Benign essential hypertension     3. Mixed hyperlipidemia     4. Peripheral vascular disease of extremity with claudication (HCC)     5. COPD mixed type (HCC)      Continue Breo Ellipta inhaler daily   6. Hypoxia     7. Acute nonintractable headache, unspecified headache type     8. Need for vaccination  COVID-19 Vaccine (Pfizer) Gray Cap           Plan of care reviewed with patient at the conclusion of today's visit. Education was provided regarding diagnosis, management, and any prescribed or recommended OTC medications.Patient verbalizes understanding of and agreement with management plan.         Jannette Chapa MD      Transcribed from ambient dictation for Jannette Chapa MD by Lilly Allen.  04/08/22   20:27 EDT    Patient verbalized consent to the visit recording.

## 2022-04-09 NOTE — ASSESSMENT & PLAN NOTE
Continue Brio Ellipta inhaler daily.  I advised her to watch for shortness of breath with exercise or signs of lack of blood flow to the brain like dizziness, lightheadedness, feeling foggy headed, trouble thinking.

## 2022-04-09 NOTE — ASSESSMENT & PLAN NOTE
She will continue Rosuvastatin as prescribed.   I encouraged her to continue a low sodium diet and avoid drinking coke.

## 2022-04-09 NOTE — PATIENT INSTRUCTIONS
Patient Instructions   Problem List Items Addressed This Visit          Cardiac and Vasculature    Peripheral vascular disease of extremity with claudication (HCC)    Overview     Taking daily Plavix and aspirin and nightly rosuvastatin.             Current Assessment & Plan     She will continue Plavix and aspirin as prescribed at night.            Mixed hyperlipidemia    Overview     Taking  rosuvastatin every evening.                 Current Assessment & Plan     She will continue Rosuvastatin as prescribed.   I encouraged her to continue a low sodium diet and avoid drinking coke.              Relevant Medications    rosuvastatin (CRESTOR) 40 MG tablet    Bilateral carotid artery stenosis - Primary    Overview     Dr. Magana did ultrasound followed by CT scan.  Blockage of the left ICA was noted to be 80 to 90% by Dr. Magana.  Right ICA with about 70% stenosis.  Left subclavian with about 90% stenosis.  Status post 3/3/2022  left carotid endarterectomy  performed by Dr. Magana.    Taking rosuvastatin nightly, clopidogrel daily, 81 mg coated aspirin daily.           Current Assessment & Plan     She will continue Rosuvastatin nightly along with Clopidogrel and Aspirin 81 mg daily.   I am referring her to Dr. Worthington at [facility] [place] for a second opinion on a right carotid endarterectomy.    I encouraged her to continue improving her low-fat diet and stay compliant with regular exercise.            Relevant Orders    Ambulatory Referral to Vascular Surgery    Benign essential hypertension    Overview     Taking amlodipine, bisoprolol, hydrochlorothiazide, and olmesartan daily.                 Current Assessment & Plan     She will continue Amlodipine, Bisoprolol, hydrochlorothiazide and Olmesartan as prescribed.              Relevant Medications    amLODIPine (NORVASC) 5 MG tablet    bisoprolol (ZEBeta) 10 MG tablet    hydroCHLOROthiazide (HYDRODIURIL) 12.5 MG tablet    olmesartan (BENICAR) 40 MG tablet       Neuro    Acute  nonintractable headache (Chronic)    Overview     Headache occurred for a few days after she put some new sheets on the bed without washing them.  Headache resolved when she took them off and washed them.              Pulmonary and Pneumonias    COPD mixed type (HCC)    Overview     History of tobacco use.  Using Breo Ellipta inhaler once a day.             Current Assessment & Plan     Continue Brio Ellipta inhaler daily.             Relevant Medications    Breo Ellipta 200-25 MCG/INH inhaler    fluticasone (FLONASE) 50 MCG/ACT nasal spray    Hypoxia    Overview     History of tobacco abuse and COPD.  Using Brio Ellipta inhaler daily.  Also diagnosed with bilateral lower lobe pneumonia and put on Augmentin.  Oxygen desaturation after carotid endarterectomy on 3/3/2022.  Patient was sent home on oxygen at 2 L per nasal cannula.           Current Assessment & Plan     Continue Brio Ellipta inhaler daily.  I advised her to watch for shortness of breath with exercise or signs of lack of blood flow to the brain like dizziness, lightheadedness, feeling foggy headed, trouble thinking.                  Other Visit Diagnoses       Need for vaccination        Relevant Orders    COVID-19 Vaccine (Pfizer) Gray Cap (Completed)

## 2022-04-09 NOTE — ASSESSMENT & PLAN NOTE
She will continue Rosuvastatin nightly along with Clopidogrel and Aspirin 81 mg daily.   I am referring her to Dr. Worthington at [facility] for a second opinion on a right carotid endarterectomy.    I encouraged her to continue improving her low-fat diet and stay compliant with regular exercise.

## 2022-04-18 DIAGNOSIS — I10 BENIGN ESSENTIAL HYPERTENSION: ICD-10-CM

## 2022-04-18 DIAGNOSIS — M19.111 POST-TRAUMATIC OSTEOARTHRITIS OF RIGHT SHOULDER: ICD-10-CM

## 2022-04-18 RX ORDER — BISOPROLOL FUMARATE 10 MG/1
10 TABLET, FILM COATED ORAL DAILY
Qty: 90 TABLET | Refills: 1 | Status: SHIPPED | OUTPATIENT
Start: 2022-04-18 | End: 2022-10-25

## 2022-04-18 RX ORDER — GABAPENTIN 100 MG/1
300 CAPSULE ORAL EVERY EVENING
Qty: 90 CAPSULE | Refills: 0 | Status: SHIPPED | OUTPATIENT
Start: 2022-04-18 | End: 2022-05-20

## 2022-05-20 DIAGNOSIS — M19.111 POST-TRAUMATIC OSTEOARTHRITIS OF RIGHT SHOULDER: ICD-10-CM

## 2022-05-20 RX ORDER — GABAPENTIN 100 MG/1
300 CAPSULE ORAL EVERY EVENING
Qty: 90 CAPSULE | Refills: 2 | Status: SHIPPED | OUTPATIENT
Start: 2022-05-20 | End: 2022-10-03

## 2022-05-20 RX ORDER — FOLIC ACID 1 MG/1
TABLET ORAL
Qty: 90 TABLET | Refills: 3 | Status: SHIPPED | OUTPATIENT
Start: 2022-05-20 | End: 2022-09-16 | Stop reason: SDUPTHER

## 2022-05-20 NOTE — TELEPHONE ENCOUNTER
4/8/22  
to self-correct without assist with brace donned with no crutch. Pt was able to temporarily return demonstration regarding walking mechanics with additional instruction necessary for reinforcement.     P: Continue with rehab plan  Kiana Barahona PT  DPT    Treatment Charges: Mins Units   Initial Evaluation     Re-Evaluation     Ther Exercise         TE 20 1   Manual Therapy     MT     Ther Activities        TA 10 1   Gait Training          GT 10 1   Neuro Re-education NR     Modalities     Non-Billable Service Time     [facility]     Total Time/Units 40 3

## 2022-05-26 RX ORDER — CHOLECALCIFEROL (VITAMIN D3) 125 MCG
CAPSULE ORAL
Qty: 90 TABLET | Refills: 1 | Status: SHIPPED | OUTPATIENT
Start: 2022-05-26 | End: 2022-12-14 | Stop reason: SDUPTHER

## 2022-05-26 NOTE — TELEPHONE ENCOUNTER
Rx Refill Note  Requested Prescriptions     Pending Prescriptions Disp Refills   • vitamin B-12 (CYANOCOBALAMIN) 500 MCG tablet [Pharmacy Med Name: VITAMIN B-12 500MCG TABLETS] 90 tablet 1     Sig: TAKE 1 TABLET BY MOUTH DAILY      Last office visit with prescribing clinician: 4/8/2022      Next office visit with prescribing clinician: 7/18/2022            Pinky Ramírez RN  05/26/22, 12:23 EDT

## 2022-05-27 DIAGNOSIS — I10 BENIGN ESSENTIAL HYPERTENSION: Chronic | ICD-10-CM

## 2022-05-27 RX ORDER — OLMESARTAN MEDOXOMIL 40 MG/1
TABLET ORAL
Qty: 90 TABLET | Refills: 1 | Status: SHIPPED | OUTPATIENT
Start: 2022-05-27 | End: 2022-09-21 | Stop reason: ALTCHOICE

## 2022-05-27 NOTE — TELEPHONE ENCOUNTER
Rx Refill Note  Requested Prescriptions     Pending Prescriptions Disp Refills   • olmesartan (BENICAR) 40 MG tablet [Pharmacy Med Name: OLMESARTAN MEDOXOMIL 40MG TABLETS] 90 tablet 1     Sig: TAKE 1 TABLET BY MOUTH DAILY      Last office visit with prescribing clinician: 4/8/2022      Next office visit with prescribing clinician: 7/18/2022            Koki Artis LPN  05/27/22, 15:15 EDT

## 2022-06-02 DIAGNOSIS — I10 BENIGN ESSENTIAL HYPERTENSION: ICD-10-CM

## 2022-06-02 NOTE — TELEPHONE ENCOUNTER
Rx Refill Note  Requested Prescriptions     Pending Prescriptions Disp Refills   • amLODIPine (NORVASC) 5 MG tablet [Pharmacy Med Name: AMLODIPINE BESYLATE 5MG TABLETS] 90 tablet 1     Sig: TAKE 1 TABLET BY MOUTH DAILY      Last office visit with prescribing clinician: 4/8/2022      Next office visit with prescribing clinician: 7/18/2022            Mercedes Trammell LPN  06/02/22, 16:15 EDT

## 2022-06-03 RX ORDER — AMLODIPINE BESYLATE 5 MG/1
5 TABLET ORAL DAILY
Qty: 90 TABLET | Refills: 1 | Status: SHIPPED | OUTPATIENT
Start: 2022-06-03 | End: 2022-09-21

## 2022-06-07 ENCOUNTER — OFFICE VISIT (OUTPATIENT)
Dept: CARDIAC SURGERY | Facility: CLINIC | Age: 67
End: 2022-06-07

## 2022-06-07 VITALS
BODY MASS INDEX: 21.97 KG/M2 | SYSTOLIC BLOOD PRESSURE: 135 MMHG | TEMPERATURE: 98.6 F | WEIGHT: 119.4 LBS | DIASTOLIC BLOOD PRESSURE: 55 MMHG | HEART RATE: 52 BPM | HEIGHT: 62 IN | OXYGEN SATURATION: 94 %

## 2022-06-07 DIAGNOSIS — I65.21 CAROTID STENOSIS, ASYMPTOMATIC, RIGHT: Primary | ICD-10-CM

## 2022-06-07 PROCEDURE — 99204 OFFICE O/P NEW MOD 45 MIN: CPT | Performed by: THORACIC SURGERY (CARDIOTHORACIC VASCULAR SURGERY)

## 2022-06-07 NOTE — PROGRESS NOTES
06/07/2022  Patient Information  Aarti Wade                                                                                          [id number]  [facility] [id number]   1955  'PCP/Referring Physician'  Jannette Chapa MD  [phone]  Jannette Chapa MD  [phone]  Chief Complaint   Patient presents with   • Carotid Artery Disease     Referred by Dr. Jannette Chapa for carotid artery stenosis. Patient underwent a left CEA on 3/3/22 at [place]       History of Present Illness: 67-year-old  female with a history of hypertension, hyperlipidemia, COPD, previous tobacco abuse, peripheral vascular disease status post right lower extremity stent placement and carotid artery disease status post left carotid endarterectomy on 3/3/2022 with Dr. Magana who presents with right carotid artery stenosis.  The patient denies vision loss, speaking difficulties or focal weakness.  Overall she is doing well and is without complaints.  She recovered well from her recent carotid surgery.      Patient Active Problem List   Diagnosis   • Atherosclerosis of native artery of both lower extremities with intermittent claudication (HCC)   • Peripheral vascular disease of extremity with claudication ([facility])   • Benign essential hypertension   • Mixed hyperlipidemia   • Allergic rhinitis   • Gastroesophageal reflux disease without esophagitis   • Osteopenia of multiple sites   • Adhesive capsulitis of right shoulder   • Chronic low back pain   • COPD mixed type ([facility])   • Post-traumatic osteoarthritis of right shoulder   • Depressive disorder   • Keratosis pilaris   • Subclavian steal syndrome   • Nocturnal leg cramps   • Atherosclerosis of native artery of left lower extremity with intermittent claudication ([facility])   • Postural dizziness with presyncope   • Hyperkalemia   • Bilateral hand pain   • Post-traumatic osteoarthritis of multiple joints   • Chronic kidney disease, stage 3, mod decreased GFR ([facility])   • Acute pain  of left shoulder   • Status post total replacement of left shoulder   • Cough   • Medicare annual wellness visit, subsequent   • Annual physical exam   • Chronic right shoulder pain   • Hyperhomocysteinemia (HCC)   • Bilateral carotid artery stenosis   • Dystrophic nail   • Hypoxia   • Acute nonintractable headache     Past Medical History:   Diagnosis Date   • Anxiety    • Arthritis    • Arthritis of right shoulder region    • Chronic UTI    • Circulatory disorder    • Claudication (HCC)    • COPD (chronic obstructive pulmonary disease) (HCC)    • DA (degenerative arthritis)    • Depression    • diffuse fatty liver infiltration on CT 2009   • Gastrointestinal disorder    • GERD (gastroesophageal reflux disease)    • Head injury    • Hepatitis     UNKNOWN TYPE   • Hyperlipidemia    • Hypertension    • Inflammatory arthritis    • 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 HARRISON   • COLONOSCOPY      last 8 years ago.  Due to schedule   • EYE LENS IMPLANT SECONDARY Left    • FEMORAL FEMORAL BYPASS Right 8/22/2016    Procedure: RIGHT COMMON FEMORAL ENDARTERECTOMY WITH PATCH;  Surgeon: Severino Harrison MD;  Location:  [facility] OR;  Service:    • FINGER SURGERY Left     LEFT INDEX FINGER   • INTERVENTIONAL RADIOLOGY PROCEDURE N/A 8/16/2016    Procedure: IR PTA femoral popliteal artery;  Surgeon: Severino Harrison MD;  Location:  [facility] CATH INVASIVE LOCATION;  Service:    • INTERVENTIONAL RADIOLOGY PROCEDURE Left 1/7/2020    Procedure: LEFT ATHERECTOMY, BALLOON ANGIOPLASTY;  Surgeon: Severino Harrison MD;  Location:  [facility] CATH INVASIVE  LOCATION;  Service: Interventional Radiology   • TONSILLECTOMY     • TOTAL SHOULDER ARTHROPLASTY Left 6/8/2020    Procedure: TOTAL SHOULDER ARTHROPLASTY LEFT;  Surgeon: Ketan Lazcano MD;  Location: [facility] beds at the [facility];  Service: Orthopedics;  Laterality: Left;  protector in: 1346  protector out: 1402       Current Outpatient Medications:   •  acetaminophen (Acetaminophen 8 Hour) 650 MG 8 hr tablet, Take 650 mg by mouth 2 (two) times a day., Disp: , Rfl:   •  amLODIPine (NORVASC) 5 MG tablet, TAKE 1 TABLET BY MOUTH DAILY, Disp: 90 tablet, Rfl: 1  •  ascorbic acid (VITAMIN C) 1000 MG tablet, Take 1,000 mg by mouth Daily., Disp: , Rfl:   •  aspirin 81 MG EC tablet, Take 81 mg by mouth every morning. TOLD NOT TO STOP BEFORE SURGERY, Disp: , Rfl:   •  bisoprolol (ZEBeta) 10 MG tablet, TAKE 1 TABLET BY MOUTH DAILY, Disp: 90 tablet, Rfl: 1  •  Breo Ellipta 200-25 MCG/INH inhaler, INHALE 1 PUFF BY MOUTH DAILY, Disp: 60 each, Rfl: 11  •  Calcium Citrate-Vitamin D (CALCIUM + D PO), Take 2 tablets by mouth Daily., Disp: , Rfl:   •  citalopram (CeleXA) 40 MG tablet, Take 1 tablet by mouth Daily., Disp: 90 tablet, Rfl: 1  •  clopidogrel (PLAVIX) 75 MG tablet, Take 1 tablet by mouth Daily., Disp: 90 tablet, Rfl: 1  •  Coenzyme Q10 200 MG capsule, Take 400 mg by mouth Daily., Disp: , Rfl:   •  Fish Oil oil, Take 1 capsule by mouth Daily., Disp: , Rfl:   •  Flaxseed, Linseed, (FLAXSEED OIL) 1000 MG capsule, Take 1 capsule by mouth daily., Disp: , Rfl:   •  fluticasone (FLONASE) 50 MCG/ACT nasal spray, SHAKE LIQUID AND USE 1 SPRAY IN EACH NOSTRIL TWICE DAILY, Disp: 48 g, Rfl: 3  •  folic acid (FOLVITE) 1 MG tablet, TAKE 1 TABLET BY MOUTH DAILY, Disp: 90 tablet, Rfl: 3  •  gabapentin (NEURONTIN) 100 MG capsule, TAKE 3 CAPSULES BY MOUTH EVERY EVENING, Disp: 90 capsule, Rfl: 2  •  hydroCHLOROthiazide (HYDRODIURIL) 12.5 MG tablet, Take 1 tablet by mouth Daily., Disp: 90 tablet, Rfl: 3  •  Methylsulfonylmethane (MSM PO), Take 2 tablets by  "mouth daily., Disp: , Rfl:   •  Multiple Vitamins-Minerals (MULTIVITAMIN ADULT PO), Take 1 tablet by mouth daily., Disp: , Rfl:   •  olmesartan (BENICAR) 40 MG tablet, TAKE 1 TABLET BY MOUTH DAILY, Disp: 90 tablet, Rfl: 1  •  omeprazole (priLOSEC) 20 MG capsule, Take 1 capsule by mouth Daily., Disp: 90 capsule, Rfl: 3  •  rosuvastatin (CRESTOR) 40 MG tablet, Take 1 tablet by mouth Daily., Disp: 90 tablet, Rfl: 3  •  traMADol (ULTRAM) 50 MG tablet, TAKE 1 TABLET BY MOUTH TWICE DAILY, Disp: 180 tablet, Rfl: 2  •  URINARY HEALTH/CRANBERRY PO, Take 1 tablet by mouth 2 (Two) Times a Day. \"CRANACTIN\", Disp: , Rfl:   •  vitamin B-12 (CYANOCOBALAMIN) 500 MCG tablet, TAKE 1 TABLET BY MOUTH DAILY, Disp: 90 tablet, Rfl: 1  Allergies   Allergen Reactions   • Levocetirizine Dihydrochloride Myalgia     Muscle aches/joint pain     Social History     Socioeconomic History   • Marital status: Single   • Number of children: 0   Tobacco Use   • Smoking status: Former Smoker     Packs/day: 1.00     Years: 40.00     Pack years: 40.00     Types: Electronic Cigarette, Cigarettes     Quit date: 2016     Years since quittin.9   • Smokeless tobacco: Never Used   • Tobacco comment: 1 PACK/DAY SMOKER UNTIL 2016   Vaping Use   • Vaping Use: Former   Substance and Sexual Activity   • Alcohol use: Yes     Alcohol/week: 2.0 standard drinks     Types: 2 Shots of liquor per week     Comment: 2 DRINKS PER DAY   • Drug use: No   • Sexual activity: Defer     Family History   Problem Relation Age of Onset   • Osteoarthritis Mother    • Alzheimer's disease Mother    • Hypertension Father    • Heart attack Father    • Alcohol abuse Father    • No Known Problems Sister    • No Known Problems Daughter    • No Known Problems Son    • Breast cancer Maternal Grandmother 50   • Breast cancer Paternal Grandmother 50   • No Known Problems Maternal Aunt    • No Known Problems Paternal Aunt    • Other Other    • Heart attack Other    • Coronary artery " "disease Paternal Grandfather    • Stroke Paternal Grandfather    • No Known Problems Sister    • Ovarian cancer Neg Hx    • Endometrial cancer Neg Hx      Review of Systems   Constitutional: Negative for chills, fever, malaise/fatigue, night sweats and weight loss.   HENT: Negative for hearing loss, odynophagia and sore throat.    Cardiovascular: Negative for chest pain, dyspnea on exertion, leg swelling, orthopnea and palpitations.   Respiratory: Positive for cough. Negative for hemoptysis.    Endocrine: Negative for cold intolerance, heat intolerance, polydipsia, polyphagia and polyuria.   Hematologic/Lymphatic: Does not bruise/bleed easily.   Skin: Negative for itching and rash.   Musculoskeletal: Positive for arthritis and joint pain. Negative for joint swelling and myalgias.   Gastrointestinal: Negative for abdominal pain, constipation, diarrhea, hematemesis, hematochezia, melena, nausea and vomiting.   Genitourinary: Negative for dysuria, frequency and hematuria.   Neurological: Positive for numbness (left hand). Negative for focal weakness, headaches and seizures.   Psychiatric/Behavioral: Negative for suicidal ideas.   All other systems reviewed and are negative.    Vitals:    06/07/22 1215   BP: 135/55   BP Location: Right arm   Patient Position: Sitting   Pulse: 52   Temp: 98.6 °F (37 °C)   SpO2: 94%   Weight: 54.2 kg (119 lb 6.4 oz)   Height: 157.5 cm (62\")      Physical Exam  Vitals reviewed.   Constitutional:       General: She is not in acute distress.     Appearance: She is well-developed. She is not diaphoretic.      Comments:  female who appears stated age   HENT:      Head: Normocephalic and atraumatic.   Eyes:      General: No scleral icterus.     Conjunctiva/sclera: Conjunctivae normal.   Neck:      Vascular: No JVD.      Trachea: No tracheal deviation.   Cardiovascular:      Rate and Rhythm: Normal rate and regular rhythm.      Heart sounds: Normal heart sounds. No murmur heard.    No " friction rub. No gallop.   Pulmonary:      Effort: Pulmonary effort is normal. No respiratory distress.      Breath sounds: Normal breath sounds. No wheezing or rales.   Abdominal:      General: There is no distension.      Palpations: Abdomen is soft. There is no mass.      Tenderness: There is no abdominal tenderness. There is no guarding or rebound.   Musculoskeletal:         General: Normal range of motion.      Cervical back: Neck supple.   Skin:     General: Skin is warm and dry.      Findings: No erythema or rash.      Comments: Well-healed left carotid endarterectomy scar   Neurological:      Mental Status: She is alert and oriented to person, place, and time.   Psychiatric:         Behavior: Behavior normal.         Thought Content: Thought content normal.         Judgment: Judgment normal.         The ROS, past medical history, surgical history, family history, social history and vitals were reviewed by myself and corrected as needed.      Labs/Imaging:  -Carotid duplex performed 3/25/2022, per report (images unavailable from [facility]), demonstrates 80 to 99% right internal carotid artery stenosis.  The right internal carotid artery peak systolic velocity is 436 cm/s with an ICA to CCA ratio of 9.8.  The left carotid is status post endarterectomy with a 16 to 49% stenosis.  The left internal carotid artery peak systolic velocity is 139 cm/s in the mid vessel with an ICA to CCA ratio 1.2.    Assessment/Plan:  67-year-old  female with a history of hypertension, hyperlipidemia, COPD, previous tobacco abuse, peripheral vascular disease status post right lower extremity stent placement and carotid artery disease status post left carotid endarterectomy on 3/3/2022 with Dr. Magana who presents with right carotid artery stenosis.  The patient has asymptomatic severe right carotid artery stenosis.  The patient was concerned about the accuracy of her recent carotid duplex and a repeat  carotid ultrasound will be obtained to ensure there is severe stenosis of the carotid artery.  I discussed with the patient performing right carotid endarterectomy if this carotid duplex confirms the previous findings.  The risks and benefits of surgery were discussed with the patient including pain, bleeding, infection, stroke, hoarseness, dysphagia, myocardial infarction and death.  The patient understood these risks and wished to proceed with the above plan.    Patient Active Problem List   Diagnosis   • Atherosclerosis of native artery of both lower extremities with intermittent claudication (HCC)   • Peripheral vascular disease of extremity with claudication ([facility])   • Benign essential hypertension   • Mixed hyperlipidemia   • Allergic rhinitis   • Gastroesophageal reflux disease without esophagitis   • Osteopenia of multiple sites   • Adhesive capsulitis of right shoulder   • Chronic low back pain   • COPD mixed type ([facility])   • Post-traumatic osteoarthritis of right shoulder   • Depressive disorder   • Keratosis pilaris   • Subclavian steal syndrome   • Nocturnal leg cramps   • Atherosclerosis of native artery of left lower extremity with intermittent claudication ([facility])   • Postural dizziness with presyncope   • Hyperkalemia   • Bilateral hand pain   • Post-traumatic osteoarthritis of multiple joints   • Chronic kidney disease, stage 3, mod decreased GFR ([facility])   • Acute pain of left shoulder   • Status post total replacement of left shoulder   • Cough   • Medicare annual wellness visit, subsequent   • Annual physical exam   • Chronic right shoulder pain   • Hyperhomocysteinemia ([facility])   • Bilateral carotid artery stenosis   • Dystrophic nail   • Hypoxia   • Acute nonintractable headache

## 2022-06-08 PROBLEM — I65.21 CAROTID STENOSIS, ASYMPTOMATIC, RIGHT: Status: ACTIVE | Noted: 2022-01-19

## 2022-06-16 ENCOUNTER — TELEPHONE (OUTPATIENT)
Dept: INTERNAL MEDICINE | Facility: CLINIC | Age: 67
End: 2022-06-16

## 2022-06-16 NOTE — TELEPHONE ENCOUNTER
Caller: Aarti Wade    Relationship to patient: Self    Best call back number: [phone]     Patient is needing: PATIENT WANTING TO KNOW WHEN SHE CAN SAFELY RESUME TAKING APSIRIN AND PLAVIX AFTER HAVING TOOTH EXTRACTED

## 2022-06-17 ENCOUNTER — PATIENT ROUNDING (BHMG ONLY) (OUTPATIENT)
Dept: CARDIAC SURGERY | Facility: CLINIC | Age: 67
End: 2022-06-17

## 2022-06-17 NOTE — TELEPHONE ENCOUNTER
Resume both immediately same day.  Unless having a lot of bleeding then could wait till the next morning.

## 2022-06-17 NOTE — PROGRESS NOTES
June 17, 2022    Hello, may I speak with Aarti Wade?    My name is GEOVANNA    I am  with [facility] CARDIOTHORACIC SURGERY  [address]  [facility] [address] [phone].    Before we get started may I verify your date of birth? 1955    I am calling to officially welcome you to our practice and ask about your recent visit. Is this a good time to talk? YES    Tell me about your visit with us. What things went well? IT WENT WELL BUT DR ERAZO WAS AN HOUR AND HALF LATE, I DIDN'T LIKE THAT     We're always looking for ways to make our patients' experiences even better. Do you have recommendations on ways we may improve? NO    Overall were you satisfied with your first visit to our practice? YES     I appreciate you taking the time to speak with me today. Is there anything else I can do for you? NO    Thank you, and have a great day.

## 2022-06-21 ENCOUNTER — TELEPHONE (OUTPATIENT)
Dept: CARDIAC SURGERY | Facility: CLINIC | Age: 67
End: 2022-06-21

## 2022-06-21 NOTE — TELEPHONE ENCOUNTER
I called Aarti today regarding her carotid duplex apt that she N/S for. I asked if she planned on getting that apt R/S and she stated the she was waiting for me to do that. I let her know that if I had not got into her chart to see she did not come for that apt I would have never known since she did not call our office to let us know she couldn't go to the apt. I told her I would hold on to her ENC form for another week and will check to see if she has R/S her carotid duplex. She understood

## 2022-06-27 RX ORDER — CLOPIDOGREL BISULFATE 75 MG/1
75 TABLET ORAL DAILY
Qty: 90 TABLET | Refills: 1 | Status: SHIPPED | OUTPATIENT
Start: 2022-06-27 | End: 2023-01-12 | Stop reason: SDUPTHER

## 2022-06-27 RX ORDER — CITALOPRAM 40 MG/1
40 TABLET ORAL DAILY
Qty: 90 TABLET | Refills: 1 | Status: SHIPPED | OUTPATIENT
Start: 2022-06-27 | End: 2022-12-27

## 2022-06-27 NOTE — TELEPHONE ENCOUNTER
Rx Refill Note  Requested Prescriptions     Pending Prescriptions Disp Refills   • citalopram (CeleXA) 40 MG tablet [Pharmacy Med Name: CITALOPRAM 40MG TABLETS] 90 tablet 1     Sig: TAKE 1 TABLET BY MOUTH DAILY      Last office visit with prescribing clinician: 4/8/2022      Next office visit with prescribing clinician: 7/18/2022            Freya Zhang  06/27/22, 10:41 EDT

## 2022-06-27 NOTE — TELEPHONE ENCOUNTER
Rx Refill Note  Requested Prescriptions     Pending Prescriptions Disp Refills   • clopidogrel (PLAVIX) 75 MG tablet [Pharmacy Med Name: CLOPIDOGREL 75MG TABLETS] 90 tablet 1     Sig: TAKE 1 TABLET BY MOUTH DAILY      Last office visit with prescribing clinician: 4/8/2022      Next office visit with prescribing clinician: 7/18/2022            Mercedes Trammell LPN  06/27/22, 12:00 EDT

## 2022-07-05 ENCOUNTER — PRIOR AUTHORIZATION (OUTPATIENT)
Dept: INTERNAL MEDICINE | Facility: CLINIC | Age: 67
End: 2022-07-05

## 2022-07-05 NOTE — TELEPHONE ENCOUNTER
Received a fax asking for an alternative therapy for the breo inhaler. I called the pharmacy to get the alternatives and I was advised the patient picked up the RX on 06/28/22 for $30. Nothing further to do.

## 2022-07-11 ENCOUNTER — HOSPITAL ENCOUNTER (OUTPATIENT)
Dept: CARDIOLOGY | Facility: HOSPITAL | Age: 67
Discharge: HOME | End: 2022-07-11
Admitting: THORACIC SURGERY (CARDIOTHORACIC VASCULAR SURGERY)

## 2022-07-11 VITALS — BODY MASS INDEX: 21.9 KG/M2 | HEIGHT: 62 IN | WEIGHT: 119 LBS

## 2022-07-11 LAB
BH CV XLRA MEAS LEFT DIST CCA EDV: 25.1 CM/SEC
BH CV XLRA MEAS LEFT DIST CCA PSV: 108 CM/SEC
BH CV XLRA MEAS LEFT DIST ICA EDV: 48.5 CM/SEC
BH CV XLRA MEAS LEFT DIST ICA PSV: 189 CM/SEC
BH CV XLRA MEAS LEFT ICA/CCA RATIO: 2.2
BH CV XLRA MEAS LEFT MID CCA EDV: 25.9 CM/SEC
BH CV XLRA MEAS LEFT MID CCA PSV: 119 CM/SEC
BH CV XLRA MEAS LEFT MID ICA EDV: 59.7 CM/SEC
BH CV XLRA MEAS LEFT MID ICA PSV: 247 CM/SEC
BH CV XLRA MEAS LEFT PROX CCA EDV: 28.5 CM/SEC
BH CV XLRA MEAS LEFT PROX CCA PSV: 157 CM/SEC
BH CV XLRA MEAS LEFT PROX ECA EDV: 18.7 CM/SEC
BH CV XLRA MEAS LEFT PROX ECA PSV: 139 CM/SEC
BH CV XLRA MEAS LEFT PROX ICA EDV: 22 CM/SEC
BH CV XLRA MEAS LEFT PROX ICA PSV: 116 CM/SEC
BH CV XLRA MEAS LEFT PROX SCLA PSV: 123 CM/SEC
BH CV XLRA MEAS LEFT VERTEBRAL A PSV: 80.5 CM/SEC
BH CV XLRA MEAS RIGHT DIST CCA EDV: 14.9 CM/SEC
BH CV XLRA MEAS RIGHT DIST CCA PSV: 46.4 CM/SEC
BH CV XLRA MEAS RIGHT DIST ICA EDV: 22.8 CM/SEC
BH CV XLRA MEAS RIGHT DIST ICA PSV: 86.4 CM/SEC
BH CV XLRA MEAS RIGHT ICA/CCA RATIO: 8.1
BH CV XLRA MEAS RIGHT MID CCA EDV: 12.6 CM/SEC
BH CV XLRA MEAS RIGHT MID CCA PSV: 54.6 CM/SEC
BH CV XLRA MEAS RIGHT MID ICA EDV: 37.7 CM/SEC
BH CV XLRA MEAS RIGHT MID ICA PSV: 231 CM/SEC
BH CV XLRA MEAS RIGHT PROX CCA EDV: 11.7 CM/SEC
BH CV XLRA MEAS RIGHT PROX CCA PSV: 79.7 CM/SEC
BH CV XLRA MEAS RIGHT PROX ICA EDV: 44.5 CM/SEC
BH CV XLRA MEAS RIGHT PROX ICA PSV: 443 CM/SEC
BH CV XLRA MEAS RIGHT PROX SCLA PSV: 157 CM/SEC
BH CV XLRA MEAS RIGHT VERTEBRAL A EDV: 21.2 CM/SEC
BH CV XLRA MEAS RIGHT VERTEBRAL A PSV: 95.1 CM/SEC
LEFT ARM BP: NORMAL MMHG
MAXIMAL PREDICTED HEART RATE: 151 BPM
RIGHT ARM BP: NORMAL MMHG
STRESS TARGET HR: 128 BPM

## 2022-07-11 PROCEDURE — 93880 EXTRACRANIAL BILAT STUDY: CPT

## 2022-07-11 PROCEDURE — 93880 EXTRACRANIAL BILAT STUDY: CPT | Performed by: INTERNAL MEDICINE

## 2022-07-19 ENCOUNTER — TRANSCRIBE ORDERS (OUTPATIENT)
Dept: ADMINISTRATIVE | Facility: HOSPITAL | Age: 67
End: 2022-07-19

## 2022-07-19 DIAGNOSIS — Z12.31 VISIT FOR SCREENING MAMMOGRAM: Primary | ICD-10-CM

## 2022-07-23 ENCOUNTER — APPOINTMENT (OUTPATIENT)
Dept: GENERAL RADIOLOGY | Facility: HOSPITAL | Age: 67
End: 2022-07-23

## 2022-07-23 ENCOUNTER — HOSPITAL ENCOUNTER (EMERGENCY)
Facility: HOSPITAL | Age: 67
Discharge: HOME | End: 2022-07-23
Attending: EMERGENCY MEDICINE | Admitting: EMERGENCY MEDICINE

## 2022-07-23 VITALS
SYSTOLIC BLOOD PRESSURE: 139 MMHG | HEART RATE: 57 BPM | TEMPERATURE: 98 F | WEIGHT: 119 LBS | HEIGHT: 62 IN | BODY MASS INDEX: 21.9 KG/M2 | OXYGEN SATURATION: 98 % | DIASTOLIC BLOOD PRESSURE: 58 MMHG | RESPIRATION RATE: 16 BRPM

## 2022-07-23 DIAGNOSIS — S42.012A ANTERIOR DISPLACED FRACTURE OF STERNAL END OF LEFT CLAVICLE, INITIAL ENCOUNTER FOR CLOSED FRACTURE: Primary | ICD-10-CM

## 2022-07-23 PROCEDURE — 99283 EMERGENCY DEPT VISIT LOW MDM: CPT

## 2022-07-23 PROCEDURE — 73000 X-RAY EXAM OF COLLAR BONE: CPT

## 2022-07-23 PROCEDURE — 73030 X-RAY EXAM OF SHOULDER: CPT

## 2022-07-23 RX ORDER — HYDROCODONE BITARTRATE AND ACETAMINOPHEN 5; 325 MG/1; MG/1
1 TABLET ORAL EVERY 6 HOURS PRN
Qty: 12 TABLET | Refills: 0 | Status: SHIPPED | OUTPATIENT
Start: 2022-07-23 | End: 2022-09-13

## 2022-07-23 RX ORDER — HYDROCODONE BITARTRATE AND ACETAMINOPHEN 5; 325 MG/1; MG/1
1 TABLET ORAL EVERY 6 HOURS PRN
Qty: 12 TABLET | Refills: 0 | Status: SHIPPED | OUTPATIENT
Start: 2022-07-23 | End: 2022-07-23 | Stop reason: SDUPTHER

## 2022-07-23 NOTE — ED PROVIDER NOTES
EMERGENCY DEPARTMENT ENCOUNTER    Pt Name: Aarti Wade  MRN: [id number]  Pt :   1955  Room Number:  RW5/R5  Date of encounter:  2022  PCP: Jannette Chapa MD  ED Provider: Marquez Alexander PA-C    Historian: Patient      HPI:  Chief Complaint: Left shoulder pain, left clavicular pain        Context: Aarti Wade is a 67 y.o. female who presents to the ED c/o pain in her left shoulder and left clavicular area after her arm was jerked by her horses when they became spooked.  She complains of pain swelling and bruising to the proximal left clavicular area and pain in the left shoulder.  She is status post total shoulder replacement in .      PAST MEDICAL HISTORY  Past Medical History:   Diagnosis Date   • Anxiety    • Arthritis    • Arthritis of right shoulder region    • Chronic UTI    • Circulatory disorder    • Claudication (HCC)    • COPD (chronic obstructive pulmonary disease) (HCC)    • DA (degenerative arthritis)    • Depression    • diffuse fatty liver infiltration on CT    • Gastrointestinal disorder    • GERD (gastroesophageal reflux disease)    • Head injury    • Hepatitis     UNKNOWN TYPE   • Hyperlipidemia    • Hypertension    • Inflammatory arthritis    • 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    • Osteopenia of multiple sites    • Osteoporosis    • PAD (peripheral artery disease) (HCC)    • Smoker    • Spontaneous pneumothorax    • Subclavian artery stenosis, left (HCC)    • Subclavian steal syndrome    • Wears dentures     TOP ONLY         PAST SURGICAL HISTORY  Past Surgical History:   Procedure Laterality Date   • ANGIOGRAM - CONVERTED  2016    AA WITH RUNOFF PER DR. CARBAJAL   • COLONOSCOPY      last 8 years ago.  Due to schedule   • EYE LENS IMPLANT SECONDARY Left    • FEMORAL FEMORAL BYPASS Right 2016    Procedure: RIGHT COMMON FEMORAL ENDARTERECTOMY WITH PATCH;  Surgeon: Severino Carbajal MD;  Location: [facility];  Service:    • FINGER  SURGERY Left     LEFT INDEX FINGER   • INTERVENTIONAL RADIOLOGY PROCEDURE N/A 2016    Procedure: IR PTA femoral popliteal artery;  Surgeon: Severino Carbajal MD;  Location:  [facility];  Service:    • INTERVENTIONAL RADIOLOGY PROCEDURE Left 2020    Procedure: LEFT ATHERECTOMY, BALLOON ANGIOPLASTY;  Surgeon: Severino Carbajal MD;  Location:  [facility];  Service: Interventional Radiology   • TONSILLECTOMY     • TOTAL SHOULDER ARTHROPLASTY Left 2020    Procedure: TOTAL SHOULDER ARTHROPLASTY LEFT;  Surgeon: Ketan Lazcano MD;  Location:  [facility];  Service: Orthopedics;  Laterality: Left;  protector in: 1346  protector out: 1402         FAMILY HISTORY  Family History   Problem Relation Age of Onset   • Osteoarthritis Mother    • Alzheimer's disease Mother    • Hypertension Father    • Heart attack Father    • Alcohol abuse Father    • No Known Problems Sister    • No Known Problems Daughter    • No Known Problems Son    • Breast cancer Maternal Grandmother 50   • Breast cancer Paternal Grandmother 50   • No Known Problems Maternal Aunt    • No Known Problems Paternal Aunt    • Other Other    • Heart attack Other    • Coronary artery disease Paternal Grandfather    • Stroke Paternal Grandfather    • No Known Problems Sister    • Ovarian cancer Neg Hx    • Endometrial cancer Neg Hx          SOCIAL HISTORY  Social History     Socioeconomic History   • Marital status: Single   • Number of children: 0   Tobacco Use   • Smoking status: Former Smoker     Packs/day: 1.00     Years: 40.00     Pack years: 40.00     Types: Electronic Cigarette, Cigarettes     Quit date: 2016     Years since quittin.1   • Smokeless tobacco: Never Used   • Tobacco comment: 1 PACK/DAY SMOKER UNTIL 2016   Vaping Use   • Vaping Use: Former   Substance and Sexual Activity   • Alcohol use: Yes     Alcohol/week: 2.0 standard drinks     Types: 2 Shots of liquor per week     Comment:  2 DRINKS PER DAY   • Drug use: No   • Sexual activity: Defer         ALLERGIES  Levocetirizine dihydrochloride        REVIEW OF SYSTEMS  Review of Systems   Constitutional: Positive for activity change. Negative for appetite change, chills, fatigue and fever.   HENT: Negative for congestion, rhinorrhea and sore throat.    Respiratory: Negative for cough, shortness of breath and wheezing.    Cardiovascular: Negative for chest pain and palpitations.   Gastrointestinal: Negative for abdominal pain, nausea and vomiting.   Genitourinary: Negative for dysuria, flank pain and hematuria.   Musculoskeletal: Positive for arthralgias (Left shoulder, left clavicle). Negative for back pain, joint swelling, myalgias and neck pain.   Neurological: Negative for dizziness, seizures, syncope and headaches.          All systems reviewed and negative except for those discussed in HPI.       PHYSICAL EXAM    I have reviewed the triage vital signs and nursing notes.    ED Triage Vitals [07/23/22 1228]   Temp Heart Rate Resp BP SpO2   98 °F (36.7 °C) 57 16 139/58 98 %      Temp src Heart Rate Source Patient Position BP Location FiO2 (%)   Oral Monitor Sitting -- --       Physical Exam  GENERAL:   Appears to be in significant mild pain, favoring her left arm.  HENT: Nares patent.  EYES: No scleral icterus.  CV: Regular rhythm, regular rate.  RESPIRATORY: Normal effort.  No audible wheezes, rales or rhonchi.  ABDOMEN: Soft, nontender  MUSCULOSKELETAL: There is a palpable bony defect to the proximal left clavicle, with soft tissue swelling and bruising in this area as well.  The shoulder itself is nontender to palpation.  Range of motion not assessed due to patient's pain level and guarding, neurovascular status is intact distally.  NEURO: Alert, moves all extremities, follows commands.  SKIN: Warm, dry, no rash visualized.        LAB RESULTS  No results found for this or any previous visit (from the past 24 hour(s)).    If labs were  ordered, I independently reviewed the results.        RADIOLOGY  XR Clavicle Left    Result Date: 7/23/2022  DATE OF EXAM: 7/23/2022 12:46 PM  PROCEDURE: XR CLAVICLE LEFT-  INDICATIONS: fall  COMPARISON: Left shoulder series 6/8/2020  TECHNIQUE: Two radiographic views of the left clavicle were obtained.  FINDINGS: There is now a displaced proximal left clavicle fracture, with a few millimeters of overlap of the fracture margins. The mid and distal left clavicle appear depressed with respect to the clavicular head. No significant comminution is seen. Shoulder joint appears anatomic.      Displaced proximal left clavicle fracture.  This report was finalized on 7/23/2022 1:04 PM by Dr. Scotty Arboleda MD.      XR Shoulder 2+ View Left    Result Date: 7/23/2022  DATE OF EXAM: 7/23/2022 12:46 PM  PROCEDURE: XR SHOULDER 2+ VW LEFT-  INDICATIONS: fall  COMPARISON: Left shoulder 6/8/2020  TECHNIQUE: A minimum of two radiologic views of the left shoulder were obtained.  FINDINGS: Left traditional shoulder replacement appears anatomically aligned and the surrounding bony structures appear intact. The patient's known proximal left clavicle fracture is almost inapparent on these views, even in retrospect compared to the current clavicle images. No acute fracture seen elsewhere.         1. Left shoulder prosthesis in anatomic alignment. 2. Known proximal clavicle fracture is almost undetectable on these images. No evidence of acute fracture elsewhere.  This report was finalized on 7/23/2022 1:16 PM by Dr. Scotty Arboleda MD.              PROCEDURES    Procedures    No orders to display       MEDICATIONS GIVEN IN ER    Medications - No data to display      PROGRESS, DATA ANALYSIS, CONSULTS, AND MEDICAL DECISION MAKING    All labs have been independently reviewed by me.  All radiology studies have been reviewed by me and the radiologist dictating the report.   EKG's have been independently viewed and interpreted by me.               She has  a displaced left clavicular fracture at the sternal and.  She was placed in a shoulder immobilizer.  Will discharge to home with short-term pain control and she is to follow-up with her orthopedist Dr. Kan on Monday.  She is to return if any change or worsening of symptoms particularly any sudden shortness of breath or pain in her chest..  She verbalizes understanding and is agreeable to plan.      AS OF 20:56 EDT VITALS:    BP - 139/58  HR - 57  TEMP - 98 °F (36.7 °C) (Oral)  O2 SATS - 98%      WOOD reviewed by Castro Tipton MD           DIAGNOSIS  Final diagnoses:   Anterior displaced fracture of sternal end of left clavicle, initial encounter for closed fracture         DISPOSITION  DISCHARGE    Patient discharged in stable condition.    Reviewed implications of results, diagnosis, meds, responsibility to follow up, warning signs and symptoms of possible worsening, potential complications and reasons to return to ER.    Patient/Family voiced understanding of above instructions.    Discussed plan for discharge, as there is no emergent indication for admission.  Pt/family is agreeable and understands need for follow up and possible repeat testing.  Pt/family is aware that discharge does not mean that nothing is wrong but that it indicates no emergency is currently present that requires admission and they must continue care with follow-up as given below or with a physician of their choice.     FOLLOW-UP  Jannette Chapa MD  [address] [facility]  [address]  [facility] [address] [phone]    Schedule an appointment as soon as possible for a visit in 3 days  For recheck    Jose Ramon Kan MD  [address]   [facility] [address] [phone]    Schedule an appointment as soon as possible for a visit   For orthopedic evaluation         Medication List      New Prescriptions    HYDROcodone-acetaminophen 5-325 MG per tablet  Commonly known as: NORCO  Take 1 tablet by mouth Every 6 (Six) Hours As  Needed for Severe Pain .           Where to Get Your Medications      These medications were sent to [facility] [address] [place] [address] - [address] [facility] [address] [facility] [address]  - [phone]  - [phone] [address] [address]    Phone: [phone]   · HYDROcodone-acetaminophen 5-325 MG per tablet                  Marquez Alexander PA-C  07/23/22 2054

## 2022-07-23 NOTE — DISCHARGE INSTRUCTIONS
Apply cold compresses to clavicle every few hours for 15 to 20 minutes.  Follow-up with Dr. Kan for orthopedic evaluation.  Recheck with your PCP on Monday.  Return to the emergency department immediately if any change or worsening of symptoms.

## 2022-07-25 ENCOUNTER — TELEPHONE (OUTPATIENT)
Dept: INTERNAL MEDICINE | Facility: CLINIC | Age: 67
End: 2022-07-25

## 2022-07-25 NOTE — TELEPHONE ENCOUNTER
Pt advised and verbalized understanding. [facility] cannot get her in until September so she is going to call Lucius.

## 2022-07-25 NOTE — TELEPHONE ENCOUNTER
Looks like ER did a referral to Dr. Kna.  She has seen Dr. Lazcano before.  She may follow-up with him instead.  She probably does not need a referral since she has seen him in the past.

## 2022-07-25 NOTE — TELEPHONE ENCOUNTER
Caller: Aarti Wade    Relationship: Self    Best call back number: [phone]    What is the medical concern/diagnosis: BROKEN COLLAR BONE ON 7/23    What specialty or service is being requested: SOMEONE REGARDING HER BROKEN COLLAR BONE    Any additional details: PLEASE CALL WITH REFERRAL.  PATIENT WENT TO [facility] ON 7.23 FOR A BROKEN COLLAR BONE AND WOULD LIKE A REFERRAL TO SOMEONE REGARDING HER BROKEN CALLER BONE.

## 2022-07-26 ENCOUNTER — TELEPHONE (OUTPATIENT)
Dept: CARDIAC SURGERY | Facility: CLINIC | Age: 67
End: 2022-07-26

## 2022-07-26 ENCOUNTER — PATIENT OUTREACH (OUTPATIENT)
Dept: CASE MANAGEMENT | Facility: OTHER | Age: 67
End: 2022-07-26

## 2022-07-26 DIAGNOSIS — G45.8 SUBCLAVIAN STEAL SYNDROME: Primary | ICD-10-CM

## 2022-07-26 DIAGNOSIS — I65.21 CAROTID STENOSIS, ASYMPTOMATIC, RIGHT: ICD-10-CM

## 2022-07-26 DIAGNOSIS — I65.21 CAROTID STENOSIS, ASYMPTOMATIC, RIGHT: Primary | ICD-10-CM

## 2022-07-26 NOTE — OUTREACH NOTE
"AMBULATORY CASE MANAGEMENT NOTE    Name and Relationship of Patient/Support Person: Aarti Wade L - Self    Patient Outreach    Pt contacted regarding [facility] ED visit 7/23/22 with chief c/o listed as shoulder pain.  Role of Ambulatory Nurse  explained and number provided.  States \"not doing to well, but I have appt tomorrow am.\"  Seeing PA in Dr. Kan's office.  She is in sling. Symptoms discussed that would warrant return to ED. [facility] 24/7 Nurse Call Center explained and number provided.  Discussed bp.  She does have bp cuff at home, but has not been monitoring.  Encouraged to monitor, log and take log to her PCP appt on 8/2/22.  She reports she lives alone and is usually very active. She does not have living will, but is interested in material.  Will mail.  She denies any needs and voiced her appreciation for the call.     Adult Patient Profile  Questions/Answers    Flowsheet Row Most Recent Value   Symptoms/Conditions Managed at Home musculoskeletal   Musculoskeletal Symptoms/Conditions fracture   Difficulty Communicating no   Walking or Climbing Stairs Difficulty no   Dressing/Bathing Difficulty no   Doing Errands Independently Difficulty (such as shopping) no   Equipment Currently Used at Home bp cuff   People in Home alone   Current Living Arrangements home          Social Work Assessment  Questions/Answers    Flowsheet Row Most Recent Value   People in Home alone   Current Living Arrangements home   Primary Care Provided by self   Equipment Currently Used at Home bp cuff          Send Education  Questions/Answers    Flowsheet Row Most Recent Value   Annual Wellness Visit:  Patient Has Completed   Other Patient Education/Resources  24/7 [facility] Nurse Call Line   24/7 Nurse Call Line Education Method Verbal   Advanced Directives: Send Materials        SDOH updated and reviewed with the patient during this program:  Financial Resource Strain: Low Risk    • Difficulty of Paying Living " Expenses: Not very hard      Food Insecurity: No Food Insecurity   • Worried About Running Out of Food in the Last Year: Never true   • Ran Out of Food in the Last Year: Never true      Transportation Needs: No Transportation Needs   • Lack of Transportation (Medical): No   • Lack of Transportation (Non-Medical): No      Housing Stability: Low Risk    • Unable to Pay for Housing in the Last Year: No   • Number of Places Lived in the Last Year: 1   • Unstable Housing in the Last Year: No     LYUBOV ALVA  Ambulatory Case Management    7/26/2022, 10:09 EDT

## 2022-07-26 NOTE — TELEPHONE ENCOUNTER
----- Message from Michael Tejada MD sent at 7/25/2022  6:04 AM EDT -----  Ok to schedule right CEA.  She also need a CTA of the carotids and chest to evaluate her subclavian and vertebral arteries.  Thanks  [facility]  ----- Message -----  From: Lam Harper JULIANNA  Sent: 7/22/2022  10:41 AM EDT  To: MD Dr. Mallory Kaufman, you saw this pt back on 06/07/22 for carotid stenosis -she has now had her carotid duplex-results are in epic for review. Is it ok to schedule R CEA?

## 2022-07-28 ENCOUNTER — TELEPHONE (OUTPATIENT)
Dept: CARDIAC SURGERY | Facility: CLINIC | Age: 67
End: 2022-07-28

## 2022-08-04 ENCOUNTER — TELEPHONE (OUTPATIENT)
Dept: INTERNAL MEDICINE | Facility: CLINIC | Age: 67
End: 2022-08-04

## 2022-08-04 NOTE — TELEPHONE ENCOUNTER
Patient notified.  Verbalized understanding  She wanted you to know she will probably go back and see Dr. Magana.

## 2022-08-04 NOTE — TELEPHONE ENCOUNTER
Caller: Aarti Wade    Relationship: Self    Best call back number: [phone]    What is the best time to reach you: ANY    Who are you requesting to speak with (clinical staff, provider,  specific staff member): [facility]O SEN'S NURSE      What was the call regarding:     SHOULD SHE DO THE CAROTID ARTERY SURGERY WHILE SHE IS OUT WITH BROKEN COLLAR BONE?  DOES SHE NEED TO COME IN AND SEE DOCTOR SEN     Do you require a callback: YES

## 2022-08-04 NOTE — TELEPHONE ENCOUNTER
It may be better to wait a little while until inflammation calms down before having the carotid surgery.  She should ask the surgeon about it.  We can see her here anytime she feels things are not going well

## 2022-08-05 ENCOUNTER — APPOINTMENT (OUTPATIENT)
Dept: CT IMAGING | Facility: HOSPITAL | Age: 67
End: 2022-08-05

## 2022-08-12 ENCOUNTER — APPOINTMENT (OUTPATIENT)
Dept: PREADMISSION TESTING | Facility: HOSPITAL | Age: 67
End: 2022-08-12

## 2022-08-15 ENCOUNTER — OFFICE VISIT (OUTPATIENT)
Dept: INTERNAL MEDICINE | Facility: CLINIC | Age: 67
End: 2022-08-15

## 2022-08-15 VITALS
HEART RATE: 60 BPM | BODY MASS INDEX: 21.35 KG/M2 | DIASTOLIC BLOOD PRESSURE: 64 MMHG | HEIGHT: 62 IN | TEMPERATURE: 98.4 F | WEIGHT: 116 LBS | SYSTOLIC BLOOD PRESSURE: 142 MMHG

## 2022-08-15 DIAGNOSIS — I65.21 CAROTID STENOSIS, ASYMPTOMATIC, RIGHT: Primary | ICD-10-CM

## 2022-08-15 DIAGNOSIS — I10 BENIGN ESSENTIAL HYPERTENSION: ICD-10-CM

## 2022-08-15 PROCEDURE — 99213 OFFICE O/P EST LOW 20 MIN: CPT | Performed by: NURSE PRACTITIONER

## 2022-08-15 NOTE — PROGRESS NOTES
Follow Up Office Visit      Patient Name: Aarti Wade  : 1955   MRN: [id number]   Care Team: Patient Care Team:  Jannette Chapa MD as PCP - General  Jannette Chapa MD as PCP - Family Medicine  Severino Carbajal MD as Consulting Physician (Vascular Surgery)  Ketan Lazcano MD as Consulting Physician (Orthopedic Surgery)  Floyd Ernandez MD as Consulting Physician (Cardiology)  Lorenzo Good MD as Consulting Physician (Ophthalmology)  Meggan Mccarthy RN as Ambulatory  ([facility])    Chief Complaint:    Chief Complaint   Patient presents with   • carotid artery stenosis       History of Present Illness: Aarti Wade is a 67 y.o. female with known right carotid stenosis, history of left carotid stenosis status post endarterectomy in 2022.    She notes that her left carotid endarterectomy was performed in 2022 by Dr. Magana.  Notes that she did not like being at [facility] as she typically has all of her care at [facility].  She had sought second opinion and was actually scheduled for right carotid endarterectomy today with Dr. Tejada at [facility].  However, she recently sustained a fall in July with subsequent left clavicle fracture.  He is currently under the care of orthopedic for the management of this.  She is currently in a sling of the left upper extremity but states that she was told at her last appointment she may require surgical intervention for this fracture.    States today that she would prefer to return to Dr. Ricardo and would not like to proceed with Dr. Tejada for the purpose of carotid endarterectomy on the right side.        Subjective      Review of Systems:   Review of Systems   Respiratory: Negative for shortness of breath.    Cardiovascular: Negative for chest pain and leg swelling.   Neurological: Negative for dizziness and syncope.       I have reviewed and the following portions of the patient's history were updated  as appropriate: past family history, past medical history, past social history, past surgical history and problem list.    Medications:     Current Outpatient Medications:   •  acetaminophen (Acetaminophen 8 Hour) 650 MG 8 hr tablet, Take 650 mg by mouth 2 (two) times a day., Disp: , Rfl:   •  amLODIPine (NORVASC) 5 MG tablet, TAKE 1 TABLET BY MOUTH DAILY, Disp: 90 tablet, Rfl: 1  •  ascorbic acid (VITAMIN C) 1000 MG tablet, Take 1,000 mg by mouth Daily., Disp: , Rfl:   •  aspirin 81 MG EC tablet, Take 81 mg by mouth every morning. TOLD NOT TO STOP BEFORE SURGERY, Disp: , Rfl:   •  bisoprolol (ZEBeta) 10 MG tablet, TAKE 1 TABLET BY MOUTH DAILY, Disp: 90 tablet, Rfl: 1  •  Breo Ellipta 200-25 MCG/INH inhaler, INHALE 1 PUFF BY MOUTH DAILY, Disp: 60 each, Rfl: 11  •  Calcium Citrate-Vitamin D (CALCIUM + D PO), Take 2 tablets by mouth Daily., Disp: , Rfl:   •  citalopram (CeleXA) 40 MG tablet, TAKE 1 TABLET BY MOUTH DAILY, Disp: 90 tablet, Rfl: 1  •  clopidogrel (PLAVIX) 75 MG tablet, TAKE 1 TABLET BY MOUTH DAILY, Disp: 90 tablet, Rfl: 1  •  Coenzyme Q10 200 MG capsule, Take 400 mg by mouth Daily., Disp: , Rfl:   •  Fish Oil oil, Take 1 capsule by mouth Daily., Disp: , Rfl:   •  Flaxseed, Linseed, (FLAXSEED OIL) 1000 MG capsule, Take 1 capsule by mouth daily., Disp: , Rfl:   •  fluticasone (FLONASE) 50 MCG/ACT nasal spray, SHAKE LIQUID AND USE 1 SPRAY IN EACH NOSTRIL TWICE DAILY, Disp: 48 g, Rfl: 3  •  folic acid (FOLVITE) 1 MG tablet, TAKE 1 TABLET BY MOUTH DAILY, Disp: 90 tablet, Rfl: 3  •  gabapentin (NEURONTIN) 100 MG capsule, TAKE 3 CAPSULES BY MOUTH EVERY EVENING, Disp: 90 capsule, Rfl: 2  •  hydroCHLOROthiazide (HYDRODIURIL) 12.5 MG tablet, Take 1 tablet by mouth Daily., Disp: 90 tablet, Rfl: 3  •  HYDROcodone-acetaminophen (NORCO) 5-325 MG per tablet, Take 1 tablet by mouth Every 6 (Six) Hours As Needed for Severe Pain ., Disp: 12 tablet, Rfl: 0  •  Methylsulfonylmethane (MSM PO), Take 2 tablets by mouth daily.,  "Disp: , Rfl:   •  Multiple Vitamins-Minerals (MULTIVITAMIN ADULT PO), Take 1 tablet by mouth daily., Disp: , Rfl:   •  olmesartan (BENICAR) 40 MG tablet, TAKE 1 TABLET BY MOUTH DAILY, Disp: 90 tablet, Rfl: 1  •  omeprazole (priLOSEC) 20 MG capsule, Take 1 capsule by mouth Daily., Disp: 90 capsule, Rfl: 3  •  rosuvastatin (CRESTOR) 40 MG tablet, Take 1 tablet by mouth Daily., Disp: 90 tablet, Rfl: 3  •  traMADol (ULTRAM) 50 MG tablet, TAKE 1 TABLET BY MOUTH TWICE DAILY, Disp: 180 tablet, Rfl: 2  •  URINARY HEALTH/CRANBERRY PO, Take 1 tablet by mouth 2 (Two) Times a Day. \"CRANACTIN\", Disp: , Rfl:   •  vitamin B-12 (CYANOCOBALAMIN) 500 MCG tablet, TAKE 1 TABLET BY MOUTH DAILY, Disp: 90 tablet, Rfl: 1    Allergies:   Allergies   Allergen Reactions   • Levocetirizine Dihydrochloride Myalgia     Muscle aches/joint pain       Objective     Physical Exam:  Vital Signs:   Vitals:    08/15/22 0836   BP: 142/64   BP Location: Right arm   Patient Position: Sitting   Cuff Size: Adult   Pulse: 60   Temp: 98.4 °F (36.9 °C)   TempSrc: Temporal   Weight: 52.6 kg (116 lb)   Height: 157.5 cm (62.01\")   PainSc:   5   PainLoc: Shoulder     Body mass index is 21.21 kg/m².     Physical Exam  Vitals and nursing note reviewed.   HENT:      Head: Normocephalic and atraumatic.      Comments: Patient wearing facial mask    Neck:      Vascular: Carotid bruit (right side) present.   Cardiovascular:      Rate and Rhythm: Normal rate and regular rhythm.   Pulmonary:      Effort: Pulmonary effort is normal. No respiratory distress.   Musculoskeletal:         General: Swelling (left clavicle area), tenderness and signs of injury present.      Comments: Left upper extremity in sling.    Skin:     General: Skin is warm and dry.   Neurological:      General: No focal deficit present.      Mental Status: She is alert.   Psychiatric:         Mood and Affect: Mood normal.         Thought Content: Thought content normal.         Assessment / Plan  "     Assessment/Plan:   Problems Addressed This Visit    ICD-10-CM ICD-9-CM   1. Carotid stenosis, asymptomatic, right  I65.21 433.10   2. Benign essential hypertension  I10 401.1      Hypertension  -Stable reading today  -Continue therapy of Norvasc 5 mg daily, aspirin 81 mg daily    Right carotid stenosis  Status post left carotid endarterectomy  -Advised patient that she may call Dr. Magana's office as she is still currently a patient of his.  She may schedule appointment to further discuss options for proceeding with her right carotid endarterectomy    Status post left clavicle fracture  -This resulted from a fall while walking to horses  -Continue to see her orthopedic provider for further management      Plan of care reviewed with patient at the conclusion of today's visit. Education was provided regarding diagnosis and management.  Patient verbalizes understanding of and agreement with management plan.      Follow Up:   Return in about 3 months (around 11/15/2022) for dr Chapa.        Carmen Claros, [facility] [address] [facility]    Please note that portions of this note were completed with a voice recognition program.

## 2022-08-16 ENCOUNTER — APPOINTMENT (OUTPATIENT)
Dept: MAMMOGRAPHY | Facility: HOSPITAL | Age: 67
End: 2022-08-16

## 2022-08-26 ENCOUNTER — PRE-ADMISSION TESTING (OUTPATIENT)
Dept: PREADMISSION TESTING | Facility: HOSPITAL | Age: 67
End: 2022-08-26

## 2022-08-26 VITALS — BODY MASS INDEX: 20.31 KG/M2 | HEIGHT: 63 IN | WEIGHT: 114.64 LBS

## 2022-08-26 LAB
ANION GAP SERPL CALCULATED.3IONS-SCNC: 13 MMOL/L (ref 5–15)
BUN SERPL-MCNC: 40 MG/DL (ref 8–23)
BUN/CREAT SERPL: 24.2 (ref 7–25)
CALCIUM SPEC-SCNC: 9.3 MG/DL (ref 8.6–10.5)
CHLORIDE SERPL-SCNC: 105 MMOL/L (ref 98–107)
CO2 SERPL-SCNC: 22 MMOL/L (ref 22–29)
CREAT SERPL-MCNC: 1.65 MG/DL (ref 0.57–1)
DEPRECATED RDW RBC AUTO: 41.9 FL (ref 37–54)
EGFRCR SERPLBLD CKD-EPI 2021: 33.5 ML/MIN/1.73
ERYTHROCYTE [DISTWIDTH] IN BLOOD BY AUTOMATED COUNT: 12.2 % (ref 12.3–15.4)
GLUCOSE SERPL-MCNC: 90 MG/DL (ref 65–99)
HBA1C MFR BLD: 4.9 % (ref 4.8–5.6)
HCT VFR BLD AUTO: 33.5 % (ref 34–46.6)
HGB BLD-MCNC: 11.3 G/DL (ref 12–15.9)
INR PPP: 1.08 (ref 0.84–1.13)
MCH RBC QN AUTO: 31.5 PG (ref 26.6–33)
MCHC RBC AUTO-ENTMCNC: 33.7 G/DL (ref 31.5–35.7)
MCV RBC AUTO: 93.3 FL (ref 79–97)
PLATELET # BLD AUTO: 277 10*3/MM3 (ref 140–450)
PMV BLD AUTO: 10 FL (ref 6–12)
POTASSIUM SERPL-SCNC: 4.7 MMOL/L (ref 3.5–5.2)
PROTHROMBIN TIME: 13.9 SECONDS (ref 11.4–14.4)
QT INTERVAL: 460 MS
QTC INTERVAL: 440 MS
RBC # BLD AUTO: 3.59 10*6/MM3 (ref 3.77–5.28)
SODIUM SERPL-SCNC: 140 MMOL/L (ref 136–145)
WBC NRBC COR # BLD: 5.73 10*3/MM3 (ref 3.4–10.8)

## 2022-08-26 PROCEDURE — 93005 ELECTROCARDIOGRAM TRACING: CPT

## 2022-08-26 PROCEDURE — 85610 PROTHROMBIN TIME: CPT

## 2022-08-26 PROCEDURE — 85027 COMPLETE CBC AUTOMATED: CPT

## 2022-08-26 PROCEDURE — 36415 COLL VENOUS BLD VENIPUNCTURE: CPT

## 2022-08-26 PROCEDURE — 80048 BASIC METABOLIC PNL TOTAL CA: CPT

## 2022-08-26 PROCEDURE — 83036 HEMOGLOBIN GLYCOSYLATED A1C: CPT

## 2022-08-26 PROCEDURE — 93010 ELECTROCARDIOGRAM REPORT: CPT | Performed by: INTERNAL MEDICINE

## 2022-08-26 NOTE — PAT
Per Anesthesia Request, patient instructed not to take their ACE/ARB medications on the AM of surgery.    Patient viewed general PAT education video as instructed in their preoperative information received from their surgeon.  Patient stated the general PAT education video was viewed in its entirety and survey completed.  Copies of [facility] general education handouts (Incentive Spirometry, Meds to Beds Program, Patient Belongings, Pre-op skin preparation instructions, Blood Glucose testing, Visitor policy, Surgery FAQ, Code H) distributed to patient if not printed. Education related to the PAT pass and skin preparation for surgery (if applicable) completed in [facility] as a reinforcement to PAT education video. Patient instructed to return PAT pass provided today as well as completed skin preparation sheet (if applicable) on the day of procedure.     Additionally if patient had not viewed video yet but intended to view it at home or in our waiting area, then referred them to the handout with QR code/link provided during PAT visit.  Instructed patient to complete survey after viewing the video in its entirety.  Encouraged patient/family to read [facility] general education handouts thoroughly and notify PAT staff with any questions or concerns. Patient verbalized understanding of all information and priority content.    Patient to apply Chlorhexadine wipes  to surgical area (as instructed) the night before procedure and the AM of procedure. Wipes provided.\

## 2022-08-31 ENCOUNTER — HOSPITAL ENCOUNTER (INPATIENT)
Facility: HOSPITAL | Age: 67
LOS: 1 days | Discharge: HOME | End: 2022-09-01
Attending: SURGERY | Admitting: SURGERY

## 2022-08-31 ENCOUNTER — ANESTHESIA EVENT (OUTPATIENT)
Dept: PERIOP | Facility: HOSPITAL | Age: 67
End: 2022-08-31

## 2022-08-31 ENCOUNTER — ANESTHESIA (OUTPATIENT)
Dept: PERIOP | Facility: HOSPITAL | Age: 67
End: 2022-08-31

## 2022-08-31 DIAGNOSIS — I65.29 CAROTID STENOSIS: ICD-10-CM

## 2022-08-31 DIAGNOSIS — Z96.612 STATUS POST TOTAL REPLACEMENT OF LEFT SHOULDER: ICD-10-CM

## 2022-08-31 DIAGNOSIS — I65.21 CAROTID STENOSIS, ASYMPTOMATIC, RIGHT: Primary | ICD-10-CM

## 2022-08-31 PROBLEM — I38 VHD (VALVULAR HEART DISEASE): Chronic | Status: ACTIVE | Noted: 2022-08-31

## 2022-08-31 PROBLEM — K21.9 GASTROESOPHAGEAL REFLUX DISEASE WITHOUT ESOPHAGITIS: Chronic | Status: ACTIVE | Noted: 2018-04-25

## 2022-08-31 PROBLEM — I38 VHD (VALVULAR HEART DISEASE): Status: ACTIVE | Noted: 2022-08-31

## 2022-08-31 PROBLEM — J44.9 COPD (CHRONIC OBSTRUCTIVE PULMONARY DISEASE): Chronic | Status: ACTIVE | Noted: 2019-02-12

## 2022-08-31 PROBLEM — I73.9 PERIPHERAL VASCULAR DISEASE OF EXTREMITY WITH CLAUDICATION (HCC): Chronic | Status: ACTIVE | Noted: 2017-05-03

## 2022-08-31 PROBLEM — E78.5 DYSLIPIDEMIA: Chronic | Status: ACTIVE | Noted: 2022-08-31

## 2022-08-31 PROBLEM — E78.5 DYSLIPIDEMIA: Status: ACTIVE | Noted: 2022-08-31

## 2022-08-31 PROBLEM — N18.30 CKD (CHRONIC KIDNEY DISEASE) STAGE 3, GFR 30-59 ML/MIN: Status: ACTIVE | Noted: 2020-02-01

## 2022-08-31 PROBLEM — F32.A DEPRESSIVE DISORDER: Chronic | Status: ACTIVE | Noted: 2019-02-12

## 2022-08-31 PROBLEM — I10 BENIGN ESSENTIAL HYPERTENSION: Chronic | Status: ACTIVE | Noted: 2017-05-03

## 2022-08-31 PROBLEM — Z87.891 FORMER SMOKER: Status: ACTIVE | Noted: 2022-08-31

## 2022-08-31 LAB — GLUCOSE BLDC GLUCOMTR-MCNC: 116 MG/DL (ref 70–130)

## 2022-08-31 PROCEDURE — 25010000002 HEPARIN (PORCINE) PER 1000 UNITS: Performed by: NURSE ANESTHETIST, CERTIFIED REGISTERED

## 2022-08-31 PROCEDURE — 03UK0KZ SUPPLEMENT RIGHT INTERNAL CAROTID ARTERY WITH NONAUTOLOGOUS TISSUE SUBSTITUTE, OPEN APPROACH: ICD-10-PCS | Performed by: SURGERY

## 2022-08-31 PROCEDURE — 0 LIDOCAINE 1 % SOLUTION: Performed by: SURGERY

## 2022-08-31 PROCEDURE — 94640 AIRWAY INHALATION TREATMENT: CPT

## 2022-08-31 PROCEDURE — 03CK0ZZ EXTIRPATION OF MATTER FROM RIGHT INTERNAL CAROTID ARTERY, OPEN APPROACH: ICD-10-PCS | Performed by: SURGERY

## 2022-08-31 PROCEDURE — 25010000002 DEXAMETHASONE PER 1 MG: Performed by: NURSE ANESTHETIST, CERTIFIED REGISTERED

## 2022-08-31 PROCEDURE — C1768 GRAFT, VASCULAR: HCPCS | Performed by: SURGERY

## 2022-08-31 PROCEDURE — 25010000002 ONDANSETRON PER 1 MG: Performed by: NURSE ANESTHETIST, CERTIFIED REGISTERED

## 2022-08-31 PROCEDURE — 25010000002 PROTAMINE SULFATE PER 10 MG: Performed by: NURSE ANESTHETIST, CERTIFIED REGISTERED

## 2022-08-31 PROCEDURE — 03CM0ZZ EXTIRPATION OF MATTER FROM RIGHT EXTERNAL CAROTID ARTERY, OPEN APPROACH: ICD-10-PCS | Performed by: SURGERY

## 2022-08-31 PROCEDURE — 25010000002 PROPOFOL 10 MG/ML EMULSION: Performed by: NURSE ANESTHETIST, CERTIFIED REGISTERED

## 2022-08-31 PROCEDURE — 25010000002 FENTANYL CITRATE (PF) 50 MCG/ML SOLUTION: Performed by: NURSE ANESTHETIST, CERTIFIED REGISTERED

## 2022-08-31 PROCEDURE — 25010000002 HEPARIN (PORCINE) PER 1000 UNITS: Performed by: SURGERY

## 2022-08-31 PROCEDURE — 25010000002 PHENYLEPHRINE 10 MG/ML SOLUTION 1 ML VIAL: Performed by: NURSE ANESTHETIST, CERTIFIED REGISTERED

## 2022-08-31 PROCEDURE — 25010000002 CEFAZOLIN IN DEXTROSE 2-4 GM/100ML-% SOLUTION: Performed by: PHYSICIAN ASSISTANT

## 2022-08-31 PROCEDURE — 99232 SBSQ HOSP IP/OBS MODERATE 35: CPT | Performed by: INTERNAL MEDICINE

## 2022-08-31 PROCEDURE — 88311 DECALCIFY TISSUE: CPT | Performed by: SURGERY

## 2022-08-31 PROCEDURE — 82962 GLUCOSE BLOOD TEST: CPT

## 2022-08-31 PROCEDURE — 03UM0KZ SUPPLEMENT RIGHT EXTERNAL CAROTID ARTERY WITH NONAUTOLOGOUS TISSUE SUBSTITUTE, OPEN APPROACH: ICD-10-PCS | Performed by: SURGERY

## 2022-08-31 PROCEDURE — 88304 TISSUE EXAM BY PATHOLOGIST: CPT | Performed by: SURGERY

## 2022-08-31 DEVICE — VASCU-GUARD PERIPHERAL VASCULAR PATCH IS PREPARED FROM BOVINE PERICARDIUM WHICH IS CROSS-LINKED WITH GLUTARALDEHYDE. THE PERICARDIUM IS PROCURED FROM CATTLE ORIGINATING IN THE UNITED STATES. VASCU-GUARD PERIPHERAL VASCULAR PATCH IS CHEMICALLY STERILIZED USING ETHANOL AND PROPYLENE OXIDE. VASCU-GUARD PERIPHERAL VASCULAR PATCH HAS BEEN TREATED WITH 1 MOLAR SODIUM HYDROXIDE FOR A MINIMUM OF 60 MINUTES AT 20 - 25 C.  VASCU-GUARD PERIPHERAL VASCULAR PATCH IS PACKAGED IN A CONTAINER FILLED WITH STERILE, NON-PYROGENIC WATER CONTAINING PROPYLENE OXIDE. THE CONTENTS OF THE UNOPENED, UNDAMAGED CONTAINER ARE STERILE.
Type: IMPLANTABLE DEVICE | Site: CAROTID | Status: FUNCTIONAL
Brand: VASCU-GUARD PERIPHERAL VASCULAR PATCH

## 2022-08-31 DEVICE — HEMOST ABS SURGICEL SNOW 1X2IN: Type: IMPLANTABLE DEVICE | Site: CAROTID | Status: FUNCTIONAL

## 2022-08-31 RX ORDER — LIDOCAINE HYDROCHLORIDE 10 MG/ML
INJECTION, SOLUTION EPIDURAL; INFILTRATION; INTRACAUDAL; PERINEURAL AS NEEDED
Status: DISCONTINUED | OUTPATIENT
Start: 2022-08-31 | End: 2022-08-31 | Stop reason: SURG

## 2022-08-31 RX ORDER — TRAMADOL HYDROCHLORIDE 50 MG/1
50 TABLET ORAL 2 TIMES DAILY
Status: DISCONTINUED | OUTPATIENT
Start: 2022-08-31 | End: 2022-09-01 | Stop reason: HOSPADM

## 2022-08-31 RX ORDER — LIDOCAINE HYDROCHLORIDE 10 MG/ML
0.5 INJECTION, SOLUTION EPIDURAL; INFILTRATION; INTRACAUDAL; PERINEURAL ONCE AS NEEDED
Status: COMPLETED | OUTPATIENT
Start: 2022-08-31 | End: 2022-08-31

## 2022-08-31 RX ORDER — ONDANSETRON 2 MG/ML
4 INJECTION INTRAMUSCULAR; INTRAVENOUS EVERY 6 HOURS PRN
Status: DISCONTINUED | OUTPATIENT
Start: 2022-08-31 | End: 2022-09-01 | Stop reason: HOSPADM

## 2022-08-31 RX ORDER — MIDAZOLAM HYDROCHLORIDE 1 MG/ML
0.5 INJECTION INTRAMUSCULAR; INTRAVENOUS
Status: DISCONTINUED | OUTPATIENT
Start: 2022-08-31 | End: 2022-08-31 | Stop reason: HOSPADM

## 2022-08-31 RX ORDER — FLUTICASONE PROPIONATE 50 MCG
1 SPRAY, SUSPENSION (ML) NASAL 2 TIMES DAILY
Status: DISCONTINUED | OUTPATIENT
Start: 2022-08-31 | End: 2022-09-01 | Stop reason: HOSPADM

## 2022-08-31 RX ORDER — SODIUM CHLORIDE 9 MG/ML
100 INJECTION, SOLUTION INTRAVENOUS CONTINUOUS
Status: DISCONTINUED | OUTPATIENT
Start: 2022-08-31 | End: 2022-09-01 | Stop reason: HOSPADM

## 2022-08-31 RX ORDER — ONDANSETRON 4 MG/1
4 TABLET, FILM COATED ORAL EVERY 6 HOURS PRN
Status: DISCONTINUED | OUTPATIENT
Start: 2022-08-31 | End: 2022-09-01 | Stop reason: HOSPADM

## 2022-08-31 RX ORDER — BISOPROLOL FUMARATE 10 MG/1
10 TABLET, FILM COATED ORAL DAILY
Status: DISCONTINUED | OUTPATIENT
Start: 2022-09-01 | End: 2022-09-01 | Stop reason: HOSPADM

## 2022-08-31 RX ORDER — HYDROCHLOROTHIAZIDE 12.5 MG/1
12.5 TABLET ORAL DAILY
Status: DISCONTINUED | OUTPATIENT
Start: 2022-09-01 | End: 2022-09-01 | Stop reason: HOSPADM

## 2022-08-31 RX ORDER — SODIUM CHLORIDE 9 MG/ML
INJECTION, SOLUTION INTRAVENOUS AS NEEDED
Status: DISCONTINUED | OUTPATIENT
Start: 2022-08-31 | End: 2022-08-31 | Stop reason: HOSPADM

## 2022-08-31 RX ORDER — ONDANSETRON 2 MG/ML
INJECTION INTRAMUSCULAR; INTRAVENOUS AS NEEDED
Status: DISCONTINUED | OUTPATIENT
Start: 2022-08-31 | End: 2022-08-31 | Stop reason: SURG

## 2022-08-31 RX ORDER — PHENYLEPHRINE HYDROCHLORIDE 10 MG/ML
INJECTION INTRAVENOUS
Status: DISPENSED
Start: 2022-08-31 | End: 2022-09-01

## 2022-08-31 RX ORDER — DEXAMETHASONE SODIUM PHOSPHATE 4 MG/ML
INJECTION, SOLUTION INTRA-ARTICULAR; INTRALESIONAL; INTRAMUSCULAR; INTRAVENOUS; SOFT TISSUE AS NEEDED
Status: DISCONTINUED | OUTPATIENT
Start: 2022-08-31 | End: 2022-08-31 | Stop reason: SURG

## 2022-08-31 RX ORDER — PROPOFOL 10 MG/ML
VIAL (ML) INTRAVENOUS AS NEEDED
Status: DISCONTINUED | OUTPATIENT
Start: 2022-08-31 | End: 2022-08-31 | Stop reason: SURG

## 2022-08-31 RX ORDER — FENTANYL CITRATE 50 UG/ML
50 INJECTION, SOLUTION INTRAMUSCULAR; INTRAVENOUS
Status: DISCONTINUED | OUTPATIENT
Start: 2022-08-31 | End: 2022-08-31 | Stop reason: HOSPADM

## 2022-08-31 RX ORDER — PROTAMINE SULFATE 10 MG/ML
INJECTION, SOLUTION INTRAVENOUS AS NEEDED
Status: DISCONTINUED | OUTPATIENT
Start: 2022-08-31 | End: 2022-08-31 | Stop reason: SURG

## 2022-08-31 RX ORDER — ROSUVASTATIN CALCIUM 20 MG/1
40 TABLET, COATED ORAL DAILY
Status: DISCONTINUED | OUTPATIENT
Start: 2022-09-01 | End: 2022-09-01 | Stop reason: HOSPADM

## 2022-08-31 RX ORDER — CLOPIDOGREL BISULFATE 75 MG/1
75 TABLET ORAL DAILY
Status: DISCONTINUED | OUTPATIENT
Start: 2022-09-01 | End: 2022-09-01 | Stop reason: HOSPADM

## 2022-08-31 RX ORDER — SODIUM CHLORIDE, SODIUM LACTATE, POTASSIUM CHLORIDE, CALCIUM CHLORIDE 600; 310; 30; 20 MG/100ML; MG/100ML; MG/100ML; MG/100ML
9 INJECTION, SOLUTION INTRAVENOUS CONTINUOUS
Status: DISCONTINUED | OUTPATIENT
Start: 2022-08-31 | End: 2022-08-31

## 2022-08-31 RX ORDER — FAMOTIDINE 10 MG/ML
20 INJECTION, SOLUTION INTRAVENOUS ONCE
Status: CANCELLED | OUTPATIENT
Start: 2022-08-31 | End: 2022-08-31

## 2022-08-31 RX ORDER — EPHEDRINE SULFATE 50 MG/ML
INJECTION, SOLUTION INTRAVENOUS AS NEEDED
Status: DISCONTINUED | OUTPATIENT
Start: 2022-08-31 | End: 2022-08-31 | Stop reason: SURG

## 2022-08-31 RX ORDER — ACETAMINOPHEN 500 MG
500 TABLET ORAL EVERY 4 HOURS PRN
Status: DISCONTINUED | OUTPATIENT
Start: 2022-08-31 | End: 2022-09-01 | Stop reason: HOSPADM

## 2022-08-31 RX ORDER — SODIUM CHLORIDE 0.9 % (FLUSH) 0.9 %
10 SYRINGE (ML) INJECTION EVERY 12 HOURS SCHEDULED
Status: DISCONTINUED | OUTPATIENT
Start: 2022-08-31 | End: 2022-09-01 | Stop reason: HOSPADM

## 2022-08-31 RX ORDER — ASPIRIN 81 MG/1
81 TABLET ORAL DAILY
Status: DISCONTINUED | OUTPATIENT
Start: 2022-09-01 | End: 2022-09-01 | Stop reason: HOSPADM

## 2022-08-31 RX ORDER — SODIUM CHLORIDE 9 MG/ML
9 INJECTION, SOLUTION INTRAVENOUS CONTINUOUS
Status: DISCONTINUED | OUTPATIENT
Start: 2022-08-31 | End: 2022-08-31

## 2022-08-31 RX ORDER — ENOXAPARIN SODIUM 100 MG/ML
40 INJECTION SUBCUTANEOUS DAILY
Status: DISCONTINUED | OUTPATIENT
Start: 2022-09-01 | End: 2022-09-01 | Stop reason: HOSPADM

## 2022-08-31 RX ORDER — AMLODIPINE BESYLATE 5 MG/1
5 TABLET ORAL DAILY
Status: DISCONTINUED | OUTPATIENT
Start: 2022-09-01 | End: 2022-09-01 | Stop reason: HOSPADM

## 2022-08-31 RX ORDER — CEFAZOLIN SODIUM 2 G/100ML
2 INJECTION, SOLUTION INTRAVENOUS ONCE
Status: COMPLETED | OUTPATIENT
Start: 2022-08-31 | End: 2022-08-31

## 2022-08-31 RX ORDER — LIDOCAINE HYDROCHLORIDE 10 MG/ML
INJECTION, SOLUTION INFILTRATION; PERINEURAL AS NEEDED
Status: DISCONTINUED | OUTPATIENT
Start: 2022-08-31 | End: 2022-08-31 | Stop reason: HOSPADM

## 2022-08-31 RX ORDER — BUPIVACAINE HCL/0.9 % NACL/PF 0.125 %
PLASTIC BAG, INJECTION (ML) EPIDURAL AS NEEDED
Status: DISCONTINUED | OUTPATIENT
Start: 2022-08-31 | End: 2022-08-31 | Stop reason: SURG

## 2022-08-31 RX ORDER — SODIUM CHLORIDE 0.9 % (FLUSH) 0.9 %
10 SYRINGE (ML) INJECTION EVERY 12 HOURS SCHEDULED
Status: CANCELLED | OUTPATIENT
Start: 2022-08-31

## 2022-08-31 RX ORDER — MAGNESIUM HYDROXIDE 1200 MG/15ML
LIQUID ORAL AS NEEDED
Status: DISCONTINUED | OUTPATIENT
Start: 2022-08-31 | End: 2022-08-31 | Stop reason: HOSPADM

## 2022-08-31 RX ORDER — GABAPENTIN 300 MG/1
300 CAPSULE ORAL NIGHTLY
Status: DISCONTINUED | OUTPATIENT
Start: 2022-08-31 | End: 2022-09-01 | Stop reason: HOSPADM

## 2022-08-31 RX ORDER — FOLIC ACID 1 MG/1
1000 TABLET ORAL DAILY
Status: DISCONTINUED | OUTPATIENT
Start: 2022-08-31 | End: 2022-09-01 | Stop reason: HOSPADM

## 2022-08-31 RX ORDER — BUDESONIDE AND FORMOTEROL FUMARATE DIHYDRATE 80; 4.5 UG/1; UG/1
2 AEROSOL RESPIRATORY (INHALATION)
Status: DISCONTINUED | OUTPATIENT
Start: 2022-08-31 | End: 2022-09-01 | Stop reason: HOSPADM

## 2022-08-31 RX ORDER — HYDROCODONE BITARTRATE AND ACETAMINOPHEN 5; 325 MG/1; MG/1
1 TABLET ORAL EVERY 6 HOURS PRN
Status: DISCONTINUED | OUTPATIENT
Start: 2022-08-31 | End: 2022-09-01 | Stop reason: HOSPADM

## 2022-08-31 RX ORDER — SODIUM CHLORIDE 0.9 % (FLUSH) 0.9 %
10 SYRINGE (ML) INJECTION AS NEEDED
Status: DISCONTINUED | OUTPATIENT
Start: 2022-08-31 | End: 2022-09-01 | Stop reason: HOSPADM

## 2022-08-31 RX ORDER — HEPARIN SODIUM 1000 [USP'U]/ML
INJECTION, SOLUTION INTRAVENOUS; SUBCUTANEOUS AS NEEDED
Status: DISCONTINUED | OUTPATIENT
Start: 2022-08-31 | End: 2022-08-31 | Stop reason: SURG

## 2022-08-31 RX ORDER — HYDROCODONE BITARTRATE AND ACETAMINOPHEN 5; 325 MG/1; MG/1
1 TABLET ORAL EVERY 4 HOURS PRN
Status: DISCONTINUED | OUTPATIENT
Start: 2022-08-31 | End: 2022-09-01 | Stop reason: HOSPADM

## 2022-08-31 RX ORDER — ROCURONIUM BROMIDE 10 MG/ML
INJECTION, SOLUTION INTRAVENOUS AS NEEDED
Status: DISCONTINUED | OUTPATIENT
Start: 2022-08-31 | End: 2022-08-31 | Stop reason: SURG

## 2022-08-31 RX ORDER — FENTANYL CITRATE 50 UG/ML
INJECTION, SOLUTION INTRAMUSCULAR; INTRAVENOUS AS NEEDED
Status: DISCONTINUED | OUTPATIENT
Start: 2022-08-31 | End: 2022-08-31 | Stop reason: SURG

## 2022-08-31 RX ORDER — SODIUM CHLORIDE 9 MG/ML
INJECTION, SOLUTION INTRAVENOUS CONTINUOUS PRN
Status: DISCONTINUED | OUTPATIENT
Start: 2022-08-31 | End: 2022-08-31 | Stop reason: SURG

## 2022-08-31 RX ORDER — FAMOTIDINE 20 MG/1
20 TABLET, FILM COATED ORAL ONCE
Status: COMPLETED | OUTPATIENT
Start: 2022-08-31 | End: 2022-08-31

## 2022-08-31 RX ORDER — LOSARTAN POTASSIUM 50 MG/1
100 TABLET ORAL
Status: DISCONTINUED | OUTPATIENT
Start: 2022-09-01 | End: 2022-09-01 | Stop reason: HOSPADM

## 2022-08-31 RX ORDER — HYDROMORPHONE HYDROCHLORIDE 1 MG/ML
0.5 INJECTION, SOLUTION INTRAMUSCULAR; INTRAVENOUS; SUBCUTANEOUS
Status: DISCONTINUED | OUTPATIENT
Start: 2022-08-31 | End: 2022-08-31 | Stop reason: HOSPADM

## 2022-08-31 RX ORDER — CITALOPRAM 40 MG/1
40 TABLET ORAL DAILY
Status: DISCONTINUED | OUTPATIENT
Start: 2022-09-01 | End: 2022-09-01 | Stop reason: HOSPADM

## 2022-08-31 RX ORDER — PANTOPRAZOLE SODIUM 40 MG/1
40 TABLET, DELAYED RELEASE ORAL
Status: DISCONTINUED | OUTPATIENT
Start: 2022-09-01 | End: 2022-09-01 | Stop reason: HOSPADM

## 2022-08-31 RX ORDER — ASCORBIC ACID 500 MG
1000 TABLET ORAL DAILY
Status: DISCONTINUED | OUTPATIENT
Start: 2022-08-31 | End: 2022-09-01 | Stop reason: HOSPADM

## 2022-08-31 RX ORDER — SODIUM CHLORIDE 0.9 % (FLUSH) 0.9 %
10 SYRINGE (ML) INJECTION AS NEEDED
Status: CANCELLED | OUTPATIENT
Start: 2022-08-31

## 2022-08-31 RX ORDER — ACETAMINOPHEN 325 MG/1
650 TABLET ORAL EVERY 4 HOURS PRN
Status: DISCONTINUED | OUTPATIENT
Start: 2022-08-31 | End: 2022-09-01 | Stop reason: HOSPADM

## 2022-08-31 RX ADMIN — Medication 10 ML: at 20:51

## 2022-08-31 RX ADMIN — FOLIC ACID 1000 MCG: 1 TABLET ORAL at 20:51

## 2022-08-31 RX ADMIN — OXYCODONE HYDROCHLORIDE AND ACETAMINOPHEN 1000 MG: 500 TABLET ORAL at 20:51

## 2022-08-31 RX ADMIN — LIDOCAINE HYDROCHLORIDE 50 MG: 10 INJECTION, SOLUTION EPIDURAL; INFILTRATION; INTRACAUDAL; PERINEURAL at 11:29

## 2022-08-31 RX ADMIN — Medication 100 MCG: at 12:44

## 2022-08-31 RX ADMIN — SODIUM CHLORIDE 9 ML/HR: 9 INJECTION, SOLUTION INTRAVENOUS at 09:50

## 2022-08-31 RX ADMIN — SODIUM CHLORIDE 9 ML/HR: 9 INJECTION, SOLUTION INTRAVENOUS at 09:45

## 2022-08-31 RX ADMIN — ONDANSETRON 4 MG: 2 INJECTION INTRAMUSCULAR; INTRAVENOUS at 13:01

## 2022-08-31 RX ADMIN — PHENYLEPHRINE HYDROCHLORIDE 0.3 MCG/KG/MIN: 10 INJECTION INTRAVENOUS at 11:40

## 2022-08-31 RX ADMIN — GABAPENTIN 300 MG: 300 CAPSULE ORAL at 20:51

## 2022-08-31 RX ADMIN — SODIUM CHLORIDE: 9 INJECTION, SOLUTION INTRAVENOUS at 11:13

## 2022-08-31 RX ADMIN — FAMOTIDINE 20 MG: 20 TABLET ORAL at 10:03

## 2022-08-31 RX ADMIN — NICARDIPINE HYDROCHLORIDE 2.2 MG/HR: 0.1 INJECTION, SOLUTION INTRAVENOUS at 12:50

## 2022-08-31 RX ADMIN — DEXAMETHASONE SODIUM PHOSPHATE 4 MG: 4 INJECTION, SOLUTION INTRA-ARTICULAR; INTRALESIONAL; INTRAMUSCULAR; INTRAVENOUS; SOFT TISSUE at 11:33

## 2022-08-31 RX ADMIN — PROTAMINE SULFATE 30 MG: 10 INJECTION, SOLUTION INTRAVENOUS at 12:50

## 2022-08-31 RX ADMIN — HEPARIN SODIUM 5000 UNITS: 1000 INJECTION, SOLUTION INTRAVENOUS; SUBCUTANEOUS at 11:48

## 2022-08-31 RX ADMIN — FENTANYL CITRATE 100 MCG: 50 INJECTION, SOLUTION INTRAMUSCULAR; INTRAVENOUS at 11:29

## 2022-08-31 RX ADMIN — SUGAMMADEX 200 MG: 100 INJECTION, SOLUTION INTRAVENOUS at 12:50

## 2022-08-31 RX ADMIN — Medication 50 MCG: at 12:41

## 2022-08-31 RX ADMIN — BUDESONIDE AND FORMOTEROL FUMARATE DIHYDRATE 2 PUFF: 80; 4.5 AEROSOL RESPIRATORY (INHALATION) at 19:26

## 2022-08-31 RX ADMIN — ROCURONIUM BROMIDE 50 MG: 50 INJECTION, SOLUTION INTRAVENOUS at 11:29

## 2022-08-31 RX ADMIN — LIDOCAINE HYDROCHLORIDE 0.5 ML: 10 INJECTION, SOLUTION EPIDURAL; INFILTRATION; INTRACAUDAL; PERINEURAL at 09:45

## 2022-08-31 RX ADMIN — EPHEDRINE SULFATE 5 MG: 50 INJECTION INTRAVENOUS at 12:39

## 2022-08-31 RX ADMIN — Medication 50 MCG: at 12:38

## 2022-08-31 RX ADMIN — TRAMADOL HYDROCHLORIDE 50 MG: 50 TABLET, COATED ORAL at 20:51

## 2022-08-31 RX ADMIN — PROPOFOL 150 MG: 10 INJECTION, EMULSION INTRAVENOUS at 11:29

## 2022-08-31 RX ADMIN — CEFAZOLIN SODIUM 2 G: 2 INJECTION, SOLUTION INTRAVENOUS at 11:32

## 2022-08-31 RX ADMIN — SODIUM CHLORIDE 100 ML/HR: 9 INJECTION, SOLUTION INTRAVENOUS at 20:51

## 2022-09-01 ENCOUNTER — READMISSION MANAGEMENT (OUTPATIENT)
Dept: CALL CENTER | Facility: HOSPITAL | Age: 67
End: 2022-09-01

## 2022-09-01 VITALS
SYSTOLIC BLOOD PRESSURE: 142 MMHG | BODY MASS INDEX: 19.65 KG/M2 | TEMPERATURE: 98 F | OXYGEN SATURATION: 92 % | WEIGHT: 110.89 LBS | HEIGHT: 63 IN | RESPIRATION RATE: 16 BRPM | DIASTOLIC BLOOD PRESSURE: 64 MMHG | HEART RATE: 79 BPM

## 2022-09-01 LAB
ANION GAP SERPL CALCULATED.3IONS-SCNC: 8 MMOL/L (ref 5–15)
BASOPHILS # BLD AUTO: 0.02 10*3/MM3 (ref 0–0.2)
BASOPHILS NFR BLD AUTO: 0.4 % (ref 0–1.5)
BUN SERPL-MCNC: 23 MG/DL (ref 8–23)
BUN/CREAT SERPL: 24.7 (ref 7–25)
CALCIUM SPEC-SCNC: 7.9 MG/DL (ref 8.6–10.5)
CHLORIDE SERPL-SCNC: 112 MMOL/L (ref 98–107)
CO2 SERPL-SCNC: 20 MMOL/L (ref 22–29)
CREAT SERPL-MCNC: 0.93 MG/DL (ref 0.57–1)
CYTO UR: NORMAL
DEPRECATED RDW RBC AUTO: 45.4 FL (ref 37–54)
EGFRCR SERPLBLD CKD-EPI 2021: 66.7 ML/MIN/1.73
EOSINOPHIL # BLD AUTO: 0.17 10*3/MM3 (ref 0–0.4)
EOSINOPHIL NFR BLD AUTO: 3.1 % (ref 0.3–6.2)
ERYTHROCYTE [DISTWIDTH] IN BLOOD BY AUTOMATED COUNT: 12.5 % (ref 12.3–15.4)
GLUCOSE SERPL-MCNC: 105 MG/DL (ref 65–99)
HCT VFR BLD AUTO: 25.2 % (ref 34–46.6)
HGB BLD-MCNC: 8 G/DL (ref 12–15.9)
IMM GRANULOCYTES # BLD AUTO: 0.02 10*3/MM3 (ref 0–0.05)
IMM GRANULOCYTES NFR BLD AUTO: 0.4 % (ref 0–0.5)
LAB AP CASE REPORT: NORMAL
LAB AP CLINICAL INFORMATION: NORMAL
LYMPHOCYTES # BLD AUTO: 0.62 10*3/MM3 (ref 0.7–3.1)
LYMPHOCYTES NFR BLD AUTO: 11.2 % (ref 19.6–45.3)
MCH RBC QN AUTO: 31.5 PG (ref 26.6–33)
MCHC RBC AUTO-ENTMCNC: 31.7 G/DL (ref 31.5–35.7)
MCV RBC AUTO: 99.2 FL (ref 79–97)
MONOCYTES # BLD AUTO: 0.57 10*3/MM3 (ref 0.1–0.9)
MONOCYTES NFR BLD AUTO: 10.3 % (ref 5–12)
NEUTROPHILS NFR BLD AUTO: 4.16 10*3/MM3 (ref 1.7–7)
NEUTROPHILS NFR BLD AUTO: 74.6 % (ref 42.7–76)
NRBC BLD AUTO-RTO: 0 /100 WBC (ref 0–0.2)
PATH REPORT.FINAL DX SPEC: NORMAL
PATH REPORT.GROSS SPEC: NORMAL
PLATELET # BLD AUTO: 181 10*3/MM3 (ref 140–450)
PMV BLD AUTO: 10.6 FL (ref 6–12)
POTASSIUM SERPL-SCNC: 4.3 MMOL/L (ref 3.5–5.2)
RBC # BLD AUTO: 2.54 10*6/MM3 (ref 3.77–5.28)
SODIUM SERPL-SCNC: 140 MMOL/L (ref 136–145)
WBC NRBC COR # BLD: 5.56 10*3/MM3 (ref 3.4–10.8)

## 2022-09-01 PROCEDURE — 94799 UNLISTED PULMONARY SVC/PX: CPT

## 2022-09-01 PROCEDURE — 94761 N-INVAS EAR/PLS OXIMETRY MLT: CPT

## 2022-09-01 PROCEDURE — 25010000002 ENOXAPARIN PER 10 MG: Performed by: SURGERY

## 2022-09-01 PROCEDURE — 97161 PT EVAL LOW COMPLEX 20 MIN: CPT

## 2022-09-01 PROCEDURE — 94664 DEMO&/EVAL PT USE INHALER: CPT

## 2022-09-01 PROCEDURE — 80048 BASIC METABOLIC PNL TOTAL CA: CPT | Performed by: SURGERY

## 2022-09-01 PROCEDURE — 85025 COMPLETE CBC W/AUTO DIFF WBC: CPT | Performed by: SURGERY

## 2022-09-01 RX ORDER — HYDROCODONE BITARTRATE AND ACETAMINOPHEN 10; 325 MG/1; MG/1
1 TABLET ORAL EVERY 6 HOURS PRN
Qty: 12 TABLET | Refills: 0 | Status: SHIPPED | OUTPATIENT
Start: 2022-09-01 | End: 2022-09-13

## 2022-09-01 RX ADMIN — CITALOPRAM 40 MG: 40 TABLET ORAL at 08:57

## 2022-09-01 RX ADMIN — OXYCODONE HYDROCHLORIDE AND ACETAMINOPHEN 1000 MG: 500 TABLET ORAL at 08:57

## 2022-09-01 RX ADMIN — BISOPROLOL FUMARATE 10 MG: 10 TABLET ORAL at 08:56

## 2022-09-01 RX ADMIN — ACETAMINOPHEN 500 MG: 500 TABLET, FILM COATED ORAL at 03:57

## 2022-09-01 RX ADMIN — HYDROCHLOROTHIAZIDE 12.5 MG: 12.5 CAPSULE ORAL at 08:57

## 2022-09-01 RX ADMIN — FOLIC ACID 1000 MCG: 1 TABLET ORAL at 08:57

## 2022-09-01 RX ADMIN — ASPIRIN 81 MG: 81 TABLET, COATED ORAL at 08:57

## 2022-09-01 RX ADMIN — LOSARTAN POTASSIUM 100 MG: 50 TABLET, FILM COATED ORAL at 08:59

## 2022-09-01 RX ADMIN — SODIUM CHLORIDE 100 ML/HR: 9 INJECTION, SOLUTION INTRAVENOUS at 03:56

## 2022-09-01 RX ADMIN — BUDESONIDE AND FORMOTEROL FUMARATE DIHYDRATE 2 PUFF: 80; 4.5 AEROSOL RESPIRATORY (INHALATION) at 07:41

## 2022-09-01 RX ADMIN — AMLODIPINE BESYLATE 5 MG: 5 TABLET ORAL at 08:59

## 2022-09-01 RX ADMIN — TRAMADOL HYDROCHLORIDE 50 MG: 50 TABLET, COATED ORAL at 08:57

## 2022-09-01 RX ADMIN — PANTOPRAZOLE SODIUM 40 MG: 40 TABLET, DELAYED RELEASE ORAL at 08:57

## 2022-09-01 RX ADMIN — ROSUVASTATIN CALCIUM 40 MG: 20 TABLET, FILM COATED ORAL at 08:59

## 2022-09-01 RX ADMIN — CLOPIDOGREL BISULFATE 75 MG: 75 TABLET ORAL at 08:57

## 2022-09-01 RX ADMIN — Medication 10 ML: at 08:59

## 2022-09-01 NOTE — PLAN OF CARE
Goal Outcome Evaluation:  Plan of Care Reviewed With: patient        Progress: no change  Outcome Evaluation: Patient A&O. No neuro changes. Dressing CDI. VSS. Good UOP.

## 2022-09-01 NOTE — PROGRESS NOTES
LOS: 1 day   Patient Care Team:  Jannette Chapa MD as PCP - General  Jannette Chapa MD as PCP - Family Medicine  Severino Carbajal MD as Consulting Physician (Vascular Surgery)  Ketan Lazcano MD as Consulting Physician (Orthopedic Surgery)  Floyd Ernandez MD as Consulting Physician (Cardiology)  Lorenzo Good MD as Consulting Physician (Ophthalmology)      Subjective   Ms. Wade is alert and lying in bed with no family at bedside. She denies any new concerns or acute events overnight. Her throat is sore to swallow but she is able to tolerate oral intake without difficulty      History taken from: patient    Objective     Vital Signs  Temp:  [97.1 °F (36.2 °C)-98.6 °F (37 °C)] 98.6 °F (37 °C)  Heart Rate:  [58-74] 70  Resp:  [12-18] 16  BP: ()/(40-79) 118/60  Arterial Line BP: (0-143)/(0-58) 79/54      Physical Exam  AAOx3, NAD, no family at bedside  Respiratory: normal effort, on 2L supplemental O2  Neuro intact  Neck: incision c/d/i with skin affix, no active bleeding or drainage, surrounding ecchymosis, trachea midline    Results Review:     I reviewed the patient's new clinical results.  CBC    Results from last 7 days   Lab Units 09/01/22  0351 08/26/22  0827   WBC 10*3/mm3 5.56 5.73   HEMOGLOBIN g/dL 8.0* 11.3*   PLATELETS 10*3/mm3 181 277     BMP   Results from last 7 days   Lab Units 09/01/22  0351 08/26/22  0827   SODIUM mmol/L 140 140   POTASSIUM mmol/L 4.3 4.7   CHLORIDE mmol/L 112* 105   CO2 mmol/L 20.0* 22.0   BUN mg/dL 23 40*   CREATININE mg/dL 0.93 1.65*   GLUCOSE mg/dL 105* 90          Current Facility-Administered Medications:   •  acetaminophen (TYLENOL) tablet 500 mg, 500 mg, Oral, Q4H PRN, Francisco Magana MD, 500 mg at 09/01/22 0357  •  acetaminophen (TYLENOL) tablet 650 mg, 650 mg, Oral, Q4H PRN, Francisco Magana MD  •  amLODIPine (NORVASC) tablet 5 mg, 5 mg, Oral, Daily, Francisco Magana MD  •  ascorbic acid (VITAMIN C) tablet 1,000 mg, 1,000 mg, Oral, Daily, [place]  MD Francisco, 1,000 mg at 08/31/22 2051  •  aspirin EC tablet 81 mg, 81 mg, Oral, Daily, Francisco Magana MD  •  bisoprolol (ZEBeta) tablet 10 mg, 10 mg, Oral, Daily, Francisco Magana MD  •  budesonide-formoterol (SYMBICORT) 80-4.5 MCG/ACT inhaler 2 puff, 2 puff, Inhalation, BID - RT, Francisco Magana MD, 2 puff at 09/01/22 0741  •  citalopram (CeleXA) tablet 40 mg, 40 mg, Oral, Daily, Francisco Magana MD  •  clopidogrel (PLAVIX) tablet 75 mg, 75 mg, Oral, Daily, Francisco Magana MD  •  Enoxaparin Sodium (LOVENOX) syringe 40 mg, 40 mg, Subcutaneous, Daily, Francisco Magana MD  •  fluticasone (FLONASE) 50 MCG/ACT nasal spray 1 spray, 1 spray, Each Nare, BID, Francisco Magana MD  •  folic acid (FOLVITE) tablet 1,000 mcg, 1,000 mcg, Oral, Daily, Francisco Magana MD, 1,000 mcg at 08/31/22 2051  •  gabapentin (NEURONTIN) capsule 300 mg, 300 mg, Oral, Nightly, Francisco Magana MD, 300 mg at 08/31/22 2051  •  hydroCHLOROthiazide (HYDRODIURIL) oral 12.5 mg, 12.5 mg, Oral, Daily, Francisco Magana MD  •  HYDROcodone-acetaminophen (NORCO) 5-325 MG per tablet 1 tablet, 1 tablet, Oral, Q4H PRN, Francisco Magana MD  •  HYDROcodone-acetaminophen (NORCO) 5-325 MG per tablet 1 tablet, 1 tablet, Oral, Q6H PRN, Francisco Magana MD  •  losartan (COZAAR) tablet 100 mg, 100 mg, Oral, Q24H, Francisco Magana MD  •  niCARdipine (CARDENE) 20 mg in 200 mL NS infusion, 5-15 mg/hr, Intravenous, Titrated, Francisco Magana MD  •  ondansetron (ZOFRAN) tablet 4 mg, 4 mg, Oral, Q6H PRN **OR** ondansetron (ZOFRAN) injection 4 mg, 4 mg, Intravenous, Q6H PRN, Francisco Magana MD  •  pantoprazole (PROTONIX) EC tablet 40 mg, 40 mg, Oral, QAM AC, Francisco Magana MD  •  rosuvastatin (CRESTOR) tablet 40 mg, 40 mg, Oral, Daily, Francisco Magana MD  •  sodium chloride 0.9 % flush 10 mL, 10 mL, Intravenous, Q12H, Francisco Magana MD, 10 mL at 08/31/22 2051  •  sodium chloride 0.9 % flush 10 mL, 10 mL, Intravenous, PRN, Francisco Magana MD  •  sodium chloride 0.9 % infusion, 100 mL/hr, Intravenous, Continuous, Francisco Magana MD, Last Rate:  100 mL/hr at 09/01/22 0356, 100 mL/hr at 09/01/22 0356  •  traMADol (ULTRAM) tablet 50 mg, 50 mg, Oral, BID, Francisco Magana MD, 50 mg at 08/31/22 2051     Assessment & Plan   High grade Asymptomatic RIGHT carotid artery stenosis  - 8/31: ([place]) RIGHT carotid endarterectomy  - Reviewed labs  - Continue ASA, Plavix, Statin  - DC IVF  - DC arterial line  - Patient to mobilize  - Plan for discharge back to home today with friend  - FU with Dr. Magana or ANDRA Saeed on 9/27/22 @ 3:30pm with carotid duplex  - Dr. Magana to send in postoperative pain medication for use upon discharge      ANDRA Estrada-ALESSANDRA  09/01/22  08:47 EDT

## 2022-09-01 NOTE — THERAPY DISCHARGE NOTE
Patient Name: Aarti Wade  : 1955    MRN: [id number]                              Today's Date: 2022       Admit Date: 2022    Visit Dx:     ICD-10-CM ICD-9-CM   1. Right carotid stenosis  I65.21 433.10   2. Carotid stenosis  I65.29 433.10   3. Status post total replacement of left shoulder  Z96.612 V43.61     Patient Active Problem List   Diagnosis   • Atherosclerosis of native artery of both lower extremities with intermittent claudication (HCC)   • PVD   • HTN   • Mixed hyperlipidemia   • Allergic rhinitis   • GERD   • Osteopenia of multiple sites   • Adhesive capsulitis of right shoulder   • Chronic low back pain   • COPD (chronic obstructive pulmonary disease)   • Post-traumatic osteoarthritis of right shoulder   • Depression   • Keratosis pilaris   • Subclavian steal syndrome   • Nocturnal leg cramps   • Atherosclerosis of native artery of left lower extremity with intermittent claudication (HCC)   • Postural dizziness with presyncope   • Hyperkalemia   • Bilateral hand pain   • Post-traumatic osteoarthritis of multiple joints   • Stage III CKD   • Acute pain of left shoulder   • Status post total replacement of left shoulder   • Cough   • Medicare annual wellness visit, subsequent   • Annual physical exam   • Chronic right shoulder pain   • Hyperhomocysteinemia (HCC)   • Right carotid stenosis   • Dystrophic nail   • Hypoxia   • Acute nonintractable headache   • Dyslipidemia   • Former smoker   • VHD (valvular heart disease)   • Carotid stenosis     Past Medical History:   Diagnosis Date   • Anxiety    • Arthritis    • Chronic UTI    • Claudication (HCC)    • COPD (chronic obstructive pulmonary disease) (HCC)    • Depression    • diffuse fatty liver infiltration on CT    • Dizzy    • Former smoker 2022   • GERD (gastroesophageal reflux disease)    • Head injury     Related to falls off  horses   • Hepatitis     UNKNOWN TYPE   • History of shingles     about 5 years ago   •  Hyperlipidemia    • Hypertension    • 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 HARRISON   • CAROTID ENDARTERECTOMY Right 8/31/2022    Procedure: CAROTID ENDARTERECTOMY- RIGHT;  Surgeon: Francisco Magana MD;  Location: [facility] OR;  Service: Vascular;  Laterality: Right;   • COLONOSCOPY      last 8 years ago.  Due to schedule   • EYE LENS IMPLANT SECONDARY Left    • FEMORAL FEMORAL BYPASS Right 8/22/2016    Procedure: RIGHT COMMON FEMORAL ENDARTERECTOMY WITH PATCH;  Surgeon: Severino Harrison MD;  Location: [facility] OR;  Service:    • FINGER SURGERY Left     LEFT INDEX FINGER   • INTERVENTIONAL RADIOLOGY PROCEDURE N/A 8/16/2016    Procedure: IR PTA femoral popliteal artery;  Surgeon: Severino Harrison MD;  Location: [facility] CATH INVASIVE LOCATION;  Service:    • INTERVENTIONAL RADIOLOGY PROCEDURE Left 1/7/2020    Procedure: LEFT ATHERECTOMY, BALLOON ANGIOPLASTY;  Surgeon: Severino Harrison MD;  Location: [facility] CATH INVASIVE LOCATION;  Service: Interventional Radiology   • TONSILLECTOMY     • TOTAL SHOULDER ARTHROPLASTY Left 6/8/2020    Procedure: TOTAL SHOULDER ARTHROPLASTY LEFT;  Surgeon: Ketan Lazcano MD;  Location: [facility] OR;  Service: Orthopedics;  Laterality: Left;  protector in: 1346  protector out: 1402      General Information     Row Name 09/01/22 1127          Physical Therapy Time and Intention    Document Type evaluation  -AY     Mode of Treatment physical therapy  -AY     Row Name 09/01/22 1127          General Information    Patient Profile Reviewed yes  -AY     Prior Level of Function independent:;community  mobility;all household mobility;gait;transfer;bed mobility;using stairs  -AY     Existing Precautions/Restrictions fall  L clavical fx (5 wks out)  -AY     Barriers to Rehab medically complex;previous functional deficit  -AY     Row Name 09/01/22 1127          Living Environment    People in Home alone  friend will be staying with her for a week  -AY     Row Name 09/01/22 1127          Home Main Entrance    Number of Stairs, Main Entrance one  -AY     Stair Railings, Main Entrance none  -AY     Row Name 09/01/22 1127          Stairs Within Home, Primary    Number of Stairs, Within Home, Primary none  -AY     Row Name 09/01/22 1127          Cognition    Orientation Status (Cognition) oriented x 3  -AY     Row Name 09/01/22 1127          Safety Issues, Functional Mobility    Safety Issues Affecting Function (Mobility) insight into deficits/self-awareness;sequencing abilities;awareness of need for assistance;safety precaution awareness  -AY     Impairments Affecting Function (Mobility) balance;pain;strength;endurance/activity tolerance  -AY           User Key  (r) = Recorded By, (t) = Taken By, (c) = Cosigned By    Initials Name Provider Type    AY Bella Frausto, KOMAL Physical Therapist               Mobility     Row Name 09/01/22 1128          Bed Mobility    Bed Mobility supine-sit;sit-supine;scooting/bridging  -AY     Scooting/Bridging [place] (Bed Mobility) minimum assist (75% patient effort);1 person assist  -AY     Supine-Sit [place] (Bed Mobility) supervision  -AY     Sit-Supine [place] (Bed Mobility) contact guard  -AY     Assistive Device (Bed Mobility) bed rails;head of bed elevated;draw sheet  -AY     Row Name 09/01/22 1128          Sit-Stand Transfer    Sit-Stand [place] (Transfers) supervision  -AY     Row Name 09/01/22 1128          Gait/Stairs (Locomotion)    [place] Level (Gait) supervision  -AY     Distance in Feet (Gait) 500  -AY     Deviations/Abnormal Patterns (Gait) base of  support, narrow;stride length decreased  -AY     Bilateral Gait Deviations forward flexed posture  -AY     [place] Level (Stairs) supervision  -AY     Number of Steps (Stairs) 1  -AY     Ascending Technique (Stairs) step-to-step  -AY     Descending Technique (Stairs) step-to-step  -AY     Comment, (Gait/Stairs) pt demonstrated step through gait pattern with very increased mariela. cueing for posture, pacing, and increased stride length. gait distance limited by fatigue. 1 step navigated with no LOB noted.  -AY           User Key  (r) = Recorded By, (t) = Taken By, (c) = Cosigned By    Initials Name Provider Type    Bella Steven, PT Physical Therapist               Obj/Interventions     Row Name 09/01/22 1130          Range of Motion Comprehensive    General Range of Motion bilateral lower extremity ROM WFL  -AY     Row Name 09/01/22 1130          Strength Comprehensive (MMT)    General Manual Muscle Testing (MMT) Assessment lower extremity strength deficits identified  -AY     Comment, General Manual Muscle Testing (MMT) Assessment BLE grossly 4+/5  -AY     Row Name 09/01/22 1130          Balance    Balance Assessment sitting static balance;sitting dynamic balance;sit to stand dynamic balance;standing static balance;standing dynamic balance  -AY     Static Sitting Balance independent  -AY     Dynamic Sitting Balance independent  -AY     Position, Sitting Balance unsupported;sitting edge of bed  -AY     Sit to Stand Dynamic Balance supervision  -AY     Static Standing Balance supervision  -AY     Dynamic Standing Balance supervision  -AY     Position/Device Used, Standing Balance unsupported  -AY     Row Name 09/01/22 1130          Sensory Assessment (Somatosensory)    Sensory Assessment (Somatosensory) LE sensation intact;UE sensation intact  -AY           User Key  (r) = Recorded By, (t) = Taken By, (c) = Cosigned By    Initials Name Provider Type    Bella Steven, KOMAL Physical Therapist                Goals/Plan    No documentation.                Clinical Impression     Row Name 09/01/22 1130          Pain    Pretreatment Pain Rating 3/10  -AY     Pain Location incisional  -AY     Pain Location - neck  -AY     Pain Intervention(s) Ambulation/increased activity;Repositioned  -AY     Additional Documentation Pain Scale: Numbers Pre/Post-Treatment (Group)  -AY     Row Name 09/01/22 1130          Plan of Care Review    Plan of Care Reviewed With patient  -AY     Progress no change  -AY     Outcome Evaluation PT initial eval completed. Pt limited by mild incisional pain and decreased functional endurance. Pt ambulated 500ft with SBA and navigated one step with no LOB. D/c rec for home with assist and OP PT to follow up on recent distal L clavical fx, limiting LUE ROM and strength. Inpatient PT will sign off at this time. Please re-consult if status changes.  -AY     Row Name 09/01/22 1130          Therapy Assessment/Plan (PT)    Rehab Potential (PT) --  -AY     Criteria for Skilled Interventions Met (PT) no;no problems identified which require skilled intervention  -AY     Therapy Frequency (PT) evaluation only  -AY     Row Name 09/01/22 1130          Vital Signs    Pre Systolic BP Rehab 131  -AY     Pre Treatment Diastolic BP 78  -AY     Post Systolic BP Rehab 142  -AY     Post Treatment Diastolic BP 79  -AY     Pretreatment Heart Rate (beats/min) 79  -AY     Posttreatment Heart Rate (beats/min) 95  -AY     Pre SpO2 (%) 98  -AY     O2 Delivery Pre Treatment room air  -AY     O2 Delivery Intra Treatment room air  -AY     Post SpO2 (%) 97  -AY     O2 Delivery Post Treatment room air  -AY     Pre Patient Position Supine  -AY     Intra Patient Position Standing  -AY     Post Patient Position Supine  -AY     Row Name 09/01/22 1130          Positioning and Restraints    Pre-Treatment Position in bed  -AY     Post Treatment Position bed  -AY     In Bed notified nsg;supine;encouraged to call for assist;call light within  reach;legs elevated;exit alarm on;patient within staff view  -AY           User Key  (r) = Recorded By, (t) = Taken By, (c) = Cosigned By    Initials Name Provider Type    Bella Steven PT Physical Therapist               Outcome Measures     Row Name 09/01/22 1135 09/01/22 0800       How much help from another person do you currently need...    Turning from your back to your side while in flat bed without using bedrails? 4  -AY 3  -MM    Moving from lying on back to sitting on the side of a flat bed without bedrails? 4  -AY 3  -MM    Moving to and from a bed to a chair (including a wheelchair)? 3  -AY 3  -MM    Standing up from a chair using your arms (e.g., wheelchair, bedside chair)? 3  -AY 3  -MM    Climbing 3-5 steps with a railing? 3  -AY 3  -MM    To walk in hospital room? 3  -AY 3  -MM    AM-PAC 6 Clicks Score (PT) 20  -AY 18  -MM    Highest level of mobility 6 --> Walked 10 steps or more  -AY 6 --> Walked 10 steps or more  -MM    Row Name 09/01/22 1135          Functional Assessment    Outcome Measure Options AM-PAC 6 Clicks Basic Mobility (PT)  -AY           User Key  (r) = Recorded By, (t) = Taken By, (c) = Cosigned By    Initials Name Provider Type    Chelo Galicia RN Registered Nurse    Bella Steven, PT Physical Therapist              Physical Therapy Education                 Title: PT OT SLP Therapies (In Progress)     Topic: Physical Therapy (Done)     Point: Mobility training (Done)     Learning Progress Summary           Patient Acceptance, E,TB, VU,NR by AY at 9/1/2022 1135                   Point: Body mechanics (Done)     Learning Progress Summary           Patient Acceptance, E,TB, VU,NR by AY at 9/1/2022 1135                   Point: Precautions (Done)     Learning Progress Summary           Patient Acceptance, E,TB, VU,NR by AY at 9/1/2022 1135                               User Key     Initials Effective Dates Name Provider Type Discipline    AY 11/10/20 -  Bella Frausto, PT  Physical Therapist PT              PT Recommendation and Plan     Plan of Care Reviewed With: patient  Progress: no change  Outcome Evaluation: PT initial eval completed. Pt limited by mild incisional pain and decreased functional endurance. Pt ambulated 500ft with SBA and navigated one step with no LOB. D/c rec for home with assist and OP PT to follow up on recent distal L clavical fx, limiting LUE ROM and strength. Inpatient PT will sign off at this time. Please re-consult if status changes.     Time Calculation:    PT Charges     Row Name 09/01/22 1137             Time Calculation    Start Time 1000  -AY      PT Received On 09/01/22  -AY      PT Goal Re-Cert Due Date 09/11/22  -AY              Untimed Charges    PT Eval/Re-eval Minutes 46  -AY              Total Minutes    Untimed Charges Total Minutes 46  -AY       Total Minutes 46  -AY            User Key  (r) = Recorded By, (t) = Taken By, (c) = Cosigned By    Initials Name Provider Type    AY Bella Frausto PT Physical Therapist              Therapy Charges for Today     Code Description Service Date Service Provider Modifiers Qty    [id number]  PT EVAL LOW COMPLEXITY 4 9/1/2022 Bella Frausto, PT GP 1          PT G-Codes  Outcome Measure Options: AM-PAC 6 Clicks Basic Mobility (PT)  AM-PAC 6 Clicks Score (PT): 20    PT Discharge Summary  Anticipated Discharge Disposition (PT): home with assist, home with outpatient therapy services    Bella Frausto PT  9/1/2022

## 2022-09-01 NOTE — DISCHARGE SUMMARY
Date of Discharge:  9/1/2022    Discharge Diagnosis: RIGHT carotid stenosis    Presenting Problem/History of Present Illness  Active Hospital Problems    Diagnosis  POA   • Dyslipidemia [E78.5]  Yes   • Former smoker [Z87.891]  Not Applicable   • VHD (valvular heart disease) [I38]  Yes   • Carotid stenosis [I65.29]  Yes   • Right carotid stenosis [I65.21]  Yes   • Stage III CKD [N18.30]  Yes   • Depression [F32.A]  Yes   • COPD (chronic obstructive pulmonary disease) [J44.9]  Yes   • GERD [K21.9]  Yes   • HTN [I10]  Yes   • PVD [I73.9]  Yes      Resolved Hospital Problems   No resolved problems to display.          Hospital Course  Patient is a 67 y.o. female presented with RIGHT carotid artery stenosis.  She was admitted under the services of Dr. Francisco Magana. After informed consent was given, the patient was taken to the operating room where she underwent a RIGHT Carotid Endarterectomy. Post procedure, he was admitted to for monitoring and pain management. Postoperative day 1 she was tolerating oral intake well without difficulty. She had only soreness to the throat. After a stable hospital course the patient was ready for transfer back to home. Pain was controlled. Diet and activity were well tolerated.    Procedures Performed    Procedure(s):  CAROTID ENDARTERECTOMY- RIGHT  -------------------       Consults:   Consults     No orders found for last 30 day(s).          Pertinent Test Results:   CBC    Results from last 7 days   Lab Units 09/01/22  0351 08/26/22  0827   WBC 10*3/mm3 5.56 5.73   HEMOGLOBIN g/dL 8.0* 11.3*   PLATELETS 10*3/mm3 181 277     BMP   Results from last 7 days   Lab Units 09/01/22  0351 08/26/22  0827   SODIUM mmol/L 140 140   POTASSIUM mmol/L 4.3 4.7   CHLORIDE mmol/L 112* 105   CO2 mmol/L 20.0* 22.0   BUN mg/dL 23 40*   CREATININE mg/dL 0.93 1.65*   GLUCOSE mg/dL 105* 90        Condition on Discharge:  Stable    Vital Signs  Temp:  [97.1 °F (36.2 °C)-98.6 °F (37 °C)] 98 °F (36.7  °C)  Heart Rate:  [59-88] 88  Resp:  [12-17] 16  BP: ()/(40-79) 138/68  Arterial Line BP: (0-143)/(0-69) 125/64    Physical Exam:  AAOx3, NAD, no family at bedside  Respiratory: normal effort, on 2L supplemental O2  Neuro intact  Neck: incision c/d/i with skin affix, no active bleeding or drainage, surrounding ecchymosis, trachea midline    Discharge Disposition  Home or Self Care    Discharge Medications     Discharge Medications      Changes to Medications      Instructions Start Date   HYDROcodone-acetaminophen 5-325 MG per tablet  Commonly known as: AMNACO  What changed: Another medication with the same name was added. Make sure you understand how and when to take each.   1 tablet, Oral, Every 6 Hours PRN      HYDROcodone-acetaminophen  MG per tablet  Commonly known as: RAUL  What changed: You were already taking a medication with the same name, and this prescription was added. Make sure you understand how and when to take each.   1 tablet, Oral, Every 6 Hours PRN         Continue These Medications      Instructions Start Date   Acetaminophen 8 Hour 650 MG 8 hr tablet  Generic drug: acetaminophen   650 mg, Oral, 2 times daily      amLODIPine 5 MG tablet  Commonly known as: NORVASC   5 mg, Oral, Daily      ascorbic acid 1000 MG tablet  Commonly known as: VITAMIN C   1,000 mg, Oral, Daily      aspirin 81 MG EC tablet   81 mg, Oral, Every Morning, TOLD NOT TO STOP BEFORE SURGERY      bisoprolol 10 MG tablet  Commonly known as: ZEBeta   10 mg, Oral, Daily      Breo Ellipta 200-25 MCG/INH inhaler  Generic drug: Fluticasone Furoate-Vilanterol   INHALE 1 PUFF BY MOUTH DAILY      CALCIUM + D PO   2 tablets, Oral, Daily      citalopram 40 MG tablet  Commonly known as: CeleXA   40 mg, Oral, Daily      clopidogrel 75 MG tablet  Commonly known as: PLAVIX   75 mg, Oral, Daily      Coenzyme Q10 200 MG capsule   400 mg, Oral, Daily      Fish Oil oil   1 capsule, Oral, Daily      Flaxseed Oil 1000 MG capsule   1  "capsule, Oral, Daily      fluticasone 50 MCG/ACT nasal spray  Commonly known as: FLONASE   SHAKE LIQUID AND USE 1 SPRAY IN EACH NOSTRIL TWICE DAILY      folic acid 1 MG tablet  Commonly known as: FOLVITE   TAKE 1 TABLET BY MOUTH DAILY      gabapentin 100 MG capsule  Commonly known as: NEURONTIN   300 mg, Oral, Every Evening      hydroCHLOROthiazide 12.5 MG tablet  Commonly known as: HYDRODIURIL   12.5 mg, Oral, Daily      MSM PO   2 tablets, Oral, Daily      multivitamin with minerals tablet tablet   1 tablet, Oral, Daily      olmesartan 40 MG tablet  Commonly known as: BENICAR   TAKE 1 TABLET BY MOUTH DAILY      omeprazole 20 MG capsule  Commonly known as: priLOSEC   20 mg, Oral, Daily      rosuvastatin 40 MG tablet  Commonly known as: CRESTOR   40 mg, Oral, Daily      traMADol 50 MG tablet  Commonly known as: ULTRAM   TAKE 1 TABLET BY MOUTH TWICE DAILY      URINARY HEALTH/CRANBERRY PO   1 tablet, Oral, 2 Times Daily, \"CRANACTIN\"       vitamin B-12 500 MCG tablet  Commonly known as: CYANOCOBALAMIN   TAKE 1 TABLET BY MOUTH DAILY             Discharge Diet:  Resume normal diet     Activity at Discharge:   - No heavy lifting over 10 pounds  - May resume normal diet  - May shower, but keep operative site clean and dry  - No driving until 24 hours after pain medication  - FU with Dr. Magana or Vicky Valadez in 4 weeks on 9/27/22 @ 3:30pm    Follow-up Appointments  Future Appointments   Date Time Provider Department Center   11/15/2022  1:45 PM Jannette Chapa MD [facility]     Additional Instructions for the Follow-ups that You Need to Schedule     Ambulatory Referral to Physical Therapy Ortho, Evaluate and treat; Full weight bearing   As directed      Specialty needed: Ortho Evaluate and treat    Weight Bearing Status: Full weight bearing    Follow-up needed: Yes               Test Results Pending at Discharge  Pending Labs     Order Current Status    Tissue Pathology Exam In process           Vicky Valadez, " [place]  09/01/22  12:38 EDT

## 2022-09-01 NOTE — OUTREACH NOTE
Prep Survey    Flowsheet Row Responses   [facility] patient discharged from? [facility]   Is LACE score < 7 ? No   Emergency Room discharge w/ pulse ox? No   Eligibility [facility]   Date of Admission 08/31/22   Date of Discharge 09/01/22   Discharge Disposition Home or Self Care   Discharge diagnosis right carotid stenosis   Does the patient have one of the following disease processes/diagnoses(primary or secondary)? Other   Does the patient have Home health ordered? No   Is there a DME ordered? No   Prep survey completed? Yes          ANA ALVA - Registered Nurse

## 2022-09-01 NOTE — DISCHARGE INSTRUCTIONS
- May resume normal diet  - No heavy lifting over 15-20 pounds  - Do not drive until you have been cleared at your next appointment  - May shower  - Keep operative sites clean and dry  - If you start to experience any severe pain, please notify our office and report to the ED

## 2022-09-01 NOTE — PLAN OF CARE
Goal Outcome Evaluation:  Plan of Care Reviewed With: patient        Progress: no change  Outcome Evaluation: PT initial eval completed. Pt limited by mild incisional pain and decreased functional endurance. Pt ambulated 500ft with SBA and navigated one step with no LOB. D/c rec for home with assist and OP PT to follow up on recent distal L clavical fx, limiting LUE ROM and strength. Inpatient PT will sign off at this time. Please re-consult if status changes.

## 2022-09-02 ENCOUNTER — TRANSITIONAL CARE MANAGEMENT TELEPHONE ENCOUNTER (OUTPATIENT)
Dept: CALL CENTER | Facility: HOSPITAL | Age: 67
End: 2022-09-02

## 2022-09-02 NOTE — OUTREACH NOTE
Call Center TCM Note    Flowsheet Row Responses   [facility] patient discharged from? [facility]   Does the patient have one of the following disease processes/diagnoses(primary or secondary)? Other   TCM attempt successful? Yes   Call start time 1006   Call end time 1013   Discharge diagnosis right carotid stenosis, CAROTID ENDARTERECTOMY- RIGHT   Person spoke with today (if not patient) and relationship patient   Meds reviewed with patient/caregiver? Yes   Does the patient have all medications ordered at discharge? Yes   Is the patient taking all medications as directed (includes completed medication regime)? Yes   Does the patient have an appointment with their PCP within 7 days of discharge? No   Nursing Interventions Assisted patient with making appointment per protocol  [HFU appt scheduled with Dr Chapa PCP for 9/13/22  9am. ]   Comments Go to Francisco Magana MD 9/27/22 @ 3:30pm   Has home health visited the patient within 72 hours of discharge? N/A   Psychosocial issues? No   Did the patient receive a copy of their discharge instructions? Yes   Nursing interventions Reviewed instructions with patient   What is the patient's perception of their health status since discharge? Improving  [Patient reports sore throat and swelling. ]   Is the patient/caregiver able to teach back signs and symptoms related to disease process for when to call PCP? Yes   Is the patient/caregiver able to teach back the hierarchy of who to call/visit for symptoms/problems? PCP, Specialist, Home health nurse, Urgent Care, ED, 911 Yes   If the patient is a current smoker, are they able to teach back resources for cessation? Not a smoker   TCM call completed? Yes          Anamaria Contreras RN    9/2/2022, 10:14 EDT

## 2022-09-06 ENCOUNTER — TELEPHONE (OUTPATIENT)
Dept: INTERNAL MEDICINE | Facility: CLINIC | Age: 67
End: 2022-09-06

## 2022-09-06 NOTE — TELEPHONE ENCOUNTER
Yes hoarseness at first after surgeries is very common because of the intubation too irritating the tissues.  If it does not resolve in a couple more weeks, let us know.

## 2022-09-06 NOTE — TELEPHONE ENCOUNTER
Caller: Jamaal Wade    Relationship: Self    Best call back number:     What is the best time to reach you: ANYTIME    Who are you requesting to speak with (clinical staff, provider,  specific staff member): CLINICAL STAFF    Do you know the name of the person who called: JAMAAL    What was the call regarding: PATIENT HAD SURGERY LAST WEEK ON HER  RIGHT CAROTID ARTERY;  SHE HAS CONCERNS SHE HAS BEEN HOARSE SINCE THE SURGERY AND WANTED TO KNOW IF THIS IS NORMAL FROM HER SURGERY?     Do you require a callback: YES

## 2022-09-13 ENCOUNTER — OFFICE VISIT (OUTPATIENT)
Dept: INTERNAL MEDICINE | Facility: CLINIC | Age: 67
End: 2022-09-13

## 2022-09-13 VITALS
SYSTOLIC BLOOD PRESSURE: 102 MMHG | HEART RATE: 78 BPM | WEIGHT: 113.2 LBS | TEMPERATURE: 98.7 F | BODY MASS INDEX: 20.06 KG/M2 | DIASTOLIC BLOOD PRESSURE: 58 MMHG | HEIGHT: 63 IN | OXYGEN SATURATION: 88 %

## 2022-09-13 DIAGNOSIS — F33.0 MAJOR DEPRESSIVE DISORDER, RECURRENT EPISODE, MILD DEGREE: Chronic | ICD-10-CM

## 2022-09-13 DIAGNOSIS — I10 BENIGN ESSENTIAL HYPERTENSION: Chronic | ICD-10-CM

## 2022-09-13 DIAGNOSIS — D64.9 POSTOPERATIVE ANEMIA: ICD-10-CM

## 2022-09-13 DIAGNOSIS — E78.2 MIXED HYPERLIPIDEMIA: ICD-10-CM

## 2022-09-13 DIAGNOSIS — I65.21 STENOSIS OF RIGHT CAROTID ARTERY: Primary | ICD-10-CM

## 2022-09-13 DIAGNOSIS — S42.022A CLOSED DISPLACED FRACTURE OF SHAFT OF LEFT CLAVICLE, INITIAL ENCOUNTER: Chronic | ICD-10-CM

## 2022-09-13 PROCEDURE — 1111F DSCHRG MED/CURRENT MED MERGE: CPT | Performed by: INTERNAL MEDICINE

## 2022-09-13 PROCEDURE — 99495 TRANSJ CARE MGMT MOD F2F 14D: CPT | Performed by: INTERNAL MEDICINE

## 2022-09-13 RX ORDER — ARIPIPRAZOLE 2 MG/1
2 TABLET ORAL DAILY
Qty: 30 TABLET | Refills: 5 | Status: SHIPPED | OUTPATIENT
Start: 2022-09-13 | End: 2022-11-15

## 2022-09-13 NOTE — PROGRESS NOTES
Transitional Care Follow Up Visit  Subjective     Aarti Wade is a 67 y.o. female who presents for a transitional care management visit.    Within 48 business hours after discharge our office contacted her via telephone to coordinate her care and needs.      I reviewed and discussed the details of that call along with the discharge summary, hospital problems, inpatient lab results, inpatient diagnostic studies, and consultation reports with Aarti.     Current outpatient and discharge medications have been reconciled for the patient.  Reviewed by: Jannette Chapa MD      Date of TCM Phone Call 6/9/2020 3/5/2022 9/1/2022   Hospital [facility]   Date of Admission 6/8/2020 - 8/31/2022   Date of Discharge 6/9/2020 3/4/2022 9/1/2022   Discharge Disposition Home or Self Care Home or Self Care Home or Self Care     Risk for Readmission (LACE) Score: 11 (9/1/2022  6:01 AM)      History of Present Illness   Course During Hospital Stay: Patient was recently admitted at [facility] on 8/31/2022 and discharged home on September 1, 2022.  She underwent right carotid endarterectomy for right carotid artery stenosis.  Dr. Francisco Magana did the surgery. Complicated by postoperative anemia. Her hemoglobin was down to 8 g/dL.  Patient denies any shortness of breath over the usual.  She denies any lightheadedness or dizziness or weakness.    Status post right carotid endarterectomy  The patient presents today for a follow-up after a right carotid endarterectomy. She reports it is healing fine and denies neck pain.     Left clavicle fracture  The patient fractured her left clavicle on 07/22/2022. She was seen in the emergency room on 07/22/2022 after an accident while leading a horse. The patient was told that she had a closed fracture and was told to discontinue the sling and would need surgery after 2 weeks, but she declined. She has not seen Dr. Masoud Avitia since  09/07/2022 when he told her that it was not stable. The patient is wearing a brace to pull the shoulder back. She states the brace holds it more stable. The patient reports she really does not want to have surgery. She is taking aspirin 81 mg once per day. The patient is no longer taking hydrocodone. She states Tylenol does help with her shoulder and collarbone. She has been trying to sleep on her back. The patient reports the right clavicle, which she broke 30 years ago, still bothers her. She reports they discussed physical therapy, and she has that scheduled for 09/21/2022, but is worried it will worsen her symptoms.     Hyperlipidemia  The patient is taking rosuvastatin for her cholesterol.    Hypertension  The patient's blood pressure today is 102/58 mmHg. She states she has not been eating well. The patient has been getting dizzy lately. She does not check her blood pressure at home. The patient is taking amlodipine 5 mg, bisoprolol 10 mg, hydrochlorothiazide 12.5 mg, and olmesartan 40 mg. She denies any swelling if she is not on the diuretic. The patient denies any shortness of breath. She will discontinue hydrochlorothiazide to see if her blood pressure improves. The patient denies shortness of breath other than her normal.     Right great toenail  She reports she is losing her right great toenail. The patient states that the horse stepped on it approximately 50 times. The patient denies any pain.    Depression  The patient has been taking citalopram 40 mg since 2016 or 2017. She took 20 mg at first, but she could not feel it. The patient has been taking 40 mg for 3 to 4 years, but it seems like she is getting more depressed. She is willing to try the addition of Abilify 2 mg.        The following portions of the patient's history were reviewed and updated as appropriate: allergies, current medications, past family history, past medical history, past social history, past surgical history and problem  "list.    Vitals:    09/13/22 0908   BP: 102/58   BP Location: Right arm   Patient Position: Sitting   Cuff Size: Adult   Pulse: 78   Temp: 98.7 °F (37.1 °C)   TempSrc: Infrared   SpO2: (!) 88%   Weight: 51.3 kg (113 lb 3.2 oz)   Height: 160 cm (63\")   PainSc:   5   PainLoc: Shoulder     Body mass index is 20.05 kg/m².  BMI is within normal parameters. No other follow-up for BMI required.      Objective   Physical Exam  Vitals and nursing note reviewed.   Constitutional:       Appearance: She is well-developed.   HENT:      Head: Normocephalic.   Eyes:      Conjunctiva/sclera: Conjunctivae normal.      Pupils: Pupils are equal, round, and reactive to light.   Neck:      Thyroid: No thyromegaly.   Cardiovascular:      Rate and Rhythm: Normal rate and regular rhythm.      Heart sounds: Normal heart sounds.      Comments: Peripheral pulses intact.  Pulmonary:      Effort: Pulmonary effort is normal.      Breath sounds: Normal breath sounds.   Musculoskeletal:         General: Normal range of motion.      Cervical back: Normal range of motion and neck supple.      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 The patient has on a brace to pull the shoulder back.   Lymphadenopathy:      Cervical: No cervical adenopathy.   Skin:     Comments: Right carotid endarterectomy incision is healing well.   Neurological:      Mental Status: She is alert and oriented to person, place, and time.   Psychiatric:         Thought Content: Thought content normal.         Assessment & Plan     Patient Instructions   Problem List Items Addressed This Visit        Cardiac and Vasculature    Benign essential hypertension (Chronic)    Overview     Taking amlodipine, bisoprolol, hydrochlorothiazide, and olmesartan daily.               Current Assessment & Plan     Stop hydrochlorothiazide since the blood pressures are running lower lately. Continue amlodipine, bisoprolol, and Olmesartan.         Relevant Medications    " bisoprolol (ZEBeta) 10 MG tablet    olmesartan (BENICAR) 40 MG tablet    amLODIPine (NORVASC) 5 MG tablet    Mixed hyperlipidemia    Overview     Taking  rosuvastatin every evening.               Current Assessment & Plan     Continue rosuvastatin every evening. Continue low-fat diet.         Relevant Medications    rosuvastatin (CRESTOR) 40 MG tablet    Other Relevant Orders    Homocysteine    Carotid stenosis - Primary    Overview     S/p R carotid endarterectomy 8/31/2022 by Dr. Magana.         Current Assessment & Plan     Right carotid endarterectomy incision is healing well and she will follow up with Dr. Magana soon.            Hematology and Neoplasia    Postoperative anemia    Current Assessment & Plan     We will recheck blood counts, iron, B12, and folate levels today.         Relevant Medications    folic acid (FOLVITE) 1 MG tablet    vitamin B-12 (CYANOCOBALAMIN) 500 MCG tablet    Other Relevant Orders    Comprehensive Metabolic Panel    CBC & Differential    Ferritin    Iron Profile    Vitamin B12       Mental Health    Major depressive disorder, recurrent episode, mild degree (HCC) (Chronic)    Overview     Taking citalopram 40 mg daily.             Current Assessment & Plan     She will continue 40 mg of citalopram daily. Add 2 mg Abilify (aripiprazole) tablet daily to enhance its effects.         Relevant Medications    citalopram (CeleXA) 40 MG tablet    ARIPiprazole (ABILIFY) 2 MG tablet       Musculoskeletal and Injuries    Closed displaced fracture of shaft of left clavicle (Chronic)    Overview     L clavicle fracture in accident while leading 2 horses 7/23/2022.  She has seen Dr. Avitia.  She saw him for follow up 9/7/2022 and his note states that the fracture is not healing.         Current Assessment & Plan     I encouraged her to see Dr. Avitia again and to consider surgery on the clavicle. 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 The patient was  reminded that a healthy diet with protein 3 times a day will certainly help with healing, as well as preventing recurrent stenosis of the carotid arteries and the peripheral arteries in the legs.                  Diagnosis Plan   1. Stenosis of right carotid artery     2. Closed displaced fracture of shaft of left clavicle, initial encounter     3. Benign essential hypertension     4. Postoperative anemia  Comprehensive Metabolic Panel    CBC & Differential    Ferritin    Iron Profile    Vitamin B12   5. Major depressive disorder, recurrent episode, mild degree (HCC)     6. Mixed hyperlipidemia  Homocysteine     Follow-up: She will return 11/15/2022 for her physical.            Transcribed from ambient dictation for Jannette Chapa MD by Maude Carvalho.  09/13/22   11:09 EDT    Patient verbalized consent to the visit recording.

## 2022-09-13 NOTE — ASSESSMENT & PLAN NOTE
Right carotid endarterectomy incision is healing well and she will follow up with Dr. Magana soon.

## 2022-09-13 NOTE — ASSESSMENT & PLAN NOTE
I encouraged her to see Dr. Avitia again and to consider surgery on the clavicle. 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 The patient was reminded that a healthy diet with protein 3 times a day will certainly help with healing, as well as preventing recurrent stenosis of the carotid arteries and the peripheral arteries in the legs.

## 2022-09-13 NOTE — ASSESSMENT & PLAN NOTE
Stop hydrochlorothiazide since the blood pressures are running lower lately. Continue amlodipine, bisoprolol, and Olmesartan.

## 2022-09-13 NOTE — ASSESSMENT & PLAN NOTE
She will continue 40 mg of citalopram daily. Add 2 mg Abilify (aripiprazole) tablet daily to enhance its effects.

## 2022-09-14 ENCOUNTER — TELEPHONE (OUTPATIENT)
Dept: INTERNAL MEDICINE | Facility: CLINIC | Age: 67
End: 2022-09-14

## 2022-09-14 ENCOUNTER — LAB (OUTPATIENT)
Dept: LAB | Facility: HOSPITAL | Age: 67
End: 2022-09-14

## 2022-09-14 DIAGNOSIS — M25.511 CHRONIC RIGHT SHOULDER PAIN: Primary | ICD-10-CM

## 2022-09-14 DIAGNOSIS — G89.29 CHRONIC RIGHT SHOULDER PAIN: Primary | ICD-10-CM

## 2022-09-14 DIAGNOSIS — D64.9 POSTOPERATIVE ANEMIA: ICD-10-CM

## 2022-09-14 DIAGNOSIS — E78.2 MIXED HYPERLIPIDEMIA: ICD-10-CM

## 2022-09-14 NOTE — PATIENT INSTRUCTIONS
Patient Instructions   Problem List Items Addressed This Visit          Cardiac and Vasculature    Benign essential hypertension (Chronic)    Overview     Taking amlodipine, bisoprolol, hydrochlorothiazide, and olmesartan daily.               Current Assessment & Plan     Stop hydrochlorothiazide since the blood pressures are running lower lately. Continue amlodipine, bisoprolol, and Olmesartan.         Relevant Medications    bisoprolol (ZEBeta) 10 MG tablet    olmesartan (BENICAR) 40 MG tablet    amLODIPine (NORVASC) 5 MG tablet    Mixed hyperlipidemia    Overview     Taking  rosuvastatin every evening.               Current Assessment & Plan     Continue rosuvastatin every evening. Continue low-fat diet.         Relevant Medications    rosuvastatin (CRESTOR) 40 MG tablet    Other Relevant Orders    Homocysteine    Carotid stenosis - Primary    Overview     S/p R carotid endarterectomy 8/31/2022 by Dr. Magana.         Current Assessment & Plan     Right carotid endarterectomy incision is healing well and she will follow up with Dr. Magana soon.            Hematology and Neoplasia    Postoperative anemia    Current Assessment & Plan     We will recheck blood counts, iron, B12, and folate levels today.         Relevant Medications    folic acid (FOLVITE) 1 MG tablet    vitamin B-12 (CYANOCOBALAMIN) 500 MCG tablet    Other Relevant Orders    Comprehensive Metabolic Panel    CBC & Differential    Ferritin    Iron Profile    Vitamin B12       Mental Health    Major depressive disorder, recurrent episode, mild degree (HCC) (Chronic)    Overview     Taking citalopram 40 mg daily.             Current Assessment & Plan     She will continue 40 mg of citalopram daily. Add 2 mg Abilify (aripiprazole) tablet daily to enhance its effects.         Relevant Medications    citalopram (CeleXA) 40 MG tablet    ARIPiprazole (ABILIFY) 2 MG tablet       Musculoskeletal and Injuries    Closed displaced fracture of shaft of left clavicle  (Chronic)    Overview     L clavicle fracture in accident while leading 2 horses 7/23/2022.  She has seen Dr. Avitia.  She saw him for follow up 9/7/2022 and his note states that the fracture is not healing.         Current Assessment & Plan     I encouraged her to see Dr. Avitia again and to consider surgery on the clavicle. 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 The patient was reminded that a healthy diet with protein 3 times a day will certainly help with healing, as well as preventing recurrent stenosis of the carotid arteries and the peripheral arteries in the legs.

## 2022-09-14 NOTE — TELEPHONE ENCOUNTER
Caller: Aarti Wade    Relationship: Self    Best call back number: [phone]     What is the medical concern/diagnosis: SHOULDER    What specialty or service is being requested: SURGEON     What is the provider, practice or medical service name: DR VINES    What is the office location[facility]    Any additional details: PATIENT IS ABLE TO GET AN APPOINTMENT TO SEE HIM ON 9-20-22

## 2022-09-15 LAB
ALBUMIN SERPL-MCNC: 4.7 G/DL (ref 3.5–5.2)
ALBUMIN/GLOB SERPL: 2 G/DL
ALP SERPL-CCNC: 71 U/L (ref 39–117)
ALT SERPL-CCNC: 15 U/L (ref 1–33)
AST SERPL-CCNC: 21 U/L (ref 1–32)
BASOPHILS # BLD AUTO: 0.04 10*3/MM3 (ref 0–0.2)
BASOPHILS NFR BLD AUTO: 0.5 % (ref 0–1.5)
BILIRUB SERPL-MCNC: 0.2 MG/DL (ref 0–1.2)
BUN SERPL-MCNC: 40 MG/DL (ref 8–23)
BUN/CREAT SERPL: 17.6 (ref 7–25)
CALCIUM SERPL-MCNC: 9 MG/DL (ref 8.6–10.5)
CHLORIDE SERPL-SCNC: 98 MMOL/L (ref 98–107)
CO2 SERPL-SCNC: 21 MMOL/L (ref 22–29)
CREAT SERPL-MCNC: 2.27 MG/DL (ref 0.57–1)
EGFRCR-CYS SERPLBLD CKD-EPI 2021: 22.8 ML/MIN/1.73
EOSINOPHIL # BLD AUTO: 0.38 10*3/MM3 (ref 0–0.4)
EOSINOPHIL NFR BLD AUTO: 5.1 % (ref 0.3–6.2)
ERYTHROCYTE [DISTWIDTH] IN BLOOD BY AUTOMATED COUNT: 12.2 % (ref 12.3–15.4)
FERRITIN SERPL-MCNC: 571 NG/ML (ref 13–150)
GLOBULIN SER CALC-MCNC: 2.3 GM/DL
GLUCOSE SERPL-MCNC: 101 MG/DL (ref 65–99)
HCT VFR BLD AUTO: 33.1 % (ref 34–46.6)
HCYS SERPL-SCNC: 26.4 UMOL/L (ref 0–17.2)
HGB BLD-MCNC: 10.4 G/DL (ref 12–15.9)
IMM GRANULOCYTES # BLD AUTO: 0.05 10*3/MM3 (ref 0–0.05)
IMM GRANULOCYTES NFR BLD AUTO: 0.7 % (ref 0–0.5)
IRON SATN MFR SERPL: 23 % (ref 20–50)
IRON SERPL-MCNC: 76 MCG/DL (ref 37–145)
LYMPHOCYTES # BLD AUTO: 1.01 10*3/MM3 (ref 0.7–3.1)
LYMPHOCYTES NFR BLD AUTO: 13.5 % (ref 19.6–45.3)
MCH RBC QN AUTO: 30.2 PG (ref 26.6–33)
MCHC RBC AUTO-ENTMCNC: 31.4 G/DL (ref 31.5–35.7)
MCV RBC AUTO: 96.2 FL (ref 79–97)
MONOCYTES # BLD AUTO: 0.93 10*3/MM3 (ref 0.1–0.9)
MONOCYTES NFR BLD AUTO: 12.4 % (ref 5–12)
NEUTROPHILS # BLD AUTO: 5.07 10*3/MM3 (ref 1.7–7)
NEUTROPHILS NFR BLD AUTO: 67.8 % (ref 42.7–76)
NRBC BLD AUTO-RTO: 0 /100 WBC (ref 0–0.2)
PLATELET # BLD AUTO: 386 10*3/MM3 (ref 140–450)
POTASSIUM SERPL-SCNC: 5.7 MMOL/L (ref 3.5–5.2)
PROT SERPL-MCNC: 7 G/DL (ref 6–8.5)
RBC # BLD AUTO: 3.44 10*6/MM3 (ref 3.77–5.28)
SODIUM SERPL-SCNC: 135 MMOL/L (ref 136–145)
TIBC SERPL-MCNC: 329 MCG/DL
UIBC SERPL-MCNC: 253 MCG/DL (ref 112–346)
VIT B12 SERPL-MCNC: 1004 PG/ML (ref 211–946)
WBC # BLD AUTO: 7.48 10*3/MM3 (ref 3.4–10.8)

## 2022-09-16 ENCOUNTER — TELEPHONE (OUTPATIENT)
Dept: INTERNAL MEDICINE | Facility: CLINIC | Age: 67
End: 2022-09-16

## 2022-09-16 DIAGNOSIS — N28.9 ACUTE RENAL INSUFFICIENCY: Primary | ICD-10-CM

## 2022-09-16 RX ORDER — FOLIC ACID 1 MG/1
2000 TABLET ORAL DAILY
Qty: 180 TABLET | Refills: 3 | Status: SHIPPED | OUTPATIENT
Start: 2022-09-16 | End: 2022-12-14 | Stop reason: SDUPTHER

## 2022-09-16 NOTE — TELEPHONE ENCOUNTER
Called pt but was unable to LM.    ----- Message from Jannette Chapa MD sent at 9/16/2022  8:21 AM EDT -----  Please call patient to go over these findings.  Make her an appointment to see APC or me on Wednesday or so next week.  Advised her to go to the lab the day before the appointment to recheck kidney function.      Homocystine is high showing that your folate level is low.  Take 2 of your folic acid  tablets daily.    Iron and B12 are acceptable.    Anemia is better than it was after surgery.    Kidney function is acutely much worse than usual.  Increase your fluid intake to 10 glasses of water per day.  Avoid potassium containing foods.  No Gatorade or electrolyte drinks.  No NSAIDs (ibuprofen, Motrin, Advil, Aleve, naproxen).  Leave off the olmesartan until we recheck your labs next week.  Check your blood pressures at home about once a day and write down the values to bring to your appointment next week.

## 2022-09-16 NOTE — PROGRESS NOTES
Subjective:     Encounter Date:09/21/2022      Patient ID: Aarti Wade is a 67 y.o. single white female, from [place], [place], recently retired and she has her Masters in Social Work and worked for [employer] (works with Alcoholics Anonymous), and now she raises/sells race horses.      INTERNIST: Jannette Chapa MD  VASCULAR SURGEON: Severino Carbajal MD, Francisco Magana MD  UROLOGIST: Unknown (near [facility]?)  REMOTE ORTHOPEDIST: Lance Burgess MD  NEPHROLOGIST: Unknown  ORTHOPEDIC SURGEON: Ketan Lazcano MD .    Chief Complaint:   Chief Complaint   Patient presents with   • Peripheral arterial disease     6 month follow up        Problem List:  1. Peripheral arterial disease:  a. Subclavian steal syndrome, 2016   b. Abnormal right femoral arterial duplex and popliteal tibial arterial duplex, on ASA and Plavix, with right femoral endarterectomy August 2016 (Dr. Carbajal)  c. Residual class I claudication without recent TIA/presyncope  d. Carotid duplex 11/18/2019: LICA 0-49% with retrograde left vertebral artery flow demonstrated, R ICA 50-69%, proximal LICA plaque without significant stenosis  e. Renal artery duplex 12/11/2019: Parenchymal disease in bilateral arteries, no JAIMEE  f. Left atherectomy, balloon angioplasty 1/7/2020: Common femoral arteries patent with moderate stenosis.  The common iliac, external iliac and internal leg arteries patent without evidence of stenosis.  Mid common femoral artery exhibits an occlusive lesion.  The origin of the profundus femoris artery appears to be occluded.  The SFA is reconstituted at its origin via collateral vessels.  The SFA, popliteal artery, and trifurcation are all patent.  Dominant flow to the foot is via the posterior tibial artery with a patent peroneal and anterior tibial artery into the distal leg.  Left common femoral artery-SFA atherectomy with balloon angioplasty.  After atherectomy and drug-coated balloon angioplasty, the common femoral  artery and SFA as well as the profunda femoris artery were patent without evidence of hemodynamically significant stenosis  g. ABIs October 2020 unchanged from previous study February 2020, right 0.74, left 0.87  h. Carotid duplex 7/11/2022: Proximal L ICA plaque without significant stenosis with subsequent 50 to 69% mid segment stenosis beyond previous remote endarterectomy site.  R ICA stenosis greater than 70%.  Normal right vertebral artery flow with retrograde left vertebral artery flow suggestive of left subclavian artery steal syndrome.  Progressive R ICA stenosis noted since November 2019 study  i. Right carotid endarterectomy September 2022  j. Residual class I claudication, January 2020, July 2020, August 2021, September 2022  2. Hypertensive cardiovascular disease:  a. Remote IV stress test over 10 years ago; negative per patient-data deficit  b. Residual class I symptoms/normal EKG with acceptable IV Lexiscan Cardiolite study suggestive of low probability for significant focal obstructive coronary artery disease  (LVEF 0.65), May 2017.  c. Echocardiogram 11/18/2019: Mild to moderate AR, mild TR, LA mildly dilated, LVEF 68%, mild calcification of the aortic valve mainly affecting the non-and left coronary cusps, LV diastolic function normal, normal RV cavity size, wall thickness, systolic function and septal motion noted.  Mild MAC is present.  Aortic valve exhibits moderate sclerosis without stenosis, no pulmonary hypertension, no pericardial effusion  d. Residual class I symptoms, January 2020, July 2020, January 2021, August 2021, September 2022  3. Hypertension  4. Hyperlipidemia; 10.8% 10-year risk; 3.6% with treatment; on statin therapy  5. COPD with mild-moderate exertional dyspnea  6. Former smoker; smoked 1 ppd x 40 years, quit in 2016  7. Syncopal episodes years ago associated with UTIs; last one a couple of years ago; data deficit  8. Depression  9. GERD  10. History of recurrent UTIs   11.  "Chronic low back pain  12. GABBY/CKD  13. Dizziness and presyncope January 2020  14. Breakthrough COVID infection, August 2021.  15. Surgical history:  a. Right femoral endarterectomy, August 2016  b. Lens implant, 1990s  c. Spontaneous pneumothorax, 1960  d. Left atherectomy and balloon angioplasty  e. Left total shoulder arthroplasty, June 2020  f. Right carotid endarterectomy September 2022    Allergies   Allergen Reactions   • Levocetirizine Dihydrochloride Myalgia     Muscle aches/joint pain       Current Outpatient Medications   Medication Instructions   • Acetaminophen 8 Hour 650 mg, Oral, 2 times daily   • amLODIPine (NORVASC) 5 mg, Oral, Daily   • ARIPiprazole (ABILIFY) 2 mg, Oral, Daily   • ascorbic acid (VITAMIN C) 1,000 mg, Oral, Daily   • aspirin 81 mg, Oral, Every Morning, TOLD NOT TO STOP BEFORE SURGERY   • bisoprolol (ZEBETA) 10 mg, Oral, Daily   • Breo Ellipta 200-25 MCG/INH inhaler INHALE 1 PUFF BY MOUTH DAILY   • Calcium Citrate-Vitamin D (CALCIUM + D PO) 2 tablets, Oral, Daily   • citalopram (CELEXA) 40 mg, Oral, Daily   • clopidogrel (PLAVIX) 75 mg, Oral, Daily   • Coenzyme Q10 400 mg, Oral, Daily   • Fish Oil oil 1 capsule, Oral, Daily   • Flaxseed, Linseed, (FLAXSEED OIL) 1000 MG capsule 1 capsule, Oral, Daily   • fluticasone (FLONASE) 50 MCG/ACT nasal spray SHAKE LIQUID AND USE 1 SPRAY IN EACH NOSTRIL TWICE DAILY   • folic acid (FOLVITE) 2,000 mcg, Oral, Daily   • gabapentin (NEURONTIN) 300 mg, Oral, Every Evening   • Methylsulfonylmethane (MSM PO) 2 tablets, Oral, Daily   • Multiple Vitamins-Minerals (MULTIVITAMIN ADULT PO) 1 tablet, Oral, Daily   • omeprazole (PRILOSEC) 20 mg, Oral, Daily   • rosuvastatin (CRESTOR) 40 mg, Oral, Daily   • traMADol (ULTRAM) 50 MG tablet TAKE 1 TABLET BY MOUTH TWICE DAILY   • URINARY HEALTH/CRANBERRY PO 1 tablet, Oral, 2 Times Daily, \"CRANACTIN\"    • vitamin B-12 (CYANOCOBALAMIN) 500 MCG tablet TAKE 1 TABLET BY MOUTH DAILY         HISTORY OF PRESENT " "ILLNESS:  The patient is here for a 7-month follow-up and last visit was a telephone visit.  At her last appointment we increased her rosuvastatin to 40 mg daily.  In the interim she had a right carotid endarterectomy September 2022.  She has an upcoming appointment with Dr. Magana late September 2022.  The patient was recently taken off of her hydrochlorothiazide and her olmesartan due to worsening renal function.  The patient has a blood pressure monitor at home but she does not often use it.  She had a broken left clavicle earlier this summer and is still recovering from her right carotid endarterectomy.  She was having some slight dizziness but she cannot recall whether this was before or after her carotid endarterectomy.  She feels like she was not eating as well over the summer and that may have been contributing as well.  She denies any palpitations, chest pain, or increased shortness of breath.  She has not had any syncopal episodes. She will let me know if she continues to have dizzy spells and will start monitoring her blood pressure at home and call back in 1-2 weeks with blood pressure readings.     ROS   All other systems reviewed and otherwise negative.    Procedures       Objective:       Vitals:    09/21/22 1348   BP: 158/58   Pulse: 62   Resp: 16   SpO2: 95%   Weight: 52.2 kg (115 lb)   Height: 154.9 cm (61\")   Recheck blood pressure right arm sitting was 160/60  Body mass index is 21.73 kg/m².  Last weight February 2022 was 121 pounds  Constitutional:       Appearance: Healthy appearance. Not in distress.   Neck:      Vascular: No JVR. JVD normal.        Comments: Right carotid endarterectomy CDI  Pulmonary:      Effort: Pulmonary effort is normal.      Breath sounds: Normal breath sounds. No wheezing. No rhonchi. No rales.   Chest:      Chest wall: Not tender to palpatation.   Cardiovascular:      PMI at left midclavicular line. Normal rate. Regular rhythm. Normal S1. Normal S2.      Murmurs: There " is a grade 2/6 systolic murmur at the LLSB.      No gallop. No click. No rub.   Pulses:     Dorsalis pedis: 1+ bilaterally.     Posterior tibial: 1+ bilaterally.  Edema:     Peripheral edema absent.   Abdominal:      General: Bowel sounds are normal.      Palpations: Abdomen is soft.      Tenderness: There is no abdominal tenderness.   Musculoskeletal: Normal range of motion.         General: No tenderness. Skin:     General: Skin is warm and dry.   Neurological:      General: No focal deficit present.      Mental Status: Alert and oriented to person, place and time.           Lab Review:   Lab Results   Component Value Date    GLUCOSE 101 (H) 09/14/2022    BUN 40 (H) 09/14/2022    CREATININE 2.27 (H) 09/14/2022    EGFRIFNONA 59 (L) 10/06/2021    EGFRIFAFRI 72 10/06/2021    BCR 17.6 09/14/2022    CO2 21.0 (L) 09/14/2022    CALCIUM 9.0 09/14/2022    PROTENTOTREF 7.0 09/14/2022    ALBUMIN 4.70 09/14/2022    LABIL2 2.0 09/14/2022    AST 21 09/14/2022    ALT 15 09/14/2022       Lab Results   Component Value Date    WBC 7.48 09/14/2022    HGB 10.4 (L) 09/14/2022    HCT 33.1 (L) 09/14/2022    MCV 96.2 09/14/2022     09/14/2022       Lab Results   Component Value Date    HGBA1C 4.90 08/26/2022       Lab Results   Component Value Date    TSH 3.300 10/06/2021       Lab Results   Component Value Date    CHOL 136 10/09/2020    CHOL 118 01/31/2020     Lab Results   Component Value Date    TRIG 144 10/06/2021    TRIG 67 10/09/2020     Lab Results   Component Value Date    HDL 59 10/06/2021    HDL 68 (H) 10/09/2020     Lab Results   Component Value Date    LDL 93 10/06/2021    LDL 54 10/09/2020            Assessment:     Overall continued acceptable course with no new interim cardiopulmonary complaints with acceptable functional status. We will defer additional diagnostic or therapeutic intervention from a cardiac perspective at this time.  She is now status post right carotid endarterectomy September 2022.  She will  follow-up with Dr. Magana later this month. She had worsening renal   function and was recently discontinued off of olmesartan and hydrochlorothiazide.  I will increase the patient's amlodipine to 10 mg daily.  She will monitor her blood pressure at home and call back in 1 week with blood pressure readings.  I may need to add hydralazine if her blood pressure remains uncontrolled. If she has recurrent dizziness, I may consider meclizine +/- E patch.     Diagnosis Plan   1. Atherosclerosis of native artery of both lower extremities with intermittent claudication (HCC)  Patient is now status post right carotid endarterectomy September 2022.  She will follow-up with Dr. Magana later this month.     2. Benign essential hypertension  Uncontrolled,She had worsening renal   function and was recently discontinued off of olmesartan and hydrochlorothiazide.  I will increase the patient's amlodipine to 10 mg daily.  She will monitor her blood pressure at home and call back in 1 week with blood pressure readings.  I may need to add hydralazine if her blood pressure remains uncontrolled.     3. Dyslipidemia  Acceptable lipid panel October 2021, continue rosuvastatin          Plan:         1. Patient to continue current medications and close follow up with the above providers.  2. Tentative cardiology follow up in February 2023 or patient may return sooner PRN.  3. Increase amlodipine to 10 mg daily  4. Patient to call back in 1 week with blood pressure readings; may need to add hydralazine  5. Defer ACEI/ARB/diuretics currently and recommend repeat laboratory testing with her PCP; may need nephrology consultation      Electronically signed by CYRUS Mcwilliams, 09/21/22, 4:27 PM EDT.

## 2022-09-20 ENCOUNTER — TELEPHONE (OUTPATIENT)
Dept: INTERNAL MEDICINE | Facility: CLINIC | Age: 67
End: 2022-09-20

## 2022-09-20 NOTE — TELEPHONE ENCOUNTER
Caller: Jamaal Wade    Relationship: Self    Best call back number: [phone] (H)    What is the medical concern/diagnosis:  LEFT COLLAR BONE BROKE 6 WEEKS AGO.    What specialty or service is being requested: ORTHODEPIST    What is the provider, practice or medical service name: Ketan Lazcano MD [[id number]]    What is the office location: [facility]     What is the office phone number: [phone]       JAMAAL SAYS THE REFERRAL HASN'T BEEN SENT OVER YET PER DR LAZCANO'S OFFICE

## 2022-09-21 ENCOUNTER — LAB (OUTPATIENT)
Dept: LAB | Facility: HOSPITAL | Age: 67
End: 2022-09-21

## 2022-09-21 ENCOUNTER — OFFICE VISIT (OUTPATIENT)
Dept: CARDIOLOGY | Facility: CLINIC | Age: 67
End: 2022-09-21

## 2022-09-21 VITALS
SYSTOLIC BLOOD PRESSURE: 158 MMHG | RESPIRATION RATE: 16 BRPM | OXYGEN SATURATION: 95 % | WEIGHT: 115 LBS | HEART RATE: 62 BPM | HEIGHT: 61 IN | BODY MASS INDEX: 21.71 KG/M2 | DIASTOLIC BLOOD PRESSURE: 58 MMHG

## 2022-09-21 DIAGNOSIS — E78.5 DYSLIPIDEMIA: Chronic | ICD-10-CM

## 2022-09-21 DIAGNOSIS — M19.111 POST-TRAUMATIC OSTEOARTHRITIS OF RIGHT SHOULDER: ICD-10-CM

## 2022-09-21 DIAGNOSIS — I10 BENIGN ESSENTIAL HYPERTENSION: Chronic | ICD-10-CM

## 2022-09-21 DIAGNOSIS — N28.9 ACUTE RENAL INSUFFICIENCY: ICD-10-CM

## 2022-09-21 DIAGNOSIS — I70.213 ATHEROSCLEROSIS OF NATIVE ARTERY OF BOTH LOWER EXTREMITIES WITH INTERMITTENT CLAUDICATION: Primary | Chronic | ICD-10-CM

## 2022-09-21 PROCEDURE — 81001 URINALYSIS AUTO W/SCOPE: CPT

## 2022-09-21 PROCEDURE — 80048 BASIC METABOLIC PNL TOTAL CA: CPT

## 2022-09-21 PROCEDURE — 36415 COLL VENOUS BLD VENIPUNCTURE: CPT

## 2022-09-21 PROCEDURE — 99214 OFFICE O/P EST MOD 30 MIN: CPT | Performed by: NURSE PRACTITIONER

## 2022-09-21 PROCEDURE — 82610 CYSTATIN C: CPT

## 2022-09-21 RX ORDER — TRAMADOL HYDROCHLORIDE 50 MG/1
TABLET ORAL
Qty: 180 TABLET | Refills: 2 | Status: SHIPPED | OUTPATIENT
Start: 2022-09-21 | End: 2023-04-04

## 2022-09-21 RX ORDER — AMLODIPINE BESYLATE 10 MG/1
10 TABLET ORAL DAILY
Qty: 90 TABLET | Refills: 3 | Status: SHIPPED | OUTPATIENT
Start: 2022-09-21

## 2022-09-21 NOTE — TELEPHONE ENCOUNTER
Rx Refill Note  Requested Prescriptions     Pending Prescriptions Disp Refills   • traMADol (ULTRAM) 50 MG tablet [Pharmacy Med Name: TRAMADOL 50MG TABLETS] 180 tablet      Sig: TAKE 1 TABLET BY MOUTH TWICE DAILY      Last refill:  3/23/22 #180/2  Last office visit with prescribing clinician: 9/13/2022      Next office visit with prescribing clinician: 9/22/2022            Pinky Ramírez RN  09/21/22, 10:48 EDT

## 2022-09-22 ENCOUNTER — OFFICE VISIT (OUTPATIENT)
Dept: INTERNAL MEDICINE | Facility: CLINIC | Age: 67
End: 2022-09-22

## 2022-09-22 VITALS
DIASTOLIC BLOOD PRESSURE: 70 MMHG | SYSTOLIC BLOOD PRESSURE: 148 MMHG | HEART RATE: 76 BPM | HEIGHT: 61 IN | TEMPERATURE: 96.6 F | OXYGEN SATURATION: 98 % | WEIGHT: 118.2 LBS | BODY MASS INDEX: 22.31 KG/M2

## 2022-09-22 DIAGNOSIS — I65.23 BILATERAL CAROTID ARTERY STENOSIS: Primary | Chronic | ICD-10-CM

## 2022-09-22 DIAGNOSIS — S42.022G CLOSED DISPLACED FRACTURE OF SHAFT OF LEFT CLAVICLE WITH DELAYED HEALING, SUBSEQUENT ENCOUNTER: Chronic | ICD-10-CM

## 2022-09-22 DIAGNOSIS — E72.11 HYPERHOMOCYSTEINEMIA: ICD-10-CM

## 2022-09-22 DIAGNOSIS — I70.213 ATHEROSCLEROSIS OF NATIVE ARTERY OF BOTH LOWER EXTREMITIES WITH INTERMITTENT CLAUDICATION: Chronic | ICD-10-CM

## 2022-09-22 DIAGNOSIS — Z23 NEED FOR IMMUNIZATION AGAINST INFLUENZA: ICD-10-CM

## 2022-09-22 DIAGNOSIS — N18.31 STAGE 3A CHRONIC KIDNEY DISEASE: Chronic | ICD-10-CM

## 2022-09-22 DIAGNOSIS — I10 BENIGN ESSENTIAL HYPERTENSION: Chronic | ICD-10-CM

## 2022-09-22 DIAGNOSIS — D64.9 POSTOPERATIVE ANEMIA: ICD-10-CM

## 2022-09-22 LAB
ANION GAP SERPL CALCULATED.3IONS-SCNC: 14 MMOL/L (ref 5–15)
BACTERIA UR QL AUTO: NORMAL /HPF
BILIRUB UR QL STRIP: NEGATIVE
BUN SERPL-MCNC: 24 MG/DL (ref 8–23)
BUN/CREAT SERPL: 22 (ref 7–25)
CALCIUM SPEC-SCNC: 8.9 MG/DL (ref 8.6–10.5)
CHLORIDE SERPL-SCNC: 102 MMOL/L (ref 98–107)
CLARITY UR: CLEAR
CO2 SERPL-SCNC: 21 MMOL/L (ref 22–29)
COLOR UR: YELLOW
CREAT SERPL-MCNC: 1.09 MG/DL (ref 0.57–1)
EGFRCR SERPLBLD CKD-EPI 2021: 55.1 ML/MIN/1.73
GLUCOSE SERPL-MCNC: 76 MG/DL (ref 65–99)
GLUCOSE UR STRIP-MCNC: NEGATIVE MG/DL
HGB UR QL STRIP.AUTO: NEGATIVE
HYALINE CASTS UR QL AUTO: NORMAL /LPF
KETONES UR QL STRIP: NEGATIVE
LEUKOCYTE ESTERASE UR QL STRIP.AUTO: ABNORMAL
NITRITE UR QL STRIP: NEGATIVE
PH UR STRIP.AUTO: 6 [PH] (ref 5–8)
POTASSIUM SERPL-SCNC: 5.2 MMOL/L (ref 3.5–5.2)
PROT UR QL STRIP: ABNORMAL
RBC # UR STRIP: NORMAL /HPF
REF LAB TEST METHOD: NORMAL
SODIUM SERPL-SCNC: 137 MMOL/L (ref 136–145)
SP GR UR STRIP: 1.01 (ref 1–1.03)
SQUAMOUS #/AREA URNS HPF: NORMAL /HPF
UROBILINOGEN UR QL STRIP: ABNORMAL
WBC # UR STRIP: NORMAL /HPF

## 2022-09-22 PROCEDURE — 99214 OFFICE O/P EST MOD 30 MIN: CPT | Performed by: INTERNAL MEDICINE

## 2022-09-22 PROCEDURE — G0008 ADMIN INFLUENZA VIRUS VAC: HCPCS | Performed by: INTERNAL MEDICINE

## 2022-09-22 PROCEDURE — 90662 IIV NO PRSV INCREASED AG IM: CPT | Performed by: INTERNAL MEDICINE

## 2022-09-22 NOTE — ASSESSMENT & PLAN NOTE
- Creatinine and GFR are significantly improved over the last week with increasing fluid intake and stopping the diuretic and ARB.  - She does avoid NSAIDs.

## 2022-09-22 NOTE — PATIENT INSTRUCTIONS
Patient Instructions   Problem List Items Addressed This Visit          Cardiac and Vasculature    Atherosclerosis of native artery of both lower extremities with intermittent claudication (HCC) (Chronic)    Overview     1/19/2022 Jannette Chapa MD    Patient is status post left lower extremity atherectomy and balloon angioplasty by Dr. Carbajal on 1/7/2020.    Continue daily Plavix and aspirin.  Continue rosuvastatin nightly.    Continue to improve low-fat low sugar diet and get plenty of exercise.         Current Assessment & Plan     - Continue Plavix, aspirin, and nightly statin.  - Continue low-fat, healthy diet.  - Continue walking and exercise.         Benign essential hypertension (Chronic)    Overview     Taking amlodipine, bisoprolol.  Amlodipine increased to 10 mg daily on 9/21/2022.         Current Assessment & Plan     - She started taking 10 mg of amlodipine daily this morning.  - The patient will continue bisoprolol 10 mg daily as well.  - She is off ARB and diuretic secondary to renal insufficiency.  - The patient will continue to monitor blood pressures at home.         Relevant Medications    bisoprolol (ZEBeta) 10 MG tablet    amLODIPine (NORVASC) 10 MG tablet    Bilateral carotid artery stenosis - Primary (Chronic)    Overview     Status post 3/3/2022  left carotid endarterectomy  performed by Dr. Magana.    S/p R carotid endarterectomy 8/31/2022 by Dr. Magana.         Current Assessment & Plan     - Right carotid endarterectomy incision is healing well.  - She will follow up with Dr. Magana soon.  - Continue rosuvastatin nightly, Plavix daily, and 81 mg aspirin daily.            Endocrine and Metabolic    Hyperhomocysteinemia (HCC)    Overview     Patient is not taking L-methylfolate secondary to high cost.    Folic acid was increased to 2 mg daily on 9/14/2022.         Current Assessment & Plan     - Folic acid increase to 2 tablets daily 09/2023.  - She will continue that dose.             Genitourinary and Reproductive     Stage III CKD (Chronic)    Overview                Current Assessment & Plan     - Creatinine and GFR are significantly improved over the last week with increasing fluid intake and stopping the diuretic and ARB.  - She does avoid NSAIDs.            Hematology and Neoplasia    Postoperative anemia    Current Assessment & Plan     - Improving on labs last week.         Relevant Medications    vitamin B-12 (CYANOCOBALAMIN) 500 MCG tablet    folic acid (FOLVITE) 1 MG tablet       Musculoskeletal and Injuries    Closed displaced fracture of shaft of left clavicle (Chronic)    Overview     L clavicle fracture in accident while leading 2 horses 7/23/2022.  She has seen Dr. Avitia.  She saw him for follow up 9/7/2022 and his note states that the fracture is not healing.  Patient saw Dr. Lazcano last week and he noted very minimal healing.  He did tell her she could continue working and wear the brace on the upper torso.  He will follow her closely to monitor healing.         Current Assessment & Plan     - She saw Dr. Lazcano last week, and he says there is some mild healing going on.  - He recommended that she continue wearing the brace on her upper torso and be very careful at work.  - She will be returning to see him again soon for follow-up.               Other Visit Diagnoses       Need for immunization against influenza        Relevant Orders    Fluzone High-Dose 65+yrs ([id number]) (Completed)

## 2022-09-22 NOTE — ASSESSMENT & PLAN NOTE
- Continue Plavix, aspirin, and nightly statin.  - Continue low-fat, healthy diet.  - Continue walking and exercise.

## 2022-09-22 NOTE — ASSESSMENT & PLAN NOTE
- Right carotid endarterectomy incision is healing well.  - She will follow up with Dr. Magana soon.  - Continue rosuvastatin nightly, Plavix daily, and 81 mg aspirin daily.

## 2022-09-22 NOTE — ASSESSMENT & PLAN NOTE
- She started taking 10 mg of amlodipine daily this morning.  - The patient will continue bisoprolol 10 mg daily as well.  - She is off ARB and diuretic secondary to renal insufficiency.  - The patient will continue to monitor blood pressures at home.

## 2022-09-22 NOTE — ASSESSMENT & PLAN NOTE
- She saw Dr. Lazcano last week, and he says there is some mild healing going on.  - He recommended that she continue wearing the brace on her upper torso and be very careful at work.  - She will be returning to see him again soon for follow-up.

## 2022-09-22 NOTE — PROGRESS NOTES
[facility]     Aarti Wade  1955   [id number]      Patient Care Team:  Jannette Chapa MD as PCP - General  Jannette Chapa MD as PCP - Family Medicine  Severino Carbajal MD as Consulting Physician (Vascular Surgery)  Ketan Lazcano MD as Consulting Physician (Orthopedic Surgery)  Floyd Ernandez MD as Consulting Physician (Cardiology)  Lorenzo Good MD as Consulting Physician (Ophthalmology)    Chief Complaint   Patient presents with   • Kidney function     recheck            HPI  Patient is a 67 y.o. female presents today for follow-up.     Hypertension  The patient states that she is feeling better. She saw cardiology nurse practitioner on 09/21/2022, who increased her amlodipine to 10 mg.  She did take the higher dose of amlodipine this morning. She states that she does not eat much sodium unless it is already in the food.       Recovering from surgery  She states that she feels better and her voice is coming back. She is trying to drink more fluids.  Carotid stenosis  The patient has a follow-up appointment on 09/27/2022 to follow-up after her carotid surgery. She is hoping to have the dressing taken off. The patient is using baby wipes around her face for bathing. She denies any pain in the back of her legs when she is walking. The patient denies any swelling in her feet.  She is taking Plavix.     Chronic kidney disease  The patient's kidney function looks better than it did on 09/14/2022. Her potassium is good. Her urinalysis showed some protein, but not a lot.    Clavicle fracture  The patient states that she saw Dr.' Jeovany SILVERMAN on 09/20/2022, and he told her that depending on her pain comfort level, she is alright to go back to work. He told her that she may need a plate in there. She states that she keeps seeing them monthly. The patient states that he did not think there was much healing going on there. She states that her right clavicle has hurt her for 35  years.    Anemia  The patient's anemia is improving. Her folic acid was increased to 2 tablets daily. Her iron and B12 looked good.     Immunizations   - The patient will get the influenza vaccine here today.  - She is due for a shingles vaccine.    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 HARRISON   • CAROTID ENDARTERECTOMY Right 8/31/2022    Procedure: CAROTID ENDARTERECTOMY- RIGHT;  Surgeon: Francisco Magana MD;  Location:  [facility];  Service: Vascular;  Laterality: Right;   • COLONOSCOPY      last 8 years ago.  Due to schedule   • EYE LENS IMPLANT SECONDARY Left    • FEMORAL FEMORAL BYPASS Right 8/22/2016    Procedure: RIGHT COMMON FEMORAL ENDARTERECTOMY WITH PATCH;  Surgeon: Severino Harrison MD;  Location:  [facility];  Service:    • FINGER SURGERY Left     LEFT INDEX FINGER   • INTERVENTIONAL RADIOLOGY PROCEDURE N/A 8/16/2016    Procedure: IR PTA femoral popliteal artery;  Surgeon: Severino Harrison MD;  Location:  [facility];  Service:    • INTERVENTIONAL RADIOLOGY PROCEDURE Left 1/7/2020    Procedure: LEFT  ATHERECTOMY, BALLOON ANGIOPLASTY;  Surgeon: Severino Carbajal MD;  Location:  [facility];  Service: Interventional Radiology   • TONSILLECTOMY     • TOTAL SHOULDER ARTHROPLASTY Left 2020    Procedure: TOTAL SHOULDER ARTHROPLASTY LEFT;  Surgeon: Ketan Lazcano MD;  Location:  [facility];  Service: Orthopedics;  Laterality: Left;  protector in: 1346  protector out: 1402       Family History   Problem Relation Age of Onset   • Osteoarthritis Mother    • Alzheimer's disease Mother    • Hypertension Father    • Heart attack Father    • Alcohol abuse Father    • No Known Problems Sister    • No Known Problems Daughter    • No Known Problems Son    • Breast cancer Maternal Grandmother 50   • Breast cancer Paternal Grandmother 50   • No Known Problems Maternal Aunt    • No Known Problems Paternal Aunt    • Other Other    • Heart attack Other    • Coronary artery disease Paternal Grandfather    • Stroke Paternal Grandfather    • No Known Problems Sister    • Ovarian cancer Neg Hx    • Endometrial cancer Neg Hx        Social History     Socioeconomic History   • Marital status: Single   • Number of children: 0   Tobacco Use   • Smoking status: Former Smoker     Packs/day: 1.00     Years: 40.00     Pack years: 40.00     Types: Electronic Cigarette, Cigarettes     Quit date: 2016     Years since quittin.2   • Smokeless tobacco: Never Used   • Tobacco comment: 1 PACK/DAY SMOKER UNTIL 2016   Vaping Use   • Vaping Use: Former   Substance and Sexual Activity   • Alcohol use: Yes     Alcohol/week: 14.0 standard drinks     Types: 14 Shots of liquor per week     Comment: 2 DRINKS PER DAY   • Drug use: No   • Sexual activity: Defer       Allergies   Allergen Reactions   • Levocetirizine Dihydrochloride Myalgia     Muscle aches/joint pain         Vital Signs  Vitals:    22 0856   BP: 148/70   BP Location: Right arm   Patient Position: Sitting   Cuff Size: Adult   Pulse: 76   Temp: 96.6 °F  "(35.9 °C)   TempSrc: Infrared   SpO2: 98%   Weight: 53.6 kg (118 lb 3.2 oz)   Height: 154.9 cm (61\")   PainSc: 0-No pain     Body mass index is 22.33 kg/m².  BMI is within normal parameters. No other follow-up for BMI required.        Current Outpatient Medications:   •  acetaminophen (Acetaminophen 8 Hour) 650 MG 8 hr tablet, Take 650 mg by mouth 2 (two) times a day., Disp: , Rfl:   •  amLODIPine (NORVASC) 10 MG tablet, Take 1 tablet by mouth Daily., Disp: 90 tablet, Rfl: 3  •  ARIPiprazole (ABILIFY) 2 MG tablet, Take 1 tablet by mouth Daily., Disp: 30 tablet, Rfl: 5  •  ascorbic acid (VITAMIN C) 1000 MG tablet, Take 1,000 mg by mouth Daily., Disp: , Rfl:   •  aspirin 81 MG EC tablet, Take 81 mg by mouth every morning. TOLD NOT TO STOP BEFORE SURGERY, Disp: , Rfl:   •  bisoprolol (ZEBeta) 10 MG tablet, TAKE 1 TABLET BY MOUTH DAILY, Disp: 90 tablet, Rfl: 1  •  Breo Ellipta 200-25 MCG/INH inhaler, INHALE 1 PUFF BY MOUTH DAILY, Disp: 60 each, Rfl: 11  •  Calcium Citrate-Vitamin D (CALCIUM + D PO), Take 2 tablets by mouth Daily., Disp: , Rfl:   •  citalopram (CeleXA) 40 MG tablet, TAKE 1 TABLET BY MOUTH DAILY, Disp: 90 tablet, Rfl: 1  •  clopidogrel (PLAVIX) 75 MG tablet, TAKE 1 TABLET BY MOUTH DAILY, Disp: 90 tablet, Rfl: 1  •  Coenzyme Q10 200 MG capsule, Take 400 mg by mouth Daily., Disp: , Rfl:   •  Fish Oil oil, Take 1 capsule by mouth Daily., Disp: , Rfl:   •  Flaxseed, Linseed, (FLAXSEED OIL) 1000 MG capsule, Take 1 capsule by mouth daily., Disp: , Rfl:   •  fluticasone (FLONASE) 50 MCG/ACT nasal spray, SHAKE LIQUID AND USE 1 SPRAY IN EACH NOSTRIL TWICE DAILY, Disp: 48 g, Rfl: 3  •  folic acid (FOLVITE) 1 MG tablet, Take 2 tablets by mouth Daily., Disp: 180 tablet, Rfl: 3  •  gabapentin (NEURONTIN) 100 MG capsule, TAKE 3 CAPSULES BY MOUTH EVERY EVENING, Disp: 90 capsule, Rfl: 2  •  Methylsulfonylmethane (MSM PO), Take 2 tablets by mouth daily., Disp: , Rfl:   •  Multiple Vitamins-Minerals (MULTIVITAMIN ADULT " "PO), Take 1 tablet by mouth daily., Disp: , Rfl:   •  omeprazole (priLOSEC) 20 MG capsule, Take 1 capsule by mouth Daily., Disp: 90 capsule, Rfl: 3  •  rosuvastatin (CRESTOR) 40 MG tablet, Take 1 tablet by mouth Daily., Disp: 90 tablet, Rfl: 3  •  traMADol (ULTRAM) 50 MG tablet, TAKE 1 TABLET BY MOUTH TWICE DAILY, Disp: 180 tablet, Rfl: 2  •  URINARY HEALTH/CRANBERRY PO, Take 1 tablet by mouth 2 (Two) Times a Day. \"CRANACTIN\", Disp: , Rfl:   •  vitamin B-12 (CYANOCOBALAMIN) 500 MCG tablet, TAKE 1 TABLET BY MOUTH DAILY, Disp: 90 tablet, Rfl: 1    Physical Exam:    Physical Exam  Vitals and nursing note reviewed.   Constitutional:       Appearance: She is well-developed.   HENT:      Head: Normocephalic.   Eyes:      Conjunctiva/sclera: Conjunctivae normal.      Pupils: Pupils are equal, round, and reactive to light.   Neck:      Thyroid: No thyromegaly.   Cardiovascular:      Rate and Rhythm: Normal rate and regular rhythm.      Heart sounds: Normal heart sounds.   Pulmonary:      Effort: Pulmonary effort is normal.      Breath sounds: Normal breath sounds.   Musculoskeletal:         General: Normal range of motion.      Cervical back: Normal range of motion and neck supple.      Comments: Left clavicle displaced fracture with less swelling today, a little less tenderness today. Arthritic changes of hands and both shoulders.   Lymphadenopathy:      Cervical: No cervical adenopathy.   Neurological:      Mental Status: She is alert and oriented to person, place, and time.   Psychiatric:         Thought Content: Thought content normal.          ACE III MINI        Results Review:    I reviewed the patient's new clinical results.    CMP:  Lab Results   Component Value Date    BUN 24 (H) 09/21/2022    CREATININE 1.09 (H) 09/21/2022    EGFRIFNONA 59 (L) 10/06/2021    EGFRIFAFRI 72 10/06/2021    BCR 22.0 09/21/2022     09/21/2022    K 5.2 09/21/2022    CO2 21.0 (L) 09/21/2022    CALCIUM 8.9 09/21/2022    PROTENTOTREF " 7.0 09/14/2022    ALBUMIN 4.70 09/14/2022    LABGLOBREF 2.3 09/14/2022    LABIL2 2.0 09/14/2022    BILITOT 0.2 09/14/2022    ALKPHOS 71 09/14/2022    AST 21 09/14/2022    ALT 15 09/14/2022     HbA1c:  Lab Results   Component Value Date    HGBA1C 4.90 08/26/2022    HGBA1C 5.50 06/05/2020     Microalbumin:  Lab Results   Component Value Date    MICROALBUR 32.0 10/06/2021     Lipid Panel  Lab Results   Component Value Date    CHOL 136 10/09/2020    TRIG 144 10/06/2021    HDL 59 10/06/2021    LDL 93 10/06/2021    AST 21 09/14/2022    ALT 15 09/14/2022       Medication Review: Medications reviewed and noted  Patient Instructions   Problem List Items Addressed This Visit        Cardiac and Vasculature    Atherosclerosis of native artery of both lower extremities with intermittent claudication (HCC) (Chronic)    Overview     1/19/2022 Jannette Chapa MD    Patient is status post left lower extremity atherectomy and balloon angioplasty by Dr. Carbajal on 1/7/2020.    Continue daily Plavix and aspirin.  Continue rosuvastatin nightly.    Continue to improve low-fat low sugar diet and get plenty of exercise.         Current Assessment & Plan     - Continue Plavix, aspirin, and nightly statin.  - Continue low-fat, healthy diet.  - Continue walking and exercise.         Benign essential hypertension (Chronic)    Overview     Taking amlodipine, bisoprolol.  Amlodipine increased to 10 mg daily on 9/21/2022.         Current Assessment & Plan     - She started taking 10 mg of amlodipine daily this morning.  - The patient will continue bisoprolol 10 mg daily as well.  - She is off ARB and diuretic secondary to renal insufficiency.  - The patient will continue to monitor blood pressures at home.         Relevant Medications    bisoprolol (ZEBeta) 10 MG tablet    amLODIPine (NORVASC) 10 MG tablet    Bilateral carotid artery stenosis - Primary (Chronic)    Overview     Status post 3/3/2022  left carotid endarterectomy  performed by  Dr. Magana.    S/p R carotid endarterectomy 8/31/2022 by Dr. Magana.         Current Assessment & Plan     - Right carotid endarterectomy incision is healing well.  - She will follow up with Dr. Magana soon.  - Continue rosuvastatin nightly, Plavix daily, and 81 mg aspirin daily.            Endocrine and Metabolic    Hyperhomocysteinemia (HCC)    Overview     Patient is not taking L-methylfolate secondary to high cost.    Folic acid was increased to 2 mg daily on 9/14/2022.         Current Assessment & Plan     - Folic acid increase to 2 tablets daily 09/2023.  - She will continue that dose.            Genitourinary and Reproductive     Stage III CKD (Chronic)    Overview                Current Assessment & Plan     - Creatinine and GFR are significantly improved over the last week with increasing fluid intake and stopping the diuretic and ARB.  - She does avoid NSAIDs.            Hematology and Neoplasia    Postoperative anemia    Current Assessment & Plan     - Improving on labs last week.         Relevant Medications    vitamin B-12 (CYANOCOBALAMIN) 500 MCG tablet    folic acid (FOLVITE) 1 MG tablet       Musculoskeletal and Injuries    Closed displaced fracture of shaft of left clavicle (Chronic)    Overview     L clavicle fracture in accident while leading 2 horses 7/23/2022.  She has seen Dr. Avitia.  She saw him for follow up 9/7/2022 and his note states that the fracture is not healing.  Patient saw Dr. Lazcano last week and he noted very minimal healing.  He did tell her she could continue working and wear the brace on the upper torso.  He will follow her closely to monitor healing.         Current Assessment & Plan     - She saw Dr. Lazcano last week, and he says there is some mild healing going on.  - He recommended that she continue wearing the brace on her upper torso and be very careful at work.  - She will be returning to see him again soon for follow-up.           Other Visit Diagnoses     Need for  immunization against influenza        Relevant Orders    Fluzone High-Dose 65+yrs ([id number]) (Completed)             Diagnosis Plan   1. Bilateral carotid artery stenosis     2. Benign essential hypertension     3. Atherosclerosis of native artery of both lower extremities with intermittent claudication (HCC)     4. Hyperhomocysteinemia (HCC)     5. Stage 3a chronic kidney disease (HCC)     6. Postoperative anemia     7. Closed displaced fracture of shaft of left clavicle with delayed healing, subsequent encounter     8. Need for immunization against influenza  Fluzone High-Dose 65+yrs ([id number])     Follow-up: She will return on 11/15/2022 for her scheduled physical.           Plan of care reviewed with patient at the conclusion of today's visit. Education was provided regarding diagnosis, management, and any prescribed or recommended OTC medications.Patient verbalizes understanding of and agreement with management plan.         Jannette Chapa MD      Transcribed from ambient dictation for Jannette Chapa MD by Maude Carvalho.  09/22/22   10:58 EDT    Patient verbalized consent to the visit recording.

## 2022-09-26 LAB — CYSTATIN C SERPL-MCNC: 1.39 MG/L (ref 0.72–1.16)

## 2022-10-03 DIAGNOSIS — M19.111 POST-TRAUMATIC OSTEOARTHRITIS OF RIGHT SHOULDER: ICD-10-CM

## 2022-10-03 RX ORDER — GABAPENTIN 100 MG/1
300 CAPSULE ORAL EVERY EVENING
Qty: 90 CAPSULE | Refills: 2 | Status: SHIPPED | OUTPATIENT
Start: 2022-10-03 | End: 2023-01-13 | Stop reason: SDUPTHER

## 2022-10-03 NOTE — TELEPHONE ENCOUNTER
Rx Refill Note  Requested Prescriptions     Pending Prescriptions Disp Refills   • gabapentin (NEURONTIN) 100 MG capsule [Pharmacy Med Name: GABAPENTIN 100MG CAPSULES] 90 capsule      Sig: TAKE 3 CAPSULES BY MOUTH EVERY EVENING      Last office visit with prescribing clinician: 9/22/2022      Next office visit with prescribing clinician: 11/15/2022     LA: 05/20/22 #90 2R             Mercedes Trammell LPN  10/03/22, 10:33 EDT

## 2022-10-10 ENCOUNTER — OFFICE VISIT (OUTPATIENT)
Dept: INTERNAL MEDICINE | Facility: CLINIC | Age: 67
End: 2022-10-10

## 2022-10-10 VITALS
SYSTOLIC BLOOD PRESSURE: 144 MMHG | HEIGHT: 61 IN | BODY MASS INDEX: 22.47 KG/M2 | HEART RATE: 68 BPM | TEMPERATURE: 97.5 F | WEIGHT: 119 LBS | DIASTOLIC BLOOD PRESSURE: 62 MMHG

## 2022-10-10 DIAGNOSIS — Z79.899 LONG-TERM USE OF HIGH-RISK MEDICATION: ICD-10-CM

## 2022-10-10 DIAGNOSIS — M19.111 POST-TRAUMATIC OSTEOARTHRITIS OF RIGHT SHOULDER: Primary | Chronic | ICD-10-CM

## 2022-10-10 DIAGNOSIS — N18.31 STAGE 3A CHRONIC KIDNEY DISEASE: Chronic | ICD-10-CM

## 2022-10-10 DIAGNOSIS — I10 BENIGN ESSENTIAL HYPERTENSION: Chronic | ICD-10-CM

## 2022-10-10 PROCEDURE — 99213 OFFICE O/P EST LOW 20 MIN: CPT | Performed by: NURSE PRACTITIONER

## 2022-10-10 NOTE — PROGRESS NOTES
Aarti Wade  1955  [id number]  Patient Care Team:  Jannette Chapa MD as PCP - General  Jannette Chapa MD as PCP - Family Medicine  Severino Carbajal MD as Consulting Physician (Vascular Surgery)  Ketan Lazcano MD as Consulting Physician (Orthopedic Surgery)  Floyd Ernandez MD as Consulting Physician (Cardiology)  Lorenzo Good MD as Consulting Physician (Ophthalmology)    Aarti Wade is a pleasant 67 y.o. female who presents for evaluation of Post-traumatic osteoarthritis of right shoulder    Chief Complaint   Patient presents with   • Post-traumatic osteoarthritis of right shoulder       HPI:     Routine 6 mo follow up visit for right shoulder.  Uses tramadol BID and gabapentin 300mg HS. 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     Saw Dr. Burgess yrs ago until he retired then saw one of his partners.   pcp here ([facility]) has been following her since. Saw Dr. Lazcano for a an acute injury of left shoulder tripping on vacuum and had surgical repair on left side.   Had to stop celebrex within the last few yrs secondary to kidney insufficiency and hypertension.  The gabapentin has been prescribed for nighttime shoulder pain.  She started tramadol for pain when it got worse which has continued to work well for the chronic right shoulder pain.    She states she fractured her left clavicle in 07/2022, but she is well healed at this time.     The patient notes she underwent a right carotid endarterectomy procedure, performed by doctor Magana in 08/2022, where she was intubated, and she has been experiencing dysphonia since that time. The patient reports she is following up with ear, nose, and throat next week. She adds that she previously underwent a left carotid endarterectomy procedure in approximately 03/2022.   Past Medical History:   Diagnosis Date   • Anxiety     • Arthritis    • Chronic UTI    • Claudication (HCC)    • COPD (chronic obstructive pulmonary disease) (HCC)    • Depression    • diffuse fatty liver infiltration on CT 2009   • Dizzy    • Former smoker 08/31/2022   • GERD (gastroesophageal reflux disease)    • Head injury     Related to falls off  horses   • Hepatitis     UNKNOWN TYPE   • History of shingles     about 5 years ago   • Hyperlipidemia    • Hypertension    • 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    • Osteopenia of multiple sites    • Osteoporosis    • PAD (peripheral artery disease) (HCC)    • Spontaneous pneumothorax 1960   • Subclavian steal syndrome 2016   • VHD (valvular heart disease) 08/31/2022   • Wears dentures     TOP ONLY   • Wears glasses      Past Surgical History:   Procedure Laterality Date   • ANGIOGRAM - CONVERTED  08/16/2016    AA WITH RUNOFF PER DR. HARRISON   • CAROTID ENDARTERECTOMY Right 8/31/2022    Procedure: CAROTID ENDARTERECTOMY- RIGHT;  Surgeon: Francisco Magana MD;  Location:  [facility];  Service: Vascular;  Laterality: Right;   • COLONOSCOPY      last 8 years ago.  Due to schedule   • EYE LENS IMPLANT SECONDARY Left    • FEMORAL FEMORAL BYPASS Right 8/22/2016    Procedure: RIGHT COMMON FEMORAL ENDARTERECTOMY WITH PATCH;  Surgeon: Severino Harrison MD;  Location:  [facility];  Service:    • FINGER SURGERY Left     LEFT INDEX FINGER   • INTERVENTIONAL RADIOLOGY PROCEDURE N/A 8/16/2016    Procedure: IR PTA femoral popliteal artery;  Surgeon: Severino Harrison MD;  Location:  [facility];  Service:    • INTERVENTIONAL RADIOLOGY PROCEDURE Left 1/7/2020    Procedure: LEFT ATHERECTOMY, BALLOON ANGIOPLASTY;  Surgeon: Severino Harrison MD;  Location:  [facility];  Service: Interventional Radiology   • TONSILLECTOMY     • TOTAL SHOULDER ARTHROPLASTY Left 6/8/2020    Procedure: TOTAL SHOULDER ARTHROPLASTY LEFT;  Surgeon: Ketan Lazcano MD;  Location:  [facility];  Service:  Orthopedics;  Laterality: Left;  protector in: 1346  protector out: 1402     Family History   Problem Relation Age of Onset   • Osteoarthritis Mother    • Alzheimer's disease Mother    • Hypertension Father    • Heart attack Father    • Alcohol abuse Father    • No Known Problems Sister    • No Known Problems Daughter    • No Known Problems Son    • Breast cancer Maternal Grandmother 50   • Breast cancer Paternal Grandmother 50   • No Known Problems Maternal Aunt    • No Known Problems Paternal Aunt    • Other Other    • Heart attack Other    • Coronary artery disease Paternal Grandfather    • Stroke Paternal Grandfather    • No Known Problems Sister    • Ovarian cancer Neg Hx    • Endometrial cancer Neg Hx      Social History     Tobacco Use   Smoking Status Former   • Packs/day: 1.00   • Years: 40.00   • Pack years: 40.00   • Types: Electronic Cigarette, Cigarettes   • Quit date: 2016   • Years since quittin.3   Smokeless Tobacco Never   Tobacco Comments    1 PACK/DAY SMOKER UNTIL 2016     Allergies   Allergen Reactions   • Levocetirizine Dihydrochloride Myalgia     Muscle aches/joint pain       Current Outpatient Medications:   •  acetaminophen (Acetaminophen 8 Hour) 650 MG 8 hr tablet, Take 650 mg by mouth 2 (two) times a day., Disp: , Rfl:   •  amLODIPine (NORVASC) 10 MG tablet, Take 1 tablet by mouth Daily., Disp: 90 tablet, Rfl: 3  •  ARIPiprazole (ABILIFY) 2 MG tablet, Take 1 tablet by mouth Daily., Disp: 30 tablet, Rfl: 5  •  ascorbic acid (VITAMIN C) 1000 MG tablet, Take 1,000 mg by mouth Daily., Disp: , Rfl:   •  aspirin 81 MG EC tablet, Take 81 mg by mouth every morning. TOLD NOT TO STOP BEFORE SURGERY, Disp: , Rfl:   •  bisoprolol (ZEBeta) 10 MG tablet, TAKE 1 TABLET BY MOUTH DAILY, Disp: 90 tablet, Rfl: 1  •  Breo Ellipta 200-25 MCG/INH inhaler, INHALE 1 PUFF BY MOUTH DAILY, Disp: 60 each, Rfl: 11  •  Calcium Citrate-Vitamin D (CALCIUM + D PO), Take 2 tablets by mouth Daily., Disp: , Rfl:  "  •  citalopram (CeleXA) 40 MG tablet, TAKE 1 TABLET BY MOUTH DAILY, Disp: 90 tablet, Rfl: 1  •  clopidogrel (PLAVIX) 75 MG tablet, TAKE 1 TABLET BY MOUTH DAILY, Disp: 90 tablet, Rfl: 1  •  Coenzyme Q10 200 MG capsule, Take 400 mg by mouth Daily., Disp: , Rfl:   •  Fish Oil oil, Take 1 capsule by mouth Daily., Disp: , Rfl:   •  Flaxseed, Linseed, (FLAXSEED OIL) 1000 MG capsule, Take 1 capsule by mouth daily., Disp: , Rfl:   •  fluticasone (FLONASE) 50 MCG/ACT nasal spray, SHAKE LIQUID AND USE 1 SPRAY IN EACH NOSTRIL TWICE DAILY, Disp: 48 g, Rfl: 3  •  folic acid (FOLVITE) 1 MG tablet, Take 2 tablets by mouth Daily., Disp: 180 tablet, Rfl: 3  •  gabapentin (NEURONTIN) 100 MG capsule, TAKE 3 CAPSULES BY MOUTH EVERY EVENING, Disp: 90 capsule, Rfl: 2  •  Methylsulfonylmethane (MSM PO), Take 2 tablets by mouth daily., Disp: , Rfl:   •  Multiple Vitamins-Minerals (MULTIVITAMIN ADULT PO), Take 1 tablet by mouth daily., Disp: , Rfl:   •  omeprazole (priLOSEC) 20 MG capsule, Take 1 capsule by mouth Daily., Disp: 90 capsule, Rfl: 3  •  rosuvastatin (CRESTOR) 40 MG tablet, Take 1 tablet by mouth Daily., Disp: 90 tablet, Rfl: 3  •  traMADol (ULTRAM) 50 MG tablet, TAKE 1 TABLET BY MOUTH TWICE DAILY, Disp: 180 tablet, Rfl: 2  •  URINARY HEALTH/CRANBERRY PO, Take 1 tablet by mouth 2 (Two) Times a Day. \"CRANACTIN\", Disp: , Rfl:   •  vitamin B-12 (CYANOCOBALAMIN) 500 MCG tablet, TAKE 1 TABLET BY MOUTH DAILY, Disp: 90 tablet, Rfl: 1    Review of Systems  Review of systems was completed, and pertinent findings are noted in the HPI.    /62 (BP Location: Right arm, Patient Position: Sitting, Cuff Size: Adult)   Pulse 68   Temp 97.5 °F (36.4 °C) (Temporal)   Ht 154.9 cm (60.98\")   Wt 54 kg (119 lb)   BMI 22.50 kg/m²     Physical Exam  Vitals reviewed.   Constitutional:       Appearance: She is well-developed.   Pulmonary:      Effort: Pulmonary effort is normal.   Skin:     General: Skin is warm and dry.   Neurological:      " Mental Status: She is alert.   Psychiatric:         Behavior: Behavior normal.         Procedures    PHQ-9 Total Score:      Assessment/Plan:  Diagnoses and all orders for this visit:    1. Post-traumatic osteoarthritis of right shoulder (Primary)  Comments:  continue tramadol and gabapentin    2. Long-term use of high-risk medication  -     Compliance Drug Analysis, Ur - Urine, Clean Catch; Future    3. Benign essential hypertension  Comments:  continue meds and monitor at home    4. Stage 3a chronic kidney disease (HCC)  Comments:  avoid NSAIDS       1. Long-term use of high-risk medication  Plan of care reviewed with patient at the conclusion of today's visit. Education was provided regarding diagnosis, management and any prescribed or recommended OTC medications. Patient verbalizes understanding of and agreement with management plan. Annual drug screen advised today to stay in compliance.     Patient Instructions   This patient is on a controlled substance which improves symptoms/quality of life and is aware of the risks, benefits and possible side-effects current treatment. The patient denies any medication side-effects at this time. A controlled substance agreement will be obtained or is currently on file. We reviewed required monitoring for controlled substances including but not limited to quarterly follow-up visits, annual depression screening, and urine drug screens to which the patient is agreeable. A WOOD report has been or shortly will be reviewed. There are no signs of deviation or misuse.          Plan of care reviewed with patient at the conclusion of today's visit. Education was provided regarding diagnosis, management and any prescribed or recommended OTC medications.  Patient verbalizes understanding of and agreement with management plan.    Return in about 6 months (around 4/10/2023) for Next scheduled follow up.    Dictated Utilizing Dragon Dictation.    Jenny Hurtado  CYRUS      Transcribed from ambient dictation for CYRUS Gallegos by Angeline Gustafson.  10/10/22   17:56 EDT    Patient or patient representative verbalized consent to the visit recording.  I have personally performed the services described in this document as transcribed by the above individual, and it is both accurate and complete.

## 2022-10-14 LAB — DRUGS UR: NORMAL

## 2022-10-25 DIAGNOSIS — I10 BENIGN ESSENTIAL HYPERTENSION: ICD-10-CM

## 2022-10-25 RX ORDER — BISOPROLOL FUMARATE 10 MG/1
10 TABLET, FILM COATED ORAL DAILY
Qty: 90 TABLET | Refills: 1 | Status: SHIPPED | OUTPATIENT
Start: 2022-10-25

## 2022-11-02 ENCOUNTER — HOSPITAL ENCOUNTER (OUTPATIENT)
Dept: MAMMOGRAPHY | Facility: HOSPITAL | Age: 67
Discharge: HOME | End: 2022-11-02
Admitting: INTERNAL MEDICINE

## 2022-11-02 DIAGNOSIS — Z12.31 VISIT FOR SCREENING MAMMOGRAM: ICD-10-CM

## 2022-11-02 PROCEDURE — 77067 SCR MAMMO BI INCL CAD: CPT

## 2022-11-02 PROCEDURE — 77067 SCR MAMMO BI INCL CAD: CPT | Performed by: RADIOLOGY

## 2022-11-02 PROCEDURE — 77063 BREAST TOMOSYNTHESIS BI: CPT

## 2022-11-02 PROCEDURE — 77063 BREAST TOMOSYNTHESIS BI: CPT | Performed by: RADIOLOGY

## 2022-11-11 RX ORDER — FLUTICASONE FUROATE AND VILANTEROL 200; 25 UG/1; UG/1
POWDER RESPIRATORY (INHALATION)
Qty: 60 EACH | Refills: 11 | Status: SHIPPED | OUTPATIENT
Start: 2022-11-11

## 2022-11-11 NOTE — TELEPHONE ENCOUNTER
Rx Refill Note  Requested Prescriptions     Pending Prescriptions Disp Refills   • Fluticasone Furoate-Vilanterol (BREO ELLIPTA) 200-25 MCG/ACT inhaler [Pharmacy Med Name: FLUTIC/VILAN 200-25MCG ORAL INH(30)] 60 each 11     Sig: INHALE 1 PUFF BY MOUTH DAILY      Last office visit with prescribing clinician: 9/22/2022      Next office visit with prescribing clinician: 11/15/2022            Koki Artis LPN  11/11/22, 11:07 EST

## 2022-11-15 ENCOUNTER — OFFICE VISIT (OUTPATIENT)
Dept: INTERNAL MEDICINE | Facility: CLINIC | Age: 67
End: 2022-11-15

## 2022-11-15 VITALS
HEART RATE: 63 BPM | BODY MASS INDEX: 22.16 KG/M2 | TEMPERATURE: 98.4 F | SYSTOLIC BLOOD PRESSURE: 132 MMHG | OXYGEN SATURATION: 96 % | DIASTOLIC BLOOD PRESSURE: 70 MMHG | HEIGHT: 62 IN | WEIGHT: 120.4 LBS

## 2022-11-15 DIAGNOSIS — J44.9 CHRONIC OBSTRUCTIVE PULMONARY DISEASE, UNSPECIFIED COPD TYPE: Chronic | ICD-10-CM

## 2022-11-15 DIAGNOSIS — F33.0 MAJOR DEPRESSIVE DISORDER, RECURRENT EPISODE, MILD DEGREE: Chronic | ICD-10-CM

## 2022-11-15 DIAGNOSIS — Z00.00 ANNUAL PHYSICAL EXAM: ICD-10-CM

## 2022-11-15 DIAGNOSIS — N18.31 STAGE 3A CHRONIC KIDNEY DISEASE: Chronic | ICD-10-CM

## 2022-11-15 DIAGNOSIS — I70.213 ATHEROSCLEROSIS OF NATIVE ARTERY OF BOTH LOWER EXTREMITIES WITH INTERMITTENT CLAUDICATION: Chronic | ICD-10-CM

## 2022-11-15 DIAGNOSIS — N95.9 MENOPAUSAL AND POSTMENOPAUSAL DISORDER: ICD-10-CM

## 2022-11-15 DIAGNOSIS — I10 BENIGN ESSENTIAL HYPERTENSION: Chronic | ICD-10-CM

## 2022-11-15 DIAGNOSIS — M85.89 OSTEOPENIA OF MULTIPLE SITES: ICD-10-CM

## 2022-11-15 DIAGNOSIS — I65.21 CAROTID STENOSIS, ASYMPTOMATIC, RIGHT: ICD-10-CM

## 2022-11-15 DIAGNOSIS — E78.2 MIXED HYPERLIPIDEMIA: ICD-10-CM

## 2022-11-15 DIAGNOSIS — Z23 NEED FOR VACCINATION: ICD-10-CM

## 2022-11-15 DIAGNOSIS — Z00.00 MEDICARE ANNUAL WELLNESS VISIT, SUBSEQUENT: Primary | ICD-10-CM

## 2022-11-15 PROCEDURE — 99397 PER PM REEVAL EST PAT 65+ YR: CPT | Performed by: INTERNAL MEDICINE

## 2022-11-15 PROCEDURE — 93000 ELECTROCARDIOGRAM COMPLETE: CPT | Performed by: INTERNAL MEDICINE

## 2022-11-15 PROCEDURE — 96160 PT-FOCUSED HLTH RISK ASSMT: CPT | Performed by: INTERNAL MEDICINE

## 2022-11-15 PROCEDURE — 1170F FXNL STATUS ASSESSED: CPT | Performed by: INTERNAL MEDICINE

## 2022-11-15 PROCEDURE — 0124A PR ADM SARSCOV2 30MCG/0.3ML BST: CPT | Performed by: INTERNAL MEDICINE

## 2022-11-15 PROCEDURE — G0439 PPPS, SUBSEQ VISIT: HCPCS | Performed by: INTERNAL MEDICINE

## 2022-11-15 PROCEDURE — 91312 COVID-19 (PFIZER) BIVALENT BOOSTER 12+YRS: CPT | Performed by: INTERNAL MEDICINE

## 2022-11-15 PROCEDURE — 1159F MED LIST DOCD IN RCRD: CPT | Performed by: INTERNAL MEDICINE

## 2022-11-15 NOTE — PROGRESS NOTES
"The ABCs of the Annual Wellness Visit  Initial Medicare Wellness Visit    Chief Complaint   Patient presents with   • Medicare Wellness-subsequent   • Hypertension       Subjective   History of Present Illness:  Aarti Wade is a 67 y.o. female who presents for an Initial Medicare Wellness Visit.    Heart disease  The patient's most recent EKG was normal. She denies any chest pain or shortness of breath.    Fall  The patient states that she fell at home when her dog ran into her and she landed on some concrete. She states that the last time she had a fall was when she broke her left collar bone in 07/2022.     History of broken collar bone  The patient broke her left collar bone in 07/2022. She states that her collar bone is doing well. She states that she is seeing Dr. Ketan Lazcano. She is going to do a follow-up bone scan in 3 months. She wears a brace every day as it makes her feel more stable. She states that she primarily sleeps on her back due to of her collarbone.    Right shoulder pain  The patient has pain in her right should and states that there is no significant difference since her last office visit. She is currently taking tramadol and gabapentin and states that they do help some. She has discontinued Celebrex as she felt like \"she was 89 years old.\"     Voice issues   The patient states that she has seen a doctor for her voice changes. She has an upcoming appointment scheduled. She states that Dr. Francisco Magana referred her to an ENT as her voice is raspy due to intubation. She had an endoscope performed that showed that her vocal cord was \"lazy.\"    Leg pain  She denies any leg pain with ambulation.    Tardive dyskinesia  She states that she discontinued Abilify 2 mg on 10/13/2022 as she was having neurological symptoms causing her legs to shake. She states that it was like tardive dyskinesia. She states that the shaking improved once she stopped the Abilify.     Depression  She states that she does " not feel much depression lately. She is currently taking citalopram.    Hypertension  The patient mentions that she is currently taking 1 Olmesartan tablet. She inquires about her prescription based on her most recent labs.    Health maintenance  She is up to date on her mammogram. She is due for her DEXA scan. She completed Cologuard in .    Immunizations  She has received her influenza vaccine. She would like to get the COVID-19 booster today. She is up to date on pneumococcal and Tdap vaccines. She is due for the Shingrix vaccine.    Labs- 2022  Homocysteine- high.  Folate- low.  Iron- WNL.  Vitamin B12- WNL.    CHRONIC CONDITIONS:    HEALTH RISK ASSESSMENT    Recent Hospitalizations:  No hospitalization(s) within the last year.    Current Medical Providers:  Patient Care Team:  Jannette Chapa MD as PCP - General  Jannette Chapa MD as PCP - Family Medicine  Severino Carbajal MD as Consulting Physician (Vascular Surgery)  Ketan Lazcano MD as Consulting Physician (Orthopedic Surgery)  Floyd Ernandez MD as Consulting Physician (Cardiology)  Lorenzo Good MD as Consulting Physician (Ophthalmology)    Smoking Status:  Social History     Tobacco Use   Smoking Status Former   • Packs/day: 1.00   • Years: 40.00   • Pack years: 40.00   • Types: Electronic Cigarette, Cigarettes   • Quit date: 2016   • Years since quittin.4   Smokeless Tobacco Never   Tobacco Comments    1 PACK/DAY SMOKER UNTIL 2016       Alcohol Consumption:  Social History     Substance and Sexual Activity   Alcohol Use Yes   • Alcohol/week: 14.0 standard drinks   • Types: 14 Shots of liquor per week    Comment: 2 DRINKS PER DAY       Depression Screen:   PHQ-2/PHQ-9 Depression Screening 11/15/2022   Retired PHQ-9 Total Score -   Retired Total Score -   Little Interest or Pleasure in Doing Things 0-->not at all   Feeling Down, Depressed or Hopeless 0-->not at all   PHQ-9: Brief Depression Severity Measure  Score 0       Fall Risk Screen:  MEGANADI Fall Risk Assessment was completed, and patient is at HIGH risk for falls. Assessment completed on:11/15/2022    Health Habits and Functional and Cognitive Screening:  Functional & Cognitive Status 11/15/2022   Do you have difficulty preparing food and eating? No   Do you have difficulty bathing yourself, getting dressed or grooming yourself? No   Do you have difficulty using the toilet? No   Do you have difficulty moving around from place to place? No   Do you have trouble with steps or getting out of a bed or a chair? No   Current Diet Unhealthy Diet   Dental Exam Up to date        Dental Exam Comment -   Eye Exam Up to date   Exercise (times per week) 7 times per week   Current Exercises Include Walking        Exercise Comment -   Do you need help using the phone?  No   Are you deaf or do you have serious difficulty hearing?  No   Do you need help with transportation? No   Do you need help shopping? No   Do you need help preparing meals?  No   Do you need help with housework?  No   Do you need help with laundry? No   Do you need help taking your medications? No   Do you need help managing money? No   Do you ever drive or ride in a car without wearing a seat belt? No   Have you felt unusual stress, anger or loneliness in the last month? No   Who do you live with? Alone   If you need help, do you have trouble finding someone available to you? No   Have you been bothered in the last four weeks by sexual problems? No   Do you have difficulty concentrating, remembering or making decisions? No         Age-appropriate Screening Schedule:  Refer to the list below for future screening recommendations based on patient's age, sex and/or medical conditions. Orders for these recommended tests are listed in the plan section. The patient has been provided with a written plan.    Health Maintenance   Topic Date Due   • ZOSTER VACCINE (2 of 3) 11/29/2017   • LIPID PANEL  10/06/2022   • DXA  SCAN  12/28/2022   • MAMMOGRAM  11/02/2024   • TDAP/TD VACCINES (2 - Td or Tdap) 08/05/2025   • INFLUENZA VACCINE  Completed        The following portions of the patient's history were reviewed and updated as appropriate: allergies, current medications, past family history, past medical history, past social history, past surgical history and problem list.    Does the patient have evidence of cognitive impairment? No    Asprin use counseling:Taking ASA appropriately as indicated    Outpatient Medications Prior to Visit   Medication Sig Dispense Refill   • acetaminophen (Acetaminophen 8 Hour) 650 MG 8 hr tablet Take 650 mg by mouth 2 (two) times a day.     • amLODIPine (NORVASC) 10 MG tablet Take 1 tablet by mouth Daily. 90 tablet 3   • ascorbic acid (VITAMIN C) 1000 MG tablet Take 1,000 mg by mouth Daily.     • aspirin 81 MG EC tablet Take 81 mg by mouth every morning. TOLD NOT TO STOP BEFORE SURGERY     • bisoprolol (ZEBeta) 10 MG tablet TAKE 1 TABLET BY MOUTH DAILY 90 tablet 1   • Calcium Citrate-Vitamin D (CALCIUM + D PO) Take 2 tablets by mouth Daily.     • citalopram (CeleXA) 40 MG tablet TAKE 1 TABLET BY MOUTH DAILY 90 tablet 1   • clopidogrel (PLAVIX) 75 MG tablet TAKE 1 TABLET BY MOUTH DAILY 90 tablet 1   • Coenzyme Q10 200 MG capsule Take 400 mg by mouth Daily.     • Fish Oil oil Take 1 capsule by mouth Daily.     • Flaxseed, Linseed, (FLAXSEED OIL) 1000 MG capsule Take 1 capsule by mouth daily.     • fluticasone (FLONASE) 50 MCG/ACT nasal spray SHAKE LIQUID AND USE 1 SPRAY IN EACH NOSTRIL TWICE DAILY 48 g 3   • Fluticasone Furoate-Vilanterol (BREO ELLIPTA) 200-25 MCG/ACT inhaler INHALE 1 PUFF BY MOUTH DAILY 60 each 11   • folic acid (FOLVITE) 1 MG tablet Take 2 tablets by mouth Daily. 180 tablet 3   • gabapentin (NEURONTIN) 100 MG capsule TAKE 3 CAPSULES BY MOUTH EVERY EVENING 90 capsule 2   • Methylsulfonylmethane (MSM PO) Take 2 tablets by mouth daily.     • Multiple Vitamins-Minerals (MULTIVITAMIN ADULT  "PO) Take 1 tablet by mouth daily.     • omeprazole (priLOSEC) 20 MG capsule Take 1 capsule by mouth Daily. 90 capsule 3   • rosuvastatin (CRESTOR) 40 MG tablet Take 1 tablet by mouth Daily. 90 tablet 3   • traMADol (ULTRAM) 50 MG tablet TAKE 1 TABLET BY MOUTH TWICE DAILY 180 tablet 2   • URINARY HEALTH/CRANBERRY PO Take 1 tablet by mouth 2 (Two) Times a Day. \"CRANACTIN\"     • vitamin B-12 (CYANOCOBALAMIN) 500 MCG tablet TAKE 1 TABLET BY MOUTH DAILY 90 tablet 1   • ARIPiprazole (ABILIFY) 2 MG tablet Take 1 tablet by mouth Daily. 30 tablet 5     No facility-administered medications prior to visit.       Patient Active Problem List   Diagnosis   • PVD   • Benign essential hypertension   • Mixed hyperlipidemia   • Allergic rhinitis   • GERD   • Osteopenia of multiple sites   • Adhesive capsulitis of right shoulder   • Chronic low back pain   • COPD (chronic obstructive pulmonary disease)   • Post-traumatic osteoarthritis of right shoulder   • Major depressive disorder, recurrent episode, mild degree (HCC)   • Keratosis pilaris   • Subclavian steal syndrome   • Nocturnal leg cramps   • Atherosclerosis of native artery of both lower extremities with intermittent claudication (HCC)   • Postural dizziness with presyncope   • Hyperkalemia   • Bilateral hand pain   • Post-traumatic osteoarthritis of multiple joints   • Stage III CKD   • Status post total replacement of left shoulder   • Cough   • Medicare annual wellness visit, subsequent   • Annual physical exam   • Chronic right shoulder pain   • Hyperhomocysteinemia (HCC)   • Right carotid stenosis   • Dystrophic nail   • Hypoxia   • Acute nonintractable headache   • Dyslipidemia   • Former smoker   • VHD (valvular heart disease)   • Bilateral carotid artery stenosis   • Closed displaced fracture of shaft of left clavicle   • Postoperative anemia       Advanced Care Planning:  ACP discussion was held with the patient during this visit. Patient has an advance directive in " "EMR which is still valid.     Review of Systems  The appropriate review of systems is included in the HPI.    Compared to one year ago, the patient feels her physical health is the same.  Compared to one year ago, the patient feels her mental health is the same.    Reviewed chart for potential of high risk medication in the elderly: yes  Reviewed chart for potential of harmful drug interactions in the elderly:yes    Objective         Vitals:    11/15/22 1352   BP: 132/70   BP Location: Right arm   Patient Position: Sitting   Cuff Size: Adult   Pulse: 63   Temp: 98.4 °F (36.9 °C)   TempSrc: Infrared   SpO2: 96%   Weight: 54.6 kg (120 lb 6.4 oz)   Height: 156.9 cm (61.77\")   PainSc: 0-No pain     BMI is within normal parameters. No other follow-up for BMI required.    Body mass index is 22.18 kg/m².  Discussed the patient's BMI with her. The BMI is in the acceptable range.    Physical Exam  Vitals and nursing note reviewed.   Constitutional:       Appearance: Normal appearance. She is well-developed.      Comments: Hoarseness of the voice.   HENT:      Head: Normocephalic.   Eyes:      Conjunctiva/sclera: Conjunctivae normal.      Pupils: Pupils are equal, round, and reactive to light.   Neck:      Thyroid: No thyromegaly.      Comments: Well-healed incision after right carotid endarterectomy.  Cardiovascular:      Rate and Rhythm: Normal rate and regular rhythm.      Heart sounds: Normal heart sounds.      Comments: No edema.  2+ peripheral pulses in the left ankle and decreased peripheral pulses in the right ankle.  Pulmonary:      Effort: Pulmonary effort is normal.      Breath sounds: Normal breath sounds. No wheezing.   Chest:   Breasts:     Right: No inverted nipple, mass, nipple discharge, skin change or tenderness.      Left: No inverted nipple, mass, nipple discharge, skin change or tenderness.   Abdominal:      General: Bowel sounds are normal.      Palpations: Abdomen is soft.      Tenderness: There is no " abdominal tenderness.   Musculoskeletal:         General: Tenderness present. Normal range of motion.      Cervical back: Normal range of motion and neck supple.      Comments: Nonunion of left clavicle fracture. Both shoulders with arthritic changes and tenderness. Hands with arthritis and status post multiple fractures.   Lymphadenopathy:      Cervical: No cervical adenopathy.   Skin:     General: Skin is warm and dry.      Findings: No rash.   Neurological:      Mental Status: She is alert and oriented to person, place, and time.      Cranial Nerves: No cranial nerve deficit.      Sensory: No sensory deficit.      Coordination: Coordination normal.      Gait: Gait normal.   Psychiatric:         Speech: Speech normal.         Behavior: Behavior normal.         Thought Content: Thought content normal.         Judgment: Judgment normal.       ECG 12 Lead    Date/Time: 11/15/2022 2:22 PM  Performed by: Jannette Chapa MD  Authorized by: Jannette Chapa MD   Comparison: compared with previous ECG   Similar to previous ECG  Rhythm: sinus rhythm  Rate: normal  BPM: 59  Conduction: conduction normal  ST Segments: ST segments normal  T Waves: T waves normal  QRS axis: normal    Clinical impression: normal ECG              Lab Results   Component Value Date    HGBA1C 4.90 08/26/2022        Assessment & Plan   Medicare Risks and Personalized Health Plan  CMS Preventative Services Quick Reference  Cardiovascular risk    The above risks/problems have been discussed with the patient.  Pertinent information has been shared with the patient in the After Visit Summary.  Follow up plans and orders are seen below in the Assessment/Plan Section.  Patient Instructions   Problem List Items Addressed This Visit        Cardiac and Vasculature    Benign essential hypertension (Chronic)    Overview     Taking amlodipine, bisoprolol.  Amlodipine increased to 10 mg daily on 9/21/2022.         Current Assessment & Plan     She will  continue amlodipine, bisoprolol. She will continue to avoid salt in the diet.         Relevant Medications    amLODIPine (NORVASC) 10 MG tablet    bisoprolol (ZEBeta) 10 MG tablet    Other Relevant Orders    CBC & Differential    Comprehensive Metabolic Panel    Microalbumin / Creatinine Urine Ratio - Urine, Clean Catch    Magnesium    Urinalysis With Microscopic - Urine, Clean Catch    Atherosclerosis of native artery of both lower extremities with intermittent claudication (HCC) (Chronic)    Overview     Patient is status post left lower extremity atherectomy and balloon angioplasty by Dr. Carbajal on 1/7/2020.    2021 SAIDA and Doppler shows mild arterial insufficiency in both lower extremities at rest.  It shows significant vascular claudication on the right leg.  It also shows significant subclavian  and arterial insufficiency since the arm blood pressures are significantly different.    Taking daily  aspirin and plavix and nightly statin.          Current Assessment & Plan     She will continue trying to walk and exercise the legs daily. She will continue taking daily aspirin and nightly statin. She will follow up with Dr. Magana. She will continue Plavix daily.         Mixed hyperlipidemia    Overview     Taking  rosuvastatin every evening.               Current Assessment & Plan     She will continue rosuvastatin every evening.         Relevant Medications    rosuvastatin (CRESTOR) 40 MG tablet    Other Relevant Orders    Lipid Panel    Vitamin B12    TSH    Right carotid stenosis    Overview     Dr. Magana did ultrasound followed by CT scan.  Blockage of the left ICA was noted to be 80 to 90% by Dr. Magana.  Right ICA with about 70% stenosis.  Left subclavian with about 90% stenosis.  Status post 3/3/2022  left carotid endarterectomy  performed by Dr. Magana.  Status post 8/2022 right carotid endarterectomy by Dr. Magana.  Taking rosuvastatin nightly, clopidogrel daily, 81 mg coated aspirin daily.         Current  Assessment & Plan     She is status post right carotid endarterectomy with good results. She will continue rosuvastatin, Plavix, and daily aspirin.            Genitourinary and Reproductive     Stage III CKD (Chronic)    Overview                Current Assessment & Plan     She will continue trying to drink plenty of fluids every day. She will continue to avoid NSAIDs.            Health Encounters    Medicare annual wellness visit, subsequent - Primary    Current Assessment & Plan     She is up to date on flu shot. She is given the Covid bivalent vaccine today. She is up to date on other vaccinations except for Shingrix which she will get at her pharmacy in about 1 month. On colorectal screening, she had Cologuard test in October 2021. She is up to date on mammogram. DEXA is due after December 28.         Annual physical exam    Current Assessment & Plan     She is up to date on flu shot. She is given the Covid bivalent vaccine today. She is up to date on other vaccinations except for Shingrix which she will get at her pharmacy in about 1 month. On colorectal screening, she had Cologuard test in October 2021. She is up to date on mammogram. DEXA is due after December 28.            Mental Health    Major depressive disorder, recurrent episode, mild degree (HCC) (Chronic)    Overview     Taking citalopram 40 mg daily.             Current Assessment & Plan     She will continue current dose of citalopram daily. She did have tardive dyskinesia side effects from Abilify.         Relevant Medications    citalopram (CeleXA) 40 MG tablet       Musculoskeletal and Injuries    Osteopenia of multiple sites    Overview     12/28/2020 DEXA showed mild improvement in osteopenia of the hip.  T score in the left total hip is now -1.1 and in  the left femoral neck is -2.0.  (Osteoporosis is -2.5 or less.)    Continue calcium with vitamin D3.  Do some weightbearing exercises including walking and using hand weights or lifting things  at the barn every day.             Current Assessment & Plan     She will continue weightbearing exercises daily with walking and arm exercises and lifting small hand weights as tolerated. She will continue taking vitamin D3 and calcium.         Relevant Orders    Vitamin D,25-Hydroxy       Pulmonary and Pneumonias    COPD (chronic obstructive pulmonary disease) (Chronic)    Overview     History of tobacco use.  Using Breo Ellipta inhaler once a day.           Current Assessment & Plan     She will continue Breo Ellipta inhaler daily.             Relevant Medications    fluticasone (FLONASE) 50 MCG/ACT nasal spray    Fluticasone Furoate-Vilanterol (BREO ELLIPTA) 200-25 MCG/ACT inhaler   Other Visit Diagnoses     Need for vaccination        Relevant Orders    COVID-19 Bivalent Booster (Pfizer) 12+yrs    Menopausal and postmenopausal disorder        Relevant Orders    DEXA Bone Density Axial           Follow Up:  Return in about 6 months (around 5/15/2023) for recheck fasting.     An After Visit Summary and PPPS were given to the patient.     Transcribed from ambient dictation for Jannette Chapa MD by Deya Adams.  11/15/22   19:30 EST    Patient or patient representative verbalized consent to the visit recording.  I have personally performed the services described in this document as transcribed by the above individual, and it is both accurate and complete.  Jannette Chapa MD  11/15/2022  21:14 EST

## 2022-11-16 NOTE — ASSESSMENT & PLAN NOTE
She is up to date on flu shot. She is given the Covid bivalent vaccine today. She is up to date on other vaccinations except for Shingrix which she will get at her pharmacy in about 1 month. On colorectal screening, she had Cologuard test in October 2021. She is up to date on mammogram. DEXA is due after December 28.

## 2022-11-16 NOTE — PATIENT INSTRUCTIONS
Patient Instructions   Problem List Items Addressed This Visit          Cardiac and Vasculature    Benign essential hypertension (Chronic)    Overview     Taking amlodipine, bisoprolol.  Amlodipine increased to 10 mg daily on 9/21/2022.         Current Assessment & Plan     She will continue amlodipine, bisoprolol. She will continue to avoid salt in the diet.         Relevant Medications    amLODIPine (NORVASC) 10 MG tablet    bisoprolol (ZEBeta) 10 MG tablet    Other Relevant Orders    CBC & Differential    Comprehensive Metabolic Panel    Microalbumin / Creatinine Urine Ratio - Urine, Clean Catch    Magnesium    Urinalysis With Microscopic - Urine, Clean Catch    Atherosclerosis of native artery of both lower extremities with intermittent claudication (HCC) (Chronic)    Overview     Patient is status post left lower extremity atherectomy and balloon angioplasty by Dr. Carbajal on 1/7/2020.    2021 SAIDA and Doppler shows mild arterial insufficiency in both lower extremities at rest.  It shows significant vascular claudication on the right leg.  It also shows significant subclavian  and arterial insufficiency since the arm blood pressures are significantly different.    Taking daily  aspirin and plavix and nightly statin.          Current Assessment & Plan     She will continue trying to walk and exercise the legs daily. She will continue taking daily aspirin and nightly statin. She will follow up with Dr. Magana. She will continue Plavix daily.         Mixed hyperlipidemia    Overview     Taking  rosuvastatin every evening.               Current Assessment & Plan     She will continue rosuvastatin every evening.         Relevant Medications    rosuvastatin (CRESTOR) 40 MG tablet    Other Relevant Orders    Lipid Panel    Vitamin B12    TSH    Right carotid stenosis    Overview     Dr. Magana did ultrasound followed by CT scan.  Blockage of the left ICA was noted to be 80 to 90% by Dr. Magana.  Right ICA with about 70%  stenosis.  Left subclavian with about 90% stenosis.  Status post 3/3/2022  left carotid endarterectomy  performed by Dr. Magana.  Status post 8/2022 right carotid endarterectomy by Dr. Magana.  Taking rosuvastatin nightly, clopidogrel daily, 81 mg coated aspirin daily.         Current Assessment & Plan     She is status post right carotid endarterectomy with good results. She will continue rosuvastatin, Plavix, and daily aspirin.            Genitourinary and Reproductive     Stage III CKD (Chronic)    Overview                Current Assessment & Plan     She will continue trying to drink plenty of fluids every day. She will continue to avoid NSAIDs.            Health Encounters    Medicare annual wellness visit, subsequent - Primary    Current Assessment & Plan     She is up to date on flu shot. She is given the Covid bivalent vaccine today. She is up to date on other vaccinations except for Shingrix which she will get at her pharmacy in about 1 month. On colorectal screening, she had Cologuard test in October 2021. She is up to date on mammogram. DEXA is due after December 28.         Annual physical exam    Current Assessment & Plan     She is up to date on flu shot. She is given the Covid bivalent vaccine today. She is up to date on other vaccinations except for Shingrix which she will get at her pharmacy in about 1 month. On colorectal screening, she had Cologuard test in October 2021. She is up to date on mammogram. DEXA is due after December 28.            Mental Health    Major depressive disorder, recurrent episode, mild degree (HCC) (Chronic)    Overview     Taking citalopram 40 mg daily.             Current Assessment & Plan     She will continue current dose of citalopram daily. She did have tardive dyskinesia side effects from Abilify.         Relevant Medications    citalopram (CeleXA) 40 MG tablet       Musculoskeletal and Injuries    Osteopenia of multiple sites    Overview     12/28/2020 DEXA showed  mild improvement in osteopenia of the hip.  T score in the left total hip is now -1.1 and in  the left femoral neck is -2.0.  (Osteoporosis is -2.5 or less.)    Continue calcium with vitamin D3.  Do some weightbearing exercises including walking and using hand weights or lifting things at the barn every day.             Current Assessment & Plan     She will continue weightbearing exercises daily with walking and arm exercises and lifting small hand weights as tolerated. She will continue taking vitamin D3 and calcium.         Relevant Orders    Vitamin D,25-Hydroxy       Pulmonary and Pneumonias    COPD (chronic obstructive pulmonary disease) (Chronic)    Overview     History of tobacco use.  Using Breo Ellipta inhaler once a day.           Current Assessment & Plan     She will continue Breo Ellipta inhaler daily.             Relevant Medications    fluticasone (FLONASE) 50 MCG/ACT nasal spray    Fluticasone Furoate-Vilanterol (BREO ELLIPTA) 200-25 MCG/ACT inhaler     Other Visit Diagnoses       Need for vaccination        Relevant Orders    COVID-19 Bivalent Booster (Pfizer) 12+yrs    Menopausal and postmenopausal disorder        Relevant Orders    DEXA Bone Density Axial         Calorie Counting for Weight Loss  Calories are units of energy. Your body needs a certain number of calories from food to keep going throughout the day. When you eat or drink more calories than your body needs, your body stores the extra calories mostly as fat. When you eat or drink fewer calories than your body needs, your body burns fat to get the energy it needs.  Calorie counting means keeping track of how many calories you eat and drink each day. Calorie counting can be helpful if you need to lose weight. If you eat fewer calories than your body needs, you should lose weight. Ask your health care provider what a healthy weight is for you.  For calorie counting to work, you will need to eat the right number of calories each day to  lose a healthy amount of weight per week. A dietitian can help you figure out how many calories you need in a day and will suggest ways to reach your calorie goal.  A healthy amount of weight to lose each week is usually 1-2 lb (0.5-0.9 kg). This usually means that your daily calorie intake should be reduced by 500-750 calories.  Eating 1,200-1,500 calories a day can help most women lose weight.  Eating 1,500-1,800 calories a day can help most men lose weight.  What do I need to know about calorie counting?  Work with your health care provider or dietitian to determine how many calories you should get each day. To meet your daily calorie goal, you will need to:  Find out how many calories are in each food that you would like to eat. Try to do this before you eat.  Decide how much of the food you plan to eat.  Keep a food log. Do this by writing down what you ate and how many calories it had.  To successfully lose weight, it is important to balance calorie counting with a healthy lifestyle that includes regular activity.  Where do I find calorie information?  The number of calories in a food can be found on a Nutrition Facts label. If a food does not have a Nutrition Facts label, try to look up the calories online or ask your dietitian for help.  Remember that calories are listed per serving. If you choose to have more than one serving of a food, you will have to multiply the calories per serving by the number of servings you plan to eat. For example, the label on a package of bread might say that a serving size is 1 slice and that there are 90 calories in a serving. If you eat 1 slice, you will have eaten 90 calories. If you eat 2 slices, you will have eaten 180 calories.  How do I keep a food log?  After each time that you eat, record the following in your food log as soon as possible:  What you ate. Be sure to include toppings, sauces, and other extras on the food.  How much you ate. This can be measured in cups,  ounces, or number of items.  How many calories were in each food and drink.  The total number of calories in the food you ate.  Keep your food log near you, such as in a pocket-sized notebook or on an ministerio or website on your mobile phone. Some programs will calculate calories for you and show you how many calories you have left to meet your daily goal.  What are some portion-control tips?  Know how many calories are in a serving. This will help you know how many servings you can have of a certain food.  Use a measuring cup to measure serving sizes. You could also try weighing out portions on a kitchen scale. With time, you will be able to estimate serving sizes for some foods.  Take time to put servings of different foods on your favorite plates or in your favorite bowls and cups so you know what a serving looks like.  Try not to eat straight from a food's packaging, such as from a bag or box. Eating straight from the package makes it hard to see how much you are eating and can lead to overeating. Put the amount you would like to eat in a cup or on a plate to make sure you are eating the right portion.  Use smaller plates, glasses, and bowls for smaller portions and to prevent overeating.  Try not to multitask. For example, avoid watching TV or using your computer while eating. If it is time to eat, sit down at a table and enjoy your food. This will help you recognize when you are full. It will also help you be more mindful of what and how much you are eating.  What are tips for following this plan?  Reading food labels  Check the calorie count compared with the serving size. The serving size may be smaller than what you are used to eating.  Check the source of the calories. Try to choose foods that are high in protein, fiber, and vitamins, and low in saturated fat, trans fat, and sodium.  Shopping  Read nutrition labels while you shop. This will help you make healthy decisions about which foods to buy.  Pay  attention to nutrition labels for low-fat or fat-free foods. These foods sometimes have the same number of calories or more calories than the full-fat versions. They also often have added sugar, starch, or salt to make up for flavor that was removed with the fat.  Make a grocery list of lower-calorie foods and stick to it.  Cooking  Try to cook your favorite foods in a healthier way. For example, try baking instead of frying.  Use low-fat dairy products.  Meal planning  Use more fruits and vegetables. One-half of your plate should be fruits and vegetables.  Include lean proteins, such as chicken, turkey, and fish.  Lifestyle  Each week, aim to do one of the followin minutes of moderate exercise, such as walking.  75 minutes of vigorous exercise, such as running.  General information  Know how many calories are in the foods you eat most often. This will help you calculate calorie counts faster.  Find a way of tracking calories that works for you. Get creative. Try different apps or programs if writing down calories does not work for you.  What foods should I eat?    Eat nutritious foods. It is better to have a nutritious, high-calorie food, such as an avocado, than a food with few nutrients, such as a bag of potato chips.  Use your calories on foods and drinks that will fill you up and will not leave you hungry soon after eating.  Examples of foods that fill you up are nuts and nut butters, vegetables, lean proteins, and high-fiber foods such as whole grains. High-fiber foods are foods with more than 5 g of fiber per serving.  Pay attention to calories in drinks. Low-calorie drinks include water and unsweetened drinks.  The items listed above may not be a complete list of foods and beverages you can eat. Contact a dietitian for more information.  What foods should I limit?  Limit foods or drinks that are not good sources of vitamins, minerals, or protein or that are high in unhealthy fats. These  include:  Candy.  Other sweets.  Sodas, specialty coffee drinks, alcohol, and juice.  The items listed above may not be a complete list of foods and beverages you should avoid. Contact a dietitian for more information.  How do I count calories when eating out?  Pay attention to portions. Often, portions are much larger when eating out. Try these tips to keep portions smaller:  Consider sharing a meal instead of getting your own.  If you get your own meal, eat only half of it. Before you start eating, ask for a container and put half of your meal into it.  When available, consider ordering smaller portions from the menu instead of full portions.  Pay attention to your food and drink choices. Knowing the way food is cooked and what is included with the meal can help you eat fewer calories.  If calories are listed on the menu, choose the lower-calorie options.  Choose dishes that include vegetables, fruits, whole grains, low-fat dairy products, and lean proteins.  Choose items that are boiled, broiled, grilled, or steamed. Avoid items that are buttered, battered, fried, or served with cream sauce. Items labeled as crispy are usually fried, unless stated otherwise.  Choose water, low-fat milk, unsweetened iced tea, or other drinks without added sugar. If you want an alcoholic beverage, choose a lower-calorie option, such as a glass of wine or light beer.  Ask for dressings, sauces, and syrups on the side. These are usually high in calories, so you should limit the amount you eat.  If you want a salad, choose a garden salad and ask for grilled meats. Avoid extra toppings such as sutton, cheese, or fried items. Ask for the dressing on the side, or ask for olive oil and vinegar or lemon to use as dressing.  Estimate how many servings of a food you are given. Knowing serving sizes will help you be aware of how much food you are eating at restaurants.  Where to find more information  Centers for Disease Control and  Prevention: www.cdc.gov  [place] Department of Agriculture: myplate.gov  Summary  Calorie counting means keeping track of how many calories you eat and drink each day. If you eat fewer calories than your body needs, you should lose weight.  A healthy amount of weight to lose per week is usually 1-2 lb (0.5-0.9 kg). This usually means reducing your daily calorie intake by 500-750 calories.  The number of calories in a food can be found on a Nutrition Facts label. If a food does not have a Nutrition Facts label, try to look up the calories online or ask your dietitian for help.  Use smaller plates, glasses, and bowls for smaller portions and to prevent overeating.  Use your calories on foods and drinks that will fill you up and not leave you hungry shortly after a meal.  This information is not intended to replace advice given to you by your health care provider. Make sure you discuss any questions you have with your health care provider.  Document Revised: 01/28/2021 Document Reviewed: 01/28/2021  [employer] Patient Education © 2022 [employer]  BMI for Adults  What is BMI?  Body mass index (BMI) is a number that is calculated from a person's weight and height. BMI can help estimate how much of a person's weight is composed of fat. BMI does not measure body fat directly. Rather, it is an alternative to procedures that directly measure body fat, which can be difficult and expensive.  BMI can help identify people who may be at higher risk for certain medical problems.  What are BMI measurements used for?  BMI is used as a screening tool to identify possible weight problems. It helps determine whether a person is obese, overweight, a healthy weight, or underweight.  BMI is useful for:  Identifying a weight problem that may be related to a medical condition or may increase the risk for medical problems.  Promoting changes, such as changes in diet and exercise, to help reach a healthy weight. BMI screening can be repeated to  "see if these changes are working.  How is BMI calculated?  BMI involves measuring your weight in relation to your height. Both height and weight are measured, and the BMI is calculated from those numbers. This can be done either in English ([place] or metric measurements. Note that charts and online BMI calculators are available to help you find your BMI quickly and easily without having to do these calculations yourself.  To calculate your BMI in English ([place] measurements:    Measure your weight in pounds (lb).  Multiply the number of pounds by 703.  For example, for a person who weighs 180 lb, multiply that number by 703, which equals 126,540.  Measure your height in inches. Then multiply that number by itself to get a measurement called \"inches squared.\"  For example, for a person who is 70 inches tall, the \"inches squared\" measurement is 70 inches x 70 inches, which equals 4,900 inches squared.  Divide the total from step 2 (number of lb x 703) by the total from step 3 (inches squared): 126,540 ÷ 4,900 = 25.8. This is your BMI.  To calculate your BMI in metric measurements:  Measure your weight in kilograms (kg).  Measure your height in meters (m). Then multiply that number by itself to get a measurement called \"meters squared.\"  For example, for a person who is 1.75 m tall, the \"meters squared\" measurement is 1.75 m x 1.75 m, which is equal to 3.1 meters squared.  Divide the number of kilograms (your weight) by the meters squared number. In this example: 70 ÷ 3.1 = 22.6. This is your BMI.  What do the results mean?  BMI charts are used to identify whether you are underweight, normal weight, overweight, or obese. The following guidelines will be used:  Underweight: BMI less than 18.5.  Normal weight: BMI between 18.5 and 24.9.  Overweight: BMI between 25 and 29.9.  Obese: BMI of 30 or above.  Keep these notes in mind:  Weight includes both fat and muscle, so someone with a muscular build, such as an athlete, may " have a BMI that is higher than 24.9. In cases like these, BMI is not an accurate measure of body fat.  To determine if excess body fat is the cause of a BMI of 25 or higher, further assessments may need to be done by a health care provider.  BMI is usually interpreted in the same way for men and women.  Where to find more information  For more information about BMI, including tools to quickly calculate your BMI, go to these websites:  Centers for Disease Control and Prevention: www.cdc.gov  American Heart Association: www.heart.org  National Heart, Lung, and Blood [place]: www.nhlbi.nih.gov  Summary  Body mass index (BMI) is a number that is calculated from a person's weight and height.  BMI may help estimate how much of a person's weight is composed of fat. BMI can help identify those who may be at higher risk for certain medical problems.  BMI can be measured using English measurements or metric measurements.  BMI charts are used to identify whether you are underweight, normal weight, overweight, or obese.  This information is not intended to replace advice given to you by your health care provider. Make sure you discuss any questions you have with your health care provider.  Document Revised: 09/09/2020 Document Reviewed: 07/17/2020  [employer] Patient Education © 2022 [employer]  Exercising

## 2022-11-16 NOTE — ASSESSMENT & PLAN NOTE
She is status post right carotid endarterectomy with good results. She will continue rosuvastatin, Plavix, and daily aspirin.

## 2022-11-16 NOTE — ASSESSMENT & PLAN NOTE
She will continue current dose of citalopram daily. She did have tardive dyskinesia side effects from Abilify.

## 2022-11-16 NOTE — ASSESSMENT & PLAN NOTE
She will continue weightbearing exercises daily with walking and arm exercises and lifting small hand weights as tolerated. She will continue taking vitamin D3 and calcium.

## 2022-11-16 NOTE — ASSESSMENT & PLAN NOTE
She will continue trying to walk and exercise the legs daily. She will continue taking daily aspirin and nightly statin. She will follow up with Dr. Magana. She will continue Plavix daily.

## 2022-12-14 ENCOUNTER — OFFICE VISIT (OUTPATIENT)
Dept: INTERNAL MEDICINE | Facility: CLINIC | Age: 67
End: 2022-12-14

## 2022-12-14 VITALS
OXYGEN SATURATION: 98 % | SYSTOLIC BLOOD PRESSURE: 136 MMHG | HEART RATE: 65 BPM | DIASTOLIC BLOOD PRESSURE: 72 MMHG | HEIGHT: 62 IN | TEMPERATURE: 95.7 F | WEIGHT: 118.2 LBS | BODY MASS INDEX: 21.75 KG/M2

## 2022-12-14 DIAGNOSIS — E78.2 MIXED HYPERLIPIDEMIA: ICD-10-CM

## 2022-12-14 DIAGNOSIS — M19.072 ARTHRITIS OF FIRST METATARSOPHALANGEAL (MTP) JOINT OF LEFT FOOT: Chronic | ICD-10-CM

## 2022-12-14 DIAGNOSIS — I10 BENIGN ESSENTIAL HYPERTENSION: ICD-10-CM

## 2022-12-14 DIAGNOSIS — I73.9 PERIPHERAL VASCULAR DISEASE OF EXTREMITY WITH CLAUDICATION: Chronic | ICD-10-CM

## 2022-12-14 DIAGNOSIS — I10 BENIGN ESSENTIAL HYPERTENSION: Primary | Chronic | ICD-10-CM

## 2022-12-14 PROCEDURE — 99214 OFFICE O/P EST MOD 30 MIN: CPT | Performed by: INTERNAL MEDICINE

## 2022-12-14 RX ORDER — FOLIC ACID 1 MG/1
2000 TABLET ORAL DAILY
Qty: 180 TABLET | Refills: 3 | Status: SHIPPED | OUTPATIENT
Start: 2022-12-14

## 2022-12-14 RX ORDER — CHOLECALCIFEROL (VITAMIN D3) 125 MCG
500 CAPSULE ORAL DAILY
Qty: 90 TABLET | Refills: 1 | Status: SHIPPED | OUTPATIENT
Start: 2022-12-14

## 2022-12-14 RX ORDER — AMLODIPINE BESYLATE 5 MG/1
5 TABLET ORAL DAILY
Qty: 90 TABLET | Refills: 1 | Status: SHIPPED | OUTPATIENT
Start: 2022-12-14

## 2022-12-14 NOTE — TELEPHONE ENCOUNTER
Rx Refill Note  Requested Prescriptions     Pending Prescriptions Disp Refills   • amLODIPine (NORVASC) 5 MG tablet [Pharmacy Med Name: AMLODIPINE BESYLATE 5MG TABLETS] 90 tablet 1     Sig: TAKE 1 TABLET BY MOUTH DAILY      Last office visit with prescribing clinician: 11/15/2022   Next office visit with prescribing clinician: 12/14/2022                         Would you like a call back once the refill request has been completed: [] Yes [] No    If the office needs to give you a call back, can they leave a voicemail: [] Yes [] No    Mercedes Trammell LPN  12/14/22, 13:30 EST

## 2022-12-14 NOTE — PROGRESS NOTES
[facility]     Aarti Wade  1955   [id number]      Patient Care Team:  Jannette Chapa MD as PCP - General  Jannette Chapa MD as PCP - Family Medicine  Severino Carbajal MD as Consulting Physician (Vascular Surgery)  Ketan Lazcano MD as Consulting Physician (Orthopedic Surgery)  Floyd Ernandez MD as Consulting Physician (Cardiology)  Lorenzo Good MD as Consulting Physician (Ophthalmology)    Chief Complaint   Patient presents with   • Hyperlipidemia   • Hypertension            HPI  Patient is a 67 y.o. female presents with hypertension, hypertension, and peripheral vascular disease.     Peripheral vascular disease  The patient states she was having trouble with her socks being tight at night and causing throbbing of the left great toe. She has since changed the socks she wears and reports her symptoms have resolved since doing so. Denies any swelling of the bilateral lower extremities and pain in the bilateral calves with ambulation. Confirms nocturnal bilateral foot pain. Per the patient, her feet do not feel cold and look normal as if there is good circulation. Denies wearing shoe inserts. Currently, she takes aspirin and Plavix as prescribed.    Hypertension  Ms. Wade denies monitoring her blood pressure at home recently but recalls an elevated systolic blood pressure of 177 mmHg at doctors appointment yesterday, which she attributes to nerves regarding possible Botox injections. Today in the office, her systolic blood pressure is elevated again at 172 mmHg after using 3 flights of stairs on her way into the clinic. Upon rechecking her blood pressure at the end of the visit, it is 136/78 mmHg. For hypertension, she confirms taking amlodipine 10 mg and bisoprolol as prescribed.     Hyperlipidemia  For hyperlipidemia, she takes rosuvastatin as prescribed.    Supplement review  She reports taking 2 folic acid per day and confirms continued vitamin B12 supplementation.  Vitamin B12 refills are requested at this time.     Health maintenance  The patient is up to date on all vaccinations with the exception of a shingles vaccine.      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 HARRISON   • CAROTID ENDARTERECTOMY Right 8/31/2022    Procedure: CAROTID ENDARTERECTOMY- RIGHT;  Surgeon: Francisco Magana MD;  Location:  [facility] OR;  Service: Vascular;  Laterality: Right;   • COLONOSCOPY      last 8 years ago.  Due to schedule   • EYE LENS IMPLANT SECONDARY Left    • FEMORAL FEMORAL BYPASS Right 8/22/2016    Procedure: RIGHT COMMON FEMORAL ENDARTERECTOMY WITH PATCH;  Surgeon: Severino Harrison MD;  Location:  [facility] OR;  Service:    • FINGER SURGERY Left     LEFT INDEX FINGER   • INTERVENTIONAL RADIOLOGY PROCEDURE N/A 8/16/2016    Procedure: IR PTA femoral popliteal artery;  Surgeon: Severino Harrison MD;  Location:  [facility] CATH INVASIVE LOCATION;  Service:    • INTERVENTIONAL RADIOLOGY PROCEDURE Left 1/7/2020    Procedure: LEFT ATHERECTOMY, BALLOON ANGIOPLASTY;  Surgeon: Severino Harrison MD;  Location:  [facility] CATH  INVASIVE LOCATION;  Service: Interventional Radiology   • TONSILLECTOMY     • TOTAL SHOULDER ARTHROPLASTY Left 2020    Procedure: TOTAL SHOULDER ARTHROPLASTY LEFT;  Surgeon: Ketan Lazcano MD;  Location: [facility] OR;  Service: Orthopedics;  Laterality: Left;  protector in: 1346  protector out: 1402       Family History   Problem Relation Age of Onset   • Osteoarthritis Mother    • Alzheimer's disease Mother    • Hypertension Father    • Heart attack Father    • Alcohol abuse Father    • No Known Problems Sister    • No Known Problems Daughter    • No Known Problems Son    • Breast cancer Maternal Grandmother 50   • Breast cancer Paternal Grandmother 50   • No Known Problems Maternal Aunt    • No Known Problems Paternal Aunt    • Other Other    • Heart attack Other    • Coronary artery disease Paternal Grandfather    • Stroke Paternal Grandfather    • No Known Problems Sister    • Ovarian cancer Neg Hx    • Endometrial cancer Neg Hx        Social History     Socioeconomic History   • Marital status: Single   • Number of children: 0   Tobacco Use   • Smoking status: Former     Packs/day: 1.00     Years: 40.00     Pack years: 40.00     Types: Electronic Cigarette, Cigarettes     Quit date: 2016     Years since quittin.4   • Smokeless tobacco: Never   • Tobacco comments:     1 PACK/DAY SMOKER UNTIL 2016   Vaping Use   • Vaping Use: Former   Substance and Sexual Activity   • Alcohol use: Yes     Alcohol/week: 14.0 standard drinks     Types: 14 Shots of liquor per week     Comment: 2 DRINKS PER DAY   • Drug use: No   • Sexual activity: Defer       Allergies   Allergen Reactions   • Levocetirizine Dihydrochloride Myalgia     Muscle aches/joint pain   • Abilify [Aripiprazole] Other (See Comments)     Tardive- Dyskinesia       Review of Systems:     Review of Systems  The appropriate review of systems is included in the HPI.    Vital Signs  Vitals:    22 1357 22 1426   BP: 172/70 136/72  "  BP Location: Right arm Right arm   Patient Position: Sitting Sitting   Cuff Size: Adult Adult   Pulse: 65    Temp: 95.7 °F (35.4 °C)    TempSrc: Infrared    SpO2: 98%    Weight: 53.6 kg (118 lb 3.2 oz)    Height: 156.9 cm (61.77\")    PainSc:   3    PainLoc: Toe      Body mass index is 21.78 kg/m².  BMI is within normal parameters. No other follow-up for BMI required.        Current Outpatient Medications:   •  acetaminophen (Acetaminophen 8 Hour) 650 MG 8 hr tablet, Take 650 mg by mouth 2 (two) times a day., Disp: , Rfl:   •  amLODIPine (NORVASC) 5 MG tablet, TAKE 1 TABLET BY MOUTH DAILY, Disp: 90 tablet, Rfl: 1  •  ascorbic acid (VITAMIN C) 1000 MG tablet, Take 1,000 mg by mouth Daily., Disp: , Rfl:   •  aspirin 81 MG EC tablet, Take 81 mg by mouth every morning. TOLD NOT TO STOP BEFORE SURGERY, Disp: , Rfl:   •  bisoprolol (ZEBeta) 10 MG tablet, TAKE 1 TABLET BY MOUTH DAILY, Disp: 90 tablet, Rfl: 1  •  Calcium Citrate-Vitamin D (CALCIUM + D PO), Take 2 tablets by mouth Daily., Disp: , Rfl:   •  citalopram (CeleXA) 40 MG tablet, TAKE 1 TABLET BY MOUTH DAILY, Disp: 90 tablet, Rfl: 1  •  clopidogrel (PLAVIX) 75 MG tablet, TAKE 1 TABLET BY MOUTH DAILY, Disp: 90 tablet, Rfl: 1  •  Coenzyme Q10 200 MG capsule, Take 400 mg by mouth Daily., Disp: , Rfl:   •  Fish Oil oil, Take 1 capsule by mouth Daily., Disp: , Rfl:   •  Flaxseed, Linseed, (FLAXSEED OIL) 1000 MG capsule, Take 1 capsule by mouth daily., Disp: , Rfl:   •  fluticasone (FLONASE) 50 MCG/ACT nasal spray, SHAKE LIQUID AND USE 1 SPRAY IN EACH NOSTRIL TWICE DAILY, Disp: 48 g, Rfl: 3  •  Fluticasone Furoate-Vilanterol (BREO ELLIPTA) 200-25 MCG/ACT inhaler, INHALE 1 PUFF BY MOUTH DAILY, Disp: 60 each, Rfl: 11  •  folic acid (FOLVITE) 1 MG tablet, Take 2 tablets by mouth Daily., Disp: 180 tablet, Rfl: 3  •  gabapentin (NEURONTIN) 100 MG capsule, TAKE 3 CAPSULES BY MOUTH EVERY EVENING, Disp: 90 capsule, Rfl: 2  •  Methylsulfonylmethane (MSM PO), Take 2 tablets by " "mouth daily., Disp: , Rfl:   •  Multiple Vitamins-Minerals (MULTIVITAMIN ADULT PO), Take 1 tablet by mouth daily., Disp: , Rfl:   •  omeprazole (priLOSEC) 20 MG capsule, Take 1 capsule by mouth Daily., Disp: 90 capsule, Rfl: 3  •  rosuvastatin (CRESTOR) 40 MG tablet, Take 1 tablet by mouth Daily., Disp: 90 tablet, Rfl: 3  •  traMADol (ULTRAM) 50 MG tablet, TAKE 1 TABLET BY MOUTH TWICE DAILY, Disp: 180 tablet, Rfl: 2  •  URINARY HEALTH/CRANBERRY PO, Take 1 tablet by mouth 2 (Two) Times a Day. \"CRANACTIN\", Disp: , Rfl:   •  vitamin B-12 (CYANOCOBALAMIN) 500 MCG tablet, Take 1 tablet by mouth Daily., Disp: 90 tablet, Rfl: 1  •  amLODIPine (NORVASC) 10 MG tablet, Take 1 tablet by mouth Daily., Disp: 90 tablet, Rfl: 3    Physical Exam:    Physical Exam  Vitals and nursing note reviewed.   Constitutional:       Appearance: She is well-developed.   HENT:      Head: Normocephalic.   Eyes:      Conjunctiva/sclera: Conjunctivae normal.      Pupils: Pupils are equal, round, and reactive to light.   Neck:      Thyroid: No thyromegaly.   Cardiovascular:      Rate and Rhythm: Normal rate and regular rhythm.      Pulses: Normal pulses.      Heart sounds: Normal heart sounds.      Comments: Bilateral feet have good peripheral pulses and are pink and warm.  Pulmonary:      Effort: Pulmonary effort is normal.      Breath sounds: Normal breath sounds. No wheezing.   Musculoskeletal:         General: Normal range of motion.      Cervical back: Normal range of motion and neck supple.   Feet:      Comments: The MTP joint of the left great toe does have arthritic thickening. No tenderness and no redness.  Lymphadenopathy:      Cervical: No cervical adenopathy.   Skin:     General: Skin is warm and dry.   Neurological:      Mental Status: She is alert and oriented to person, place, and time.   Psychiatric:         Thought Content: Thought content normal.          ACE III MINI        Results Review:    None    CMP:  Lab Results   Component " Value Date    BUN 24 (H) 09/21/2022    CREATININE 1.09 (H) 09/21/2022    EGFRIFNONA 59 (L) 10/06/2021    EGFRIFAFRI 72 10/06/2021    BCR 22.0 09/21/2022     09/21/2022    K 5.2 09/21/2022    CO2 21.0 (L) 09/21/2022    CALCIUM 8.9 09/21/2022    PROTENTOTREF 7.0 09/14/2022    ALBUMIN 4.70 09/14/2022    LABGLOBREF 2.3 09/14/2022    LABIL2 2.0 09/14/2022    BILITOT 0.2 09/14/2022    ALKPHOS 71 09/14/2022    AST 21 09/14/2022    ALT 15 09/14/2022     HbA1c:  Lab Results   Component Value Date    HGBA1C 4.90 08/26/2022    HGBA1C 5.50 06/05/2020     Microalbumin:  Lab Results   Component Value Date    MICROALBUR 32.0 10/06/2021     Lipid Panel  Lab Results   Component Value Date    CHOL 136 10/09/2020    TRIG 144 10/06/2021    HDL 59 10/06/2021    LDL 93 10/06/2021    AST 21 09/14/2022    ALT 15 09/14/2022       Medication Review: Medications reviewed and noted  Patient Instructions   Problem List Items Addressed This Visit        Cardiac and Vasculature    PVD (Chronic)    Overview     Taking daily Plavix and aspirin and nightly rosuvastatin.           Current Assessment & Plan     - Continue taking Plavix, aspirin, and nightly rosuvastatin.  - She has good peripheral pulses today.  - She will let us know if she starts to have leg pain with ambulation.         Benign essential hypertension - Primary (Chronic)    Overview     Taking amlodipine, bisoprolol.  Amlodipine increased to 10 mg daily on 9/21/2022.         Current Assessment & Plan     - Continue current doses of amlodipine and bisoprolol.  - Continue to avoid salt in the diet.         Relevant Medications    amLODIPine (NORVASC) 10 MG tablet    bisoprolol (ZEBeta) 10 MG tablet    amLODIPine (NORVASC) 5 MG tablet    Mixed hyperlipidemia    Overview     Taking  rosuvastatin every evening.               Current Assessment & Plan     - Continue rosuvastatin every evening.  - Continue to improve low fat, low sugar diet.         Relevant Medications     rosuvastatin (CRESTOR) 40 MG tablet       Musculoskeletal and Injuries    Arthritis of first metatarsophalangeal (MTP) joint of left foot (Chronic)    Current Assessment & Plan     - She will make sure she has good support in the boots she wears for work.               Diagnosis Plan   1. Benign essential hypertension        2. PVD        3. Mixed hyperlipidemia        4. Arthritis of first metatarsophalangeal (MTP) joint of left foot                Plan of care reviewed with patient at the conclusion of today's visit. Education was provided regarding diagnosis, management, and any prescribed or recommended OTC medications.Patient verbalizes understanding of and agreement with management plan.         Jannette Chapa MD    Transcribed from ambient dictation for Jannette Chapa MD by Bella Jacobsen.  12/14/22   15:23 EST    Patient or patient representative verbalized consent to the visit recording.

## 2022-12-14 NOTE — PATIENT INSTRUCTIONS
Patient Instructions   Problem List Items Addressed This Visit          Cardiac and Vasculature    PVD (Chronic)    Overview     Taking daily Plavix and aspirin and nightly rosuvastatin.           Current Assessment & Plan     - Continue taking Plavix, aspirin, and nightly rosuvastatin.  - She has good peripheral pulses today.  - She will let us know if she starts to have leg pain with ambulation.         Benign essential hypertension - Primary (Chronic)    Overview     Taking amlodipine, bisoprolol.  Amlodipine increased to 10 mg daily on 9/21/2022.         Current Assessment & Plan     - Continue current doses of amlodipine and bisoprolol.  - Continue to avoid salt in the diet.         Relevant Medications    amLODIPine (NORVASC) 10 MG tablet    bisoprolol (ZEBeta) 10 MG tablet    amLODIPine (NORVASC) 5 MG tablet    Mixed hyperlipidemia    Overview     Taking  rosuvastatin every evening.               Current Assessment & Plan     - Continue rosuvastatin every evening.  - Continue to improve low fat, low sugar diet.         Relevant Medications    rosuvastatin (CRESTOR) 40 MG tablet       Musculoskeletal and Injuries    Arthritis of first metatarsophalangeal (MTP) joint of left foot (Chronic)    Current Assessment & Plan     - She will make sure she has good support in the boots she wears for work.

## 2022-12-14 NOTE — ASSESSMENT & PLAN NOTE
- Continue taking Plavix, aspirin, and nightly rosuvastatin.  - She has good peripheral pulses today.  - She will let us know if she starts to have leg pain with ambulation.

## 2022-12-27 RX ORDER — CITALOPRAM 40 MG/1
40 TABLET ORAL DAILY
Qty: 90 TABLET | Refills: 1 | Status: SHIPPED | OUTPATIENT
Start: 2022-12-27

## 2023-01-12 RX ORDER — CLOPIDOGREL BISULFATE 75 MG/1
75 TABLET ORAL DAILY
Qty: 90 TABLET | Refills: 1 | Status: SHIPPED | OUTPATIENT
Start: 2023-01-12 | End: 2023-01-16

## 2023-01-12 NOTE — TELEPHONE ENCOUNTER
Rx Refill Note  Requested Prescriptions     Pending Prescriptions Disp Refills   • clopidogrel (PLAVIX) 75 MG tablet 90 tablet 1     Sig: Take 1 tablet by mouth Daily.      Last office visit with prescribing clinician: 12/14/2022   Last telemedicine visit with prescribing clinician: 3/6/2023   Next office visit with prescribing clinician: 3/6/2023                         Would you like a call back once the refill request has been completed: [] Yes [] No    If the office needs to give you a call back, can they leave a voicemail: [] Yes [] No    Koki Artis LPN  01/12/23, 15:35 EST

## 2023-01-12 NOTE — TELEPHONE ENCOUNTER
Caller: Mauro Aarti NELIDA    Relationship: Self    Best call back number: [phone]  Requested Prescriptions:   Requested Prescriptions     Pending Prescriptions Disp Refills   • clopidogrel (PLAVIX) 75 MG tablet 90 tablet 1     Sig: Take 1 tablet by mouth Daily.        Pharmacy where request should be sent:      [facility] #[id number] [facility] [address] AT [facility] [phone] [facility] [phone] FX        Additional details provided by patient:     Does the patient have less than a 3 day supply:  [x] Yes  [] No    Would you like a call back once the refill request has been completed: [] Yes [x] No    If the office needs to give you a call back, can they leave a voicemail: [] Yes [x] No    Latanya Yan Rep   01/12/23 15:30 EST

## 2023-01-13 DIAGNOSIS — M19.111 POST-TRAUMATIC OSTEOARTHRITIS OF RIGHT SHOULDER: ICD-10-CM

## 2023-01-13 RX ORDER — GABAPENTIN 100 MG/1
300 CAPSULE ORAL EVERY EVENING
Qty: 90 CAPSULE | Refills: 2 | Status: SHIPPED | OUTPATIENT
Start: 2023-01-13 | End: 2023-04-05 | Stop reason: SDUPTHER

## 2023-01-13 NOTE — TELEPHONE ENCOUNTER
Caller: WadeAarti    Relationship: Self    Best call back number:[phone]    Requested Prescriptions:   Requested Prescriptions     Pending Prescriptions Disp Refills   • gabapentin (NEURONTIN) 100 MG capsule 90 capsule 2     Sig: Take 3 capsules by mouth Every Evening.        [id number] [address] [facility] [phone] [facility] [phone] FX     Additional details provided by patient:     Does the patient have less than a 3 day supply:  [x] Yes  [] No    Would you like a call back once the refill request has been completed: [] Yes [x] No    If the office needs to give you a call back, can they leave a voicemail: [] Yes [x] No    Latanya Dietrich Rep   01/13/23 09:23 EST

## 2023-01-13 NOTE — TELEPHONE ENCOUNTER
Rx Refill Note  Requested Prescriptions     Pending Prescriptions Disp Refills   • gabapentin (NEURONTIN) 100 MG capsule 90 capsule 2     Sig: Take 3 capsules by mouth Every Evening.      Last office visit with prescribing clinician: 12/14/2022   Last telemedicine visit with prescribing clinician: 3/6/2023   Next office visit with prescribing clinician: 3/6/2023                         Would you like a call back once the refill request has been completed: [] Yes [] No    If the office needs to give you a call back, can they leave a voicemail: [] Yes [] No    Koki Artis LPN  01/13/23, 09:31 EST

## 2023-01-14 DIAGNOSIS — M19.111 POST-TRAUMATIC OSTEOARTHRITIS OF RIGHT SHOULDER: ICD-10-CM

## 2023-01-16 RX ORDER — CLOPIDOGREL BISULFATE 75 MG/1
75 TABLET ORAL DAILY
Qty: 90 TABLET | Refills: 1 | OUTPATIENT
Start: 2023-01-16

## 2023-01-16 RX ORDER — GABAPENTIN 100 MG/1
300 CAPSULE ORAL EVERY EVENING
Qty: 90 CAPSULE | OUTPATIENT
Start: 2023-01-16

## 2023-01-16 RX ORDER — CLOPIDOGREL BISULFATE 75 MG/1
75 TABLET ORAL DAILY
Qty: 90 TABLET | Refills: 1 | Status: SHIPPED | OUTPATIENT
Start: 2023-01-16

## 2023-01-16 NOTE — TELEPHONE ENCOUNTER
Rx Refill Note  Requested Prescriptions     Pending Prescriptions Disp Refills   • clopidogrel (PLAVIX) 75 MG tablet [Pharmacy Med Name: CLOPIDOGREL 75MG TABLETS] 90 tablet 1     Sig: TAKE 1 TABLET BY MOUTH DAILY   • gabapentin (NEURONTIN) 100 MG capsule [Pharmacy Med Name: GABAPENTIN 100MG CAPSULES] 90 capsule      Sig: TAKE 3 CAPSULES BY MOUTH EVERY EVENING      Last office visit with prescribing clinician: 12/14/2022   Next office visit with prescribing clinician: 3/6/2023     LA: 01/13/23 #90 2R TOO EARLY                         Would you like a call back once the refill request has been completed: [] Yes [] No    If the office needs to give you a call back, can they leave a voicemail: [] Yes [] No    Mercedes Trammell LPN  01/16/23, 09:36 EST

## 2023-01-16 NOTE — TELEPHONE ENCOUNTER
Rx Refill Note  Requested Prescriptions     Pending Prescriptions Disp Refills   • clopidogrel (PLAVIX) 75 MG tablet [Pharmacy Med Name: CLOPIDOGREL 75MG TABLETS] 90 tablet 1     Sig: TAKE 1 TABLET BY MOUTH DAILY      Last office visit with prescribing clinician: 12/14/2022   Last telemedicine visit with prescribing clinician: 3/6/2023   Next office visit with prescribing clinician: 3/6/2023                           Pinky Ramírez RN  01/16/23, 11:27 EST

## 2023-01-17 ENCOUNTER — LAB (OUTPATIENT)
Dept: LAB | Facility: HOSPITAL | Age: 68
End: 2023-01-17
Payer: MEDICARE

## 2023-01-17 DIAGNOSIS — E78.2 MIXED HYPERLIPIDEMIA: ICD-10-CM

## 2023-01-17 DIAGNOSIS — I10 BENIGN ESSENTIAL HYPERTENSION: Chronic | ICD-10-CM

## 2023-01-17 DIAGNOSIS — M85.89 OSTEOPENIA OF MULTIPLE SITES: ICD-10-CM

## 2023-01-18 LAB
25(OH)D3+25(OH)D2 SERPL-MCNC: 51 NG/ML (ref 30–100)
ALBUMIN SERPL-MCNC: 4.8 G/DL (ref 3.8–4.8)
ALBUMIN/CREAT UR: 375 MG/G CREAT (ref 0–29)
ALBUMIN/GLOB SERPL: 1.9 {RATIO} (ref 1.2–2.2)
ALP SERPL-CCNC: 71 IU/L (ref 44–121)
ALT SERPL-CCNC: 19 IU/L (ref 0–32)
APPEARANCE UR: CLEAR
AST SERPL-CCNC: 31 IU/L (ref 0–40)
BACTERIA #/AREA URNS HPF: ABNORMAL /[HPF]
BASOPHILS # BLD AUTO: 0.1 X10E3/UL (ref 0–0.2)
BASOPHILS NFR BLD AUTO: 1 %
BILIRUB SERPL-MCNC: <0.2 MG/DL (ref 0–1.2)
BILIRUB UR QL STRIP: NEGATIVE
BUN SERPL-MCNC: 21 MG/DL (ref 8–27)
BUN/CREAT SERPL: 18 (ref 12–28)
CALCIUM SERPL-MCNC: 8.9 MG/DL (ref 8.7–10.3)
CASTS URNS QL MICRO: ABNORMAL /LPF
CHLORIDE SERPL-SCNC: 103 MMOL/L (ref 96–106)
CHOLEST SERPL-MCNC: 165 MG/DL (ref 100–199)
CO2 SERPL-SCNC: 20 MMOL/L (ref 20–29)
COLOR UR: YELLOW
CREAT SERPL-MCNC: 1.17 MG/DL (ref 0.57–1)
CREAT UR-MCNC: 148 MG/DL
EGFRCR SERPLBLD CKD-EPI 2021: 50 ML/MIN/1.73
EOSINOPHIL # BLD AUTO: 0.6 X10E3/UL (ref 0–0.4)
EOSINOPHIL NFR BLD AUTO: 7 %
EPI CELLS #/AREA URNS HPF: ABNORMAL /HPF (ref 0–10)
ERYTHROCYTE [DISTWIDTH] IN BLOOD BY AUTOMATED COUNT: 12.9 % (ref 11.7–15.4)
GLOBULIN SER CALC-MCNC: 2.5 G/DL (ref 1.5–4.5)
GLUCOSE SERPL-MCNC: 95 MG/DL (ref 70–99)
GLUCOSE UR QL STRIP: NEGATIVE
HCT VFR BLD AUTO: 37 % (ref 34–46.6)
HDLC SERPL-MCNC: 86 MG/DL
HGB BLD-MCNC: 12.1 G/DL (ref 11.1–15.9)
HGB UR QL STRIP: NEGATIVE
IMM GRANULOCYTES # BLD AUTO: 0 X10E3/UL (ref 0–0.1)
IMM GRANULOCYTES NFR BLD AUTO: 0 %
KETONES UR QL STRIP: NEGATIVE
LDLC SERPL CALC-MCNC: 67 MG/DL (ref 0–99)
LEUKOCYTE ESTERASE UR QL STRIP: ABNORMAL
LYMPHOCYTES # BLD AUTO: 0.7 X10E3/UL (ref 0.7–3.1)
LYMPHOCYTES NFR BLD AUTO: 8 %
MAGNESIUM SERPL-MCNC: 2.9 MG/DL (ref 1.6–2.3)
MCH RBC QN AUTO: 29.4 PG (ref 26.6–33)
MCHC RBC AUTO-ENTMCNC: 32.7 G/DL (ref 31.5–35.7)
MCV RBC AUTO: 90 FL (ref 79–97)
MICRO URNS: ABNORMAL
MICROALBUMIN UR-MCNC: 555.1 UG/ML
MONOCYTES # BLD AUTO: 0.9 X10E3/UL (ref 0.1–0.9)
MONOCYTES NFR BLD AUTO: 10 %
NEUTROPHILS # BLD AUTO: 6.7 X10E3/UL (ref 1.4–7)
NEUTROPHILS NFR BLD AUTO: 74 %
NITRITE UR QL STRIP: NEGATIVE
PH UR STRIP: 6 [PH] (ref 5–7.5)
PLATELET # BLD AUTO: 386 X10E3/UL (ref 150–450)
POTASSIUM SERPL-SCNC: 5.4 MMOL/L (ref 3.5–5.2)
PROT SERPL-MCNC: 7.3 G/DL (ref 6–8.5)
PROT UR QL STRIP: ABNORMAL
RBC # BLD AUTO: 4.11 X10E6/UL (ref 3.77–5.28)
RBC #/AREA URNS HPF: ABNORMAL /HPF (ref 0–2)
SODIUM SERPL-SCNC: 142 MMOL/L (ref 134–144)
SP GR UR STRIP: 1.02 (ref 1–1.03)
TRIGL SERPL-MCNC: 62 MG/DL (ref 0–149)
TSH SERPL DL<=0.005 MIU/L-ACNC: 2.57 UIU/ML (ref 0.45–4.5)
UROBILINOGEN UR STRIP-MCNC: 0.2 MG/DL (ref 0.2–1)
VIT B12 SERPL-MCNC: 926 PG/ML (ref 232–1245)
VLDLC SERPL CALC-MCNC: 12 MG/DL (ref 5–40)
WBC # BLD AUTO: 9 X10E3/UL (ref 3.4–10.8)
WBC #/AREA URNS HPF: ABNORMAL /HPF (ref 0–5)

## 2023-01-24 ENCOUNTER — HOSPITAL ENCOUNTER (OUTPATIENT)
Dept: BONE DENSITY | Facility: HOSPITAL | Age: 68
Discharge: HOME | End: 2023-01-24
Admitting: INTERNAL MEDICINE
Payer: MEDICARE

## 2023-01-24 DIAGNOSIS — N95.9 MENOPAUSAL AND POSTMENOPAUSAL DISORDER: ICD-10-CM

## 2023-01-24 PROCEDURE — 77080 DXA BONE DENSITY AXIAL: CPT

## 2023-02-15 RX ORDER — ROSUVASTATIN CALCIUM 40 MG/1
40 TABLET, COATED ORAL DAILY
Qty: 90 TABLET | Refills: 3 | Status: SHIPPED | OUTPATIENT
Start: 2023-02-15

## 2023-03-13 ENCOUNTER — OFFICE VISIT (OUTPATIENT)
Dept: INTERNAL MEDICINE | Facility: CLINIC | Age: 68
End: 2023-03-13
Payer: MEDICARE

## 2023-03-13 VITALS
OXYGEN SATURATION: 96 % | DIASTOLIC BLOOD PRESSURE: 60 MMHG | BODY MASS INDEX: 22.31 KG/M2 | HEIGHT: 62 IN | TEMPERATURE: 98.2 F | WEIGHT: 121.2 LBS | SYSTOLIC BLOOD PRESSURE: 132 MMHG | HEART RATE: 66 BPM

## 2023-03-13 DIAGNOSIS — S91.109A OPEN WOUND OF TOE, INITIAL ENCOUNTER: Primary | ICD-10-CM

## 2023-03-13 DIAGNOSIS — I70.213 ATHEROSCLEROSIS OF NATIVE ARTERY OF BOTH LOWER EXTREMITIES WITH INTERMITTENT CLAUDICATION: Chronic | ICD-10-CM

## 2023-03-13 DIAGNOSIS — R26.89 POOR BALANCE: Chronic | ICD-10-CM

## 2023-03-13 DIAGNOSIS — S91.209A NAIL AVULSION OF TOE, INITIAL ENCOUNTER: ICD-10-CM

## 2023-03-13 PROCEDURE — 3078F DIAST BP <80 MM HG: CPT | Performed by: INTERNAL MEDICINE

## 2023-03-13 PROCEDURE — 99214 OFFICE O/P EST MOD 30 MIN: CPT | Performed by: INTERNAL MEDICINE

## 2023-03-13 PROCEDURE — 87070 CULTURE OTHR SPECIMN AEROBIC: CPT | Performed by: INTERNAL MEDICINE

## 2023-03-13 PROCEDURE — 1159F MED LIST DOCD IN RCRD: CPT | Performed by: INTERNAL MEDICINE

## 2023-03-13 PROCEDURE — 1160F RVW MEDS BY RX/DR IN RCRD: CPT | Performed by: INTERNAL MEDICINE

## 2023-03-13 PROCEDURE — 87205 SMEAR GRAM STAIN: CPT | Performed by: INTERNAL MEDICINE

## 2023-03-13 PROCEDURE — 3075F SYST BP GE 130 - 139MM HG: CPT | Performed by: INTERNAL MEDICINE

## 2023-03-13 NOTE — PATIENT INSTRUCTIONS
Patient Instructions   Problem List Items Addressed This Visit          Cardiac and Vasculature    Atherosclerosis of native artery of both lower extremities with intermittent claudication (HCC) (Chronic)    Overview     Patient is status post left lower extremity atherectomy and balloon angioplasty by Dr. Carbajal on 1/7/2020.    2021 SAIDA and Doppler shows mild arterial insufficiency in both lower extremities at rest.  It shows significant vascular claudication on the right leg.  It also shows significant subclavian  and arterial insufficiency since the arm blood pressures are significantly different.    Taking daily  aspirin and plavix and nightly statin.          Current Assessment & Plan     - She will continue aspirin, Plavix, and nightly statin.         Relevant Orders    Ambulatory Referral to Physical Therapy Evaluate and treat (Completed)       Musculoskeletal and Injuries    Open wound of toe - Primary    Current Assessment & Plan     - She was advised to stop using Neosporin.  - She will continue taking Bactrim twice per day.  - She is being referred to podiatry for possible nail removal.  - Culture of the wound was sent today.         Relevant Orders    Wound Culture - Wound, Toe, Right    Ambulatory Referral to Podiatry (Completed)       Symptoms and Signs    Poor balance (Chronic)    Current Assessment & Plan     - We will refer for physical therapy at [facility].         Relevant Orders    Ambulatory Referral to Physical Therapy Evaluate and treat (Completed)       Other    Nail avulsion of toe, initial encounter    Current Assessment & Plan     - She was advised to stop using Neosporin.  - She will continue taking Bactrim twice per day.  - She is being referred to podiatry for possible nail removal.  - Culture of the wound was sent today.         Relevant Orders    Ambulatory Referral to Podiatry (Completed)       Fall Prevention in the Home, Adult  Falls can cause injuries and affect  people of all ages. There are many simple things that you can do to make your home safe and to help prevent falls. Ask for help when making these changes, if needed.  What actions can I take to prevent falls?  General instructions  Use good lighting in all rooms. Replace any light bulbs that burn out, turn on lights if it is dark, and use night-lights.  Place frequently used items in easy-to-reach places. Lower the shelves around your home if necessary.  Set up furniture so that there are clear paths around it. Avoid moving your furniture around.  Remove throw rugs and other tripping hazards from the floor.  Avoid walking on wet floors.  Fix any uneven floor surfaces.  Add color or contrast paint or tape to grab bars and handrails in your home. Place contrasting color strips on the first and last steps of staircases.  When you use a stepladder, make sure that it is completely opened and that the sides and supports are firmly locked. Have someone hold the ladder while you are using it. Do not climb a closed stepladder.  Know where your pets are when moving through your home.  What can I do in the bathroom?     Keep the floor dry. Immediately clean up any water that is on the floor.  Remove soap buildup in the tub or shower regularly.  Use nonskid mats or decals on the floor of the tub or shower.  Attach bath mats securely with double-sided, nonslip rug tape.  If you need to sit down while you are in the shower, use a plastic, nonslip stool.  Install grab bars by the toilet and in the tub and shower. Do not use towel bars as grab bars.  What can I do in the bedroom?  Make sure that a bedside light is easy to reach.  Do not use oversized bedding that reaches the floor.  Have a firm chair that has side arms to use for getting dressed.  What can I do in the kitchen?  Clean up any spills right away.  If you need to reach for something above you, use a sturdy step stool that has a grab bar.  Keep electrical cables out of  the way.  Do not use floor polish or wax that makes floors slippery. If you must use wax, make sure that it is non-skid floor wax.  What can I do with my stairs?  Do not leave any items on the stairs.  Make sure that you have a light switch at the top and the bottom of the stairs. Have them installed if you do not have them.  Make sure that there are handrails on both sides of the stairs. Fix handrails that are broken or loose. Make sure that handrails are as long as the staircases.  Install non-slip stair treads on all stairs in your home.  Avoid having throw rugs at the top or bottom of stairs, or secure the rugs with carpet tape to prevent them from moving.  Choose a carpet design that does not hide the edge of steps on the stairs.  Check any carpeting to make sure that it is firmly attached to the stairs. Fix any carpet that is loose or worn.  What can I do on the outside of my home?  Use bright outdoor lighting.  Regularly repair the edges of walkways and driveways and fix any cracks.  Remove high doorway thresholds.  Trim any shrubbery on the main path into your home.  Regularly check that handrails are securely fastened and in good repair. Both sides of all steps should have handrails.  Install guardrails along the edges of any raised decks or porches.  Clear walkways of debris and clutter, including tools and rocks.  Have leaves, snow, and ice cleared regularly.  Use sand or salt on walkways during winter months.  In the garage, clean up any spills right away, including grease or oil spills.  What other actions can I take?  Wear closed-toe shoes that fit well and support your feet. Wear shoes that have rubber soles or low heels.  Use mobility aids as needed, such as canes, walkers, scooters, and crutches.  Review your medicines with your health care provider. Some medicines can cause dizziness or changes in blood pressure, which increase your risk of falling.  Talk with your health care provider about other  ways that you can decrease your risk of falls. This may include working with a physical therapist or  to improve your strength, balance, and endurance.  Where to find more information  Centers for Disease Control and PreventionRIGOBERTO: www.cdc.gov  National [place] on Aging: www.[place].nih.gov  Contact a health care provider if:  You are afraid of falling at home.  You feel weak, drowsy, or dizzy at home.  You fall at home.  Summary  There are many simple things that you can do to make your home safe and to help prevent falls.  Ways to make your home safe include removing tripping hazards and installing grab bars in the bathroom.  Ask for help when making these changes in your home.  This information is not intended to replace advice given to you by your health care provider. Make sure you discuss any questions you have with your health care provider.  Document Revised: 09/19/2022 Document Reviewed: 07/21/2021  Elsevier Patient Education © 2022 [employer] Inc.

## 2023-03-13 NOTE — PROGRESS NOTES
[facility]     Aarti Wade  1955   [id number]      Patient Care Team:  Jannette Chapa MD as PCP - General  Jannette Chapa MD as PCP - Family Medicine  Severino Carbajal MD as Consulting Physician (Vascular Surgery)  Ketan Lazcano MD as Consulting Physician (Orthopedic Surgery)  Floyd Ernandez MD as Consulting Physician (Cardiology)  Lorenzo Good MD as Consulting Physician (Ophthalmology)    Chief Complaint   Patient presents with   • Toe Injury     Right big toe            HPI  Patient is a 68 y.o. female who presents today for evaluation of right great toe pain.    Right great toe pain  A horse stepped on her right great toe on Thursday, 03/09/2022. She lost her balance and was trying to pull her foot out. The patient started Bactrim on Saturday, 03/11/2022. She is taking the Bactrim twice daily. The patient is scared to take the nail off. She has been changing the dressing once a day. The patient has been applying Neosporin. She has not applied peroxide. It is still swollen. The patient is able to move her toe. The rest of her foot is okay. She is agreeable to a referral to a podiatrist.    Poor balance  The patient has been falling too much. She denies vertigo or spinning. The patient said her balance is not good. Her blood pressure is good at 132/60 mmHg. She is willing to try physical therapy to work on her balance.    Health maintenance   She will see her cardiologist on 03/17/2023.   The patient has an appointment on 03/21/2023 to follow-up her recent intubation.      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 HARRISON   • CAROTID ENDARTERECTOMY Right 8/31/2022    Procedure: CAROTID ENDARTERECTOMY- RIGHT;  Surgeon: Francisco Magana MD;  Location: [facility] OR;  Service: Vascular;  Laterality: Right;   • COLONOSCOPY      last 8 years ago.  Due to schedule   • EYE LENS IMPLANT SECONDARY Left    • FEMORAL FEMORAL BYPASS Right 8/22/2016    Procedure: RIGHT COMMON FEMORAL ENDARTERECTOMY WITH PATCH;  Surgeon: Severino Harrison MD;  Location: [facility] OR;  Service:    • FINGER SURGERY Left     LEFT INDEX FINGER   • INTERVENTIONAL RADIOLOGY PROCEDURE N/A 8/16/2016    Procedure: IR PTA femoral popliteal artery;  Surgeon: Severino Harrison MD;  Location: [facility] CATH INVASIVE LOCATION;  Service:    • INTERVENTIONAL RADIOLOGY PROCEDURE Left 1/7/2020    Procedure: LEFT ATHERECTOMY, BALLOON ANGIOPLASTY;  Surgeon: Severino Harrison MD;  Location: [facility] CATH INVASIVE LOCATION;  Service: Interventional Radiology   • TONSILLECTOMY     • TOTAL SHOULDER ARTHROPLASTY Left 6/8/2020    Procedure: TOTAL SHOULDER ARTHROPLASTY LEFT;  Surgeon: Ketan Lazcano MD;  Location: [facility] OR;  Service: Orthopedics;  Laterality: Left;  protector in: 1346  protector out: 1402       Family History   Problem Relation Age of Onset   • Osteoarthritis Mother    • Alzheimer's disease Mother    • Hypertension Father    • Heart attack Father    • Alcohol abuse Father    • No Known Problems Sister    • No Known Problems Daughter    • No  "Known Problems Son    • Breast cancer Maternal Grandmother 50   • Breast cancer Paternal Grandmother 50   • No Known Problems Maternal Aunt    • No Known Problems Paternal Aunt    • Other Other    • Heart attack Other    • Coronary artery disease Paternal Grandfather    • Stroke Paternal Grandfather    • No Known Problems Sister    • Ovarian cancer Neg Hx    • Endometrial cancer Neg Hx        Social History     Socioeconomic History   • Marital status: Single   • Number of children: 0   Tobacco Use   • Smoking status: Former     Packs/day: 1.00     Years: 40.00     Pack years: 40.00     Types: Electronic Cigarette, Cigarettes     Quit date: 2016     Years since quittin.7   • Smokeless tobacco: Never   • Tobacco comments:     1 PACK/DAY SMOKER UNTIL 2016   Vaping Use   • Vaping Use: Former   Substance and Sexual Activity   • Alcohol use: Yes     Alcohol/week: 14.0 standard drinks     Types: 14 Shots of liquor per week     Comment: 2 DRINKS PER DAY   • Drug use: No   • Sexual activity: Defer       Allergies   Allergen Reactions   • Levocetirizine Dihydrochloride Myalgia     Muscle aches/joint pain   • Abilify [Aripiprazole] Other (See Comments)     Tardive- Dyskinesia         Vital Signs  Vitals:    23 1246   BP: 132/60   BP Location: Right arm   Patient Position: Sitting   Cuff Size: Adult   Pulse: 66   Temp: 98.2 °F (36.8 °C)   TempSrc: Infrared   SpO2: 96%   Weight: 55 kg (121 lb 3.2 oz)   Height: 156.9 cm (61.77\")     Body mass index is 22.33 kg/m².  BMI is within normal parameters. No other follow-up for BMI required.        Current Outpatient Medications:   •  acetaminophen (Acetaminophen 8 Hour) 650 MG 8 hr tablet, Take 1 tablet by mouth 2 (two) times a day., Disp: , Rfl:   •  amLODIPine (NORVASC) 5 MG tablet, TAKE 1 TABLET BY MOUTH DAILY (Patient taking differently: Take 2 tablets by mouth Daily. Pt takes 2 tablets per day totaling 10mg), Disp: 90 tablet, Rfl: 1  •  ascorbic acid (VITAMIN " "C) 1000 MG tablet, Take 1 tablet by mouth Daily., Disp: , Rfl:   •  aspirin 81 MG EC tablet, Take 1 tablet by mouth Every Morning. TOLD NOT TO STOP BEFORE SURGERY, Disp: , Rfl:   •  bisoprolol (ZEBeta) 10 MG tablet, TAKE 1 TABLET BY MOUTH DAILY, Disp: 90 tablet, Rfl: 1  •  Calcium Citrate-Vitamin D (CALCIUM + D PO), Take 2 tablets by mouth Daily., Disp: , Rfl:   •  citalopram (CeleXA) 40 MG tablet, TAKE 1 TABLET BY MOUTH DAILY, Disp: 90 tablet, Rfl: 1  •  clopidogrel (PLAVIX) 75 MG tablet, TAKE 1 TABLET BY MOUTH DAILY, Disp: 90 tablet, Rfl: 1  •  Coenzyme Q10 200 MG capsule, Take 400 mg by mouth Daily., Disp: , Rfl:   •  Fish Oil oil, Take 1 capsule by mouth Daily., Disp: , Rfl:   •  Flaxseed, Linseed, (FLAXSEED OIL) 1000 MG capsule, Take 1 capsule by mouth daily., Disp: , Rfl:   •  fluticasone (FLONASE) 50 MCG/ACT nasal spray, SHAKE LIQUID AND USE 1 SPRAY IN EACH NOSTRIL TWICE DAILY, Disp: 48 g, Rfl: 3  •  Fluticasone Furoate-Vilanterol (BREO ELLIPTA) 200-25 MCG/ACT inhaler, INHALE 1 PUFF BY MOUTH DAILY, Disp: 60 each, Rfl: 11  •  folic acid (FOLVITE) 1 MG tablet, Take 2 tablets by mouth Daily., Disp: 180 tablet, Rfl: 3  •  gabapentin (NEURONTIN) 100 MG capsule, Take 3 capsules by mouth Every Evening., Disp: 90 capsule, Rfl: 2  •  Methylsulfonylmethane (MSM PO), Take 2 tablets by mouth daily., Disp: , Rfl:   •  Multiple Vitamins-Minerals (MULTIVITAMIN ADULT PO), Take 1 tablet by mouth Daily., Disp: , Rfl:   •  omeprazole (priLOSEC) 20 MG capsule, Take 1 capsule by mouth Daily., Disp: 90 capsule, Rfl: 3  •  rosuvastatin (CRESTOR) 40 MG tablet, TAKE 1 TABLET BY MOUTH DAILY, Disp: 90 tablet, Rfl: 3  •  traMADol (ULTRAM) 50 MG tablet, TAKE 1 TABLET BY MOUTH TWICE DAILY, Disp: 180 tablet, Rfl: 2  •  URINARY HEALTH/CRANBERRY PO, Take 1 tablet by mouth 2 (Two) Times a Day. \"CRANACTIN\", Disp: , Rfl:   •  vitamin B-12 (CYANOCOBALAMIN) 500 MCG tablet, Take 1 tablet by mouth Daily., Disp: 90 tablet, Rfl: 1  •  amLODIPine " (NORVASC) 10 MG tablet, Take 1 tablet by mouth Daily., Disp: 90 tablet, Rfl: 3    Physical Exam:    Physical Exam  Vitals and nursing note reviewed.   Constitutional:       Appearance: She is well-developed.   HENT:      Head: Normocephalic.   Eyes:      Conjunctiva/sclera: Conjunctivae normal.      Pupils: Pupils are equal, round, and reactive to light.   Neck:      Thyroid: No thyromegaly.   Cardiovascular:      Rate and Rhythm: Normal rate and regular rhythm.      Heart sounds: Normal heart sounds.      Comments: Decreased peripheral pulses, both feet, warm and pink.  Pulmonary:      Effort: Pulmonary effort is normal.      Breath sounds: Normal breath sounds.   Musculoskeletal:         General: Normal range of motion.      Cervical back: Normal range of motion and neck supple.      Comments: Right great toe is swollen with some redness proximal to the nail and including the paronychia. Right great toenail is chronically thickened and somewhat lifted on the end, but is now pretty loose all over with a lot of dried blood under it. The wound was cleaned today and bandaged with gauze and culture sent.    Lymphadenopathy:      Cervical: No cervical adenopathy.   Neurological:      Mental Status: She is alert and oriented to person, place, and time.   Psychiatric:         Thought Content: Thought content normal.          ACE III MINI        Results Review:    None    CMP:  Lab Results   Component Value Date    BUN 21 01/17/2023    CREATININE 1.17 (H) 01/17/2023    EGFRIFNONA 59 (L) 10/06/2021    EGFRIFAFRI 72 10/06/2021    BCR 18 01/17/2023     01/17/2023    K 5.4 (H) 01/17/2023    CO2 20 01/17/2023    CALCIUM 8.9 01/17/2023    PROTENTOTREF 7.3 01/17/2023    ALBUMIN 4.8 01/17/2023    LABGLOBREF 2.5 01/17/2023    LABIL2 1.9 01/17/2023    BILITOT <0.2 01/17/2023    ALKPHOS 71 01/17/2023    AST 31 01/17/2023    ALT 19 01/17/2023     HbA1c:  Lab Results   Component Value Date    HGBA1C 4.90 08/26/2022    HGBA1C 5.50  06/05/2020     Microalbumin:  Lab Results   Component Value Date    MICROALBUR 555.1 01/17/2023     Lipid Panel  Lab Results   Component Value Date    CHOL 136 10/09/2020    TRIG 62 01/17/2023    HDL 86 01/17/2023    LDL 67 01/17/2023    AST 31 01/17/2023    ALT 19 01/17/2023       Medication Review: Medications reviewed and noted  Patient Instructions   Problem List Items Addressed This Visit        Cardiac and Vasculature    Atherosclerosis of native artery of both lower extremities with intermittent claudication (HCC) (Chronic)    Overview     Patient is status post left lower extremity atherectomy and balloon angioplasty by Dr. Carbajal on 1/7/2020.    2021 SAIDA and Doppler shows mild arterial insufficiency in both lower extremities at rest.  It shows significant vascular claudication on the right leg.  It also shows significant subclavian  and arterial insufficiency since the arm blood pressures are significantly different.    Taking daily  aspirin and plavix and nightly statin.          Current Assessment & Plan     - She will continue aspirin, Plavix, and nightly statin.         Relevant Orders    Ambulatory Referral to Physical Therapy Evaluate and treat (Completed)       Musculoskeletal and Injuries    Open wound of toe - Primary    Current Assessment & Plan     - She was advised to stop using Neosporin.  - She will continue taking Bactrim twice per day.  - She is being referred to podiatry for possible nail removal.  - Culture of the wound was sent today.         Relevant Orders    Wound Culture - Wound, Toe, Right    Ambulatory Referral to Podiatry (Completed)       Symptoms and Signs    Poor balance (Chronic)    Current Assessment & Plan     - We will refer for physical therapy at [facility].         Relevant Orders    Ambulatory Referral to Physical Therapy Evaluate and treat (Completed)       Other    Nail avulsion of toe, initial encounter    Current Assessment & Plan     - She was  advised to stop using Neosporin.  - She will continue taking Bactrim twice per day.  - She is being referred to podiatry for possible nail removal.  - Culture of the wound was sent today.         Relevant Orders    Ambulatory Referral to Podiatry (Completed)          Diagnosis Plan   1. Open wound of toe, initial encounter  Wound Culture - Wound, Toe, Right    Ambulatory Referral to Podiatry    Wound Culture - Wound, Toe, Right      2. Nail avulsion of toe, initial encounter  Ambulatory Referral to Podiatry      3. Poor balance  Ambulatory Referral to Physical Therapy Evaluate and treat      4. Atherosclerosis of native artery of both lower extremities with intermittent claudication (HCC)  Ambulatory Referral to Physical Therapy Evaluate and treat        Follow-up: She is coming back to see me on 04/10/2023.        Plan of care reviewed with patient at the conclusion of today's visit. Education was provided regarding diagnosis, management, and any prescribed or recommended OTC medications.Patient verbalizes understanding of and agreement with management plan.         Jannette Chapa MD      Transcribed from ambient dictation for Jannette Chapa MD by Maude Carvalho.  03/13/23   13:44 EDT    Patient or patient representative verbalized consent to the visit recording.  I have personally performed the services described in this document as transcribed by the above individual, and it is both accurate and complete.  Jannette Chapa MD  3/13/2023  15:32 EDT

## 2023-03-13 NOTE — ASSESSMENT & PLAN NOTE
- She was advised to stop using Neosporin.  - She will continue taking Bactrim twice per day.  - She is being referred to podiatry for possible nail removal.  - Culture of the wound was sent today.

## 2023-03-17 ENCOUNTER — OFFICE VISIT (OUTPATIENT)
Dept: CARDIOLOGY | Facility: CLINIC | Age: 68
End: 2023-03-17
Payer: MEDICARE

## 2023-03-17 VITALS
BODY MASS INDEX: 21.53 KG/M2 | SYSTOLIC BLOOD PRESSURE: 120 MMHG | WEIGHT: 117 LBS | HEART RATE: 62 BPM | RESPIRATION RATE: 18 BRPM | HEIGHT: 62 IN | OXYGEN SATURATION: 96 % | DIASTOLIC BLOOD PRESSURE: 58 MMHG

## 2023-03-17 DIAGNOSIS — E78.5 DYSLIPIDEMIA: Chronic | ICD-10-CM

## 2023-03-17 DIAGNOSIS — I70.213 ATHEROSCLEROSIS OF NATIVE ARTERY OF BOTH LOWER EXTREMITIES WITH INTERMITTENT CLAUDICATION: Primary | Chronic | ICD-10-CM

## 2023-03-17 DIAGNOSIS — I65.23 BILATERAL CAROTID ARTERY STENOSIS: Chronic | ICD-10-CM

## 2023-03-17 DIAGNOSIS — R42 DIZZINESS: ICD-10-CM

## 2023-03-17 DIAGNOSIS — I10 BENIGN ESSENTIAL HYPERTENSION: Chronic | ICD-10-CM

## 2023-03-17 LAB
BACTERIA SPEC AEROBE CULT: ABNORMAL
BACTERIA SPEC AEROBE CULT: ABNORMAL
GRAM STN SPEC: ABNORMAL

## 2023-03-17 PROCEDURE — 1159F MED LIST DOCD IN RCRD: CPT | Performed by: NURSE PRACTITIONER

## 2023-03-17 PROCEDURE — 1160F RVW MEDS BY RX/DR IN RCRD: CPT | Performed by: NURSE PRACTITIONER

## 2023-03-17 PROCEDURE — 3078F DIAST BP <80 MM HG: CPT | Performed by: NURSE PRACTITIONER

## 2023-03-17 PROCEDURE — 3074F SYST BP LT 130 MM HG: CPT | Performed by: NURSE PRACTITIONER

## 2023-03-17 PROCEDURE — 99214 OFFICE O/P EST MOD 30 MIN: CPT | Performed by: NURSE PRACTITIONER

## 2023-03-17 NOTE — PROGRESS NOTES
Subjective:     Encounter Date:03/17/2023      Patient ID: Aarti Wade is a 68 y.o.  single white female, from [place], [place], recently retired and she has her Masters in Social Work and worked for [employer] (works with Alcoholics Anonymous), and now she raises/sells race horses.      INTERNIST: Jannette Chapa MD  VASCULAR SURGEON: Severino Carbajal MD, Francisco Magana MD  UROLOGIST: Unknown (near [facility]?)  REMOTE ORTHOPEDIST: Lance Burgess MD  NEPHROLOGIST: Unknown  ORTHOPEDIC SURGEON: Ketan Lazcano MD .  ENT: Castro Urrutia MD     Chief Complaint:   Chief Complaint   Patient presents with   • Follow-up     Atherosclerosis of native artery of both lower extremities with intermittent claudication     Problem List:  1. Peripheral arterial disease:  a. Subclavian steal syndrome, 2016   b. Abnormal right femoral arterial duplex and popliteal tibial arterial duplex, on ASA and Plavix, with right femoral endarterectomy August 2016 (Dr. Carbajal)  c. Residual class I claudication without recent TIA/presyncope  d. Carotid duplex 11/18/2019: LICA 0-49% with retrograde left vertebral artery flow demonstrated, R ICA 50-69%, proximal LICA plaque without significant stenosis  e. Renal artery duplex 12/11/2019: Parenchymal disease in bilateral arteries, no JAIMEE  f. Left atherectomy, balloon angioplasty 1/7/2020: Common femoral arteries patent with moderate stenosis.  The common iliac, external iliac and internal leg arteries patent without evidence of stenosis.  Mid common femoral artery exhibits an occlusive lesion.  The origin of the profundus femoris artery appears to be occluded.  The SFA is reconstituted at its origin via collateral vessels.  The SFA, popliteal artery, and trifurcation are all patent.  Dominant flow to the foot is via the posterior tibial artery with a patent peroneal and anterior tibial artery into the distal leg.  Left common femoral artery-SFA atherectomy with balloon angioplasty.   After atherectomy and drug-coated balloon angioplasty, the common femoral artery and SFA as well as the profunda femoris artery were patent without evidence of hemodynamically significant stenosis  g. ABIs October 2020 unchanged from previous study February 2020, right 0.74, left 0.87  h. Carotid duplex 7/11/2022: Proximal L ICA plaque without significant stenosis with subsequent 50 to 69% mid segment stenosis beyond previous remote endarterectomy site.  R ICA stenosis greater than 70%.  Normal right vertebral artery flow with retrograde left vertebral artery flow suggestive of left subclavian artery steal syndrome.  Progressive R ICA stenosis noted since November 2019 study  i. Right carotid endarterectomy September 2022  j. Residual class I claudication, January 2020, July 2020, August 2021, September 2022, March 2023  2. Hypertensive cardiovascular disease:  a. Remote IV stress test over 10 years ago; negative per patient-data deficit  b. Residual class I symptoms/normal EKG with acceptable IV Lexiscan Cardiolite study suggestive of low probability for significant focal obstructive coronary artery disease  (LVEF 0.65), May 2017.  c. Echocardiogram 11/18/2019: Mild to moderate AR, mild TR, LA mildly dilated, LVEF 68%, mild calcification of the aortic valve mainly affecting the non-and left coronary cusps, LV diastolic function normal, normal RV cavity size, wall thickness, systolic function and septal motion noted.  Mild MAC is present.  Aortic valve exhibits moderate sclerosis without stenosis, no pulmonary hypertension, no pericardial effusion  d. Residual class I symptoms, January 2020, July 2020, January 2021, August 2021, September 2022  3. Hypertension  4. Hyperlipidemia; 10.8% 10-year risk; 3.6% with treatment; on statin therapy  5. COPD with mild-moderate exertional dyspnea  6. Former smoker; smoked 1 ppd x 40 years, quit in 2016  7. Syncopal episodes years ago associated with UTIs; last one a couple of  years ago; data deficit  8. Depression  9. GERD  10. History of recurrent UTIs   11. Chronic low back pain  12. GABBY/CKD  13. Dizziness and presyncope January 2020, recurrent dizziness and falls March 2023  14. Breakthrough COVID infection, August 2021.  15. Dysphonia/right vocal cord paralysis with laryngoscopy December 2022  16.  Alcohol use 1-2 standard drinks of vodka daily March 2023  17. Surgical history:  a. Right femoral endarterectomy, August 2016  b. Lens implant, 1990s  c. Spontaneous pneumothorax, 1960  d. Left atherectomy and balloon angioplasty  e. Left total shoulder arthroplasty, June 2020  f. Right carotid endarterectomy September 2022    Allergies   Allergen Reactions   • Abilify [Aripiprazole] Other (See Comments)     Tardive- Dyskinesia   • Levocetirizine Dihydrochloride Myalgia     Muscle aches/joint pain       Current Outpatient Medications   Medication Instructions   • Acetaminophen 8 Hour 650 mg, Oral, 2 times daily   • amLODIPine (NORVASC) 10 mg, Oral, Daily   • amLODIPine (NORVASC) 5 mg, Oral, Daily   • ascorbic acid (VITAMIN C) 1,000 mg, Oral, Daily   • aspirin 81 mg, Oral, Every Morning, TOLD NOT TO STOP BEFORE SURGERY   • bisoprolol (ZEBETA) 10 mg, Oral, Daily   • Calcium Citrate-Vitamin D (CALCIUM + D PO) 2 tablets, Oral, Daily   • citalopram (CELEXA) 40 mg, Oral, Daily   • clopidogrel (PLAVIX) 75 mg, Oral, Daily   • Coenzyme Q10 400 mg, Oral, Daily   • Fish Oil oil 1 capsule, Oral, Daily   • Flaxseed, Linseed, (FLAXSEED OIL) 1000 MG capsule 1 capsule, Oral, Daily   • fluticasone (FLONASE) 50 MCG/ACT nasal spray SHAKE LIQUID AND USE 1 SPRAY IN EACH NOSTRIL TWICE DAILY   • Fluticasone Furoate-Vilanterol (BREO ELLIPTA) 200-25 MCG/ACT inhaler INHALE 1 PUFF BY MOUTH DAILY   • folic acid (FOLVITE) 2,000 mcg, Oral, Daily   • gabapentin (NEURONTIN) 300 mg, Oral, Every Evening   • Methylsulfonylmethane (MSM PO) 2 tablets, Oral, Daily   • Multiple Vitamins-Minerals (MULTIVITAMIN ADULT PO) 1  "tablet, Oral, Daily   • omeprazole (PRILOSEC) 20 mg, Oral, Daily   • rosuvastatin (CRESTOR) 40 mg, Oral, Daily   • traMADol (ULTRAM) 50 MG tablet TAKE 1 TABLET BY MOUTH TWICE DAILY   • URINARY HEALTH/CRANBERRY PO 1 tablet, Oral, 2 Times Daily, \"CRANACTIN\"    • vitamin B-12 (CYANOCOBALAMIN) 500 mcg, Oral, Daily         HISTORY OF PRESENT ILLNESS:  The patient is here for 6-month follow-up.  She had a right carotid endarterectomy September 2022.  At her last appointment her amlodipine was increased to 10 mg daily with consideration of adding hydralazine if her blood pressure remained uncontrolled.  She saw Dr. Magana late September 2022.  She was having some dysphonia/right vocal cord paralysis and was seen by ENT December 2022 and had a laryngoscopy.  Since that time the patient has had more frequent falls.  She denies any presyncope, syncope, palpitations, chest pain, or shortness of breath.  She thinks she may have some lightheadedness occasionally with the falls.  She is also unsure whether she just loses her balance.  She has not sustained any major injuries with this but is concerned because her falls happen about once every couple weeks.  She says that she has been drinking 1-2 standard drinks of vodka per night.  She noticed earlier this week when she had been drinking 2 drinks that her blood pressure was more elevated than today's readings.  She says that she is trying to cut back to 1 drink per night.  She says she also does not get good nutrition which she thinks is playing a part in this as well.  She is supposed to follow-up with Dr. Magana and just received a notification in the mail that she needed to make an appointment.  Her horse recently stepped on her right foot and she lost her right big toenail.  She is being followed by Dr. Chapa for this.  She also is supposed to attend physical therapy soon to help her with her gait disturbances/balance.  She denies any numbness or tingling in her lower " "extremities.  She uses compression stockings daily.      ROS   All other systems reviewed and otherwise negative.    Procedures       Objective:       Vitals:    03/17/23 1315 03/17/23 1326   BP: 120/58 120/58   BP Location: Right arm Right arm   Patient Position: Sitting Standing   Cuff Size: Adult Adult   Pulse: 60 62   Resp: 18    SpO2: 96%    Weight: 53.1 kg (117 lb)    Height: 156.9 cm (61.77\")      Body mass index is 21.56 kg/m².  Last weight September 2022 was 115 pounds  Constitutional:       Appearance: Healthy appearance. Not in distress.   Neck:      Vascular: No JVR. JVD normal.   Pulmonary:      Effort: Pulmonary effort is normal.      Breath sounds: Normal breath sounds. No wheezing. No rhonchi. No rales.   Chest:      Chest wall: Not tender to palpatation.   Cardiovascular:      PMI at left midclavicular line. Normal rate. Regular rhythm. Normal S1. Normal S2.      Murmurs: There is a grade 2/6 systolic murmur at the LLSB.      No gallop. No click. No rub.   Pulses:     Dorsalis pedis: 1+ bilaterally.     Posterior tibial: 1+ bilaterally.  Edema:     Peripheral edema absent.   Abdominal:      General: Bowel sounds are normal.      Palpations: Abdomen is soft.      Tenderness: There is no abdominal tenderness.   Musculoskeletal: Normal range of motion.         General: No tenderness. Skin:     General: Skin is warm and dry.   Neurological:      General: No focal deficit present.      Mental Status: Alert and oriented to person, place and time.           Lab Review:   Lab Results   Component Value Date    GLUCOSE 95 01/17/2023    BUN 21 01/17/2023    CREATININE 1.17 (H) 01/17/2023    EGFRIFNONA 59 (L) 10/06/2021    EGFRIFAFRI 72 10/06/2021    BCR 18 01/17/2023    CO2 20 01/17/2023    CALCIUM 8.9 01/17/2023    PROTENTOTREF 7.3 01/17/2023    ALBUMIN 4.8 01/17/2023    LABIL2 1.9 01/17/2023    AST 31 01/17/2023    ALT 19 01/17/2023       Lab Results   Component Value Date    WBC 9.0 01/17/2023    HGB 12.1 " 01/17/2023    HCT 37.0 01/17/2023    MCV 90 01/17/2023     01/17/2023       Lab Results   Component Value Date    HGBA1C 4.90 08/26/2022       Lab Results   Component Value Date    TSH 2.570 01/17/2023       Lab Results   Component Value Date    CHOL 136 10/09/2020    CHOL 118 01/31/2020     Lab Results   Component Value Date    TRIG 62 01/17/2023    TRIG 144 10/06/2021     Lab Results   Component Value Date    HDL 86 01/17/2023    HDL 59 10/06/2021     Lab Results   Component Value Date    LDL 67 01/17/2023    LDL 93 10/06/2021     Advance Care Planning   ACP discussion was held with the patient during this visit. Patient has an advance directive (not in EMR), copy requested.             Assessment:     Patient has had some occasional dizziness and more frequent falls lately.  She has peripheral vascular disease and has been drinking 1-2 drinks of vodka per day as well.  I feel like her gait disturbances/falls/dizziness are likely multifactorial.  I will have her wear an E patch to assess for any arrhythmias, PAF, or pauses in view of her occasional dizziness.  I will also order a carotid duplex and SAIDA.  She is going to schedule a follow-up appointment with Dr. Magana.        Diagnosis Plan   1. Atherosclerosis of native artery of both lower extremities with intermittent claudication (HCC)   SAIDA      2. Bilateral carotid artery stenosis   Carotid duplex      3. Benign essential hypertension   Controlled, continue current cardiac medications      4. Dyslipidemia   Acceptable lipid panel January 2023, continue rosuvastatin             Plan:         1. Patient to continue current medications and close follow up with the above providers.  2. Tentative cardiology follow up in September 2023 or patient may return sooner PRN.  3. Carotid duplex  4. SAIDA  5. E patch  6. Patient to follow-up with Dr. Magana  7. Decrease alcohol use    Electronically signed by CYRUS Mcwilliams, 03/17/23, 3:52 PM EDT.

## 2023-04-04 DIAGNOSIS — M19.111 POST-TRAUMATIC OSTEOARTHRITIS OF RIGHT SHOULDER: ICD-10-CM

## 2023-04-04 RX ORDER — TRAMADOL HYDROCHLORIDE 50 MG/1
TABLET ORAL
Qty: 180 TABLET | Refills: 2 | Status: SHIPPED | OUTPATIENT
Start: 2023-04-04

## 2023-04-04 RX ORDER — FLUTICASONE PROPIONATE 50 MCG
SPRAY, SUSPENSION (ML) NASAL
Qty: 48 G | Refills: 3 | Status: SHIPPED | OUTPATIENT
Start: 2023-04-04

## 2023-04-04 NOTE — TELEPHONE ENCOUNTER
Rx Refill Note  Requested Prescriptions     Pending Prescriptions Disp Refills   • fluticasone (FLONASE) 50 MCG/ACT nasal spray [Pharmacy Med Name: FLUTICASONE 50MCG TEDDY SP(120SP) RX] 48 g 3     Sig: SHAKE LIQUID AND USE 1 SPRAY IN EACH NOSTRIL TWICE DAILY      Last office visit with prescribing clinician: 3/13/2023   Last telemedicine visit with prescribing clinician: 4/5/2023   Next office visit with prescribing clinician: 4/10/2023                         Would you like a call back once the refill request has been completed: [] Yes [] No    If the office needs to give you a call back, can they leave a voicemail: [] Yes [] No    Koki Artis LPN  04/04/23, 09:19 EDT

## 2023-04-04 NOTE — TELEPHONE ENCOUNTER
Rx Refill Note  Requested Prescriptions     Pending Prescriptions Disp Refills   • traMADol (ULTRAM) 50 MG tablet [Pharmacy Med Name: TRAMADOL 50MG TABLETS] 180 tablet      Sig: TAKE 1 TABLET BY MOUTH TWICE DAILY      Last office visit with prescribing clinician: 03/13/23   Next office visit with prescribing clinician: 4/5/2023     LA: 09/21/22 #180 2R                          Would you like a call back once the refill request has been completed: [] Yes [] No    If the office needs to give you a call back, can they leave a voicemail: [] Yes [] No    Mercedes Trammell LPN  04/04/23, 09:18 EDT

## 2023-04-05 ENCOUNTER — OFFICE VISIT (OUTPATIENT)
Dept: INTERNAL MEDICINE | Facility: CLINIC | Age: 68
End: 2023-04-05
Payer: MEDICARE

## 2023-04-05 VITALS
HEIGHT: 62 IN | BODY MASS INDEX: 21.16 KG/M2 | DIASTOLIC BLOOD PRESSURE: 58 MMHG | SYSTOLIC BLOOD PRESSURE: 128 MMHG | TEMPERATURE: 98.2 F | WEIGHT: 115 LBS | HEART RATE: 68 BPM

## 2023-04-05 DIAGNOSIS — M19.111 POST-TRAUMATIC OSTEOARTHRITIS OF RIGHT SHOULDER: ICD-10-CM

## 2023-04-05 PROCEDURE — 99213 OFFICE O/P EST LOW 20 MIN: CPT | Performed by: NURSE PRACTITIONER

## 2023-04-05 RX ORDER — GABAPENTIN 100 MG/1
300 CAPSULE ORAL EVERY EVENING
Qty: 90 CAPSULE | Refills: 2 | Status: SHIPPED | OUTPATIENT
Start: 2023-04-05

## 2023-04-05 NOTE — PROGRESS NOTES
Aarti Wade  1955  [id number]  Patient Care Team:  Jannette Chapa MD as PCP - General  Jannette Chapa MD as PCP - Family Medicine  Ketan Lazcano MD as Consulting Physician (Orthopedic Surgery)  Lorenzo Good MD as Consulting Physician (Ophthalmology)  Severino Carbajal MD as Consulting Physician (Vascular Surgery)    Aarti Wade is a pleasant 68 y.o. female who presents for evaluation of Shoulder Injury (6 month follow up )    Chief Complaint   Patient presents with   • Shoulder Injury     6 month follow up        HPI:   Aarti is here for a routine 6 mo follow up visit for right shoulder.  Uses tramadol BID and gabapentin 300mg HS. 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    Saw Dr. Burgess yrs ago until he retired then saw one of his partners.  PCP here ([facility]) has been following her since. Saw Dr. Lazcano for a an acute injury of left shoulder tripping on vacuum and had surgical repair on left side.   Had to stop celebrex within the last few yrs secondary to kidney insufficiency and hypertension.  The gabapentin has been prescribed for nighttime shoulder pain.  She started tramadol for pain when it got worse which has continued to work well for the chronic right shoulder pain.     She states she fractured her left clavicle in 07/2022, but she is well healed at this time.     Past Medical History:   Diagnosis Date   • Anxiety    • Arthritis    • Chronic UTI    • Claudication    • COPD (chronic obstructive pulmonary disease)    • Depression    • diffuse fatty liver infiltration on CT 2009   • Dizzy    • Former smoker 08/31/2022   • GERD (gastroesophageal reflux disease)    • Head injury     Related to falls off  horses   • Hepatitis     UNKNOWN TYPE   • History of shingles     about 5 years ago   • Hyperlipidemia    • Hypertension    • multiple fractures and  injuries working with horses    • Osteopenia of multiple sites    • Osteoporosis    • PAD (peripheral artery disease)    • Spontaneous pneumothorax 1960   • Subclavian steal syndrome 2016   • VHD (valvular heart disease) 08/31/2022   • Wears dentures     TOP ONLY   • Wears glasses      Past Surgical History:   Procedure Laterality Date   • ANGIOGRAM - CONVERTED  08/16/2016    AA WITH RUNOFF PER DR. HARRISON   • CAROTID ENDARTERECTOMY Right 8/31/2022    Procedure: CAROTID ENDARTERECTOMY- RIGHT;  Surgeon: Francisco Magana MD;  Location: [facility];  Service: Vascular;  Laterality: Right;   • COLONOSCOPY      last 8 years ago.  Due to schedule   • EYE LENS IMPLANT SECONDARY Left    • FEMORAL FEMORAL BYPASS Right 8/22/2016    Procedure: RIGHT COMMON FEMORAL ENDARTERECTOMY WITH PATCH;  Surgeon: Severino Harrison MD;  Location: [facility] OR;  Service:    • FINGER SURGERY Left     LEFT INDEX FINGER   • INTERVENTIONAL RADIOLOGY PROCEDURE N/A 8/16/2016    Procedure: IR PTA femoral popliteal artery;  Surgeon: Severino Harrison MD;  Location: [facility] CATH INVASIVE LOCATION;  Service:    • INTERVENTIONAL RADIOLOGY PROCEDURE Left 1/7/2020    Procedure: LEFT ATHERECTOMY, BALLOON ANGIOPLASTY;  Surgeon: Severino Harrison MD;  Location: [facility] CATH INVASIVE LOCATION;  Service: Interventional Radiology   • TONSILLECTOMY     • TOTAL SHOULDER ARTHROPLASTY Left 6/8/2020    Procedure: TOTAL SHOULDER ARTHROPLASTY LEFT;  Surgeon: Ketan Lazcano MD;  Location: [facility] OR;  Service: Orthopedics;  Laterality: Left;  protector in: 1346  protector out: 1402     Family History   Problem Relation Age of Onset   • Osteoarthritis Mother    • Alzheimer's disease Mother    • Hypertension Father    • Heart attack Father    • Alcohol abuse Father    • No Known Problems Sister    • No Known Problems Daughter    • No Known Problems Son    • Breast cancer Maternal Grandmother 50   • Breast cancer Paternal Grandmother 50   • No Known Problems Maternal  Aunt    • No Known Problems Paternal Aunt    • Other Other    • Heart attack Other    • Coronary artery disease Paternal Grandfather    • Stroke Paternal Grandfather    • No Known Problems Sister    • Ovarian cancer Neg Hx    • Endometrial cancer Neg Hx      Social History     Tobacco Use   Smoking Status Former   • Packs/day: 1.00   • Years: 40.00   • Pack years: 40.00   • Types: Electronic Cigarette, Cigarettes   • Quit date: 2016   • Years since quittin.8   • Passive exposure: Past   Smokeless Tobacco Never   Tobacco Comments    1 PACK/DAY SMOKER UNTIL 2016     Allergies   Allergen Reactions   • Abilify [Aripiprazole] Other (See Comments)     Tardive- Dyskinesia   • Levocetirizine Dihydrochloride Myalgia     Muscle aches/joint pain       Current Outpatient Medications:   •  acetaminophen (Acetaminophen 8 Hour) 650 MG 8 hr tablet, Take 1 tablet by mouth 2 (two) times a day., Disp: , Rfl:   •  amLODIPine (NORVASC) 10 MG tablet, Take 1 tablet by mouth Daily., Disp: 90 tablet, Rfl: 3  •  amLODIPine (NORVASC) 5 MG tablet, TAKE 1 TABLET BY MOUTH DAILY (Patient taking differently: Take 2 tablets by mouth Daily. Pt takes 2 tablets per day totaling 10mg), Disp: 90 tablet, Rfl: 1  •  ascorbic acid (VITAMIN C) 1000 MG tablet, Take 1 tablet by mouth Daily., Disp: , Rfl:   •  aspirin 81 MG EC tablet, Take 1 tablet by mouth Every Morning. TOLD NOT TO STOP BEFORE SURGERY, Disp: , Rfl:   •  bisoprolol (ZEBeta) 10 MG tablet, TAKE 1 TABLET BY MOUTH DAILY, Disp: 90 tablet, Rfl: 1  •  Calcium Citrate-Vitamin D (CALCIUM + D PO), Take 2 tablets by mouth Daily., Disp: , Rfl:   •  citalopram (CeleXA) 40 MG tablet, TAKE 1 TABLET BY MOUTH DAILY, Disp: 90 tablet, Rfl: 1  •  clopidogrel (PLAVIX) 75 MG tablet, TAKE 1 TABLET BY MOUTH DAILY, Disp: 90 tablet, Rfl: 1  •  Coenzyme Q10 200 MG capsule, Take 400 mg by mouth Daily., Disp: , Rfl:   •  Fish Oil oil, Take 1 capsule by mouth Daily., Disp: , Rfl:   •  Flaxseed, Linseed,  "(FLAXSEED OIL) 1000 MG capsule, Take 1 capsule by mouth daily., Disp: , Rfl:   •  fluticasone (FLONASE) 50 MCG/ACT nasal spray, SHAKE LIQUID AND USE 1 SPRAY IN EACH NOSTRIL TWICE DAILY, Disp: 48 g, Rfl: 3  •  Fluticasone Furoate-Vilanterol (BREO ELLIPTA) 200-25 MCG/ACT inhaler, INHALE 1 PUFF BY MOUTH DAILY, Disp: 60 each, Rfl: 11  •  folic acid (FOLVITE) 1 MG tablet, Take 2 tablets by mouth Daily., Disp: 180 tablet, Rfl: 3  •  gabapentin (NEURONTIN) 100 MG capsule, Take 3 capsules by mouth Every Evening., Disp: 90 capsule, Rfl: 2  •  Methylsulfonylmethane (MSM PO), Take 2 tablets by mouth daily., Disp: , Rfl:   •  Multiple Vitamins-Minerals (MULTIVITAMIN ADULT PO), Take 1 tablet by mouth Daily., Disp: , Rfl:   •  omeprazole (priLOSEC) 20 MG capsule, Take 1 capsule by mouth Daily., Disp: 90 capsule, Rfl: 3  •  rosuvastatin (CRESTOR) 40 MG tablet, TAKE 1 TABLET BY MOUTH DAILY, Disp: 90 tablet, Rfl: 3  •  traMADol (ULTRAM) 50 MG tablet, TAKE 1 TABLET BY MOUTH TWICE DAILY, Disp: 180 tablet, Rfl: 2  •  URINARY HEALTH/CRANBERRY PO, Take 1 tablet by mouth 2 (Two) Times a Day. \"CRANACTIN\", Disp: , Rfl:   •  vitamin B-12 (CYANOCOBALAMIN) 500 MCG tablet, Take 1 tablet by mouth Daily., Disp: 90 tablet, Rfl: 1    Review of Systems  Review of systems was completed, and pertinent findings are noted in the HPI.  /58 (BP Location: Right arm, Patient Position: Sitting, Cuff Size: Adult)   Pulse 68   Temp 98.2 °F (36.8 °C) (Temporal)   Ht 156.9 cm (61.77\")   Wt 52.2 kg (115 lb)   BMI 21.19 kg/m²     Physical Exam  Vitals reviewed.   Constitutional:       Appearance: She is well-developed.   Pulmonary:      Effort: Pulmonary effort is normal.   Skin:     General: Skin is warm and dry.   Neurological:      Mental Status: She is alert.   Psychiatric:         Behavior: Behavior normal.         PHQ-9 Total Score:      Assessment/Plan:  Diagnoses and all orders for this visit:    1. Post-traumatic osteoarthritis of right " shoulder  Comments:  continue gabapentin and tramadol for pain  Orders:  -     gabapentin (NEURONTIN) 100 MG capsule; Take 3 capsules by mouth Every Evening.  Dispense: 90 capsule; Refill: 2       Patient Instructions   This patient is on a controlled substance which improves symptoms/quality of life and is aware of the risks, benefits and possible side-effects current treatment. The patient denies any medication side-effects at this time. A controlled substance agreement will be obtained or is currently on file. We reviewed required monitoring for controlled substances including but not limited to quarterly follow-up visits, annual depression screening, and urine drug screens to which the patient is agreeable. A [facility] report has been or shortly will be reviewed. There are no signs of deviation or misuse.       Plan of care reviewed with patient at the conclusion of today's visit. Education was provided regarding diagnosis, management and any prescribed or recommended OTC medications.  Patient verbalizes understanding of and agreement with management plan.    Return in about 6 months (around 10/5/2023).    Transcribed from ambient dictation for CYRUS Gallegos by Maryam Lamb.  04/05/23   15:35 EDT    Patient or patient representative verbalized consent to the visit recording.  I have personally performed the services described in this document as transcribed by the above individual, and it is both accurate and complete.    Dictated Utilizing Dragon Dictation.    CYRUS Gallegos

## 2023-04-10 ENCOUNTER — OFFICE VISIT (OUTPATIENT)
Dept: INTERNAL MEDICINE | Facility: CLINIC | Age: 68
End: 2023-04-10
Payer: MEDICARE

## 2023-04-10 VITALS
SYSTOLIC BLOOD PRESSURE: 134 MMHG | BODY MASS INDEX: 21.02 KG/M2 | OXYGEN SATURATION: 94 % | WEIGHT: 114.2 LBS | HEIGHT: 62 IN | DIASTOLIC BLOOD PRESSURE: 58 MMHG | TEMPERATURE: 97.8 F | HEART RATE: 81 BPM

## 2023-04-10 DIAGNOSIS — R42 POSTURAL DIZZINESS WITH PRESYNCOPE: ICD-10-CM

## 2023-04-10 DIAGNOSIS — S42.022G CLOSED DISPLACED FRACTURE OF SHAFT OF LEFT CLAVICLE WITH DELAYED HEALING, SUBSEQUENT ENCOUNTER: Chronic | ICD-10-CM

## 2023-04-10 DIAGNOSIS — R55 POSTURAL DIZZINESS WITH PRESYNCOPE: ICD-10-CM

## 2023-04-10 DIAGNOSIS — S91.209D NAIL AVULSION OF TOE, SUBSEQUENT ENCOUNTER: Chronic | ICD-10-CM

## 2023-04-10 DIAGNOSIS — E78.2 MIXED HYPERLIPIDEMIA: ICD-10-CM

## 2023-04-10 DIAGNOSIS — I10 BENIGN ESSENTIAL HYPERTENSION: Primary | Chronic | ICD-10-CM

## 2023-04-10 DIAGNOSIS — R26.89 POOR BALANCE: Chronic | ICD-10-CM

## 2023-04-10 DIAGNOSIS — S91.109D OPEN WOUND OF TOE, SUBSEQUENT ENCOUNTER: ICD-10-CM

## 2023-04-10 NOTE — ASSESSMENT & PLAN NOTE
She will continue exercises from physical therapist and also talk to her friend who is a physical therapist.

## 2023-04-10 NOTE — PROGRESS NOTES
[facility]     Aarti Wade  1955   [id number]      Patient Care Team:  Jannette Chapa MD as PCP - General  Jannette Chapa MD as PCP - Family Medicine  eKtan Lazcano MD as Consulting Physician (Orthopedic Surgery)  Lorenoz Good MD as Consulting Physician (Ophthalmology)  Severino Carbajal MD as Consulting Physician (Vascular Surgery)    Chief Complaint   Patient presents with   • Hypertension            HPI  Patient is a 68 y.o. female who presents today for a follow-up.    Hypertension  The patient checks her blood pressure at home. Last week, her blood pressure was 128 mmHg systolic. She is taking amlodipine 10 mg. The patient is walking and doing all of her exercise activities. She was sent to physical therapy, but it has a $500 deductible. The patient was shown some exercises she has been doing twice per day. She has a friend who taught physical therapy at  for 20 years, so she is going to ask her if she can give her some exercises. The patient has been on a diet for the last 3 weeks. She denies any side effects from her medications. The patient is hoping after a while she can at least reduce the blood thinner. She has a lot of bruising on her arms.    Right great toe wound  The patient saw CYRUS Ocasio, not long after her last visit for her right great toe. She did not change it this morning, but she has a Band-Aid on it. The patient has been changing it once a day. She wants to keep it covered with her work boots on. Cynthia ordered some dopplers for her, which she will do on 04/24/2023 on both the carotid arteries and her legs. She will leave the Band-Aid off at night and let it breathe.     Hoarseness  She was having hoarseness from getting intubated. She saw Dr. Urrutia at  approximately 10 days ago, and he cleared her and everything was working right.    Dizziness  She has not had any more dizziness. The patient has been practicing the things he taught  her. She will have a follow-up with Dr. Magana after her dopplers are done.    Closed fracture left clavicle  She has a brace on her collar bone. It helps her feel more stable.    Health maintenance  The patient did the Cologuard.   She has not had the Shingrix vaccine.      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 HARRISON   • CAROTID ENDARTERECTOMY Right 8/31/2022    Procedure: CAROTID ENDARTERECTOMY- RIGHT;  Surgeon: Francisco Magana MD;  Location:  [facility] OR;  Service: Vascular;  Laterality: Right;   • COLONOSCOPY      last 8 years ago.  Due to schedule   • EYE LENS IMPLANT SECONDARY Left    • FEMORAL FEMORAL BYPASS Right 8/22/2016    Procedure: RIGHT COMMON FEMORAL ENDARTERECTOMY WITH PATCH;  Surgeon: Severino Harrison MD;  Location:  [facility] OR;  Service:    • FINGER SURGERY Left     LEFT INDEX FINGER   • INTERVENTIONAL RADIOLOGY PROCEDURE N/A 8/16/2016    Procedure: IR PTA femoral popliteal artery;  Surgeon: Severino Harrison MD;  Location:  [facility];  Service:    • INTERVENTIONAL RADIOLOGY PROCEDURE Left 1/7/2020    Procedure: LEFT  ATHERECTOMY, BALLOON ANGIOPLASTY;  Surgeon: Severino Carbajal MD;  Location:  [facility];  Service: Interventional Radiology   • TONSILLECTOMY     • TOTAL SHOULDER ARTHROPLASTY Left 2020    Procedure: TOTAL SHOULDER ARTHROPLASTY LEFT;  Surgeon: Ketan Lazcano MD;  Location:  [facility] OR;  Service: Orthopedics;  Laterality: Left;  protector in: 1346  protector out: 1402       Family History   Problem Relation Age of Onset   • Osteoarthritis Mother    • Alzheimer's disease Mother    • Hypertension Father    • Heart attack Father    • Alcohol abuse Father    • No Known Problems Sister    • No Known Problems Daughter    • No Known Problems Son    • Breast cancer Maternal Grandmother 50   • Breast cancer Paternal Grandmother 50   • No Known Problems Maternal Aunt    • No Known Problems Paternal Aunt    • Other Other    • Heart attack Other    • Coronary artery disease Paternal Grandfather    • Stroke Paternal Grandfather    • No Known Problems Sister    • Ovarian cancer Neg Hx    • Endometrial cancer Neg Hx        Social History     Socioeconomic History   • Marital status: Single   • Number of children: 0   Tobacco Use   • Smoking status: Former     Packs/day: 1.00     Years: 40.00     Pack years: 40.00     Types: Electronic Cigarette, Cigarettes     Quit date: 2016     Years since quittin.8     Passive exposure: Past   • Smokeless tobacco: Never   • Tobacco comments:     1 PACK/DAY SMOKER UNTIL 2016   Vaping Use   • Vaping Use: Former   • Substances: Nicotine, Flavoring   • Devices: Disposable   Substance and Sexual Activity   • Alcohol use: Yes     Alcohol/week: 14.0 standard drinks     Types: 14 Shots of liquor per week     Comment: 2 DRINKS PER DAY   • Drug use: Never   • Sexual activity: Defer       Allergies   Allergen Reactions   • Abilify [Aripiprazole] Other (See Comments)     Tardive- Dyskinesia   • Levocetirizine Dihydrochloride Myalgia     Muscle aches/joint pain  "      Vital Signs  Vitals:    04/10/23 1341   BP: 134/58   BP Location: Right arm   Patient Position: Sitting   Cuff Size: Adult   Pulse: 81   Temp: 97.8 °F (36.6 °C)   TempSrc: Infrared   SpO2: 94%   Weight: 51.8 kg (114 lb 3.2 oz)   Height: 156.9 cm (61.77\")     Body mass index is 21.04 kg/m².  BMI is within normal parameters. No other follow-up for BMI required.        Current Outpatient Medications:   •  acetaminophen (Acetaminophen 8 Hour) 650 MG 8 hr tablet, Take 1 tablet by mouth 2 (two) times a day., Disp: , Rfl:   •  amLODIPine (NORVASC) 10 MG tablet, Take 1 tablet by mouth Daily., Disp: 90 tablet, Rfl: 3  •  ascorbic acid (VITAMIN C) 1000 MG tablet, Take 1 tablet by mouth Daily., Disp: , Rfl:   •  aspirin 81 MG EC tablet, Take 1 tablet by mouth Every Morning. TOLD NOT TO STOP BEFORE SURGERY, Disp: , Rfl:   •  bisoprolol (ZEBeta) 10 MG tablet, TAKE 1 TABLET BY MOUTH DAILY, Disp: 90 tablet, Rfl: 1  •  Calcium Citrate-Vitamin D (CALCIUM + D PO), Take 2 tablets by mouth Daily., Disp: , Rfl:   •  citalopram (CeleXA) 40 MG tablet, TAKE 1 TABLET BY MOUTH DAILY, Disp: 90 tablet, Rfl: 1  •  clopidogrel (PLAVIX) 75 MG tablet, TAKE 1 TABLET BY MOUTH DAILY, Disp: 90 tablet, Rfl: 1  •  Coenzyme Q10 200 MG capsule, Take 400 mg by mouth Daily., Disp: , Rfl:   •  Fish Oil oil, Take 1 capsule by mouth Daily., Disp: , Rfl:   •  Flaxseed, Linseed, (FLAXSEED OIL) 1000 MG capsule, Take 1 capsule by mouth daily., Disp: , Rfl:   •  fluticasone (FLONASE) 50 MCG/ACT nasal spray, SHAKE LIQUID AND USE 1 SPRAY IN EACH NOSTRIL TWICE DAILY, Disp: 48 g, Rfl: 3  •  Fluticasone Furoate-Vilanterol (BREO ELLIPTA) 200-25 MCG/ACT inhaler, INHALE 1 PUFF BY MOUTH DAILY, Disp: 60 each, Rfl: 11  •  folic acid (FOLVITE) 1 MG tablet, Take 2 tablets by mouth Daily., Disp: 180 tablet, Rfl: 3  •  gabapentin (NEURONTIN) 100 MG capsule, Take 3 capsules by mouth Every Evening., Disp: 90 capsule, Rfl: 2  •  Methylsulfonylmethane (MSM PO), Take 2 tablets " "by mouth daily., Disp: , Rfl:   •  Multiple Vitamins-Minerals (MULTIVITAMIN ADULT PO), Take 1 tablet by mouth Daily., Disp: , Rfl:   •  omeprazole (priLOSEC) 20 MG capsule, Take 1 capsule by mouth Daily., Disp: 90 capsule, Rfl: 3  •  rosuvastatin (CRESTOR) 40 MG tablet, TAKE 1 TABLET BY MOUTH DAILY, Disp: 90 tablet, Rfl: 3  •  traMADol (ULTRAM) 50 MG tablet, TAKE 1 TABLET BY MOUTH TWICE DAILY, Disp: 180 tablet, Rfl: 2  •  URINARY HEALTH/CRANBERRY PO, Take 1 tablet by mouth 2 (Two) Times a Day. \"CRANACTIN\", Disp: , Rfl:   •  vitamin B-12 (CYANOCOBALAMIN) 500 MCG tablet, Take 1 tablet by mouth Daily., Disp: 90 tablet, Rfl: 1    Physical Exam:    Physical Exam  Vitals and nursing note reviewed.   Constitutional:       Appearance: She is well-developed.   HENT:      Head: Normocephalic.   Eyes:      Conjunctiva/sclera: Conjunctivae normal.      Pupils: Pupils are equal, round, and reactive to light.   Neck:      Thyroid: No thyromegaly.   Cardiovascular:      Rate and Rhythm: Normal rate and regular rhythm.      Heart sounds: Normal heart sounds.   Pulmonary:      Effort: Pulmonary effort is normal.      Breath sounds: Normal breath sounds.   Musculoskeletal:         General: Normal range of motion.      Cervical back: Normal range of motion and neck supple.      Comments: Right great toe nail bed is healing. No swelling or erythema now. No open areas.   Lymphadenopathy:      Cervical: No cervical adenopathy.   Neurological:      Mental Status: She is alert and oriented to person, place, and time.   Psychiatric:         Thought Content: Thought content normal.          ACE III MINI        Results Review:    None    CMP:  Lab Results   Component Value Date    BUN 21 01/17/2023    CREATININE 1.17 (H) 01/17/2023    EGFRIFNONA 59 (L) 10/06/2021    EGFRIFAFRI 72 10/06/2021    BCR 18 01/17/2023     01/17/2023    K 5.4 (H) 01/17/2023    CO2 20 01/17/2023    CALCIUM 8.9 01/17/2023    PROTENTOTREF 7.3 01/17/2023    ALBUMIN " 4.8 01/17/2023    LABGLOBREF 2.5 01/17/2023    LABIL2 1.9 01/17/2023    BILITOT <0.2 01/17/2023    ALKPHOS 71 01/17/2023    AST 31 01/17/2023    ALT 19 01/17/2023     HbA1c:  Lab Results   Component Value Date    HGBA1C 4.90 08/26/2022    HGBA1C 5.50 06/05/2020     Microalbumin:  Lab Results   Component Value Date    MICROALBUR 555.1 01/17/2023     Lipid Panel  Lab Results   Component Value Date    CHOL 136 10/09/2020    TRIG 62 01/17/2023    HDL 86 01/17/2023    LDL 67 01/17/2023    AST 31 01/17/2023    ALT 19 01/17/2023       Medication Review: Medications reviewed and noted  Patient Instructions   Problem List Items Addressed This Visit        Cardiac and Vasculature    Benign essential hypertension - Primary (Chronic)    Overview     Taking amlodipine, bisoprolol.  Amlodipine increased to 10 mg daily on 9/21/2022.         Current Assessment & Plan     She will continue current doses of amlodipine, bisoprolol, amlodipine. She will continue low-salt diet.         Relevant Medications    amLODIPine (NORVASC) 10 MG tablet    bisoprolol (ZEBeta) 10 MG tablet    Mixed hyperlipidemia    Overview     Taking  rosuvastatin every evening.               Current Assessment & Plan     She will continue 40 mg rosuvastatin every evening. She will continue to improve low-fat, low-carbohydrate diet and eat more vegetables. She will continue her regular exercise, working with her horses.         Relevant Medications    rosuvastatin (CRESTOR) 40 MG tablet       Musculoskeletal and Injuries    Closed displaced fracture of shaft of left clavicle (Chronic)    Overview     L clavicle fracture in accident while leading 2 horses 7/23/2022.  She has seen Dr. Avitia.  She saw him for follow up 9/7/2022 and his note states that the fracture is not healing.  Saw Dr. Lazcano.  Wearing brace on the upper torso to help stabilize the clavicle.          Current Assessment & Plan     She will continue wearing the brace to stabilize the clavicle while  it is healing.         Open wound of toe    Overview     R great toe.         Current Assessment & Plan     The open wound on the right great toe has healed.            Symptoms and Signs    Poor balance (Chronic)    Current Assessment & Plan     She will continue exercises from physical therapist and also talk to her friend who is a physical therapist.         Postural dizziness with presyncope    Current Assessment & Plan     She will continue to stay well hydrated. We will continue current medications for blood pressure control.            Other    Nail avulsion of toe, subsequent encounter (Chronic)          Diagnosis Plan   1. Benign essential hypertension        2. Mixed hyperlipidemia        3. Open wound of toe, subsequent encounter        4. Postural dizziness with presyncope        5. Poor balance        6. Nail avulsion of toe, subsequent encounter        7. Closed displaced fracture of shaft of left clavicle with delayed healing, subsequent encounter          Follow-up: She will come back for a physical in 10/2023.        Plan of care reviewed with patient at the conclusion of today's visit. Education was provided regarding diagnosis, management, and any prescribed or recommended OTC medications.Patient verbalizes understanding of and agreement with management plan.         Jannette Chapa MD        Transcribed from ambient dictation for Jannette Chapa MD by Maude Carvalho.  04/10/23   14:47 EDT    Patient or patient representative verbalized consent to the visit recording.  I have personally performed the services described in this document as transcribed by the above individual, and it is both accurate and complete.

## 2023-04-10 NOTE — ASSESSMENT & PLAN NOTE
She will continue 40 mg rosuvastatin every evening. She will continue to improve low-fat, low-carbohydrate diet and eat more vegetables. She will continue her regular exercise, working with her horses.

## 2023-04-10 NOTE — ASSESSMENT & PLAN NOTE
She will continue to stay well hydrated. We will continue current medications for blood pressure control.

## 2023-04-10 NOTE — ASSESSMENT & PLAN NOTE
She will continue current doses of amlodipine, bisoprolol, amlodipine. She will continue low-salt diet.

## 2023-04-12 NOTE — PATIENT INSTRUCTIONS
Patient Instructions   Problem List Items Addressed This Visit          Cardiac and Vasculature    Benign essential hypertension - Primary (Chronic)    Overview     Taking amlodipine, bisoprolol.  Amlodipine increased to 10 mg daily on 9/21/2022.         Current Assessment & Plan     She will continue current doses of amlodipine, bisoprolol, amlodipine. She will continue low-salt diet.         Relevant Medications    amLODIPine (NORVASC) 10 MG tablet    bisoprolol (ZEBeta) 10 MG tablet    Mixed hyperlipidemia    Overview     Taking  rosuvastatin every evening.               Current Assessment & Plan     She will continue 40 mg rosuvastatin every evening. She will continue to improve low-fat, low-carbohydrate diet and eat more vegetables. She will continue her regular exercise, working with her horses.         Relevant Medications    rosuvastatin (CRESTOR) 40 MG tablet       Musculoskeletal and Injuries    Closed displaced fracture of shaft of left clavicle (Chronic)    Overview     L clavicle fracture in accident while leading 2 horses 7/23/2022.  She has seen Dr. Avitia.  She saw him for follow up 9/7/2022 and his note states that the fracture is not healing.  Saw Dr. Lazcano.  Wearing brace on the upper torso to help stabilize the clavicle.          Current Assessment & Plan     She will continue wearing the brace to stabilize the clavicle while it is healing.         Open wound of toe    Overview     R great toe.         Current Assessment & Plan     The open wound on the right great toe has healed.            Symptoms and Signs    Poor balance (Chronic)    Current Assessment & Plan     She will continue exercises from physical therapist and also talk to her friend who is a physical therapist.         Postural dizziness with presyncope    Current Assessment & Plan     She will continue to stay well hydrated. We will continue current medications for blood pressure control.            Other    Nail avulsion of toe,  subsequent encounter (Chronic)

## 2023-04-18 ENCOUNTER — TELEPHONE (OUTPATIENT)
Dept: CARDIOLOGY | Facility: CLINIC | Age: 68
End: 2023-04-18
Payer: MEDICARE

## 2023-04-18 DIAGNOSIS — I47.1 SUSTAINED SVT: Primary | ICD-10-CM

## 2023-04-18 NOTE — TELEPHONE ENCOUNTER
Called and spoke with patient regarding the following results and message from CYRUS Ocasio:    Aarti,  I was able to review your Holter monitor results. You had 39 episodes of supraventricular tachycardia with the longest 20 beats and rare skipped beats. You had one blocked beat in the middle of the night. I would continue your current cardiac medications but I would like to make a referral for you to see one of my colleagues regarding the supraventricular tachycardia in the event you may need to have an ablation in the future to get rid of this arrhythmia. This may be contributing to the episodes of dizziness and falls you have been having. I would also like to screen you for sleep apnea since untreated sleep apnea can precipitate arrhythmias. This is something that I can have sent to your house and you place it on your finger for one night and then mail it back the next day.Thanks! CYRUS Ocasio      Patient agreed to plan and verbalized understanding. Will send EP referral.

## 2023-04-19 DIAGNOSIS — I10 BENIGN ESSENTIAL HYPERTENSION: ICD-10-CM

## 2023-04-19 RX ORDER — BISOPROLOL FUMARATE 10 MG/1
10 TABLET, FILM COATED ORAL DAILY
Qty: 90 TABLET | Refills: 1 | Status: SHIPPED | OUTPATIENT
Start: 2023-04-19

## 2023-04-24 ENCOUNTER — HOSPITAL ENCOUNTER (OUTPATIENT)
Dept: CARDIOLOGY | Facility: HOSPITAL | Age: 68
Discharge: HOME | End: 2023-04-24
Payer: MEDICARE

## 2023-04-24 VITALS — HEIGHT: 62 IN | BODY MASS INDEX: 21.02 KG/M2 | WEIGHT: 114.2 LBS

## 2023-04-24 VITALS — WEIGHT: 114.2 LBS | HEIGHT: 62 IN | BODY MASS INDEX: 21.02 KG/M2

## 2023-04-24 DIAGNOSIS — I70.213 ATHEROSCLEROSIS OF NATIVE ARTERY OF BOTH LOWER EXTREMITIES WITH INTERMITTENT CLAUDICATION: Chronic | ICD-10-CM

## 2023-04-24 DIAGNOSIS — R42 DIZZINESS: ICD-10-CM

## 2023-04-24 LAB
BH CV LOWER ARTERIAL LEFT ABI RATIO: 0.82
BH CV LOWER ARTERIAL LEFT CALF RATIO: 0.86
BH CV LOWER ARTERIAL LEFT DORSALIS PEDIS SYS MAX: 115
BH CV LOWER ARTERIAL LEFT GREAT TOE SYS MAX: 58
BH CV LOWER ARTERIAL LEFT LOW THIGH RATIO: 0.92
BH CV LOWER ARTERIAL LEFT LOW THIGH SYS MAX: 129
BH CV LOWER ARTERIAL LEFT POPLITEAL SYS MAX: 120
BH CV LOWER ARTERIAL LEFT POST TIBIAL SYS MAX: 95
BH CV LOWER ARTERIAL LEFT TBI RATIO: 0.41
BH CV LOWER ARTERIAL RIGHT ABI RATIO: 0.91
BH CV LOWER ARTERIAL RIGHT CALF RATIO: 0.91
BH CV LOWER ARTERIAL RIGHT DORSALIS PEDIS SYS MAX: 124
BH CV LOWER ARTERIAL RIGHT GREAT TOE SYS MAX: 98
BH CV LOWER ARTERIAL RIGHT LOW THIGH RATIO: 0.93
BH CV LOWER ARTERIAL RIGHT LOW THIGH SYS MAX: 130
BH CV LOWER ARTERIAL RIGHT POPLITEAL SYS MAX: 128
BH CV LOWER ARTERIAL RIGHT POST TIBIAL SYS MAX: 128
BH CV LOWER ARTERIAL RIGHT TBI RATIO: 0.7
BH CV XLRA MEAS LEFT DIST CCA EDV: 23.6 CM/SEC
BH CV XLRA MEAS LEFT DIST CCA PSV: 119 CM/SEC
BH CV XLRA MEAS LEFT DIST ICA EDV: 27.5 CM/SEC
BH CV XLRA MEAS LEFT DIST ICA PSV: 156 CM/SEC
BH CV XLRA MEAS LEFT ICA/CCA RATIO: 2.03
BH CV XLRA MEAS LEFT MID CCA EDV: 16.4 CM/SEC
BH CV XLRA MEAS LEFT MID CCA PSV: 91.5 CM/SEC
BH CV XLRA MEAS LEFT MID ICA EDV: 44.2 CM/SEC
BH CV XLRA MEAS LEFT MID ICA PSV: 186 CM/SEC
BH CV XLRA MEAS LEFT PROX CCA EDV: 22.6 CM/SEC
BH CV XLRA MEAS LEFT PROX CCA PSV: 134 CM/SEC
BH CV XLRA MEAS LEFT PROX ECA EDV: 17.7 CM/SEC
BH CV XLRA MEAS LEFT PROX ECA PSV: 137 CM/SEC
BH CV XLRA MEAS LEFT PROX ICA EDV: 23.6 CM/SEC
BH CV XLRA MEAS LEFT PROX ICA PSV: 127 CM/SEC
BH CV XLRA MEAS LEFT PROX SCLA PSV: 159 CM/SEC
BH CV XLRA MEAS RIGHT CAROTID BULB EDV: 10.6 CM/SEC
BH CV XLRA MEAS RIGHT CAROTID BULB PSV: 70 CM/SEC
BH CV XLRA MEAS RIGHT DIST CCA EDV: 15.8 CM/SEC
BH CV XLRA MEAS RIGHT DIST CCA PSV: 78 CM/SEC
BH CV XLRA MEAS RIGHT DIST ICA EDV: 16.5 CM/SEC
BH CV XLRA MEAS RIGHT DIST ICA PSV: 107 CM/SEC
BH CV XLRA MEAS RIGHT ICA/CCA RATIO: 3.04
BH CV XLRA MEAS RIGHT MID CCA EDV: 14.1 CM/SEC
BH CV XLRA MEAS RIGHT MID CCA PSV: 79.7 CM/SEC
BH CV XLRA MEAS RIGHT MID ICA EDV: 39.3 CM/SEC
BH CV XLRA MEAS RIGHT MID ICA PSV: 242 CM/SEC
BH CV XLRA MEAS RIGHT PROX CCA EDV: 13.5 CM/SEC
BH CV XLRA MEAS RIGHT PROX CCA PSV: 94.4 CM/SEC
BH CV XLRA MEAS RIGHT PROX ECA EDV: 11 CM/SEC
BH CV XLRA MEAS RIGHT PROX ECA PSV: 140 CM/SEC
BH CV XLRA MEAS RIGHT PROX ICA EDV: 51.9 CM/SEC
BH CV XLRA MEAS RIGHT PROX ICA PSV: 239 CM/SEC
BH CV XLRA MEAS RIGHT PROX SCLA PSV: 142 CM/SEC
BH CV XLRA MEAS RIGHT VERTEBRAL A EDV: 15.7 CM/SEC
BH CV XLRA MEAS RIGHT VERTEBRAL A PSV: 96.6 CM/SEC
LEFT ARM BP: 100 MMHG
MAXIMAL PREDICTED HEART RATE: 150 BPM
MAXIMAL PREDICTED HEART RATE: 150 BPM
RIGHT ARM BP: 140 MMHG
STRESS TARGET HR: 128 BPM
STRESS TARGET HR: 128 BPM
UPPER ARTERIAL LEFT ARM BRACHIAL SYS MAX: 100 MMHG
UPPER ARTERIAL RIGHT ARM BRACHIAL SYS MAX: 140 MMHG

## 2023-04-24 PROCEDURE — 93923 UPR/LXTR ART STDY 3+ LVLS: CPT | Performed by: INTERNAL MEDICINE

## 2023-04-24 PROCEDURE — 93880 EXTRACRANIAL BILAT STUDY: CPT

## 2023-04-24 PROCEDURE — 93923 UPR/LXTR ART STDY 3+ LVLS: CPT

## 2023-04-24 PROCEDURE — 93880 EXTRACRANIAL BILAT STUDY: CPT | Performed by: INTERNAL MEDICINE

## 2023-07-06 PROBLEM — S27.0XXA TRAUMATIC PNEUMOTHORAX: Chronic | Status: ACTIVE | Noted: 2023-07-06

## 2023-07-06 PROBLEM — E44.0 MODERATE PROTEIN-CALORIE MALNUTRITION: Chronic | Status: ACTIVE | Noted: 2023-07-06
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
## 2023-07-06 PROBLEM — R63.4 ABNORMAL WEIGHT LOSS: Chronic | Status: ACTIVE | Noted: 2023-07-06

## 2023-07-06 PROBLEM — S22.43XD MULTIPLE CLOSED FRACTURES OF RIBS OF BOTH SIDES WITH ROUTINE HEALING: Chronic | Status: ACTIVE | Noted: 2023-07-06

## 2023-07-06 PROBLEM — S32.811D MULTIPLE CLOSED FRACTURES OF PELVIS WITH UNSTABLE DISRUPTION OF PELVIC RING WITH ROUTINE HEALING: Chronic | Status: ACTIVE | Noted: 2023-07-06

## 2023-07-06 PROBLEM — R68.89 ABNORMAL WEIGHT: Chronic | Status: ACTIVE | Noted: 2023-07-06

## 2023-07-26 RX ORDER — CITALOPRAM 40 MG/1
40 TABLET ORAL DAILY
Qty: 90 TABLET | Refills: 1 | Status: SHIPPED | OUTPATIENT
Start: 2023-07-26

## 2023-07-26 NOTE — TELEPHONE ENCOUNTER
Rx Refill Note  Requested Prescriptions     Pending Prescriptions Disp Refills    citalopram (CeleXA) 40 MG tablet [Pharmacy Med Name: CITALOPRAM 40MG TABLETS] 90 tablet 1     Sig: TAKE 1 TABLET BY MOUTH DAILY      Last office visit with prescribing clinician: 7/6/2023   Last telemedicine visit with prescribing clinician: Visit date not found   Next office visit with prescribing clinician: 8/3/2023                           Pinky Ramírez RN  07/26/23, 14:04 EDT

## 2023-08-03 ENCOUNTER — OFFICE VISIT (OUTPATIENT)
Dept: INTERNAL MEDICINE | Facility: CLINIC | Age: 68
End: 2023-08-03
Payer: COMMERCIAL

## 2023-08-03 VITALS
OXYGEN SATURATION: 96 % | BODY MASS INDEX: 19.98 KG/M2 | WEIGHT: 108.6 LBS | TEMPERATURE: 98 F | DIASTOLIC BLOOD PRESSURE: 52 MMHG | HEART RATE: 58 BPM | SYSTOLIC BLOOD PRESSURE: 138 MMHG | HEIGHT: 62 IN

## 2023-08-03 DIAGNOSIS — I10 BENIGN ESSENTIAL HYPERTENSION: Chronic | ICD-10-CM

## 2023-08-03 DIAGNOSIS — D64.9 POSTOPERATIVE ANEMIA: ICD-10-CM

## 2023-08-03 DIAGNOSIS — S27.0XXD TRAUMATIC PNEUMOTHORAX, SUBSEQUENT ENCOUNTER: Chronic | ICD-10-CM

## 2023-08-03 DIAGNOSIS — E44.0 MODERATE PROTEIN-CALORIE MALNUTRITION: Chronic | ICD-10-CM
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
## 2023-08-03 DIAGNOSIS — R63.4 ABNORMAL WEIGHT LOSS: Chronic | ICD-10-CM

## 2023-08-03 DIAGNOSIS — S32.811D MULTIPLE CLOSED FRACTURES OF PELVIS WITH UNSTABLE DISRUPTION OF PELVIC RING WITH ROUTINE HEALING: Primary | Chronic | ICD-10-CM

## 2023-08-03 PROCEDURE — 99214 OFFICE O/P EST MOD 30 MIN: CPT | Performed by: INTERNAL MEDICINE

## 2023-08-03 RX ORDER — AMLODIPINE BESYLATE 10 MG/1
10 TABLET ORAL DAILY
Qty: 90 TABLET | Refills: 3 | Status: SHIPPED | OUTPATIENT
Start: 2023-08-03

## 2023-08-03 RX ORDER — BISOPROLOL FUMARATE 10 MG/1
10 TABLET, FILM COATED ORAL DAILY
Qty: 90 TABLET | Refills: 1 | Status: SHIPPED | OUTPATIENT
Start: 2023-08-03

## 2023-08-03 NOTE — ASSESSMENT & PLAN NOTE
- The patient is doing well after prolonged admission in the hospital, then rehab at [facility], and physical therapy in her home.  - Continue doing the physical therapy exercises at home daily.  - Continue to gradually increase back to her regular physical activities.
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
## 2023-08-03 NOTE — ASSESSMENT & PLAN NOTE
- Continue doing the exercises from physical therapy every day and follow up with the [facility] trauma surgeons again at the end of this month.  - The patient is now able to walk well without the walker.  - She is now cleared to drive.  - She will continue being very careful and I have advised her to drive only short distances.
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
## 2023-08-23 ENCOUNTER — OFFICE VISIT (OUTPATIENT)
Dept: CARDIOLOGY | Facility: CLINIC | Age: 68
End: 2023-08-23
Payer: MEDICARE

## 2023-08-23 VITALS
HEART RATE: 65 BPM | BODY MASS INDEX: 20.24 KG/M2 | HEIGHT: 62 IN | DIASTOLIC BLOOD PRESSURE: 60 MMHG | WEIGHT: 110 LBS | SYSTOLIC BLOOD PRESSURE: 138 MMHG | OXYGEN SATURATION: 91 %

## 2023-08-23 DIAGNOSIS — D64.9 ANEMIA, UNSPECIFIED TYPE: ICD-10-CM

## 2023-08-23 DIAGNOSIS — F10.10 ETOH ABUSE: ICD-10-CM

## 2023-08-23 DIAGNOSIS — I47.1 ATRIAL TACHYCARDIA, PAROXYSMAL: Primary | ICD-10-CM

## 2023-08-23 DIAGNOSIS — M19.111 POST-TRAUMATIC OSTEOARTHRITIS OF RIGHT SHOULDER: ICD-10-CM
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
## 2023-08-23 DIAGNOSIS — I47.1 SVT (SUPRAVENTRICULAR TACHYCARDIA): ICD-10-CM

## 2023-08-23 RX ORDER — GABAPENTIN 100 MG/1
300 CAPSULE ORAL EVERY EVENING
Qty: 90 CAPSULE | Refills: 2 | Status: SHIPPED | OUTPATIENT
Start: 2023-08-23

## 2023-08-23 NOTE — TELEPHONE ENCOUNTER
Rx Refill Note  Requested Prescriptions     Pending Prescriptions Disp Refills    gabapentin (NEURONTIN) 100 MG capsule [Pharmacy Med Name: GABAPENTIN 100MG CAPSULES] 90 capsule      Sig: TAKE 3 CAPSULES BY MOUTH EVERY EVENING      Last refill:  4/5/23 #90/2  Last office visit with prescribing clinician: 4/5/2023   Last telemedicine visit with prescribing clinician: Visit date not found   Next office visit with prescribing clinician: 10/18/2023                           Pinky Ramírez RN  08/23/23, 10:22 EDT

## 2023-08-23 NOTE — PROGRESS NOTES
Electrophysiology Clinic Consult     Aarti Wade  1955  [unfilled]  [unfilled]    08/24/23    DATE OF ADMISSION: (Not on file)  [facility] CARDIOLOGY    Jannette Chapa MD  [address] / [facility] [address]  Referring Provider: Cynthia Powell APRN     CC: SVT     Problem List:  SVT  13d Holter monitor 4/2023: Average HR: 63. Min HR: 52. Max HR: 84. 39 episodes of SVT, peak heart rate 115 bpm, longest 20 beats in duration  Valvular disease  Echo 2019: EF 68%, mild to mod AR, mild TR, mild MAC, moderate aortic sclerosis without stenosis, no pulmonary HTN  HTN  Remote IV stress test over 10 years ago; negative per patient-data deficit  Residual class I symptoms/normal EKG with acceptable IV Lexiscan Cardiolite study suggestive of low probability for significant focal obstructive coronary artery disease  (LVEF 0.65), May 2017.  Echocardiogram 11/18/2019: Mild to moderate AR, mild TR, LA mildly dilated, LVEF 68%, mild calcification of the aortic valve mainly affecting the non-and left coronary cusps, LV diastolic function normal, normal RV cavity size, wall thickness, systolic function and septal motion noted.  Mild MAC is present.  Aortic valve exhibits moderate sclerosis without stenosis, no pulmonary hypertension, no pericardial effusion  Residual class I symptoms, January 2020, July 2020, January 2021, August 2021, September 2022  HLD  PAD  Subclavian steal syndrome, 2016   Abnormal right femoral arterial duplex and popliteal tibial arterial duplex, on ASA and Plavix, with right femoral endarterectomy August 2016 (Dr. Carbajal)  Residual class I claudication without recent TIA/presyncope  Carotid duplex 11/18/2019: LICA 0-49% with retrograde left vertebral artery flow demonstrated, R ICA 50-69%, proximal LICA plaque without significant stenosis  Renal artery duplex 12/11/2019: Parenchymal disease in bilateral arteries, no JAIMEE  Left atherectomy, balloon angioplasty  1/7/2020: Common femoral arteries patent with moderate stenosis.  The common iliac, external iliac and internal leg arteries patent without evidence of stenosis.  Mid common femoral artery exhibits an occlusive lesion.  The origin of the profundus femoris artery appears to be occluded.  The SFA is reconstituted at its origin via collateral vessels.  The SFA, popliteal artery, and trifurcation are all patent.  Dominant flow to the foot is via the posterior tibial artery with a patent peroneal and anterior tibial artery into the distal leg.  Left common femoral artery-SFA atherectomy with balloon angioplasty.  After atherectomy and drug-coated balloon angioplasty, the common femoral artery and SFA as well as the profunda femoris artery were patent without evidence of hemodynamically significant stenosis  ABIs October 2020 unchanged from previous study February 2020, right 0.74, left 0.87  Carotid duplex 7/11/2022: Proximal L ICA plaque without significant stenosis with subsequent 50 to 69% mid segment stenosis beyond previous remote endarterectomy site.  R ICA stenosis greater than 70%.  Normal right vertebral artery flow with retrograde left vertebral artery flow suggestive of left subclavian artery steal syndrome.  Progressive R ICA stenosis noted since November 2019 study  Right carotid endarterectomy September 2022  Residual class I claudication, January 2020, July 2020, August 2021, September 2022, March 2023  Duplex Carotid US CAR 4/2023: R ICA 50-69% stenosis, L ICA 50-69% stenosis, left retrograde vertebral flow consistent with left subclavian steal   Doppler arterial multilevel lower extremity arteries: RLE: low normal right SAIDA at 0.91, muliphasic high resistive arterial flow noted. LLE: left SAIDA mildly reduced at 0.82, monophasic low resistive arterial flow noted in the posterior tibial and dorsalis pedis vessels, multiphasic high resistive arterial flow noted in the femoral and popliteal  vessels  COPD  Depression   GERD  GABBY/CKD  Brain aneurysm   Dizziness and presyncope January 2020, recurrent dizziness and falls March 2023  Alcohol use 1-2 standard drinks of vodka daily March 2023  Surgeries  Right femoral endarterectomy, August 2016  Lens implant, 1990s  Spontaneous pneumothorax, 1960  Left atherectomy and balloon angioplasty  Left total shoulder arthroplasty, June 2020  Right carotid endarterectomy, September 2022  Dysphonia/right vocal cord paralysis with laryngoscopy December 2022      History of Present Illness:   Aarti Wade is a 68 year old female with above PMHx referred by Cynthia Powell for management of SVT. She was previously seen by Dr. Ernandez and now is followed by CYRUS Ocasio. She has been having episodes of LH, dizziness with falls, twice in the last year.  She described her last fall as standing in position and subsequently bending over working with her horses.  As she bent over, she became dizzy and subsequently felt she might of passed out just for a few seconds with a fall on the ground.  She is not sure however she did have syncope.  She is a relatively poor historian.  No trauma.  Denies any chest pain, diaphoresis, nausea, or shortness of breath associated with the episode.  She was not confused upon awakening.  No loss of urine or stool.  She wore a holter monitor that showed episodes of nonsustained SVT. She did not have any symptoms while wearing monitor. She denies any palpitations, SOB, CP or swelling. BP is better controlled, her norvasc was recently increased and BP has improved. She has had no recurrence of dizziness, LH since prior to the event monitor.  She states that she has changed her diet since that episodes and now feels much improved.  She does drink alcohol on a daily basis. She states that she is compliant with her medications.  Denies any recreational drug use or over-the-counter medications.  She did have a sleep study in May which was abnormal  and was told that she qualified for oxygen.  She has also been found to be anemic with hemoglobins from 7-9.  This is being evaluated by her primary doctor.  Caffeine: 1 cup coffee  Nicotine: quit 2016  Alcohol: 1-2 drinks (vodka) per day  Stimulant: none    Allergies   Allergen Reactions    Abilify [Aripiprazole] Other (See Comments)     Tardive- Dyskinesia    Levocetirizine Dihydrochloride Myalgia     Muscle aches/joint pain        Cannot display prior to admission medications because the patient has not been admitted in this contact.              Current Outpatient Medications:     acetaminophen (Acetaminophen 8 Hour) 650 MG 8 hr tablet, Take 1 tablet by mouth 2 (two) times a day., Disp: , Rfl:     amLODIPine (NORVASC) 10 MG tablet, Take 1 tablet by mouth Daily., Disp: 90 tablet, Rfl: 3    ascorbic acid (VITAMIN C) 1000 MG tablet, Take 1 tablet by mouth Daily., Disp: , Rfl:     aspirin 81 MG EC tablet, Take 1 tablet by mouth Every Morning. TOLD NOT TO STOP BEFORE SURGERY, Disp: , Rfl:     bisoprolol (ZEBeta) 10 MG tablet, Take 1 tablet by mouth Daily., Disp: 90 tablet, Rfl: 1    Calcium Citrate-Vitamin D (CALCIUM + D PO), Take 2 tablets by mouth Daily., Disp: , Rfl:     citalopram (CeleXA) 40 MG tablet, TAKE 1 TABLET BY MOUTH DAILY, Disp: 90 tablet, Rfl: 1    clopidogrel (PLAVIX) 75 MG tablet, TAKE 1 TABLET BY MOUTH DAILY, Disp: 90 tablet, Rfl: 1    Coenzyme Q10 200 MG capsule, Take 400 mg by mouth Daily., Disp: , Rfl:     Fish Oil oil, Take 1 capsule by mouth Daily., Disp: , Rfl:     Flaxseed, Linseed, (FLAXSEED OIL) 1000 MG capsule, Take 1 capsule by mouth daily., Disp: , Rfl:     fluticasone (FLONASE) 50 MCG/ACT nasal spray, SHAKE LIQUID AND USE 1 SPRAY IN EACH NOSTRIL TWICE DAILY (Patient taking differently: 2 sprays into the nostril(s) as directed by provider Daily.), Disp: 48 g, Rfl: 3    Fluticasone Furoate-Vilanterol (BREO ELLIPTA) 200-25 MCG/ACT inhaler, INHALE 1 PUFF BY MOUTH DAILY, Disp: 60 each, Rfl:  "11    folic acid (FOLVITE) 1 MG tablet, Take 2 tablets by mouth Daily., Disp: 180 tablet, Rfl: 3    Methylsulfonylmethane (MSM PO), Take 2 tablets by mouth daily., Disp: , Rfl:     Multiple Vitamins-Minerals (MULTIVITAMIN ADULT PO), Take 1 tablet by mouth Daily., Disp: , Rfl:     omeprazole (priLOSEC) 20 MG capsule, Take 1 capsule by mouth Daily., Disp: 90 capsule, Rfl: 3    oxyCODONE (OXY-IR) 5 MG capsule, Take 1 capsule by mouth Every 4 (Four) Hours As Needed for Moderate Pain., Disp: , Rfl:     rosuvastatin (CRESTOR) 40 MG tablet, TAKE 1 TABLET BY MOUTH DAILY, Disp: 90 tablet, Rfl: 3    traMADol (ULTRAM) 50 MG tablet, TAKE 1 TABLET BY MOUTH TWICE DAILY, Disp: 180 tablet, Rfl: 2    URINARY HEALTH/CRANBERRY PO, Take 1 tablet by mouth 2 (Two) Times a Day. \"CRANACTIN\", Disp: , Rfl:     vitamin B-12 (CYANOCOBALAMIN) 500 MCG tablet, TAKE 1 TABLET BY MOUTH DAILY, Disp: 90 tablet, Rfl: 1    gabapentin (NEURONTIN) 100 MG capsule, TAKE 3 CAPSULES BY MOUTH EVERY EVENING, Disp: 90 capsule, Rfl: 2    Social History     Socioeconomic History    Marital status: Single    Number of children: 0   Tobacco Use    Smoking status: Former     Packs/day: 1.00     Years: 40.00     Pack years: 40.00     Types: Electronic Cigarette, Cigarettes     Quit date: 2016     Years since quittin.1     Passive exposure: Past    Smokeless tobacco: Never    Tobacco comments:     1 PACK/DAY SMOKER UNTIL 2016   Vaping Use    Vaping Use: Former    Substances: Nicotine, Flavoring    Devices: Disposable   Substance and Sexual Activity    Alcohol use: Yes     Alcohol/week: 14.0 standard drinks     Types: 14 Shots of liquor per week     Comment: 2 DRINKS PER DAY    Drug use: Never    Sexual activity: Defer       Family History   Problem Relation Age of Onset    Osteoarthritis Mother     Alzheimer's disease Mother     Hypertension Father     Heart attack Father     Alcohol abuse Father     No Known Problems Sister     No Known Problems Daughter " "    No Known Problems Son     Breast cancer Maternal Grandmother 50    Breast cancer Paternal Grandmother 50    No Known Problems Maternal Aunt     No Known Problems Paternal Aunt     Other Other     Heart attack Other     Coronary artery disease Paternal Grandfather     Stroke Paternal Grandfather     No Known Problems Sister     Ovarian cancer Neg Hx     Endometrial cancer Neg Hx    Father: stroke    REVIEW OF SYSTEMS:   CONST:  No weight loss, fever, chills, weakness or fatigue.   HEENT:  No visual loss, blurred vision, double vision, yellow sclerae.                   No hearing loss, congestion, sore throat.   SKIN:      No rashes, urticaria, ulcers, sores.     RESP:     No shortness of breath, hemoptysis, cough, sputum.   GI:           No anorexia, nausea, vomiting, diarrhea. No abdominal pain, melena.   :         No burning on urination, hematuria or increased frequency.  ENDO:    No diaphoresis, cold or heat intolerance. No polyuria or polydipsia.   NEURO:  No headache, +dizziness, -syncope, paralysis, ataxia, or parasthesias.                  No change in bowel or bladder control. No history of CVA/TIA  MUSC:    No muscle, back pain, joint pain or stiffness.   HEME:    No anemia, bleeding, bruising. No history of DVT/PE.  PSYCH:  No history of depression, anxiety    Vitals:    08/23/23 1300   BP: 138/60   BP Location: Right arm   Patient Position: Sitting   Pulse: 65   SpO2: 91%   Weight: 49.9 kg (110 lb)   Height: 157.5 cm (62\")                 Physical Exam:  GEN: no acute distress  HEENT: Normocephalic, atraumatic, PERRLA, moist mucous membranes  NECK: Supple, NO JVD, no thyromegaly, no lymphadenopathy  CARD: Regular rate rhythm normal S1-S2.  There is an S4.  No murmurs are appreciated.  PMI normal.  LUNGS: Clear to auscultation, normal respiratory effort  ABDOMEN: Soft, nontender, normal bowel sounds  EXTREMITIES: No gross deformities, no clubbing, cyanosis, or edema  SKIN: Warm, dry  NEURO: No focal " deficits, alert and oriented x 3  PSYCHIATRIC: Normal affect and mood      I personally viewed and interpreted the patient's EKG/Telemetry/lab data    Lab Results   Component Value Date    GLUCOSE 152 (H) 06/03/2023    CALCIUM 8.9 01/17/2023     01/17/2023    K 3.7 06/03/2023    CO2 20 01/17/2023     (H) 06/03/2023    BUN 21 01/17/2023    CREATININE 1.17 (H) 01/17/2023    EGFRIFAFRI 72 10/06/2021    EGFRIFNONA 59 (L) 10/06/2021    BCR 18 01/17/2023    ANIONGAP 14.0 09/21/2022     Lab Results   Component Value Date    WBC 7.80 06/09/2023    HGB 8.0 (L) 06/09/2023    HCT 24.2 (L) 06/09/2023    MCV 93 06/09/2023     (H) 06/09/2023     Lab Results   Component Value Date    INR 1.0 06/02/2023    INR 1.2 (H) 05/31/2023    INR 1.08 08/26/2022    PROTIME 13.9 08/26/2022     Lab Results   Component Value Date    TSH 2.570 01/17/2023           ECG 12 Lead    Date/Time: 8/23/2023 1:29 PM  Performed by: Milton Quintanilla MD  Authorized by: Milton Quintanilla MD   Comparison: compared with previous ECG from 11/15/2022  Comparison to previous ECG: NSR has replaced SB  Rhythm: sinus rhythm  Rate: normal  BPM: 62          ICD-10-CM ICD-9-CM   1. Atrial tachycardia, paroxysmal  I47.1 427.0   2. SVT (supraventricular tachycardia)  I47.1 427.89   3. Fall, initial encounter  W19.XXXA E888.9   4. ETOH abuse  F10.10 305.00   5. Anemia, unspecified type  D64.9 285.9       Assessment and Plan:   Nonsustained SVT, Atrial tachycardia  -13d Holter monitor 4/2023: Average HR: 63. Min HR: 52. Max HR: 84. 39 episodes of SVT, peak heart rate 115 bpm, longest 20 beats  -asymptomatic with SVT, AT episodes. No changes as she is asymptomatic and heart rates well controlled on holter monitor.  No therapy or changes in medication at this time.    2.  Dizziness/lightheadedness resulting in a fall; most likely multifactorial in nature.  Sounds more consistent with postural hypotension now resolved.  However explained to the patient  that other factors such as alcohol use and anemia may be contributing to these issues.  Doubt that nonsustained atrial tachycardia is causing her symptoms.  Encouraged her to stop drinking alcohol and follow-up with her primary doctor for further evaluation of her anemia.  She understands notify us if she does have a recurrent episode.    3.  Alcohol abuse: See #2.  Encourage immediate cessation or at least significant reduction.    4.  Anemia in a patient on aspirin/Plavix.  Encouraged her to follow-up with her primary doctor for reassessment and evaluation.    Scribed for Milton Quintanilla MD by CYRUS Figueroa. 8/24/2023  10:47 EDT    I, Milton Quintanilla MD, personally performed the services described in this documentation as scribed by the above named individual in my presence, and it is both accurate and complete.  8/24/2023  10:47 EDT

## 2023-09-09 PROBLEM — Z91.89 AT RISK FOR SLEEP APNEA: Status: ACTIVE | Noted: 2023-09-09
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
## 2023-09-20 ENCOUNTER — OFFICE VISIT (OUTPATIENT)
Dept: CARDIOLOGY | Facility: CLINIC | Age: 68
End: 2023-09-20
Payer: MEDICARE

## 2023-09-20 VITALS
SYSTOLIC BLOOD PRESSURE: 120 MMHG | BODY MASS INDEX: 20.87 KG/M2 | HEIGHT: 62 IN | OXYGEN SATURATION: 97 % | WEIGHT: 113.4 LBS | DIASTOLIC BLOOD PRESSURE: 70 MMHG | HEART RATE: 67 BPM

## 2023-09-20 DIAGNOSIS — E78.5 DYSLIPIDEMIA: Chronic | ICD-10-CM

## 2023-09-20 DIAGNOSIS — R42 POSTURAL DIZZINESS WITH PRESYNCOPE: ICD-10-CM

## 2023-09-20 DIAGNOSIS — I10 BENIGN ESSENTIAL HYPERTENSION: Chronic | ICD-10-CM

## 2023-09-20 DIAGNOSIS — I70.213 ATHEROSCLEROSIS OF NATIVE ARTERY OF BOTH LOWER EXTREMITIES WITH INTERMITTENT CLAUDICATION: Primary | Chronic | ICD-10-CM

## 2023-09-20 DIAGNOSIS — Z91.89 AT RISK FOR SLEEP APNEA: ICD-10-CM

## 2023-09-20 DIAGNOSIS — I65.23 BILATERAL CAROTID ARTERY STENOSIS: Chronic | ICD-10-CM

## 2023-09-20 DIAGNOSIS — R55 POSTURAL DIZZINESS WITH PRESYNCOPE: ICD-10-CM

## 2023-09-20 PROCEDURE — 1159F MED LIST DOCD IN RCRD: CPT | Performed by: NURSE PRACTITIONER

## 2023-09-20 PROCEDURE — 99214 OFFICE O/P EST MOD 30 MIN: CPT | Performed by: NURSE PRACTITIONER

## 2023-09-20 PROCEDURE — 1160F RVW MEDS BY RX/DR IN RCRD: CPT | Performed by: NURSE PRACTITIONER

## 2023-09-20 PROCEDURE — 3078F DIAST BP <80 MM HG: CPT | Performed by: NURSE PRACTITIONER

## 2023-09-20 PROCEDURE — 3074F SYST BP LT 130 MM HG: CPT | Performed by: NURSE PRACTITIONER

## 2023-10-03 DIAGNOSIS — M19.111 POST-TRAUMATIC OSTEOARTHRITIS OF RIGHT SHOULDER: ICD-10-CM

## 2023-10-03 RX ORDER — TRAMADOL HYDROCHLORIDE 50 MG/1
TABLET ORAL
Qty: 180 TABLET | Refills: 2 | Status: SHIPPED | OUTPATIENT
Start: 2023-10-03

## 2023-10-03 NOTE — TELEPHONE ENCOUNTER
Rx Refill Note  Requested Prescriptions     Pending Prescriptions Disp Refills    traMADol (ULTRAM) 50 MG tablet [Pharmacy Med Name: TRAMADOL 50MG TABLETS] 180 tablet      Sig: TAKE 1 TABLET BY MOUTH TWICE DAILY      Last office visit with prescribing clinician: 8/3/2023   Last telemedicine visit with prescribing clinician: Visit date not found   Next office visit with prescribing clinician: 11/20/2023                         Would you like a call back once the refill request has been completed: [] Yes [] No    If the office needs to give you a call back, can they leave a voicemail: [] Yes [] No    Koki Artis LPN  10/03/23, 10:44 EDT

## 2023-10-18 ENCOUNTER — OFFICE VISIT (OUTPATIENT)
Dept: INTERNAL MEDICINE | Facility: CLINIC | Age: 68
End: 2023-10-18
Payer: MEDICARE

## 2023-10-18 VITALS
BODY MASS INDEX: 21.35 KG/M2 | HEIGHT: 62 IN | TEMPERATURE: 97.6 F | SYSTOLIC BLOOD PRESSURE: 130 MMHG | WEIGHT: 116 LBS | DIASTOLIC BLOOD PRESSURE: 60 MMHG | HEART RATE: 64 BPM

## 2023-10-18 DIAGNOSIS — M19.111 POST-TRAUMATIC OSTEOARTHRITIS OF RIGHT SHOULDER: Primary | Chronic | ICD-10-CM

## 2023-10-18 PROBLEM — Z00.00 ANNUAL PHYSICAL EXAM: Status: RESOLVED | Noted: 2020-09-30 | Resolved: 2023-10-18

## 2023-10-18 PROBLEM — R05.9 COUGH: Status: RESOLVED | Noted: 2020-09-30 | Resolved: 2023-10-18

## 2023-10-18 NOTE — PROGRESS NOTES
Aarti Wade  1955  [id number]  Patient Care Team:  Jannette Chapa MD as PCP - General  Jannette Chapa MD as PCP - Family Medicine  Ketan Lazcano MD as Consulting Physician (Orthopedic Surgery)  Lorenzo Good MD as Consulting Physician (Ophthalmology)  Severino Carbajal MD as Consulting Physician (Vascular Surgery)  Cynthia Powlel APRN as Nurse Practitioner (Cardiology)    Aarti Wade is a pleasant 68 y.o. female who presents for evaluation of post traumatic osteoarthritis of right shoulder (F/u)    Chief Complaint   Patient presents with    post traumatic osteoarthritis of right shoulder     F/u       HPI:   Aarti is here for a routine 6 mo follow up visit for right shoulder.  Uses tramadol BID and gabapentin 300mg HS. 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     Saw Dr. Burgess yrs ago until he retired then saw one of his partners.  PCP here ([facility]) has been following her since. Saw Dr. Lazcano for a an acute injury of left shoulder tripping on vacuum and had surgical repair on left side.   Had to stop celebrex within the last few yrs secondary to kidney insufficiency and hypertension.  The gabapentin has been prescribed for nighttime shoulder pain.  She started tramadol for pain when it got worse which has continued to work well for the chronic right shoulder pain.    Past Medical History:   Diagnosis Date    Anxiety     Arthritis     Chronic UTI     Claudication     COPD (chronic obstructive pulmonary disease)     Depression     diffuse fatty liver infiltration on CT 2009    Dizzy     Former smoker 08/31/2022    GERD (gastroesophageal reflux disease)     Head injury     Related to falls off  horses    Hepatitis     UNKNOWN TYPE    History of shingles     about 5 years ago    Hyperlipidemia     Hypertension     multiple fractures and injuries working with  horses     Osteopenia of multiple sites     Osteoporosis     PAD (peripheral artery disease)     Spontaneous pneumothorax 1960    Subclavian steal syndrome 2016    VHD (valvular heart disease) 08/31/2022    Wears dentures     TOP ONLY    Wears glasses      Past Surgical History:   Procedure Laterality Date    ANGIOGRAM - CONVERTED  08/16/2016    AA WITH RUNOFF PER DR. HARRISON    CAROTID ENDARTERECTOMY Right 8/31/2022    Procedure: CAROTID ENDARTERECTOMY- RIGHT;  Surgeon: Francisco Magana MD;  Location: [facility];  Service: Vascular;  Laterality: Right;    COLONOSCOPY      last 8 years ago.  Due to schedule    EYE LENS IMPLANT SECONDARY Left     FEMORAL FEMORAL BYPASS Right 8/22/2016    Procedure: RIGHT COMMON FEMORAL ENDARTERECTOMY WITH PATCH;  Surgeon: Severino Harrison MD;  Location: [facility];  Service:     FINGER SURGERY Left     LEFT INDEX FINGER    INTERVENTIONAL RADIOLOGY PROCEDURE N/A 8/16/2016    Procedure: IR PTA femoral popliteal artery;  Surgeon: Severino Harrison MD;  Location: [facility] IN[facility]IVE LOCATION;  Service:     INTERVENTIONAL RADIOLOGY PROCEDURE Left 1/7/2020    Procedure: LEFT ATHERECTOMY, BALLOON ANGIOPLASTY;  Surgeon: Severino Harrison MD;  Location: [facility] INVASIVE LOCATION;  Service: Interventional Radiology    TONSILLECTOMY      TOTAL SHOULDER ARTHROPLASTY Left 6/8/2020    Procedure: TOTAL SHOULDER ARTHROPLASTY LEFT;  Surgeon: Ketan Lazcano MD;  Location: [facility];  Service: Orthopedics;  Laterality: Left;  protector in: 1346  protector out: 1402     Family History   Problem Relation Age of Onset    Osteoarthritis Mother     Alzheimer's disease Mother     Hypertension Father     Heart attack Father     Alcohol abuse Father     No Known Problems Sister     No Known Problems Daughter     No Known Problems Son     Breast cancer Maternal Grandmother 50    Breast cancer Paternal Grandmother 50    No Known Problems Maternal Aunt     No Known Problems Paternal Aunt     Other  Other     Heart attack Other     Coronary artery disease Paternal Grandfather     Stroke Paternal Grandfather     No Known Problems Sister     Ovarian cancer Neg Hx     Endometrial cancer Neg Hx      Social History     Tobacco Use   Smoking Status Former    Packs/day: 1.00    Years: 40.00    Additional pack years: 0.00    Total pack years: 40.00    Types: Electronic Cigarette, Cigarettes    Quit date: 2016    Years since quittin.3    Passive exposure: Past   Smokeless Tobacco Never   Tobacco Comments    1 PACK/DAY SMOKER UNTIL 2016     Allergies   Allergen Reactions    Abilify [Aripiprazole] Other (See Comments)     Tardive- Dyskinesia    Levocetirizine Dihydrochloride Myalgia     Muscle aches/joint pain       Current Outpatient Medications:     acetaminophen (Acetaminophen 8 Hour) 650 MG 8 hr tablet, Take 1 tablet by mouth 2 (two) times a day., Disp: , Rfl:     amLODIPine (NORVASC) 10 MG tablet, Take 1 tablet by mouth Daily., Disp: 90 tablet, Rfl: 3    ascorbic acid (VITAMIN C) 1000 MG tablet, Take 1 tablet by mouth Daily., Disp: , Rfl:     aspirin 81 MG EC tablet, Take 1 tablet by mouth Every Morning. TOLD NOT TO STOP BEFORE SURGERY, Disp: , Rfl:     bisoprolol (ZEBeta) 10 MG tablet, Take 1 tablet by mouth Daily., Disp: 90 tablet, Rfl: 1    Calcium Citrate-Vitamin D (CALCIUM + D PO), Take 2 tablets by mouth Daily., Disp: , Rfl:     citalopram (CeleXA) 40 MG tablet, TAKE 1 TABLET BY MOUTH DAILY, Disp: 90 tablet, Rfl: 1    clopidogrel (PLAVIX) 75 MG tablet, TAKE 1 TABLET BY MOUTH DAILY, Disp: 90 tablet, Rfl: 1    Coenzyme Q10 200 MG capsule, Take 400 mg by mouth Daily., Disp: , Rfl:     Fish Oil oil, Take 1 capsule by mouth Daily., Disp: , Rfl:     Flaxseed, Linseed, (FLAXSEED OIL) 1000 MG capsule, Take 1 capsule by mouth daily., Disp: , Rfl:     fluticasone (FLONASE) 50 MCG/ACT nasal spray, SHAKE LIQUID AND USE 1 SPRAY IN EACH NOSTRIL TWICE DAILY (Patient taking differently: 2 sprays into the  "nostril(s) as directed by provider Daily.), Disp: 48 g, Rfl: 3    Fluticasone Furoate-Vilanterol (BREO ELLIPTA) 200-25 MCG/ACT inhaler, INHALE 1 PUFF BY MOUTH DAILY, Disp: 60 each, Rfl: 11    folic acid (FOLVITE) 1 MG tablet, Take 2 tablets by mouth Daily., Disp: 180 tablet, Rfl: 3    gabapentin (NEURONTIN) 100 MG capsule, TAKE 3 CAPSULES BY MOUTH EVERY EVENING, Disp: 90 capsule, Rfl: 2    Methylsulfonylmethane (MSM PO), Take 2 tablets by mouth daily., Disp: , Rfl:     Multiple Vitamins-Minerals (MULTIVITAMIN ADULT PO), Take 1 tablet by mouth Daily., Disp: , Rfl:     omeprazole (priLOSEC) 20 MG capsule, Take 1 capsule by mouth Daily., Disp: 90 capsule, Rfl: 3    rosuvastatin (CRESTOR) 40 MG tablet, TAKE 1 TABLET BY MOUTH DAILY, Disp: 90 tablet, Rfl: 3    traMADol (ULTRAM) 50 MG tablet, TAKE 1 TABLET BY MOUTH TWICE DAILY, Disp: 180 tablet, Rfl: 2    URINARY HEALTH/CRANBERRY PO, Take 1 tablet by mouth 2 (Two) Times a Day. \"CRANACTIN\", Disp: , Rfl:     vitamin B-12 (CYANOCOBALAMIN) 500 MCG tablet, TAKE 1 TABLET BY MOUTH DAILY, Disp: 90 tablet, Rfl: 1    oxyCODONE (OXY-IR) 5 MG capsule, Take 1 capsule by mouth Every 4 (Four) Hours As Needed for Moderate Pain. (Patient not taking: Reported on 9/20/2023), Disp: , Rfl:     Review of Systems  Review of systems was completed, and pertinent findings are noted in the HPI.  /60 (BP Location: Right arm, Patient Position: Sitting, Cuff Size: Adult)   Pulse 64   Temp 97.6 °F (36.4 °C) (Temporal)   Ht 157.5 cm (62.01\")   Wt 52.6 kg (116 lb)   BMI 21.21 kg/m²     Physical Exam  Vitals reviewed.   Constitutional:       Appearance: She is well-developed.   Pulmonary:      Effort: Pulmonary effort is normal.   Skin:     General: Skin is warm and dry.   Neurological:      Mental Status: She is alert.   Psychiatric:         Behavior: Behavior normal.         PHQ-9 Total Score:      Assessment/Plan:  Diagnoses and all orders for this visit:    1. Post-traumatic osteoarthritis " of right shoulder (Primary)      1. Right shoulder pain  - The patient will continue taking tramadol and gabapentin as prescribed.         There are no Patient Instructions on file for this visit.  Plan of care reviewed with patient at the conclusion of today's visit. Education was provided regarding diagnosis, management and any prescribed or recommended OTC medications.  Patient verbalizes understanding of and agreement with management plan.    Return in about 6 months (around 4/18/2024).    Dictated Utilizing Dragon Dictation.    CYRUS Gallegos      Transcribed from ambient dictation for CYRUS Gallegos by Nancy Hardin.  10/18/23   17:24 EDT    Patient or patient representative verbalized consent to the visit recording.  I have personally performed the services described in this document as transcribed by the above individual, and it is both accurate and complete.

## 2023-10-20 ENCOUNTER — TRANSCRIBE ORDERS (OUTPATIENT)
Dept: ADMINISTRATIVE | Facility: HOSPITAL | Age: 68
End: 2023-10-20
Payer: MEDICARE

## 2023-10-20 DIAGNOSIS — Z12.31 VISIT FOR SCREENING MAMMOGRAM: Primary | ICD-10-CM

## 2023-10-21 ENCOUNTER — DOCUMENTATION (OUTPATIENT)
Dept: INTERNAL MEDICINE | Facility: CLINIC | Age: 68
End: 2023-10-21
Payer: MEDICARE

## 2023-11-14 ENCOUNTER — TELEPHONE (OUTPATIENT)
Dept: INTERNAL MEDICINE | Facility: CLINIC | Age: 68
End: 2023-11-14
Payer: MEDICARE

## 2023-11-14 RX ORDER — CLOPIDOGREL BISULFATE 75 MG/1
75 TABLET ORAL DAILY
Qty: 90 TABLET | Refills: 1 | Status: SHIPPED | OUTPATIENT
Start: 2023-11-14

## 2023-11-14 RX ORDER — CLOPIDOGREL BISULFATE 75 MG/1
75 TABLET ORAL DAILY
Qty: 90 TABLET | Refills: 1 | OUTPATIENT
Start: 2023-11-14

## 2023-11-14 RX ORDER — PANTOPRAZOLE SODIUM 40 MG/1
40 TABLET, DELAYED RELEASE ORAL DAILY
Qty: 90 TABLET | Refills: 1 | Status: SHIPPED | OUTPATIENT
Start: 2023-11-14

## 2023-11-14 NOTE — TELEPHONE ENCOUNTER
Rx Refill Note  Requested Prescriptions     Pending Prescriptions Disp Refills    clopidogrel (PLAVIX) 75 MG tablet [Pharmacy Med Name: CLOPIDOGREL 75MG TABLETS] 90 tablet 1     Sig: TAKE 1 TABLET BY MOUTH DAILY      Last office visit with prescribing clinician: 8/3/2023   Last telemedicine visit with prescribing clinician: Visit date not found   Next office visit with prescribing clinician: 11/20/2023                         Would you like a call back once the refill request has been completed: [] Yes [] No    If the office needs to give you a call back, can they leave a voicemail: [] Yes [] No    Pinky Ramírez RN  11/14/23, 10:12 EST

## 2023-11-20 ENCOUNTER — OFFICE VISIT (OUTPATIENT)
Dept: INTERNAL MEDICINE | Facility: CLINIC | Age: 68
End: 2023-11-20
Payer: MEDICARE

## 2023-11-20 VITALS
BODY MASS INDEX: 21.83 KG/M2 | SYSTOLIC BLOOD PRESSURE: 130 MMHG | HEART RATE: 62 BPM | DIASTOLIC BLOOD PRESSURE: 80 MMHG | HEIGHT: 61 IN | TEMPERATURE: 98 F | OXYGEN SATURATION: 92 % | WEIGHT: 115.6 LBS

## 2023-11-20 DIAGNOSIS — M15.3 POST-TRAUMATIC OSTEOARTHRITIS OF MULTIPLE JOINTS: ICD-10-CM

## 2023-11-20 DIAGNOSIS — J44.9 CHRONIC OBSTRUCTIVE PULMONARY DISEASE, UNSPECIFIED COPD TYPE: Chronic | ICD-10-CM

## 2023-11-20 DIAGNOSIS — F33.0 MAJOR DEPRESSIVE DISORDER, RECURRENT EPISODE, MILD DEGREE: Chronic | ICD-10-CM

## 2023-11-20 DIAGNOSIS — Z00.00 ANNUAL PHYSICAL EXAM: ICD-10-CM

## 2023-11-20 DIAGNOSIS — I10 BENIGN ESSENTIAL HYPERTENSION: Chronic | ICD-10-CM

## 2023-11-20 DIAGNOSIS — I70.213 ATHEROSCLEROSIS OF NATIVE ARTERY OF BOTH LOWER EXTREMITIES WITH INTERMITTENT CLAUDICATION: Chronic | ICD-10-CM

## 2023-11-20 DIAGNOSIS — M85.89 OSTEOPENIA OF MULTIPLE SITES: ICD-10-CM

## 2023-11-20 DIAGNOSIS — N18.31 STAGE 3A CHRONIC KIDNEY DISEASE: Chronic | ICD-10-CM

## 2023-11-20 DIAGNOSIS — Z91.81 AT HIGH RISK FOR FALLS: Chronic | ICD-10-CM

## 2023-11-20 DIAGNOSIS — E78.2 MIXED HYPERLIPIDEMIA: ICD-10-CM

## 2023-11-20 DIAGNOSIS — M19.111 POST-TRAUMATIC OSTEOARTHRITIS OF RIGHT SHOULDER: ICD-10-CM

## 2023-11-20 DIAGNOSIS — Z00.00 MEDICARE ANNUAL WELLNESS VISIT, SUBSEQUENT: Primary | ICD-10-CM

## 2023-11-20 DIAGNOSIS — E44.0 MODERATE PROTEIN-CALORIE MALNUTRITION: Chronic | ICD-10-CM

## 2023-11-20 PROBLEM — R09.02 HYPOXIA: Status: RESOLVED | Noted: 2022-03-09 | Resolved: 2023-11-20

## 2023-11-20 RX ORDER — GABAPENTIN 100 MG/1
300 CAPSULE ORAL EVERY EVENING
Qty: 90 CAPSULE | Refills: 2 | Status: SHIPPED | OUTPATIENT
Start: 2023-11-20

## 2023-11-20 NOTE — ASSESSMENT & PLAN NOTE
She will continue taking citalopram daily. Continue to get out and be physically active every day.

## 2023-11-20 NOTE — ASSESSMENT & PLAN NOTE
Continue taking daily aspirin and Plavix and nightly statin. Continue to walk around a lot every day.
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
## 2023-11-20 NOTE — ASSESSMENT & PLAN NOTE
She has gained back 5 pounds over the last few months. She will continue trying to eat protein several times per day.

## 2023-11-20 NOTE — ASSESSMENT & PLAN NOTE
Continue doing the balance exercises every day. Stay well hydrated to which also helps prevent falls.

## 2023-11-20 NOTE — ASSESSMENT & PLAN NOTE
She will get an influenza vaccine today. She will get the new COVID-19 booster and Shingrix at her pharmacy. She will be due for a Cologuard in 2024.

## 2023-11-20 NOTE — ASSESSMENT & PLAN NOTE
She will get an influenza vaccine today. She will get the new COVID-19 booster and Shingrix at her pharmacy. She will be due for [facility] in 2024.

## 2023-11-20 NOTE — ASSESSMENT & PLAN NOTE
She will continue taking gabapentin every evening. Continue tramadol as needed. May also take some Tylenol Arthritis as needed. Use a cold pack on joints as needed to decrease pain, inflammation, or swelling.

## 2023-11-21 ENCOUNTER — LAB (OUTPATIENT)
Dept: LAB | Facility: HOSPITAL | Age: 68
End: 2023-11-21
Payer: MEDICARE

## 2023-11-21 DIAGNOSIS — I10 BENIGN ESSENTIAL HYPERTENSION: Chronic | ICD-10-CM

## 2023-11-21 DIAGNOSIS — E78.2 MIXED HYPERLIPIDEMIA: ICD-10-CM

## 2023-11-21 DIAGNOSIS — M85.89 OSTEOPENIA OF MULTIPLE SITES: ICD-10-CM

## 2023-11-21 NOTE — PATIENT INSTRUCTIONS
Patient Instructions  Problem List Items Addressed This Visit          Advance Directives and General Issues    At high risk for falls (Chronic)    Current Assessment & Plan     Continue doing the balance exercises every day. Stay well hydrated to which also helps prevent falls.            Cardiac and Vasculature    Benign essential hypertension (Chronic)    Overview     Taking amlodipine, bisoprolol.          Current Assessment & Plan     Continue amlodipine and bisoprolol.         Relevant Medications    amLODIPine (NORVASC) 10 MG tablet    bisoprolol (ZEBeta) 10 MG tablet    Other Relevant Orders    CBC & Differential    Comprehensive Metabolic Panel    Microalbumin / Creatinine Urine Ratio - Urine, Clean Catch    Urinalysis With Microscopic - Urine, Clean Catch    Atherosclerosis of native artery of both lower extremities with intermittent claudication (Chronic)    Overview     Patient is status post left lower extremity atherectomy and balloon angioplasty by Dr. Carbajal on 1/7/2020.    2021 SAIDA and Doppler shows mild arterial insufficiency in both lower extremities at rest.  It shows significant vascular claudication on the right leg.  It also shows significant subclavian  and arterial insufficiency since the arm blood pressures are significantly different.    Taking daily  aspirin and plavix and nightly statin.          Current Assessment & Plan     Continue taking daily aspirin and Plavix and nightly statin. Continue to walk around a lot every day.         Mixed hyperlipidemia    Overview     Taking  rosuvastatin every evening.               Current Assessment & Plan     Continue rosuvastatin daily. Continue low-fat, healthy diet.         Relevant Medications    rosuvastatin (CRESTOR) 40 MG tablet    Other Relevant Orders    Homocysteine    Lipid Panel    TSH    Vitamin B12       Endocrine and Metabolic    Moderate protein-calorie malnutrition (Chronic)    Current Assessment & Plan     She has gained back 5  pounds over the last few months. She will continue trying to eat protein several times per day.            Genitourinary and Reproductive     Stage III CKD (Chronic)    Overview                Current Assessment & Plan     Continue trying to drink a lot of fluids every day.            Health Encounters    Medicare annual wellness visit, subsequent - Primary    Current Assessment & Plan     She will get an influenza vaccine today. She will get the new COVID-19 booster and Shingrix at her pharmacy. She will be due for Cologuard in 2024.         Annual physical exam    Current Assessment & Plan     She will get an influenza vaccine today. She will get the new COVID-19 booster and Shingrix at her pharmacy. She will be due for a Cologuard in 2024.            Mental Health    Major depressive disorder, recurrent episode, mild degree (Chronic)    Overview     Taking citalopram 40 mg daily.             Current Assessment & Plan     She will continue taking citalopram daily. Continue to get out and be physically active every day.         Relevant Medications    citalopram (CeleXA) 40 MG tablet       Musculoskeletal and Injuries    Osteopenia of multiple sites    Overview     12/28/2020 DEXA showed mild improvement in osteopenia of the hip.  T score in the left total hip is now -1.1 and in  the left femoral neck is -2.0.  DEXA 2/2023 shows a mild decrease in osteopenia with the lowest T-scores in the femoral neck at -2.0.          Relevant Orders    Vitamin D,25-Hydroxy    Post-traumatic osteoarthritis of multiple joints    Overview     Taking gabapentin  3 capsules every evening.  Taking tramadol 1/2 to 1 tablet up to 4 times a day as needed.           Current Assessment & Plan     She will continue taking gabapentin every evening. Continue tramadol as needed. May also take some Tylenol Arthritis as needed. Use a cold pack on joints as needed to decrease pain, inflammation, or swelling.            Pulmonary and Pneumonias     COPD (chronic obstructive pulmonary disease) (Chronic)    Overview     History of tobacco use.  Using Breo Ellipta inhaler once a day.           Current Assessment & Plan       Continue Breo Ellipta inhaler daily. Continue regular physical activity.           Relevant Medications    Fluticasone Furoate-Vilanterol (BREO ELLIPTA) 200-25 MCG/ACT inhaler    fluticasone (FLONASE) 50 MCG/ACT nasal spray      Fall Prevention in the Home, Adult  Falls can cause injuries and affect people of all ages. There are many simple things that you can do to make your home safe and to help prevent falls. Ask for help when making these changes, if needed.  What actions can I take to prevent falls?  General instructions  Use good lighting in all rooms. Replace any light bulbs that burn out, turn on lights if it is dark, and use night-lights.  Place frequently used items in easy-to-reach places. Lower the shelves around your home if necessary.  Set up furniture so that there are clear paths around it. Avoid moving your furniture around.  Remove throw rugs and other tripping hazards from the floor.  Avoid walking on wet floors.  Fix any uneven floor surfaces.  Add color or contrast paint or tape to grab bars and handrails in your home. Place contrasting color strips on the first and last steps of staircases.  When you use a stepladder, make sure that it is completely opened and that the sides and supports are firmly locked. Have someone hold the ladder while you are using it. Do not climb a closed stepladder.  Know where your pets are when moving through your home.  What can I do in the bathroom?         Keep the floor dry. Immediately clean up any water that is on the floor.  Remove soap buildup in the tub or shower regularly.  Use nonskid mats or decals on the floor of the tub or shower.  Attach bath mats securely with double-sided, nonslip rug tape.  If you need to sit down while you are in the shower, use a plastic, nonslip  stool.  Install grab bars by the toilet and in the tub and shower. Do not use towel bars as grab bars.  What can I do in the bedroom?  Make sure that a bedside light is easy to reach.  Do not use oversized bedding that reaches the floor.  Have a firm chair that has side arms to use for getting dressed.  What can I do in the kitchen?  Clean up any spills right away.  If you need to reach for something above you, use a sturdy step stool that has a grab bar.  Keep electrical cables out of the way.  Do not use floor polish or wax that makes floors slippery. If you must use wax, make sure that it is non-skid floor wax.  What can I do with my stairs?  Do not leave any items on the stairs.  Make sure that you have a light switch at the top and the bottom of the stairs. Have them installed if you do not have them.  Make sure that there are handrails on both sides of the stairs. Fix handrails that are broken or loose. Make sure that handrails are as long as the staircases.  Install non-slip stair treads on all stairs in your home.  Avoid having throw rugs at the top or bottom of stairs, or secure the rugs with carpet tape to prevent them from moving.  Choose a carpet design that does not hide the edge of steps on the stairs.  Check any carpeting to make sure that it is firmly attached to the stairs. Fix any carpet that is loose or worn.  What can I do on the outside of my home?  Use bright outdoor lighting.  Regularly repair the edges of walkways and driveways and fix any cracks.  Remove high doorway thresholds.  Trim any shrubbery on the main path into your home.  Regularly check that handrails are securely fastened and in good repair. Both sides of all steps should have handrails.  Install guardrails along the edges of any raised decks or porches.  Clear walkways of debris and clutter, including tools and rocks.  Have leaves, snow, and ice cleared regularly.  Use sand or salt on walkways during winter months.  In the  garage, clean up any spills right away, including grease or oil spills.  What other actions can I take?  Wear closed-toe shoes that fit well and support your feet. Wear shoes that have rubber soles or low heels.  Use mobility aids as needed, such as canes, walkers, scooters, and crutches.  Review your medicines with your health care provider. Some medicines can cause dizziness or changes in blood pressure, which increase your risk of falling.  Talk with your health care provider about other ways that you can decrease your risk of falls. This may include working with a physical therapist or  to improve your strength, balance, and endurance.  Where to find more information  Centers for Disease Control and Prevention, [place]AD[place]: www.cdc.gov  National [place] on Aging: www.shawanda.nih.gov  Contact a health care provider if:  You are afraid of falling at home.  You feel weak, drowsy, or dizzy at home.  You fall at home.  Summary  There are many simple things that you can do to make your home safe and to help prevent falls.  Ways to make your home safe include removing tripping hazards and installing grab bars in the bathroom.  Ask for help when making these changes in your home.  This information is not intended to replace advice given to you by your health care provider. Make sure you discuss any questions you have with your health care provider.  Document Revised: 09/19/2022 Document Reviewed: 07/21/2021  Elsevier Patient Education © 2023 [employer]vier Inc.

## 2023-11-22 LAB
25(OH)D3+25(OH)D2 SERPL-MCNC: 44.1 NG/ML (ref 30–100)
ALBUMIN SERPL-MCNC: 4.5 G/DL (ref 3.9–4.9)
ALBUMIN/CREAT UR: 796 MG/G CREAT (ref 0–29)
ALBUMIN/GLOB SERPL: 1.7 {RATIO} (ref 1.2–2.2)
ALP SERPL-CCNC: 70 IU/L (ref 44–121)
ALT SERPL-CCNC: 16 IU/L (ref 0–32)
APPEARANCE UR: ABNORMAL
AST SERPL-CCNC: 24 IU/L (ref 0–40)
BACTERIA #/AREA URNS HPF: ABNORMAL /[HPF]
BASOPHILS # BLD AUTO: 0.1 X10E3/UL (ref 0–0.2)
BASOPHILS NFR BLD AUTO: 1 %
BILIRUB SERPL-MCNC: 0.4 MG/DL (ref 0–1.2)
BILIRUB UR QL STRIP: NEGATIVE
BUN SERPL-MCNC: 26 MG/DL (ref 8–27)
BUN/CREAT SERPL: 30 (ref 12–28)
CALCIUM SERPL-MCNC: 8.8 MG/DL (ref 8.7–10.3)
CASTS URNS QL MICRO: ABNORMAL /LPF
CHLORIDE SERPL-SCNC: 102 MMOL/L (ref 96–106)
CHOLEST SERPL-MCNC: 164 MG/DL (ref 100–199)
CO2 SERPL-SCNC: 24 MMOL/L (ref 20–29)
COLOR UR: YELLOW
CREAT SERPL-MCNC: 0.87 MG/DL (ref 0.57–1)
CREAT UR-MCNC: 105.8 MG/DL
EGFRCR SERPLBLD CKD-EPI 2021: 72 ML/MIN/1.73
EOSINOPHIL # BLD AUTO: 0.4 X10E3/UL (ref 0–0.4)
EOSINOPHIL NFR BLD AUTO: 6 %
EPI CELLS #/AREA URNS HPF: ABNORMAL /HPF (ref 0–10)
ERYTHROCYTE [DISTWIDTH] IN BLOOD BY AUTOMATED COUNT: 12.7 % (ref 11.7–15.4)
GLOBULIN SER CALC-MCNC: 2.7 G/DL (ref 1.5–4.5)
GLUCOSE SERPL-MCNC: 87 MG/DL (ref 70–99)
GLUCOSE UR QL STRIP: NEGATIVE
HCT VFR BLD AUTO: 38.6 % (ref 34–46.6)
HCYS SERPL-SCNC: 14.4 UMOL/L (ref 0–17.2)
HDLC SERPL-MCNC: 91 MG/DL
HGB BLD-MCNC: 12.7 G/DL (ref 11.1–15.9)
HGB UR QL STRIP: NEGATIVE
IMM GRANULOCYTES # BLD AUTO: 0 X10E3/UL (ref 0–0.1)
IMM GRANULOCYTES NFR BLD AUTO: 0 %
KETONES UR QL STRIP: NEGATIVE
LDLC SERPL CALC-MCNC: 61 MG/DL (ref 0–99)
LEUKOCYTE ESTERASE UR QL STRIP: ABNORMAL
LYMPHOCYTES # BLD AUTO: 0.5 X10E3/UL (ref 0.7–3.1)
LYMPHOCYTES NFR BLD AUTO: 6 %
MCH RBC QN AUTO: 30.2 PG (ref 26.6–33)
MCHC RBC AUTO-ENTMCNC: 32.9 G/DL (ref 31.5–35.7)
MCV RBC AUTO: 92 FL (ref 79–97)
MICRO URNS: ABNORMAL
MICROALBUMIN UR-MCNC: 841.8 UG/ML
MONOCYTES # BLD AUTO: 0.8 X10E3/UL (ref 0.1–0.9)
MONOCYTES NFR BLD AUTO: 11 %
NEUTROPHILS # BLD AUTO: 5.6 X10E3/UL (ref 1.4–7)
NEUTROPHILS NFR BLD AUTO: 76 %
NITRITE UR QL STRIP: POSITIVE
PH UR STRIP: 5 [PH] (ref 5–7.5)
PLATELET # BLD AUTO: 337 X10E3/UL (ref 150–450)
POTASSIUM SERPL-SCNC: 5.1 MMOL/L (ref 3.5–5.2)
PROT SERPL-MCNC: 7.2 G/DL (ref 6–8.5)
PROT UR QL STRIP: ABNORMAL
RBC # BLD AUTO: 4.21 X10E6/UL (ref 3.77–5.28)
RBC #/AREA URNS HPF: ABNORMAL /HPF (ref 0–2)
SODIUM SERPL-SCNC: 142 MMOL/L (ref 134–144)
SP GR UR STRIP: 1.02 (ref 1–1.03)
TRIGL SERPL-MCNC: 58 MG/DL (ref 0–149)
TSH SERPL DL<=0.005 MIU/L-ACNC: 1.78 UIU/ML (ref 0.45–4.5)
UROBILINOGEN UR STRIP-MCNC: 0.2 MG/DL (ref 0.2–1)
VIT B12 SERPL-MCNC: 955 PG/ML (ref 232–1245)
VLDLC SERPL CALC-MCNC: 12 MG/DL (ref 5–40)
WBC # BLD AUTO: 7.4 X10E3/UL (ref 3.4–10.8)
WBC #/AREA URNS HPF: >30 /HPF (ref 0–5)

## 2023-11-25 DIAGNOSIS — R80.9 PROTEINURIA, UNSPECIFIED TYPE: Primary | ICD-10-CM

## 2023-11-25 RX ORDER — CIPROFLOXACIN 500 MG/1
500 TABLET, FILM COATED ORAL 2 TIMES DAILY
Qty: 14 TABLET | Refills: 0 | Status: SHIPPED | OUTPATIENT
Start: 2023-11-25

## 2023-12-05 RX ORDER — FLUTICASONE FUROATE AND VILANTEROL 200; 25 UG/1; UG/1
POWDER RESPIRATORY (INHALATION)
Qty: 60 EACH | Refills: 11 | Status: SHIPPED | OUTPATIENT
Start: 2023-12-05 | End: 2023-12-06

## 2023-12-05 NOTE — TELEPHONE ENCOUNTER
Rx Refill Note  Requested Prescriptions     Pending Prescriptions Disp Refills    Fluticasone Furoate-Vilanterol (BREO ELLIPTA) 200-25 MCG/ACT inhaler [Pharmacy Med Name: FLUTIC/VILAN 200-25MCG ORAL INH(30)] 60 each 11     Sig: INHALE 1 PUFF BY MOUTH DAILY      Last office visit with prescribing clinician: 11/20/23  Last telemedicine visit with prescribing clinician: Visit date not found   Next office visit with prescribing clinician: 4/24/24                        Would you like a call back once the refill request has been completed: [] Yes [] No    If the office needs to give you a call back, can they leave a voicemail: [] Yes [] No    Pinky Ramírez RN  12/05/23, 11:06 EST

## 2023-12-06 ENCOUNTER — TELEPHONE (OUTPATIENT)
Dept: INTERNAL MEDICINE | Facility: CLINIC | Age: 68
End: 2023-12-06

## 2023-12-06 ENCOUNTER — TELEPHONE (OUTPATIENT)
Dept: INTERNAL MEDICINE | Facility: CLINIC | Age: 68
End: 2023-12-06
Payer: MEDICARE

## 2023-12-06 RX ORDER — FLUTICASONE PROPIONATE AND SALMETEROL 250; 50 UG/1; UG/1
1 POWDER RESPIRATORY (INHALATION)
Qty: 60 EACH | Refills: 11 | Status: SHIPPED | OUTPATIENT
Start: 2023-12-06

## 2023-12-06 RX ORDER — TIOTROPIUM BROMIDE INHALATION SPRAY 3.12 UG/1
2 SPRAY, METERED RESPIRATORY (INHALATION)
Qty: 4 G | Refills: 11 | Status: SHIPPED | OUTPATIENT
Start: 2023-12-06

## 2023-12-06 NOTE — TELEPHONE ENCOUNTER
[facility]    Relationship: Other    Best call back number: [phone]     What form or medical record are you requesting: MOST RECENT LAB WORK    Who is requesting this form or medical record from you: [facility]    How would you like to receive the form or medical records (pick-up, mail, fax): FAX  If fax, what is the fax number: [phone]    Timeframe paperwork needed: AS SOON AS POSSIBLE

## 2023-12-06 NOTE — TELEPHONE ENCOUNTER
[facility] reached out on the pt's Breo Ellipta. The pt's insurance does not cover this and prefers the following:    Symbicort, Advair Diskus, Wixela, Fluticasone/salmeterol    How would you like to proceed?

## 2023-12-08 ENCOUNTER — HOSPITAL ENCOUNTER (OUTPATIENT)
Dept: GENERAL RADIOLOGY | Facility: HOSPITAL | Age: 68
Discharge: HOME | End: 2023-12-08
Payer: MEDICARE

## 2023-12-08 ENCOUNTER — TELEPHONE (OUTPATIENT)
Dept: INTERNAL MEDICINE | Facility: CLINIC | Age: 68
End: 2023-12-08
Payer: MEDICARE

## 2023-12-08 ENCOUNTER — HOSPITAL ENCOUNTER (OUTPATIENT)
Dept: MAMMOGRAPHY | Facility: HOSPITAL | Age: 68
Discharge: HOME | End: 2023-12-08
Payer: MEDICARE

## 2023-12-08 DIAGNOSIS — R04.2 COUGHING UP BLOOD: ICD-10-CM

## 2023-12-08 DIAGNOSIS — Z12.31 VISIT FOR SCREENING MAMMOGRAM: ICD-10-CM

## 2023-12-08 DIAGNOSIS — R04.2 COUGHING UP BLOOD: Primary | ICD-10-CM

## 2023-12-08 PROCEDURE — 77067 SCR MAMMO BI INCL CAD: CPT

## 2023-12-08 PROCEDURE — 71046 X-RAY EXAM CHEST 2 VIEWS: CPT

## 2023-12-08 PROCEDURE — 77063 BREAST TOMOSYNTHESIS BI: CPT

## 2023-12-08 NOTE — TELEPHONE ENCOUNTER
Patient understood and verbalized understanding.    She denies any fever, chills and increased shortness of breath. She will go to the ER if she develops these symptoms
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
## 2024-01-04 ENCOUNTER — OFFICE VISIT (OUTPATIENT)
Dept: INTERNAL MEDICINE | Facility: CLINIC | Age: 69
End: 2024-01-04
Payer: MEDICARE

## 2024-01-04 VITALS
HEIGHT: 61 IN | HEART RATE: 68 BPM | BODY MASS INDEX: 22.09 KG/M2 | WEIGHT: 117 LBS | SYSTOLIC BLOOD PRESSURE: 120 MMHG | TEMPERATURE: 98.2 F | DIASTOLIC BLOOD PRESSURE: 58 MMHG

## 2024-01-04 DIAGNOSIS — J40 BRONCHITIS: Primary | ICD-10-CM

## 2024-01-04 DIAGNOSIS — J44.9 CHRONIC OBSTRUCTIVE PULMONARY DISEASE, UNSPECIFIED COPD TYPE: ICD-10-CM

## 2024-01-04 DIAGNOSIS — R04.2 COUGHING UP BLOOD: ICD-10-CM

## 2024-01-04 PROCEDURE — 3078F DIAST BP <80 MM HG: CPT

## 2024-01-04 PROCEDURE — 3074F SYST BP LT 130 MM HG: CPT

## 2024-01-04 PROCEDURE — 99213 OFFICE O/P EST LOW 20 MIN: CPT

## 2024-01-04 PROCEDURE — 1159F MED LIST DOCD IN RCRD: CPT

## 2024-01-04 PROCEDURE — 1160F RVW MEDS BY RX/DR IN RCRD: CPT

## 2024-01-04 RX ORDER — DOXYCYCLINE HYCLATE 100 MG/1
100 CAPSULE ORAL 2 TIMES DAILY
Qty: 20 CAPSULE | Refills: 0 | Status: SHIPPED | OUTPATIENT
Start: 2024-01-04 | End: 2024-01-14

## 2024-01-04 RX ORDER — FLUTICASONE FUROATE AND VILANTEROL TRIFENATATE 200; 25 UG/1; UG/1
1 POWDER RESPIRATORY (INHALATION) DAILY
COMMUNITY
Start: 2024-01-02
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
## 2024-01-12 DIAGNOSIS — J40 BRONCHITIS: ICD-10-CM

## 2024-01-12 DIAGNOSIS — R04.2 COUGHING UP BLOOD: ICD-10-CM

## 2024-01-12 RX ORDER — DOXYCYCLINE HYCLATE 100 MG/1
100 CAPSULE ORAL 2 TIMES DAILY
Qty: 20 CAPSULE | Refills: 0 | Status: SHIPPED | OUTPATIENT
Start: 2024-01-12 | End: 2024-01-22

## 2024-01-12 RX ORDER — DOXYCYCLINE HYCLATE 100 MG/1
100 CAPSULE ORAL 2 TIMES DAILY
Qty: 20 CAPSULE | Refills: 0 | Status: CANCELLED | OUTPATIENT
Start: 2024-01-12 | End: 2024-01-22

## 2024-01-12 NOTE — TELEPHONE ENCOUNTER
Caller: MauroAarti    Relationship: Self    Best call back number: [phone]     Requested Prescriptions:   Requested Prescriptions     Pending Prescriptions Disp Refills    doxycycline (VIBRAMYCIN) 100 MG capsule 20 capsule 0     Sig: Take 1 capsule by mouth 2 (Two) Times a Day for 10 days.        Pharmacy where request should be sent: [id number] [address] [facility] [phone] [facility] [phone] FX     Last office visit with prescribing clinician: 11/20/2023   Last telemedicine visit with prescribing clinician: Visit date not found   Next office visit with prescribing clinician: 5/21/2024     Additional details provided by patient: PATIENT IS WILLING TO  BE SEEN AGAIN IF NEEDED.    Does the patient have less than a 3 day supply:  [x] Yes  [] No    Would you like a call back once the refill request has been completed: [x] Yes [] No    If the office needs to give you a call back, can they leave a voicemail: [] Yes [x] No    Latanya Dietrich Rep   01/12/24 09:10 EST

## 2024-01-22 RX ORDER — FOLIC ACID 1 MG/1
2000 TABLET ORAL DAILY
Qty: 180 TABLET | Refills: 3 | Status: SHIPPED | OUTPATIENT
Start: 2024-01-22

## 2024-01-22 RX ORDER — CHOLECALCIFEROL (VITAMIN D3) 125 MCG
500 CAPSULE ORAL DAILY
Qty: 90 TABLET | Refills: 1 | Status: SHIPPED | OUTPATIENT
Start: 2024-01-22

## 2024-01-22 NOTE — TELEPHONE ENCOUNTER
Rx Refill Note  Requested Prescriptions     Pending Prescriptions Disp Refills    vitamin B-12 (CYANOCOBALAMIN) 500 MCG tablet [Pharmacy Med Name: VITAMIN B-12 500MCG TABLETS] 90 tablet 1     Sig: TAKE 1 TABLET BY MOUTH DAILY      Last office visit with prescribing clinician: 1/4/24   Last telemedicine visit with prescribing clinician: Visit date not found   Next office visit with prescribing clinician: 4/17/24                         Would you like a call back once the refill request has been completed: [] Yes [] No    If the office needs to give you a call back, can they leave a voicemail: [] Yes [] No    Mercedes Trammell LPN  01/22/24, 11:24 EST

## 2024-01-24 ENCOUNTER — HOSPITAL ENCOUNTER (OUTPATIENT)
Dept: PULMONOLOGY | Facility: HOSPITAL | Age: 69
Discharge: HOME | End: 2024-01-24
Payer: MEDICARE

## 2024-01-24 DIAGNOSIS — J44.9 CHRONIC OBSTRUCTIVE PULMONARY DISEASE, UNSPECIFIED COPD TYPE: ICD-10-CM

## 2024-01-24 PROCEDURE — 94729 DIFFUSING CAPACITY: CPT

## 2024-01-24 PROCEDURE — 94726 PLETHYSMOGRAPHY LUNG VOLUMES: CPT

## 2024-01-24 PROCEDURE — 94010 BREATHING CAPACITY TEST: CPT

## 2024-02-01 RX ORDER — CITALOPRAM 40 MG/1
40 TABLET ORAL DAILY
Qty: 90 TABLET | Refills: 1 | Status: SHIPPED | OUTPATIENT
Start: 2024-02-01

## 2024-02-15 ENCOUNTER — OFFICE VISIT (OUTPATIENT)
Dept: INTERNAL MEDICINE | Facility: CLINIC | Age: 69
End: 2024-02-15
Payer: MEDICARE

## 2024-02-15 VITALS
WEIGHT: 118 LBS | DIASTOLIC BLOOD PRESSURE: 50 MMHG | BODY MASS INDEX: 22.28 KG/M2 | HEIGHT: 61 IN | TEMPERATURE: 98 F | SYSTOLIC BLOOD PRESSURE: 142 MMHG | HEART RATE: 59 BPM | OXYGEN SATURATION: 95 %

## 2024-02-15 DIAGNOSIS — J44.1 COPD WITH EXACERBATION: Chronic | ICD-10-CM

## 2024-02-15 DIAGNOSIS — R05.1 ACUTE COUGH: ICD-10-CM

## 2024-02-15 DIAGNOSIS — R06.02 SHORTNESS OF BREATH: ICD-10-CM

## 2024-02-15 DIAGNOSIS — J02.9 SORE THROAT: ICD-10-CM

## 2024-02-15 DIAGNOSIS — J20.8 ACUTE BRONCHITIS DUE TO OTHER SPECIFIED ORGANISMS: Primary | Chronic | ICD-10-CM

## 2024-02-15 LAB
EXPIRATION DATE: NORMAL
EXPIRATION DATE: NORMAL
FLUAV AG UPPER RESP QL IA.RAPID: NOT DETECTED
FLUBV AG UPPER RESP QL IA.RAPID: NOT DETECTED
INTERNAL CONTROL: NORMAL
INTERNAL CONTROL: NORMAL
Lab: NORMAL
Lab: NORMAL
S PYO AG THROAT QL: NEGATIVE
SARS-COV-2 AG UPPER RESP QL IA.RAPID: NOT DETECTED

## 2024-02-15 PROCEDURE — 87880 STREP A ASSAY W/OPTIC: CPT | Performed by: INTERNAL MEDICINE

## 2024-02-15 PROCEDURE — 3077F SYST BP >= 140 MM HG: CPT | Performed by: INTERNAL MEDICINE

## 2024-02-15 PROCEDURE — 99214 OFFICE O/P EST MOD 30 MIN: CPT | Performed by: INTERNAL MEDICINE

## 2024-02-15 PROCEDURE — 1159F MED LIST DOCD IN RCRD: CPT | Performed by: INTERNAL MEDICINE

## 2024-02-15 PROCEDURE — 87428 SARSCOV & INF VIR A&B AG IA: CPT | Performed by: INTERNAL MEDICINE

## 2024-02-15 PROCEDURE — 3078F DIAST BP <80 MM HG: CPT | Performed by: INTERNAL MEDICINE

## 2024-02-15 PROCEDURE — 1160F RVW MEDS BY RX/DR IN RCRD: CPT | Performed by: INTERNAL MEDICINE

## 2024-02-15 RX ORDER — CEFDINIR 300 MG/1
300 CAPSULE ORAL 2 TIMES DAILY
Qty: 14 CAPSULE | Refills: 0 | Status: SHIPPED | OUTPATIENT
Start: 2024-02-15 | End: 2024-02-22

## 2024-02-15 RX ORDER — SULFAMETHOXAZOLE AND TRIMETHOPRIM 800; 160 MG/1; MG/1
1 TABLET ORAL 3 TIMES DAILY
COMMUNITY

## 2024-02-15 RX ORDER — ALBUTEROL SULFATE 90 UG/1
2 AEROSOL, METERED RESPIRATORY (INHALATION) EVERY 4 HOURS PRN
Qty: 18 G | Refills: 5 | Status: SHIPPED | OUTPATIENT
Start: 2024-02-15

## 2024-02-26 DIAGNOSIS — M19.111 POST-TRAUMATIC OSTEOARTHRITIS OF RIGHT SHOULDER: ICD-10-CM

## 2024-02-26 RX ORDER — GABAPENTIN 100 MG/1
300 CAPSULE ORAL EVERY EVENING
Qty: 90 CAPSULE | Refills: 2 | Status: SHIPPED | OUTPATIENT
Start: 2024-02-26

## 2024-02-26 RX ORDER — PANTOPRAZOLE SODIUM 40 MG/1
40 TABLET, DELAYED RELEASE ORAL DAILY
Qty: 90 TABLET | Refills: 1 | Status: SHIPPED | OUTPATIENT
Start: 2024-02-26

## 2024-02-26 NOTE — TELEPHONE ENCOUNTER
Rx Refill Note  Requested Prescriptions     Pending Prescriptions Disp Refills    pantoprazole (PROTONIX) 40 MG EC tablet [Pharmacy Med Name: PANTOPRAZOLE 40MG TABLETS] 90 tablet 1     Sig: TAKE 1 TABLET BY MOUTH DAILY      Last office visit with prescribing clinician: 2/15/2024   Last telemedicine visit with prescribing clinician: Visit date not found   Next office visit with prescribing clinician: 2/26/2024                         Would you like a call back once the refill request has been completed: [] Yes [] No    If the office needs to give you a call back, can they leave a voicemail: [] Yes [] No    Koki Artis LPN  02/26/24, 11:23 EST

## 2024-02-26 NOTE — TELEPHONE ENCOUNTER
Rx Refill Note  Requested Prescriptions     Pending Prescriptions Disp Refills    gabapentin (NEURONTIN) 100 MG capsule [Pharmacy Med Name: GABAPENTIN 100MG CAPSULES] 90 capsule      Sig: TAKE 3 CAPSULES BY MOUTH EVERY EVENING      Last office visit with prescribing clinician: 2/15/2024   Last telemedicine visit with prescribing clinician: Visit date not found   Next office visit with prescribing clinician: 4/17/24     LA:  11/20/23 #90 2R                          Would you like a call back once the refill request has been completed: [] Yes [] No    If the office needs to give you a call back, can they leave a voicemail: [] Yes [] No    Mercedes Trammell LPN  02/26/24, 11:31 EST

## 2024-03-08 NOTE — ASSESSMENT & PLAN NOTE
Continue Breo Ellipta inhaler daily. Continue regular physical activity.     Addended by: WALI BELTRAN on: 3/8/2024 03:27 PM     Modules accepted: Orders
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
## 2024-03-14 RX ORDER — ROSUVASTATIN CALCIUM 40 MG/1
40 TABLET, COATED ORAL DAILY
Qty: 90 TABLET | Refills: 3 | Status: SHIPPED | OUTPATIENT
Start: 2024-03-14

## 2024-03-20 ENCOUNTER — OFFICE VISIT (OUTPATIENT)
Dept: CARDIOLOGY | Facility: CLINIC | Age: 69
End: 2024-03-20
Payer: MEDICARE

## 2024-03-20 ENCOUNTER — TELEPHONE (OUTPATIENT)
Dept: INTERNAL MEDICINE | Facility: CLINIC | Age: 69
End: 2024-03-20
Payer: MEDICARE

## 2024-03-20 VITALS
HEIGHT: 62 IN | BODY MASS INDEX: 22.41 KG/M2 | HEART RATE: 66 BPM | SYSTOLIC BLOOD PRESSURE: 118 MMHG | DIASTOLIC BLOOD PRESSURE: 48 MMHG | WEIGHT: 121.8 LBS

## 2024-03-20 DIAGNOSIS — I73.9 PERIPHERAL VASCULAR DISEASE OF EXTREMITY WITH CLAUDICATION: Chronic | ICD-10-CM

## 2024-03-20 DIAGNOSIS — I10 BENIGN ESSENTIAL HYPERTENSION: Primary | Chronic | ICD-10-CM

## 2024-03-20 DIAGNOSIS — E78.5 DYSLIPIDEMIA: Chronic | ICD-10-CM

## 2024-03-20 DIAGNOSIS — E55.9 VITAMIN D DEFICIENCY: Primary | ICD-10-CM

## 2024-03-20 DIAGNOSIS — I38 VHD (VALVULAR HEART DISEASE): Primary | Chronic | ICD-10-CM

## 2024-03-20 DIAGNOSIS — I65.23 BILATERAL CAROTID ARTERY STENOSIS: Chronic | ICD-10-CM

## 2024-03-20 DIAGNOSIS — I10 BENIGN ESSENTIAL HYPERTENSION: Chronic | ICD-10-CM

## 2024-03-20 DIAGNOSIS — I10 ESSENTIAL HYPERTENSION: ICD-10-CM

## 2024-03-20 PROCEDURE — 99214 OFFICE O/P EST MOD 30 MIN: CPT | Performed by: NURSE PRACTITIONER

## 2024-03-20 PROCEDURE — 1159F MED LIST DOCD IN RCRD: CPT | Performed by: NURSE PRACTITIONER

## 2024-03-20 PROCEDURE — 3074F SYST BP LT 130 MM HG: CPT | Performed by: NURSE PRACTITIONER

## 2024-03-20 PROCEDURE — 3078F DIAST BP <80 MM HG: CPT | Performed by: NURSE PRACTITIONER

## 2024-03-20 PROCEDURE — 1160F RVW MEDS BY RX/DR IN RCRD: CPT | Performed by: NURSE PRACTITIONER

## 2024-03-20 RX ORDER — AMLODIPINE BESYLATE 10 MG/1
10 TABLET ORAL DAILY
Qty: 90 TABLET | Refills: 3 | Status: SHIPPED | OUTPATIENT
Start: 2024-03-20

## 2024-03-20 RX ORDER — BISOPROLOL FUMARATE 10 MG/1
10 TABLET, FILM COATED ORAL DAILY
Qty: 90 TABLET | Refills: 3 | Status: SHIPPED | OUTPATIENT
Start: 2024-03-20

## 2024-03-20 RX ORDER — ASPIRIN 81 MG/1
81 TABLET ORAL EVERY MORNING
Qty: 90 TABLET | Refills: 3 | Status: SHIPPED | OUTPATIENT
Start: 2024-03-20

## 2024-03-20 RX ORDER — CLOPIDOGREL BISULFATE 75 MG/1
75 TABLET ORAL DAILY
Qty: 90 TABLET | Refills: 3 | Status: SHIPPED | OUTPATIENT
Start: 2024-03-20

## 2024-03-20 RX ORDER — ROSUVASTATIN CALCIUM 40 MG/1
40 TABLET, COATED ORAL DAILY
Qty: 90 TABLET | Refills: 3 | Status: SHIPPED | OUTPATIENT
Start: 2024-03-20

## 2024-03-20 NOTE — TELEPHONE ENCOUNTER
Patient understood and verbalized understanding.    Resent / faxed the orders to [facility] on [address] per pt request and she will have them done tomorrow

## 2024-03-20 NOTE — ADDENDUM NOTE
Addended by: BOBBY AMANDA on: 3/20/2024 04:46 PM     Modules accepted: Orders     SPOKE TO MEDICAL RECORDS AT [facility] AND THEY WILL BE FAXING OVER HIS NOTES NOW. FROM [facility] AND HE WAS SEEN THIS AM AT THEIR URGENT CLINIC AND SHE IS GOING TO SEND THAT TOO.

## 2024-03-20 NOTE — TELEPHONE ENCOUNTER
Dr. Pride's office, [facility] reached out. He requested if the following orders could be placed for the pt, and once resulted faxed to their office:    - CBC  - Renal Panel  - U/A Complete    For the following indications:    Hypertension  Proteinuria  Vitamin D deficiency

## 2024-03-21 NOTE — TELEPHONE ENCOUNTER
Spoke with pt. Stacy lab orders via fax. Advised the blood work orders are per Dr. Chapa and Dr. Pride

## 2024-03-22 LAB
25(OH)D3+25(OH)D2 SERPL-MCNC: 40.9 NG/ML (ref 30–100)
ALBUMIN/CREAT UR: 528 MG/G CREAT (ref 0–29)
AMBIG ABBREV BMP8 DEFAULT: NORMAL
APPEARANCE UR: CLEAR
BACTERIA #/AREA URNS HPF: ABNORMAL /[HPF]
BASOPHILS # BLD AUTO: 0 X10E3/UL (ref 0–0.2)
BASOPHILS NFR BLD AUTO: 1 %
BILIRUB UR QL STRIP: NEGATIVE
BUN SERPL-MCNC: 25 MG/DL (ref 8–27)
BUN/CREAT SERPL: 23 (ref 12–28)
CALCIUM SERPL-MCNC: 9.1 MG/DL (ref 8.7–10.3)
CASTS URNS QL MICRO: ABNORMAL /LPF
CHLORIDE SERPL-SCNC: 101 MMOL/L (ref 96–106)
CO2 SERPL-SCNC: 22 MMOL/L (ref 20–29)
COLOR UR: YELLOW
CREAT SERPL-MCNC: 1.09 MG/DL (ref 0.57–1)
CREAT UR-MCNC: 136.4 MG/DL
EGFRCR SERPLBLD CKD-EPI 2021: 54 ML/MIN/1.73
EOSINOPHIL # BLD AUTO: 0.3 X10E3/UL (ref 0–0.4)
EOSINOPHIL NFR BLD AUTO: 4 %
EPI CELLS #/AREA URNS HPF: ABNORMAL /HPF (ref 0–10)
ERYTHROCYTE [DISTWIDTH] IN BLOOD BY AUTOMATED COUNT: 13.3 % (ref 11.7–15.4)
GLUCOSE SERPL-MCNC: 83 MG/DL (ref 70–99)
GLUCOSE UR QL STRIP: NEGATIVE
HCT VFR BLD AUTO: 41 % (ref 34–46.6)
HGB BLD-MCNC: 13.9 G/DL (ref 11.1–15.9)
HGB UR QL STRIP: NEGATIVE
IMM GRANULOCYTES # BLD AUTO: 0 X10E3/UL (ref 0–0.1)
IMM GRANULOCYTES NFR BLD AUTO: 0 %
KETONES UR QL STRIP: NEGATIVE
LEUKOCYTE ESTERASE UR QL STRIP: ABNORMAL
LYMPHOCYTES # BLD AUTO: 0.7 X10E3/UL (ref 0.7–3.1)
LYMPHOCYTES NFR BLD AUTO: 11 %
MCH RBC QN AUTO: 31.6 PG (ref 26.6–33)
MCHC RBC AUTO-ENTMCNC: 33.9 G/DL (ref 31.5–35.7)
MCV RBC AUTO: 93 FL (ref 79–97)
MICRO URNS: ABNORMAL
MICROALBUMIN UR-MCNC: 720.8 UG/ML
MONOCYTES # BLD AUTO: 0.7 X10E3/UL (ref 0.1–0.9)
MONOCYTES NFR BLD AUTO: 11 %
NEUTROPHILS # BLD AUTO: 4.9 X10E3/UL (ref 1.4–7)
NEUTROPHILS NFR BLD AUTO: 73 %
NITRITE UR QL STRIP: NEGATIVE
PH UR STRIP: 5.5 [PH] (ref 5–7.5)
PHOSPHATE SERPL-MCNC: 5.1 MG/DL (ref 3–4.3)
PLATELET # BLD AUTO: 305 X10E3/UL (ref 150–450)
POTASSIUM SERPL-SCNC: 5.5 MMOL/L (ref 3.5–5.2)
PROT UR QL STRIP: ABNORMAL
RBC # BLD AUTO: 4.4 X10E6/UL (ref 3.77–5.28)
RBC #/AREA URNS HPF: ABNORMAL /HPF (ref 0–2)
SODIUM SERPL-SCNC: 138 MMOL/L (ref 134–144)
SP GR UR STRIP: 1.02 (ref 1–1.03)
UROBILINOGEN UR STRIP-MCNC: 0.2 MG/DL (ref 0.2–1)
WBC # BLD AUTO: 6.7 X10E3/UL (ref 3.4–10.8)
WBC #/AREA URNS HPF: ABNORMAL /HPF (ref 0–5)

## 2024-03-23 DIAGNOSIS — E83.39 HIGH PHOSPHATE LEVELS: Primary | ICD-10-CM

## 2024-03-23 RX ORDER — CALCIUM ACETATE 667 MG/1
1334 CAPSULE ORAL DAILY
Qty: 60 CAPSULE | Refills: 1 | Status: SHIPPED | OUTPATIENT
Start: 2024-03-23 | End: 2024-03-25

## 2024-03-23 RX ORDER — MELATONIN
1000 DAILY
Start: 2024-03-23

## 2024-03-25 DIAGNOSIS — E83.39 HIGH PHOSPHATE LEVELS: ICD-10-CM

## 2024-03-25 RX ORDER — CALCIUM ACETATE 667 MG/1
1334 CAPSULE ORAL DAILY
Qty: 180 CAPSULE | Refills: 1 | Status: SHIPPED | OUTPATIENT
Start: 2024-03-25

## 2024-03-25 NOTE — TELEPHONE ENCOUNTER
Rx Refill Note  Requested Prescriptions     Pending Prescriptions Disp Refills    calcium acetate (PHOS BINDER,) 667 MG capsule capsule [Pharmacy Med Name: CALCIUM ACETATE 667MG CAPSULES] 180 capsule 1     Sig: TAKE 2 CAPSULES BY MOUTH DAILY      Last office visit with prescribing clinician: 2/15/2024   Last telemedicine visit with prescribing clinician: Visit date not found   Next office visit with prescribing clinician: 5/21/2024                         Would you like a call back once the refill request has been completed: [] Yes [] No    If the office needs to give you a call back, can they leave a voicemail: [] Yes [] No    Pinky Ramírez RN  03/25/24, 11:18 EDT

## 2024-03-27 ENCOUNTER — TRANSCRIBE ORDERS (OUTPATIENT)
Dept: ADMINISTRATIVE | Facility: HOSPITAL | Age: 69
End: 2024-03-27
Payer: MEDICARE

## 2024-03-27 DIAGNOSIS — N18.1 CHRONIC KIDNEY DISEASE, STAGE 1: Primary | ICD-10-CM

## 2024-03-27 DIAGNOSIS — I12.9 HYPERTENSIVE RENAL DISEASE: ICD-10-CM

## 2024-03-27 DIAGNOSIS — I10 ESSENTIAL HYPERTENSION, MALIGNANT: ICD-10-CM

## 2024-04-04 DIAGNOSIS — M19.111 POST-TRAUMATIC OSTEOARTHRITIS OF RIGHT SHOULDER: ICD-10-CM

## 2024-04-04 RX ORDER — TRAMADOL HYDROCHLORIDE 50 MG/1
TABLET ORAL
Qty: 180 TABLET | OUTPATIENT
Start: 2024-04-04

## 2024-04-04 RX ORDER — TRAMADOL HYDROCHLORIDE 50 MG/1
50 TABLET ORAL 2 TIMES DAILY
Qty: 180 TABLET | Refills: 2 | Status: SHIPPED | OUTPATIENT
Start: 2024-04-04

## 2024-04-04 NOTE — TELEPHONE ENCOUNTER
Rx Refill Note  Requested Prescriptions     Pending Prescriptions Disp Refills    traMADol (ULTRAM) 50 MG tablet [Pharmacy Med Name: TRAMADOL 50MG TABLETS] 180 tablet      Sig: TAKE 1 TABLET BY MOUTH TWICE DAILY      Last office visit with prescribing clinician: 2/15/2024   Last telemedicine visit with prescribing clinician: Visit date not found   Next office visit with prescribing clinician: 5/21/2024                         Would you like a call back once the refill request has been completed: [] Yes [] No    If the office needs to give you a call back, can they leave a voicemail: [] Yes [] No    Shireen Villalba MA  04/04/24, 11:43 EDT

## 2024-04-04 NOTE — TELEPHONE ENCOUNTER
PATIENT MADE CONTACT TO CHECK THE STATUS OF HER EARLIER REQUEST FOR MEDICATION REFILL    PATIENT STATED SHE DIDN'T REALIZE THAT MEDICATION REFILL REQUEST WAS TO TAKE PLACE PRIOR TO 4/2    PATIENT ALSO STATED SHE HAS (1) REFILL LEFT FOR THE MEDICATION      traMADol (ULTRAM) 50 MG tablet     [place]    TELEPHONE CONTACT:    [phone]

## 2024-04-08 ENCOUNTER — HOSPITAL ENCOUNTER (OUTPATIENT)
Dept: CARDIOLOGY | Facility: HOSPITAL | Age: 69
Discharge: HOME | End: 2024-04-08
Admitting: NURSE PRACTITIONER
Payer: MEDICARE

## 2024-04-08 VITALS — HEIGHT: 62 IN | BODY MASS INDEX: 22.31 KG/M2 | WEIGHT: 121.25 LBS

## 2024-04-08 LAB
ASCENDING AORTA: 2.9 CM
BH CV ECHO MEAS - AI P1/2T: 451.9 MSEC
BH CV ECHO MEAS - AO MAX PG: 8.7 MMHG
BH CV ECHO MEAS - AO MEAN PG: 4.5 MMHG
BH CV ECHO MEAS - AO ROOT DIAM: 3.1 CM
BH CV ECHO MEAS - AO V2 MAX: 147.3 CM/SEC
BH CV ECHO MEAS - AO V2 VTI: 38.6 CM
BH CV ECHO MEAS - AVA(I,D): 2.46 CM2
BH CV ECHO MEAS - EDV(CUBED): 72.9 ML
BH CV ECHO MEAS - EDV(MOD-SP2): 59.9 ML
BH CV ECHO MEAS - EDV(MOD-SP4): 72.6 ML
BH CV ECHO MEAS - EF(MOD-SP2): 76.2 %
BH CV ECHO MEAS - EF(MOD-SP4): 63.2 %
BH CV ECHO MEAS - ESV(CUBED): 19.2 ML
BH CV ECHO MEAS - ESV(MOD-SP2): 14.2 ML
BH CV ECHO MEAS - ESV(MOD-SP4): 26.7 ML
BH CV ECHO MEAS - FS: 35.9 %
BH CV ECHO MEAS - IVS/LVPW: 0.92 CM
BH CV ECHO MEAS - IVSD: 0.91 CM
BH CV ECHO MEAS - LA DIMENSION: 4 CM
BH CV ECHO MEAS - LAT PEAK E' VEL: 9 CM/SEC
BH CV ECHO MEAS - LV MASS(C)D: 127.1 GRAMS
BH CV ECHO MEAS - LV MAX PG: 5.1 MMHG
BH CV ECHO MEAS - LV MEAN PG: 3.2 MMHG
BH CV ECHO MEAS - LV V1 MAX: 112.5 CM/SEC
BH CV ECHO MEAS - LV V1 VTI: 30.5 CM
BH CV ECHO MEAS - LVIDD: 4.2 CM
BH CV ECHO MEAS - LVIDS: 2.7 CM
BH CV ECHO MEAS - LVOT AREA: 3.1 CM2
BH CV ECHO MEAS - LVOT DIAM: 1.99 CM
BH CV ECHO MEAS - LVPWD: 0.99 CM
BH CV ECHO MEAS - MED PEAK E' VEL: 6 CM/SEC
BH CV ECHO MEAS - MV A MAX VEL: 102.4 CM/SEC
BH CV ECHO MEAS - MV DEC SLOPE: 502.7 CM/SEC2
BH CV ECHO MEAS - MV DEC TIME: 0.16 SEC
BH CV ECHO MEAS - MV E MAX VEL: 77.9 CM/SEC
BH CV ECHO MEAS - MV E/A: 0.76
BH CV ECHO MEAS - MV MAX PG: 4.4 MMHG
BH CV ECHO MEAS - MV MEAN PG: 1.3 MMHG
BH CV ECHO MEAS - MV V2 VTI: 40.1 CM
BH CV ECHO MEAS - MVA(VTI): 2.37 CM2
BH CV ECHO MEAS - PA ACC TIME: 0.09 SEC
BH CV ECHO MEAS - PA V2 MAX: 93.4 CM/SEC
BH CV ECHO MEAS - PI END-D VEL: 83.6 CM/SEC
BH CV ECHO MEAS - SV(LVOT): 95 ML
BH CV ECHO MEAS - SV(MOD-SP2): 45.7 ML
BH CV ECHO MEAS - SV(MOD-SP4): 45.9 ML
BH CV ECHO MEAS - TAPSE (>1.6): 1.6 CM
BH CV ECHO MEASUREMENTS AVERAGE E/E' RATIO: 10.39
BH CV VAS BP LEFT ARM: NORMAL MMHG
BH CV XLRA - RV BASE: 3.8 CM
BH CV XLRA - RV LENGTH: 7 CM
BH CV XLRA - RV MID: 2.7 CM
BH CV XLRA - TDI S': 13 CM/SEC
IVRT: 74 MS
LEFT ATRIUM VOLUME INDEX: 34 ML/M2

## 2024-04-08 PROCEDURE — 93306 TTE W/DOPPLER COMPLETE: CPT | Performed by: INTERNAL MEDICINE

## 2024-04-08 PROCEDURE — 93306 TTE W/DOPPLER COMPLETE: CPT

## 2024-04-17 ENCOUNTER — OFFICE VISIT (OUTPATIENT)
Dept: INTERNAL MEDICINE | Facility: CLINIC | Age: 69
End: 2024-04-17
Payer: OTHER MISCELLANEOUS

## 2024-04-17 VITALS
TEMPERATURE: 98.4 F | SYSTOLIC BLOOD PRESSURE: 128 MMHG | HEART RATE: 60 BPM | HEIGHT: 62 IN | BODY MASS INDEX: 22.08 KG/M2 | DIASTOLIC BLOOD PRESSURE: 62 MMHG | WEIGHT: 120 LBS

## 2024-04-17 DIAGNOSIS — M19.111 POST-TRAUMATIC OSTEOARTHRITIS OF RIGHT SHOULDER: Primary | Chronic | ICD-10-CM

## 2024-04-17 DIAGNOSIS — R35.0 URINARY FREQUENCY: ICD-10-CM

## 2024-04-17 DIAGNOSIS — Z79.899 LONG-TERM USE OF HIGH-RISK MEDICATION: ICD-10-CM

## 2024-04-17 PROBLEM — R05.1 ACUTE COUGH: Status: RESOLVED | Noted: 2020-09-30 | Resolved: 2024-04-17

## 2024-04-17 LAB
BILIRUB BLD-MCNC: NEGATIVE MG/DL
CLARITY, POC: CLEAR
COLOR UR: YELLOW
EXPIRATION DATE: ABNORMAL
GLUCOSE UR STRIP-MCNC: NEGATIVE MG/DL
KETONES UR QL: NEGATIVE
LEUKOCYTE EST, POC: NEGATIVE
Lab: ABNORMAL
NITRITE UR-MCNC: NEGATIVE MG/ML
PH UR: 5.5 [PH] (ref 5–8)
PROT UR STRIP-MCNC: ABNORMAL MG/DL
RBC # UR STRIP: NEGATIVE /UL
SP GR UR: 1.02 (ref 1–1.03)
UROBILINOGEN UR QL: ABNORMAL

## 2024-04-17 NOTE — PROGRESS NOTES
Aarti Wade  1955  [id number]  Patient Care Team:  Jannette Chapa MD as PCP - General  Jannette Chapa MD as PCP - Family Medicine  Ketan Lazcano MD as Consulting Physician (Orthopedic Surgery)  Lorenzo Good MD as Consulting Physician (Ophthalmology)  Severino Carbajal MD as Consulting Physician (Vascular Surgery)  Cynthia Powell APRN as Nurse Practitioner (Cardiology)    Aarti Wade is a pleasant 69 y.o. female who presents for evaluation of Shoulder Injury    Chief Complaint   Patient presents with    Shoulder Injury       HPI:   History of Present Illness  Aarti is here for a routine follow up visit for right shoulder.  Uses tramadol BID and gabapentin 300mg HS. 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     Saw Dr. Burgess yrs ago until he retired then saw one of his partners.  PCP here ([facility]) has been following her since. Saw Dr. Lazcano for a an acute injury of left shoulder tripping on vacuum and had surgical repair on left side.   Had to stop celebrex within the last few yrs secondary to kidney insufficiency and hypertension.  The gabapentin has been prescribed for nighttime shoulder pain.  She started tramadol for pain when it got worse which has continued to work well for the chronic right shoulder pain.    The patient has a history of urinary tract infections (UTIs) and has been taking over-the-counter Cranacitin (?). She reports clear urine and nocturnal urination, which she attributes to her hypertension. Her symptoms have improved since she started taking Cranacitin 4 times daily. She denies any dysuria.     Results      Past Medical History:   Diagnosis Date    Anxiety     Arthritis     Chronic UTI     Claudication     COPD (chronic obstructive pulmonary disease)     Depression     diffuse fatty liver infiltration on CT 2009    Dizzy     Former  smoker 08/31/2022    GERD (gastroesophageal reflux disease)     Head injury     Related to falls off  horses    Hepatitis     UNKNOWN TYPE    History of shingles     about 5 years ago    Hyperlipidemia     Hypertension     Hypoxia     History of tobacco abuse and COPD.  Using Brio Ellipta inhaler daily.  Also diagnosed with bilateral lower lobe pneumonia and put on Augmentin.  Oxygen desaturation after carotid endarterectomy on 3/3/2022.  Patient was sent home on oxygen at 2 L per nasal cannula.    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     Osteopenia of multiple sites     Osteoporosis     PAD (peripheral artery disease)     Spontaneous pneumothorax 1960    Subclavian steal syndrome 2016    VHD (valvular heart disease) 08/31/2022    Wears dentures     TOP ONLY    Wears glasses      Past Surgical History:   Procedure Laterality Date    ANGIOGRAM - CONVERTED  08/16/2016    AA WITH RUNOFF PER DR. HARRISON    CAROTID ENDARTERECTOMY Right 8/31/2022    Procedure: CAROTID ENDARTERECTOMY- RIGHT;  Surgeon: Francisco Magana MD;  Location:  [facility];  Service: Vascular;  Laterality: Right;    COLONOSCOPY      last 8 years ago.  Due to schedule    EYE LENS IMPLANT SECONDARY Left     FEMORAL FEMORAL BYPASS Right 8/22/2016    Procedure: RIGHT COMMON FEMORAL ENDARTERECTOMY WITH PATCH;  Surgeon: Severino Harrison MD;  Location: [facility];  Service:     FINGER SURGERY Left     LEFT INDEX FINGER    INTERVENTIONAL RADIOLOGY PROCEDURE N/A 8/16/2016    Procedure: IR PTA femoral popliteal artery;  Surgeon: Severino Harrison MD;  Location: [facility] CATH INVASIVE LOCATION;  Service:     INTERVENTIONAL RADIOLOGY PROCEDURE Left 1/7/2020    Procedure: LEFT ATHERECTOMY, BALLOON ANGIOPLASTY;  Surgeon: Severino Harrison MD;  Location:  [facility] CATH INVASIVE LOCATION;  Service: Interventional Radiology    TONSILLECTOMY      TOTAL SHOULDER ARTHROPLASTY Left 6/8/2020    Procedure: TOTAL SHOULDER ARTHROPLASTY LEFT;  Surgeon: Ketan Lazcano  MD Devan;  Location: [facility];  Service: Orthopedics;  Laterality: Left;  protector in: 1346  protector out: 1402     Family History   Problem Relation Age of Onset    Osteoarthritis Mother     Alzheimer's disease Mother     Hypertension Father     Heart attack Father     Alcohol abuse Father     No Known Problems Sister     No Known Problems Daughter     No Known Problems Son     Breast cancer Maternal Grandmother 50    Breast cancer Paternal Grandmother 50    No Known Problems Maternal Aunt     No Known Problems Paternal Aunt     Other Other     Heart attack Other     Coronary artery disease Paternal Grandfather     Stroke Paternal Grandfather     No Known Problems Sister     Ovarian cancer Neg Hx     Endometrial cancer Neg Hx      Social History     Tobacco Use   Smoking Status Former    Current packs/day: 0.00    Average packs/day: 1 pack/day for 40.0 years (40.0 ttl pk-yrs)    Types: Electronic Cigarette, Cigarettes    Start date: 1976    Quit date: 2016    Years since quittin.8    Passive exposure: Past   Smokeless Tobacco Never   Tobacco Comments    1 PACK/DAY SMOKER UNTIL 2016     Allergies   Allergen Reactions    Abilify [Aripiprazole] Other (See Comments)     Tardive- Dyskinesia    Levocetirizine Dihydrochloride Myalgia     Muscle aches/joint pain       Current Outpatient Medications:     acetaminophen (Acetaminophen 8 Hour) 650 MG 8 hr tablet, Take 1 tablet by mouth 2 (two) times a day., Disp: , Rfl:     albuterol sulfate  (90 Base) MCG/ACT inhaler, Inhale 2 puffs Every 4 (Four) Hours As Needed for Wheezing., Disp: 18 g, Rfl: 5    amLODIPine (NORVASC) 10 MG tablet, Take 1 tablet by mouth Daily., Disp: 90 tablet, Rfl: 3    ascorbic acid (VITAMIN C) 1000 MG tablet, Take 1 tablet by mouth Daily., Disp: , Rfl:     aspirin 81 MG EC tablet, Take 1 tablet by mouth Every Morning., Disp: 90 tablet, Rfl: 3    bisoprolol (ZEBeta) 10 MG tablet, Take 1 tablet by mouth Daily., Disp: 90  "tablet, Rfl: 3    Breo Ellipta 200-25 MCG/ACT inhaler, Inhale 1 puff Daily., Disp: , Rfl:     calcium acetate (PHOS BINDER,) 667 MG capsule capsule, TAKE 2 CAPSULES BY MOUTH DAILY, Disp: 180 capsule, Rfl: 1    cholecalciferol (Vitamin D, Cholecalciferol,) 25 MCG (1000 UT) tablet, Take 1 tablet by mouth Daily., Disp: , Rfl:     citalopram (CeleXA) 40 MG tablet, TAKE 1 TABLET BY MOUTH DAILY, Disp: 90 tablet, Rfl: 1    clopidogrel (Plavix) 75 MG tablet, Take 1 tablet by mouth Daily., Disp: 90 tablet, Rfl: 3    Coenzyme Q10 400 MG capsule, Take 1 capsule by mouth Daily., Disp: , Rfl:     Fish Oil oil, Take 1 capsule by mouth Daily., Disp: , Rfl:     fluticasone (FLONASE) 50 MCG/ACT nasal spray, SHAKE LIQUID AND USE 1 SPRAY IN EACH NOSTRIL TWICE DAILY (Patient taking differently: 2 sprays into the nostril(s) as directed by provider Daily.), Disp: 48 g, Rfl: 3    folic acid (FOLVITE) 1 MG tablet, TAKE 2 TABLETS BY MOUTH DAILY, Disp: 180 tablet, Rfl: 3    gabapentin (NEURONTIN) 100 MG capsule, TAKE 3 CAPSULES BY MOUTH EVERY EVENING, Disp: 90 capsule, Rfl: 2    Methylsulfonylmethane (MSM PO), Take 2 tablets by mouth daily., Disp: , Rfl:     Multiple Vitamins-Minerals (MULTIVITAMIN ADULT PO), Take 1 tablet by mouth Daily., Disp: , Rfl:     pantoprazole (PROTONIX) 40 MG EC tablet, TAKE 1 TABLET BY MOUTH DAILY, Disp: 90 tablet, Rfl: 1    rosuvastatin (CRESTOR) 40 MG tablet, Take 1 tablet by mouth Daily., Disp: 90 tablet, Rfl: 3    tiotropium bromide monohydrate (Spiriva Respimat) 2.5 MCG/ACT aerosol solution inhaler, Inhale 2 puffs Daily., Disp: 4 g, Rfl: 11    traMADol (ULTRAM) 50 MG tablet, Take 1 tablet by mouth 2 (Two) Times a Day., Disp: 180 tablet, Rfl: 2    URINARY HEALTH/CRANBERRY PO, \"CRANACTIN\" Take 2 tablets in the morning, 1 mid-day, and 1 in the evening, Disp: , Rfl:     Review of Systems  Review of systems was completed, and pertinent findings are noted in the HPI.  /62 (BP Location: Right arm, Patient " "Position: Sitting, Cuff Size: Adult)   Pulse 60   Temp 98.4 °F (36.9 °C) (Temporal)   Ht 157 cm (61.81\")   Wt 54.4 kg (120 lb)   BMI 22.08 kg/m²     Physical Exam  Vitals reviewed.   Constitutional:       Appearance: She is well-developed.   Pulmonary:      Effort: Pulmonary effort is normal.   Skin:     General: Skin is warm and dry.   Neurological:      Mental Status: She is alert.   Psychiatric:         Behavior: Behavior normal.       Physical Exam      PHQ-9 Total Score:      Assessment/Plan:  Assessment & Plan  1. Chronic right shoulder osteoarthritis.  A compliance urine drug screen will be conducted today.    2. Recurrent urinary tract infections.  Negative UA in office.    There are no Patient Instructions on file for this visit.  Plan of care reviewed with patient at the conclusion of today's visit. Education was provided regarding diagnosis, management and any prescribed or recommended OTC medications.  Patient verbalizes understanding of and agreement with management plan.    Return in about 6 months (around 10/17/2024) for Next scheduled follow up.    Dictated Utilizing Dragon Dictation.    CYRUS Gallegos       Patient or patient representative verbalized consent for the use of Ambient Listening during the visit with  CYRUS Gallegos for chart documentation. 4/20/2024  08:48 EDT    "

## 2024-04-26 ENCOUNTER — TELEPHONE (OUTPATIENT)
Dept: CARDIOLOGY | Facility: CLINIC | Age: 69
End: 2024-04-26
Payer: MEDICARE

## 2024-04-26 NOTE — TELEPHONE ENCOUNTER
PT called stating she had a recent visit with her nephrologist who told her she had protein in her urine and he would like to start her on Losartan 25 mg qd.  PT wanted to clear that with CYRUS Ocasio before starting.    Per Cynthia she is ok with pt starting losartan but advised pt to record BP readings in HTN care plan.    PT was agreeable and verbalized understanding

## 2024-05-20 RX ORDER — FLUTICASONE PROPIONATE 50 MCG
1 SPRAY, SUSPENSION (ML) NASAL 2 TIMES DAILY
Qty: 48 G | Refills: 3 | Status: SHIPPED | OUTPATIENT
Start: 2024-05-20

## 2024-05-20 NOTE — TELEPHONE ENCOUNTER
Rx Refill Note  Requested Prescriptions     Pending Prescriptions Disp Refills    fluticasone (FLONASE) 50 MCG/ACT nasal spray [Pharmacy Med Name: FLUTICASONE 50MCG NASAL SP (120) RX] 48 g 3     Sig: SHAKE LIQUID AND USE 1 SPRAY IN EACH NOSTRIL TWICE DAILY      Last office visit with prescribing clinician: 2/15/2024   Last telemedicine visit with prescribing clinician: Visit date not found   Next office visit with prescribing clinician: 5/21/2024                         Would you like a call back once the refill request has been completed: [] Yes [] No    If the office needs to give you a call back, can they leave a voicemail: [] Yes [] No    Pinky Ramírez RN  05/20/24, 12:14 EDT

## 2024-05-21 ENCOUNTER — OFFICE VISIT (OUTPATIENT)
Dept: INTERNAL MEDICINE | Facility: CLINIC | Age: 69
End: 2024-05-21
Payer: MEDICARE

## 2024-05-21 VITALS
HEART RATE: 57 BPM | HEIGHT: 62 IN | WEIGHT: 118 LBS | BODY MASS INDEX: 21.71 KG/M2 | DIASTOLIC BLOOD PRESSURE: 50 MMHG | OXYGEN SATURATION: 92 % | SYSTOLIC BLOOD PRESSURE: 124 MMHG | TEMPERATURE: 98 F

## 2024-05-21 DIAGNOSIS — E78.2 MIXED HYPERLIPIDEMIA: Primary | ICD-10-CM

## 2024-05-21 DIAGNOSIS — Z23 NEED FOR VACCINATION: ICD-10-CM

## 2024-05-21 DIAGNOSIS — I73.9 PERIPHERAL VASCULAR DISEASE OF EXTREMITY WITH CLAUDICATION: Chronic | ICD-10-CM

## 2024-05-21 DIAGNOSIS — M85.89 OSTEOPENIA OF MULTIPLE SITES: ICD-10-CM

## 2024-05-21 DIAGNOSIS — F33.0 MAJOR DEPRESSIVE DISORDER, RECURRENT EPISODE, MILD DEGREE: Chronic | ICD-10-CM

## 2024-05-21 DIAGNOSIS — N18.31 STAGE 3A CHRONIC KIDNEY DISEASE: Chronic | ICD-10-CM

## 2024-05-21 DIAGNOSIS — I10 BENIGN ESSENTIAL HYPERTENSION: Chronic | ICD-10-CM

## 2024-05-21 DIAGNOSIS — R80.1 PERSISTENT PROTEINURIA: ICD-10-CM

## 2024-05-21 DIAGNOSIS — E55.9 VITAMIN D DEFICIENCY: ICD-10-CM

## 2024-05-21 DIAGNOSIS — M19.111 POST-TRAUMATIC OSTEOARTHRITIS OF RIGHT SHOULDER: ICD-10-CM

## 2024-05-21 PROBLEM — E78.5 DYSLIPIDEMIA: Chronic | Status: RESOLVED | Noted: 2022-08-31 | Resolved: 2024-05-21

## 2024-05-21 PROCEDURE — 3078F DIAST BP <80 MM HG: CPT | Performed by: INTERNAL MEDICINE

## 2024-05-21 PROCEDURE — 99214 OFFICE O/P EST MOD 30 MIN: CPT | Performed by: INTERNAL MEDICINE

## 2024-05-21 PROCEDURE — 1160F RVW MEDS BY RX/DR IN RCRD: CPT | Performed by: INTERNAL MEDICINE

## 2024-05-21 PROCEDURE — 1125F AMNT PAIN NOTED PAIN PRSNT: CPT | Performed by: INTERNAL MEDICINE

## 2024-05-21 PROCEDURE — G0009 ADMIN PNEUMOCOCCAL VACCINE: HCPCS | Performed by: INTERNAL MEDICINE

## 2024-05-21 PROCEDURE — 1159F MED LIST DOCD IN RCRD: CPT | Performed by: INTERNAL MEDICINE

## 2024-05-21 PROCEDURE — 3074F SYST BP LT 130 MM HG: CPT | Performed by: INTERNAL MEDICINE

## 2024-05-21 PROCEDURE — 90677 PCV20 VACCINE IM: CPT | Performed by: INTERNAL MEDICINE

## 2024-05-21 RX ORDER — PANTOPRAZOLE SODIUM 40 MG/1
40 TABLET, DELAYED RELEASE ORAL DAILY
Qty: 90 TABLET | Refills: 1 | Status: SHIPPED | OUTPATIENT
Start: 2024-05-21

## 2024-05-21 RX ORDER — CITALOPRAM 40 MG/1
40 TABLET ORAL DAILY
Qty: 90 TABLET | Refills: 1 | Status: SHIPPED | OUTPATIENT
Start: 2024-05-21

## 2024-05-21 RX ORDER — CHOLECALCIFEROL (VITAMIN D3) 125 MCG
1 CAPSULE ORAL DAILY
COMMUNITY
Start: 2024-04-23

## 2024-05-21 RX ORDER — LISINOPRIL 2.5 MG/1
1 TABLET ORAL DAILY
COMMUNITY
Start: 2024-05-15

## 2024-05-21 RX ORDER — GABAPENTIN 100 MG/1
300 CAPSULE ORAL EVERY EVENING
Qty: 90 CAPSULE | Refills: 2 | Status: SHIPPED | OUTPATIENT
Start: 2024-05-21

## 2024-05-21 NOTE — LETTER
[facility]  Vaccine Consent Form    Patient Name:  Aarti Wade  Patient :  1955  [id number]      Vaccine(s) Ordered    Pneumococcal Conjugate Vaccine 20-Valent All        Screening Checklist  The following questions should be completed prior to vaccination. If you answer “yes” to any question, it does not necessarily mean you should not be vaccinated. It just means we may need to clarify or ask more questions. If a question is unclear, please ask your healthcare provider to explain it.    Yes No   Any fever or moderate to severe illness today (mild illness and/or antibiotic treatment are not contraindications)?     Do you have a history of a serious reaction to any previous vaccinations, such as anaphylaxis, encephalopathy within 7 days, Guillain-[place] syndrome within 6 weeks, seizure?     Have you received any live vaccine(s) (e.g MMR, ELIANA) or any other vaccines in the last month (to ensure duplicate doses aren't given)?     Do you have an anaphylactic allergy to latex (DTaP, DTaP-IPV, Hep A, Hep B, MenB, RV, Td, Tdap), baker’s yeast (Hep B, HPV), polysorbates (RSV, nirsevimab, PCV 20, Rotavirrus, Tdap, Shingrix), or gelatin (ELIANA, MMR)?     Do you have an anaphylactic allergy to neomycin (Rabies, ELIANA, MMR, IPV, Hep A), polymyxin B (IPV), or streptomycin (IPV)?      Any cancer, leukemia, AIDS, or other immune system disorder? (ELIANA, MMR, RV)     Do you have a parent, brother, or sister with an immune system problem (if immune competence of vaccine recipient clinically verified, can proceed)? (MMR, ELIANA)     Any recent steroid treatments for >2 weeks, chemotherapy, or radiation treatment? (ELIANA, MMR)     Have you received antibody-containing blood transfusions or IVIG in the past 11 months (recommended interval is dependent on product)? (MMR, ELIANA)     Have you taken antiviral drugs (acyclovir, famciclovir, valacyclovir for ELIANA) in the last 24 or 48 hours, respectively?      Are you pregnant or planning to  "become pregnant within 1 month? (ELIANA, MMR, HPV, IPV, MenB, Abrexvy; For Hep B- refer to Engerix-B; For RSV - Abrysvo is indicated for 32-36 weeks of pregnancy from September to January)     For infants, have you ever been told your child has had intussusception or a medical emergency involving obstruction of the intestine (Rotavirus)? If not for an infant, can skip this question.         *Ordering Physicians/APC should be consulted if \"yes\" is checked by the patient or guardian above.  I have received, read, and understand the Vaccine Information Statement (VIS) for each vaccine ordered.  I have considered my or my child's health status as well as the health status of my close contacts.  I have taken the opportunity to discuss my vaccine questions with my or my child's health care provider.   I have requested that the ordered vaccine(s) be given to me or my child.  I understand the benefits and risks of the vaccines.  I understand that I should remain in the clinic for 15 minutes after receiving the vaccine(s).  _________________________________________________________  Signature of Patient or Parent/Legal Guardian ____________________  Date     "

## 2024-05-21 NOTE — PROGRESS NOTES
[facility]     Aarti Wade  1955   [id number]      Patient Care Team:  Jannette Chapa MD as PCP - General  Jannette Chapa MD as PCP - Family Medicine  Ketan Lazcano MD as Consulting Physician (Orthopedic Surgery)  Lorenzo Good MD as Consulting Physician (Ophthalmology)  Severino Carbajal MD as Consulting Physician (Vascular Surgery)  Cynthia Powell APRN as Nurse Practitioner (Cardiology)    Chief Complaint   Patient presents with    Hyperlipidemia    Heartburn        Patient is a 69 y.o. female.   History of Present Illness  The patient is here for a follow-up visit.    The patient was initially prescribed losartan 25 mg, however, this induced severe heartburn, prompting her to switch to lisinopril 2.5 mg approximately 10 days ago. Despite discontinuing the medication for several years, she resumed its use. She monitors her blood pressure at home, which typically measures in the 140s or lower. She denies experiencing calf pain during ambulation, chest pain, dyspnea, or wheezing.    The patient recently consulted her nephrologist, who conducted laboratory tests and a renal ultrasound. A benign cyst was found on her kidney. A urine test conducted approximately a month ago yielded clean results, with the exception of elevated protein levels. Dr. Kimbrough recommended a pelvic examination, but the patient declined due to a previous pelvic fracture sustained approximately a year ago. She denies any pelvic pain that originates from muscle or skeleton issues.    Approximately a month ago, the patient was advised to increase her cranberry intake to two tablets in the morning, one midday, and one at night to manage her urinary tract infections (UTIs). She reports vaginal discharge, which she attributes to protein intake or amlodipine. A urine test conducted for a workers' compensation visit revealed no UTIs, but elevated protein levels. A follow-up appointment with Dr. Kimbrough is  scheduled for the end of the month.    Supplemental Information  She is supposed to go for a follow-up on her carotid arteries.   She has not received the Shingrix vaccine. She has had shingles in the past. Her pneumonia vaccine is now 5 years old. She did get the RSV and Tdap vaccines at the same time.         CHRONIC CONDITIONS      Past Medical History:   Diagnosis Date    Anxiety     Arthritis     Chronic UTI     Claudication     COPD (chronic obstructive pulmonary disease)     Depression     diffuse fatty liver infiltration on CT 2009    Dizzy     Former smoker 08/31/2022    GERD (gastroesophageal reflux disease)     Head injury     Related to falls off  horses    Hepatitis     UNKNOWN TYPE    History of shingles     about 5 years ago    Hyperlipidemia     Hypertension     Hypoxia     History of tobacco abuse and COPD.  Using Brio Ellipta inhaler daily.  Also diagnosed with bilateral lower lobe pneumonia and put on Augmentin.  Oxygen desaturation after carotid endarterectomy on 3/3/2022.  Patient was sent home on oxygen at 2 L per nasal cannula.    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 HARRISON    CAROTID ENDARTERECTOMY Right 8/31/2022    Procedure: CAROTID ENDARTERECTOMY- RIGHT;  Surgeon: Francisco Magana MD;  Location:  [facility] OR;  Service: Vascular;  Laterality: Right;    COLONOSCOPY      last 8 years ago.  Due to schedule    EYE LENS IMPLANT SECONDARY Left     FEMORAL FEMORAL BYPASS Right 8/22/2016    Procedure: RIGHT COMMON FEMORAL ENDARTERECTOMY WITH PATCH;  Surgeon: Severino Harrison MD;  Location:  [facility];  Service:     FINGER  SURGERY Left     LEFT INDEX FINGER    INTERVENTIONAL RADIOLOGY PROCEDURE N/A 2016    Procedure: IR PTA femoral popliteal artery;  Surgeon: Severino Carbajal MD;  Location:  [facility];  Service:     INTERVENTIONAL RADIOLOGY PROCEDURE Left 2020    Procedure: LEFT ATHERECTOMY, BALLOON ANGIOPLASTY;  Surgeon: Severino Carbajal MD;  Location:  [facility];  Service: Interventional Radiology    TONSILLECTOMY      TOTAL SHOULDER ARTHROPLASTY Left 2020    Procedure: TOTAL SHOULDER ARTHROPLASTY LEFT;  Surgeon: Ketan Lazcano MD;  Location:  [facility];  Service: Orthopedics;  Laterality: Left;  protector in: 1346  protector out: 1402       Family History   Problem Relation Age of Onset    Osteoarthritis Mother     Alzheimer's disease Mother     Hypertension Father     Heart attack Father     Alcohol abuse Father     No Known Problems Sister     No Known Problems Daughter     No Known Problems Son     Breast cancer Maternal Grandmother 50    Breast cancer Paternal Grandmother 50    No Known Problems Maternal Aunt     No Known Problems Paternal Aunt     Other Other     Heart attack Other     Coronary artery disease Paternal Grandfather     Stroke Paternal Grandfather     No Known Problems Sister     Ovarian cancer Neg Hx     Endometrial cancer Neg Hx        Social History     Socioeconomic History    Marital status: Single    Number of children: 0   Tobacco Use    Smoking status: Former     Current packs/day: 0.00     Average packs/day: 1 pack/day for 40.0 years (40.0 ttl pk-yrs)     Types: Electronic Cigarette, Cigarettes     Start date: 1976     Quit date: 2016     Years since quittin.9     Passive exposure: Past    Smokeless tobacco: Never    Tobacco comments:     1 PACK/DAY SMOKER UNTIL 2016   Vaping Use    Vaping status: Former    Substances: Nicotine, Flavoring    Devices: Disposable   Substance and Sexual Activity    Alcohol use: Yes      "Alcohol/week: 14.0 standard drinks of alcohol     Types: 14 Shots of liquor per week     Comment: 2 DRINKS PER DAY    Drug use: Not Currently     Types: Marijuana, Benzodiazepines    Sexual activity: Defer       Allergies   Allergen Reactions    Abilify [Aripiprazole] Other (See Comments)     Tardive- Dyskinesia    Levocetirizine Dihydrochloride Myalgia     Muscle aches/joint pain       Review of Systems:     Review of Systems    Vital Signs  Vitals:    05/21/24 1316   BP: 124/50   BP Location: Left arm   Patient Position: Sitting   Cuff Size: Adult   Pulse: 57   Temp: 98 °F (36.7 °C)   TempSrc: Infrared   SpO2: 92%   Weight: 53.5 kg (118 lb)   Height: 157 cm (61.81\")     Body mass index is 21.71 kg/m².  BMI is within normal parameters. No other follow-up for BMI required.          Current Outpatient Medications:     acetaminophen (Acetaminophen 8 Hour) 650 MG 8 hr tablet, Take 1 tablet by mouth 2 (two) times a day., Disp: , Rfl:     albuterol sulfate  (90 Base) MCG/ACT inhaler, Inhale 2 puffs Every 4 (Four) Hours As Needed for Wheezing., Disp: 18 g, Rfl: 5    amLODIPine (NORVASC) 10 MG tablet, Take 1 tablet by mouth Daily., Disp: 90 tablet, Rfl: 3    ascorbic acid (VITAMIN C) 1000 MG tablet, Take 1 tablet by mouth Daily., Disp: , Rfl:     aspirin 81 MG EC tablet, Take 1 tablet by mouth Every Morning., Disp: 90 tablet, Rfl: 3    bisoprolol (ZEBeta) 10 MG tablet, Take 1 tablet by mouth Daily., Disp: 90 tablet, Rfl: 3    Breo Ellipta 200-25 MCG/ACT inhaler, Inhale 1 puff Daily., Disp: , Rfl:     calcium acetate (PHOS BINDER,) 667 MG capsule capsule, TAKE 2 CAPSULES BY MOUTH DAILY, Disp: 180 capsule, Rfl: 1    cholecalciferol (Vitamin D, Cholecalciferol,) 25 MCG (1000 UT) tablet, Take 1 tablet by mouth Daily., Disp: , Rfl:     citalopram (CeleXA) 40 MG tablet, Take 1 tablet by mouth Daily., Disp: 90 tablet, Rfl: 1    clopidogrel (Plavix) 75 MG tablet, Take 1 tablet by mouth Daily., Disp: 90 tablet, Rfl: 3    " "Coenzyme Q10 400 MG capsule, Take 1 capsule by mouth Daily., Disp: , Rfl:     Fish Oil oil, Take 1 capsule by mouth Daily., Disp: , Rfl:     fluticasone (FLONASE) 50 MCG/ACT nasal spray, 1 spray into the nostril(s) as directed by provider 2 (Two) Times a Day. shake liquid, Disp: 48 g, Rfl: 3    folic acid (FOLVITE) 1 MG tablet, TAKE 2 TABLETS BY MOUTH DAILY, Disp: 180 tablet, Rfl: 3    gabapentin (NEURONTIN) 100 MG capsule, Take 3 capsules by mouth Every Evening., Disp: 90 capsule, Rfl: 2    Methylsulfonylmethane (MSM PO), Take 2 tablets by mouth daily., Disp: , Rfl:     Multiple Vitamins-Minerals (MULTIVITAMIN ADULT PO), Take 1 tablet by mouth Daily., Disp: , Rfl:     pantoprazole (PROTONIX) 40 MG EC tablet, Take 1 tablet by mouth Daily., Disp: 90 tablet, Rfl: 1    rosuvastatin (CRESTOR) 40 MG tablet, Take 1 tablet by mouth Daily., Disp: 90 tablet, Rfl: 3    tiotropium bromide monohydrate (Spiriva Respimat) 2.5 MCG/ACT aerosol solution inhaler, Inhale 2 puffs Daily., Disp: 4 g, Rfl: 11    traMADol (ULTRAM) 50 MG tablet, Take 1 tablet by mouth 2 (Two) Times a Day., Disp: 180 tablet, Rfl: 2    URINARY HEALTH/CRANBERRY PO, \"CRANACTIN\" Take 2 tablets in the morning, 1 mid-day, and 1 in the evening, Disp: , Rfl:     vitamin B-12 (CYANOCOBALAMIN) 500 MCG tablet, Take 1 tablet by mouth Daily., Disp: , Rfl:     lisinopril (PRINIVIL,ZESTRIL) 2.5 MG tablet, Take 1 tablet by mouth Daily. (Patient not taking: Reported on 5/21/2024), Disp: , Rfl:     Physical Exam:    Physical Exam  Vitals and nursing note reviewed.   Constitutional:       Appearance: She is well-developed.   HENT:      Head: Normocephalic.   Eyes:      Conjunctiva/sclera: Conjunctivae normal.      Pupils: Pupils are equal, round, and reactive to light.   Neck:      Thyroid: No thyromegaly.   Cardiovascular:      Rate and Rhythm: Normal rate and regular rhythm.      Pulses:           Posterior tibial pulses are 1+ on the right side and 1+ on the left side.      " Heart sounds: Normal heart sounds.   Pulmonary:      Effort: Pulmonary effort is normal.      Breath sounds: Normal breath sounds. No wheezing.   Musculoskeletal:         General: Normal range of motion.      Cervical back: Normal range of motion and neck supple.      Right lower leg: No edema.      Left lower leg: No edema.      Comments: Multiple arthritic joints and posttraumatic joint deformities   Lymphadenopathy:      Cervical: No cervical adenopathy.   Skin:     General: Skin is warm and dry.   Neurological:      Mental Status: She is alert and oriented to person, place, and time.   Psychiatric:         Attention and Perception: Attention normal.         Mood and Affect: Mood normal.         Thought Content: Thought content normal.          ACE III MINI        Results Review:    I reviewed the patient's new clinical results.  Results  Laboratory Studies      Imaging  CAT scan and ultrasound of kidneys showed a benign cyst.       CMP:  Lab Results   Component Value Date    BUN 25 03/21/2024    CREATININE 1.09 (H) 03/21/2024    EGFRIFNONA 59 (L) 10/06/2021    EGFRIFAFRI 72 10/06/2021    BCR 23 03/21/2024     03/21/2024    K 5.5 (H) 03/21/2024    CO2 22 03/21/2024    CALCIUM 9.1 03/21/2024    PROTENTOTREF 7.2 11/21/2023    ALBUMIN 4.5 11/21/2023    LABGLOBREF 2.7 11/21/2023    LABIL2 1.7 11/21/2023    BILITOT 0.4 11/21/2023    ALKPHOS 70 11/21/2023    AST 24 11/21/2023    ALT 16 11/21/2023     HbA1c:  Lab Results   Component Value Date    HGBA1C 4.90 08/26/2022    HGBA1C 5.50 06/05/2020     Microalbumin:  Lab Results   Component Value Date    MICROALBUR 720.8 03/21/2024     Lipid Panel  Lab Results   Component Value Date    CHOL 136 10/09/2020    TRIG 58 11/21/2023    HDL 91 11/21/2023    LDL 61 11/21/2023    AST 24 11/21/2023    ALT 16 11/21/2023       Medication Review: Medications reviewed and noted  Patient Instructions   Problem List Items Addressed This Visit          Cardiac and Vasculature    PVD  (Chronic)    Overview     Taking daily Plavix and aspirin and nightly rosuvastatin.           Benign essential hypertension (Chronic)    Overview     Taking amlodipine, bisoprolol.          Relevant Medications    amLODIPine (NORVASC) 10 MG tablet    bisoprolol (ZEBeta) 10 MG tablet    lisinopril (PRINIVIL,ZESTRIL) 2.5 MG tablet    Other Relevant Orders    Microalbumin / Creatinine Urine Ratio - Urine, Clean Catch    Urinalysis With Microscopic - Urine, Clean Catch    Mixed hyperlipidemia - Primary    Overview     Taking  rosuvastatin every evening.               Relevant Medications    rosuvastatin (CRESTOR) 40 MG tablet    Other Relevant Orders    Comprehensive Metabolic Panel    Lipid Panel       Genitourinary and Reproductive     Stage III CKD (Chronic)    Overview                Proteinuria       Mental Health    Major depressive disorder, recurrent episode, mild degree (Chronic)    Overview     Taking citalopram 40 mg daily.             Relevant Medications    citalopram (CeleXA) 40 MG tablet       Musculoskeletal and Injuries    Post-traumatic osteoarthritis of right shoulder (Chronic)    Overview     Taking gabapentin, tramadol, and Tylenol as needed for pain.  Sees Dr. Lazcano          Relevant Medications    traMADol (ULTRAM) 50 MG tablet    gabapentin (NEURONTIN) 100 MG capsule    Osteopenia of multiple sites    Overview     12/28/2020 DEXA showed mild improvement in osteopenia of the hip.  T score in the left total hip is now -1.1 and in  the left femoral neck is -2.0.  DEXA 2/2023 shows a mild decrease in osteopenia with the lowest T-scores in the femoral neck at -2.0.           Other Visit Diagnoses       Vitamin D deficiency        Relevant Orders    Vitamin D,25-Hydroxy    Need for vaccination        Relevant Orders    Pneumococcal Conjugate Vaccine 20-Valent All (Completed)               Diagnosis Plan   1. Mixed hyperlipidemia  Comprehensive Metabolic Panel    Lipid Panel      2. PVD        3.  Benign essential hypertension  Microalbumin / Creatinine Urine Ratio - Urine, Clean Catch    Urinalysis With Microscopic - Urine, Clean Catch      4. Stage 3a chronic kidney disease        5. Persistent proteinuria        6. Major depressive disorder, recurrent episode, mild degree        7. Osteopenia of multiple sites        8. Post-traumatic osteoarthritis of right shoulder  gabapentin (NEURONTIN) 100 MG capsule      9. Vitamin D deficiency  Vitamin D,25-Hydroxy      10. Need for vaccination  Pneumococcal Conjugate Vaccine 20-Valent All        Assessment & Plan  1. Hyperlipidemia.  The patient will persist with the nightly administration of rosuvastatin, adhere to a low-fat diet, engage in regular exercise, and maintain physical activity.    2. Peripheral vascular disease.  The patient will maintain her current regimen of nightly rosuvastatin, daily Plavix, and aspirin.    3. Hypertension.  The patient will continue her current regimen of amlodipine and bisoprolol. She will consult with Dr. Pride today to explore the possibility of replacing lisinopril with losartan, which has previously caused heartburn. She will also avoid salt in her diet.    4. Stage 3 chronic kidney disease.  The patient will maintain a high fluid intake. She will follow up with Dr. Pride.    5. Proteinuria.  Follow-up with Dr. Pride.    6. Depression.  The patient will continue her daily intake of citalopram. She will also continue her exercise and physical activity regimen, which will also aid in reducing symptoms of stress, anxiety, and mood.    7. Posttraumatic right shoulder pain.  The patient will maintain her current regimen of tramadol, Tylenol, and gabapentin for pain management. She will follow up with Dr. Lazcano as needed.    8. Osteopenia.  The patient will continue her regular weightbearing exercises and continue her intake of calcium and vitamin D3. A repeat DEXA scan will be scheduled for 02/2025.    Follow-up  The patient  is scheduled to return on 10/09/2024 for her annual wellness visit.         Plan of care reviewed with patient at the conclusion of today's visit. Education was provided regarding diagnosis, management, and any prescribed or recommended OTC medications. Patient verbalizes understanding of and agreement with management plan.         05/21/24   13:24 EDT    Patient or patient representative verbalized consent for the use of Ambient Listening during the visit with  Jannette Chapa MD for chart documentation. 5/21/2024  13:53 EDT

## 2024-05-21 NOTE — PATIENT INSTRUCTIONS
Patient Instructions  Problem List Items Addressed This Visit          Cardiac and Vasculature    PVD (Chronic)    Overview     Taking daily Plavix and aspirin and nightly rosuvastatin.           Benign essential hypertension (Chronic)    Overview     Taking amlodipine, bisoprolol.          Relevant Medications    amLODIPine (NORVASC) 10 MG tablet    bisoprolol (ZEBeta) 10 MG tablet    lisinopril (PRINIVIL,ZESTRIL) 2.5 MG tablet    Other Relevant Orders    Microalbumin / Creatinine Urine Ratio - Urine, Clean Catch    Urinalysis With Microscopic - Urine, Clean Catch    Mixed hyperlipidemia - Primary    Overview     Taking  rosuvastatin every evening.               Relevant Medications    rosuvastatin (CRESTOR) 40 MG tablet    Other Relevant Orders    Comprehensive Metabolic Panel    Lipid Panel       Genitourinary and Reproductive     Stage III CKD (Chronic)    Overview                Proteinuria       Mental Health    Major depressive disorder, recurrent episode, mild degree (Chronic)    Overview     Taking citalopram 40 mg daily.             Relevant Medications    citalopram (CeleXA) 40 MG tablet       Musculoskeletal and Injuries    Post-traumatic osteoarthritis of right shoulder (Chronic)    Overview     Taking gabapentin, tramadol, and Tylenol as needed for pain.  Sees Dr. Lazcano          Relevant Medications    traMADol (ULTRAM) 50 MG tablet    gabapentin (NEURONTIN) 100 MG capsule    Osteopenia of multiple sites    Overview     12/28/2020 DEXA showed mild improvement in osteopenia of the hip.  T score in the left total hip is now -1.1 and in  the left femoral neck is -2.0.  DEXA 2/2023 shows a mild decrease in osteopenia with the lowest T-scores in the femoral neck at -2.0.           Other Visit Diagnoses       Vitamin D deficiency        Relevant Orders    Vitamin D,25-Hydroxy    Need for vaccination        Relevant Orders    Pneumococcal Conjugate Vaccine 20-Valent All (Completed)            Exercising to  Stay Healthy  To become healthy and stay healthy, it is recommended that you do moderate-intensity and vigorous-intensity exercise. You can tell that you are exercising at a moderate intensity if your heart starts beating faster and you start breathing faster but can still hold a conversation. You can tell that you are exercising at a vigorous intensity if you are breathing much harder and faster and cannot hold a conversation while exercising.  How can exercise benefit me?  Exercising regularly is important. It has many health benefits, such as:  Improving overall fitness, flexibility, and endurance.  Increasing bone density.  Helping with weight control.  Decreasing body fat.  Increasing muscle strength and endurance.  Reducing stress and tension, anxiety, depression, or anger.  Improving overall health.  What guidelines should I follow while exercising?  Before you start a new exercise program, talk with your health care provider.  Do not exercise so much that you hurt yourself, feel dizzy, or get very short of breath.  Wear comfortable clothes and wear shoes with good support.  Drink plenty of water while you exercise to prevent dehydration or heat stroke.  Work out until your breathing and your heartbeat get faster (moderate intensity).  How often should I exercise?  Choose an activity that you enjoy, and set realistic goals. Your health care provider can help you make an activity plan that is individually designed and works best for you.  Exercise regularly as told by your health care provider. This may include:  Doing strength training two times a week, such as:  Lifting weights.  Using resistance bands.  Push-ups.  Sit-ups.  Yoga.  Doing a certain intensity of exercise for a given amount of time. Choose from these options:  A total of 150 minutes of moderate-intensity exercise every week.  A total of 75 minutes of vigorous-intensity exercise every week.  A mix of moderate-intensity and vigorous-intensity  exercise every week.  Children, pregnant women, people who have not exercised regularly, people who are overweight, and older adults may need to talk with a health care provider about what activities are safe to perform. If you have a medical condition, be sure to talk with your health care provider before you start a new exercise program.  What are some exercise ideas?  Moderate-intensity exercise ideas include:  Walking 1 mile (1.6 km) in about 15 minutes.  Biking.  Hiking.  Golfing.  Dancing.  Water aerobics.  Vigorous-intensity exercise ideas include:  Walking 4.5 miles (7.2 km) or more in about 1 hour.  Jogging or running 5 miles (8 km) in about 1 hour.  Biking 10 miles (16.1 km) or more in about 1 hour.  Lap swimming.  Roller-skating or in-line skating.  Cross-country skiing.  Vigorous competitive sports, such as football, basketball, and soccer.  Jumping rope.  Aerobic dancing.  What are some everyday activities that can help me get exercise?  Yard work, such as:  Pushing a .  Raking and bagging leaves.  Washing your car.  Pushing a stroller.  Shoveling snow.  Gardening.  Washing windows or floors.  How can I be more active in my day-to-day activities?  Use stairs instead of an elevator.  Take a walk during your lunch break.  If you drive, park your car farther away from your work or school.  If you take public transportation, get off one stop early and walk the rest of the way.  Stand up or walk around during all of your indoor phone calls.  Get up, stretch, and walk around every 30 minutes throughout the day.  Enjoy exercise with a friend. Support to continue exercising will help you keep a regular routine of activity.  Where to find more information  You can find more information about exercising to stay healthy from:  [place] Department of Health and Human Services: www.hhs.gov  Centers for Disease Control and Prevention (CDC): www.cdc.gov  Summary  Exercising regularly is important. It will  improve your overall fitness, flexibility, and endurance.  Regular exercise will also improve your overall health. It can help you control your weight, reduce stress, and improve your bone density.  Do not exercise so much that you hurt yourself, feel dizzy, or get very short of breath.  Before you start a new exercise program, talk with your health care provider.  This information is not intended to replace advice given to you by your health care provider. Make sure you discuss any questions you have with your health care provider.  Document Revised: 04/15/2022 Document Reviewed: 04/15/2022  [employer] Patient Education © 2023 [employer] Heart-Healthy Eating Plan  Many factors influence your heart (coronary) health, including eating and exercise habits. Coronary risk increases with abnormal blood fat (lipid) levels. Heart-healthy meal planning includes limiting unhealthy fats, increasing healthy fats, and making other diet and lifestyle changes.  What is my plan?  Your health care provider may recommend that you:  Limit your fat intake to _________% or less of your total calories each day.  Limit your saturated fat intake to _________% or less of your total calories each day.  Limit the amount of cholesterol in your diet to less than _________ mg per day.  What are tips for following this plan?  Cooking  Cook foods using methods other than frying. Baking, boiling, grilling, and broiling are all good options. Other ways to reduce fat include:  Removing the skin from poultry.  Removing all visible fats from meats.  Steaming vegetables in water or broth.  Meal planning  A plate with examples of foods in a healthy diet.      At meals, imagine dividing your plate into fourths:  Fill one-half of your plate with vegetables and green salads.  Fill one-fourth of your plate with whole grains.  Fill one-fourth of your plate with lean protein foods.  Eat 4-5 servings of vegetables per day. One serving equals 1 cup raw or  cooked vegetable, or 2 cups raw leafy greens.  Eat 4-5 servings of fruit per day. One serving equals 1 medium whole fruit, ¼ cup dried fruit, ½ cup fresh, frozen, or canned fruit, or ½ cup 100% fruit juice.  Eat more foods that contain soluble fiber. Examples include apples, broccoli, carrots, beans, peas, and barley. Aim to get 25-30 g of fiber per day.  Increase your consumption of legumes, nuts, and seeds to 4-5 servings per week. One serving of dried beans or legumes equals ½ cup cooked, 1 serving of nuts is ¼ cup, and 1 serving of seeds equals 1 tablespoon.  Fats  Choose healthy fats more often. Choose monounsaturated and polyunsaturated fats, such as olive and canola oils, flaxseeds, walnuts, almonds, and seeds.  Eat more omega-3 fats. Choose salmon, mackerel, sardines, tuna, flaxseed oil, and ground flaxseeds. Aim to eat fish at least 2 times each week.  Check food labels carefully to identify foods with trans fats or high amounts of saturated fat.  Limit saturated fats. These are found in animal products, such as meats, butter, and cream. Plant sources of saturated fats include palm oil, palm kernel oil, and coconut oil.  Avoid foods with partially hydrogenated oils in them. These contain trans fats. Examples are stick margarine, some tub margarines, cookies, crackers, and other baked goods.  Avoid fried foods.  General information  Eat more home-cooked food and less restaurant, buffet, and fast food.  Limit or avoid alcohol.  Limit foods that are high in starch and sugar.  Lose weight if you are overweight. Losing just 5-10% of your body weight can help your overall health and prevent diseases such as diabetes and heart disease.  Monitor your salt (sodium) intake, especially if you have high blood pressure. Talk with your health care provider about your sodium intake.  Try to incorporate more vegetarian meals weekly.  What foods can I eat?  Fruits  All fresh, canned (in natural juice), or frozen  fruits.  Vegetables  Fresh or frozen vegetables (raw, steamed, roasted, or grilled). Green salads.  Grains  Most grains. Choose whole wheat and whole grains most of the time. Rice and pasta, including brown rice and pastas made with whole wheat.  Meats and other proteins  Lean, well-trimmed beef, veal, pork, and lamb. Chicken and turkey without skin. All fish and shellfish. Wild duck, rabbit, pheasant, and venison. Egg whites or low-cholesterol egg substitutes. Dried beans, peas, lentils, and tofu. Seeds and most nuts.  Dairy  Low-fat or nonfat cheeses, including ricotta and mozzarella. Skim or 1% milk (liquid, powdered, or evaporated). Buttermilk made with low-fat milk. Nonfat or low-fat yogurt.  Fats and oils  Non-hydrogenated (trans-free) margarines. Vegetable oils, including soybean, sesame, sunflower, olive, peanut, safflower, corn, canola, and cottonseed. Salad dressings or mayonnaise made with a vegetable oil.  Beverages  Water (mineral or sparkling). Coffee and tea. Diet carbonated beverages.  Sweets and desserts  Sherbet, gelatin, and fruit ice. Small amounts of dark chocolate.  Limit all sweets and desserts.  Seasonings and condiments  All seasonings and condiments.  The items listed above may not be a complete list of foods and beverages you can eat. Contact a dietitian for more options.  What foods are not recommended?  Fruits  Canned fruit in heavy syrup. Fruit in cream or butter sauce. Fried fruit. Limit coconut.  Vegetables  Vegetables cooked in cheese, cream, or butter sauce. Fried vegetables.  Grains  Breads made with saturated or trans fats, oils, or whole milk. Croissants. Sweet rolls. Donuts. High-fat crackers, such as cheese crackers.  Meats and other proteins  Fatty meats, such as hot dogs, ribs, sausage, sutton, rib-eye roast or steak. High-fat deli meats, such as salami and bologna. Caviar. Domestic duck and goose. Organ meats, such as liver.  Dairy  Cream, sour cream, cream cheese, and  creamed cottage cheese. Whole-milk cheeses. Whole or 2% milk (liquid, evaporated, or condensed). Whole buttermilk. Cream sauce or high-fat cheese sauce. Whole-milk yogurt.  Fats and oils  Meat fat, or shortening. Cocoa butter, hydrogenated oils, palm oil, coconut oil, palm kernel oil. Solid fats and shortenings, including sutton fat, salt pork, lard, and butter. Nondairy cream substitutes. Salad dressings with cheese or sour cream.  Beverages  Regular sodas and any drinks with added sugar.  Sweets and desserts  Frosting. Pudding. Cookies. Cakes. Pies. Milk chocolate or white chocolate. Buttered syrups. Full-fat ice cream or ice cream drinks.  The items listed above may not be a complete list of foods and beverages to avoid. Contact a dietitian for more information.  Summary  Heart-healthy meal planning includes limiting unhealthy fats, increasing healthy fats, and making other diet and lifestyle changes.  Lose weight if you are overweight. Losing just 5-10% of your body weight can help your overall health and prevent diseases such as diabetes and heart disease.  Focus on eating a balance of foods, including fruits and vegetables, low-fat or nonfat dairy, lean protein, nuts and legumes, whole grains, and heart-healthy oils and fats.  This information is not intended to replace advice given to you by your health care provider. Make sure you discuss any questions you have with your health care provider.  Document Revised: 04/28/2022 Document Reviewed: 04/28/2022  [employer] Patient Education © 2022 [employer]

## 2024-05-25 LAB
25(OH)D3+25(OH)D2 SERPL-MCNC: 44 NG/ML (ref 30–100)
ALBUMIN SERPL-MCNC: 4.6 G/DL (ref 3.8–4.8)
ALBUMIN/CREAT UR: 520 MG/G CREAT (ref 0–29)
ALBUMIN/GLOB SERPL: 1.9 {RATIO} (ref 1.2–2.2)
ALP SERPL-CCNC: 51 IU/L (ref 44–121)
ALT SERPL-CCNC: 19 IU/L (ref 0–32)
AMBIG ABBREV CMP14 DEFAULT: NORMAL
AMBIG ABBREV LP DEFAULT: NORMAL
APPEARANCE UR: CLEAR
AST SERPL-CCNC: 28 IU/L (ref 0–40)
BACTERIA #/AREA URNS HPF: ABNORMAL /[HPF]
BILIRUB SERPL-MCNC: 0.4 MG/DL (ref 0–1.2)
BILIRUB UR QL STRIP: NEGATIVE
BUN SERPL-MCNC: 20 MG/DL (ref 8–27)
BUN/CREAT SERPL: 19 (ref 12–28)
CALCIUM SERPL-MCNC: 9 MG/DL (ref 8.7–10.3)
CASTS URNS QL MICRO: ABNORMAL /LPF
CHLORIDE SERPL-SCNC: 102 MMOL/L (ref 96–106)
CHOLEST SERPL-MCNC: 154 MG/DL (ref 100–199)
CO2 SERPL-SCNC: 23 MMOL/L (ref 20–29)
COLOR UR: YELLOW
CREAT SERPL-MCNC: 1.03 MG/DL (ref 0.57–1)
CREAT UR-MCNC: 137.9 MG/DL
EGFRCR SERPLBLD CKD-EPI 2021: 58 ML/MIN/1.73
EPI CELLS #/AREA URNS HPF: ABNORMAL /HPF (ref 0–10)
GLOBULIN SER CALC-MCNC: 2.4 G/DL (ref 1.5–4.5)
GLUCOSE SERPL-MCNC: 93 MG/DL (ref 70–99)
GLUCOSE UR QL STRIP: NEGATIVE
HDLC SERPL-MCNC: 99 MG/DL
HGB UR QL STRIP: NEGATIVE
KETONES UR QL STRIP: NEGATIVE
LDLC SERPL CALC-MCNC: 45 MG/DL (ref 0–99)
LEUKOCYTE ESTERASE UR QL STRIP: ABNORMAL
MICRO URNS: ABNORMAL
MICROALBUMIN UR-MCNC: 717.2 UG/ML
NITRITE UR QL STRIP: NEGATIVE
PH UR STRIP: 5.5 [PH] (ref 5–7.5)
POTASSIUM SERPL-SCNC: 5.1 MMOL/L (ref 3.5–5.2)
PROT SERPL-MCNC: 7 G/DL (ref 6–8.5)
PROT UR QL STRIP: ABNORMAL
RBC #/AREA URNS HPF: ABNORMAL /HPF (ref 0–2)
SODIUM SERPL-SCNC: 140 MMOL/L (ref 134–144)
SP GR UR STRIP: 1.02 (ref 1–1.03)
TRIGL SERPL-MCNC: 45 MG/DL (ref 0–149)
UROBILINOGEN UR STRIP-MCNC: 0.2 MG/DL (ref 0.2–1)
VLDLC SERPL CALC-MCNC: 10 MG/DL (ref 5–40)
WBC #/AREA URNS HPF: ABNORMAL /HPF (ref 0–5)

## 2024-06-03 ENCOUNTER — OFFICE VISIT (OUTPATIENT)
Dept: PULMONOLOGY | Facility: CLINIC | Age: 69
End: 2024-06-03
Payer: OTHER MISCELLANEOUS

## 2024-06-03 VITALS
OXYGEN SATURATION: 93 % | SYSTOLIC BLOOD PRESSURE: 146 MMHG | WEIGHT: 121 LBS | TEMPERATURE: 98.2 F | DIASTOLIC BLOOD PRESSURE: 70 MMHG | HEART RATE: 60 BPM | BODY MASS INDEX: 22.26 KG/M2 | RESPIRATION RATE: 16 BRPM | HEIGHT: 62 IN

## 2024-06-03 DIAGNOSIS — J44.9 CHRONIC OBSTRUCTIVE PULMONARY DISEASE, UNSPECIFIED COPD TYPE: Primary | ICD-10-CM

## 2024-06-03 DIAGNOSIS — Z87.891 FORMER SMOKER: ICD-10-CM

## 2024-06-03 PROCEDURE — 94726 PLETHYSMOGRAPHY LUNG VOLUMES: CPT | Performed by: INTERNAL MEDICINE

## 2024-06-03 PROCEDURE — 94729 DIFFUSING CAPACITY: CPT | Performed by: INTERNAL MEDICINE

## 2024-06-03 PROCEDURE — 99215 OFFICE O/P EST HI 40 MIN: CPT | Performed by: INTERNAL MEDICINE

## 2024-06-03 PROCEDURE — 94010 BREATHING CAPACITY TEST: CPT | Performed by: INTERNAL MEDICINE
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
## 2024-07-02 ENCOUNTER — HOSPITAL ENCOUNTER (OUTPATIENT)
Dept: CT IMAGING | Facility: HOSPITAL | Age: 69
Discharge: HOME | End: 2024-07-02
Admitting: INTERNAL MEDICINE
Payer: MEDICARE

## 2024-07-02 DIAGNOSIS — Z87.891 FORMER SMOKER: ICD-10-CM

## 2024-07-02 PROCEDURE — 71271 CT THORAX LUNG CANCER SCR C-: CPT

## 2024-07-03 DIAGNOSIS — E83.39 HIGH PHOSPHATE LEVELS: ICD-10-CM

## 2024-07-03 RX ORDER — CALCIUM ACETATE 667 MG/1
1334 CAPSULE ORAL DAILY
Qty: 180 CAPSULE | Refills: 1 | Status: SHIPPED | OUTPATIENT
Start: 2024-07-03

## 2024-07-03 NOTE — TELEPHONE ENCOUNTER
Rx Refill Note  Requested Prescriptions     Pending Prescriptions Disp Refills    calcium acetate (PHOS BINDER,) 667 MG capsule capsule [Pharmacy Med Name: CALCIUM ACETATE 667MG CAPSULES] 180 capsule 1     Sig: TAKE 2 CAPSULES BY MOUTH DAILY      Last office visit with prescribing clinician: 5/21/2024   Last telemedicine visit with prescribing clinician: Visit date not found   Next office visit with prescribing clinician: 11/25/2024                         Would you like a call back once the refill request has been completed: [] Yes [] No    If the office needs to give you a call back, can they leave a voicemail: [] Yes [] No    Talia Simons MA  07/03/24, 10:13 EDT

## 2024-07-12 ENCOUNTER — TELEPHONE (OUTPATIENT)
Dept: PULMONOLOGY | Facility: CLINIC | Age: 69
End: 2024-07-12
Payer: MEDICARE

## 2024-07-12 DIAGNOSIS — R91.1 INCIDENTAL LUNG NODULE, GREATER THAN OR EQUAL TO 8MM: Primary | ICD-10-CM

## 2024-07-12 NOTE — TELEPHONE ENCOUNTER
----- Message from Lyudmila Patel sent at 7/12/2024  9:18 AM EDT -----  Can you please let Ms. Wade know that she had an area in the right lung which looks like scarring but will need a CT in 3 months to follow this up to make sure.     She needs to come back to see me in October after the repeat CT Chest.     Thanks  ----- Message -----  From: Melissa, Rad [facility]  Sent: 7/5/2024  11:47 AM EDT  To: Lyudmila Patel, DO

## 2024-07-24 RX ORDER — UREA 10 %
LOTION (ML) TOPICAL DAILY
Qty: 90 TABLET | Refills: 3 | Status: SHIPPED | OUTPATIENT
Start: 2024-07-24

## 2024-07-24 NOTE — TELEPHONE ENCOUNTER
Rx Refill Note  Requested Prescriptions     Pending Prescriptions Disp Refills    vitamin B-12 (CYANOCOBALAMIN) 500 MCG tablet [Pharmacy Med Name: VITAMIN B-12 500MCG TABLETS] 90 tablet      Sig: TAKE 1 TABLET BY MOUTH DAILY      Last office visit with prescribing clinician: Visit date not found   Last telemedicine visit with prescribing clinician: Visit date not found   Next office visit with prescribing clinician: Visit date not found                         Would you like a call back once the refill request has been completed: [] Yes [] No    If the office needs to give you a call back, can they leave a voicemail: [] Yes [] No    Talia Simons MA  07/24/24, 10:36 EDT

## 2024-07-30 RX ORDER — FLUTICASONE FUROATE AND VILANTEROL 200; 25 UG/1; UG/1
1 POWDER RESPIRATORY (INHALATION) DAILY
Qty: 60 EACH | Refills: 5 | Status: SHIPPED | OUTPATIENT
Start: 2024-07-30

## 2024-07-30 NOTE — TELEPHONE ENCOUNTER
Rx Refill Note  Requested Prescriptions     Pending Prescriptions Disp Refills    Fluticasone Furoate-Vilanterol (BREO ELLIPTA) 200-25 MCG/ACT inhaler [Pharmacy Med Name: FLUTIC/VILAN 200-25MCG ORAL INH(30)] 60 each      Sig: INHALE 1 PUFF BY MOUTH DAILY      Last office visit with prescribing clinician: 5/21/2024   Last telemedicine visit with prescribing clinician: Visit date not found   Next office visit with prescribing clinician: 11/25/2024                         Would you like a call back once the refill request has been completed: [] Yes [] No    If the office needs to give you a call back, can they leave a voicemail: [] Yes [] No    Koki Artis LPN  07/30/24, 15:40 EDT

## 2024-08-08 ENCOUNTER — TELEPHONE (OUTPATIENT)
Dept: INTERNAL MEDICINE | Facility: CLINIC | Age: 69
End: 2024-08-08
Payer: MEDICARE

## 2024-08-08 NOTE — TELEPHONE ENCOUNTER
I TALKED TO VANDA AND WE'RE THINKING THIS SHOULD HAVE BEEN HALF WC AND OTHER HALF BILLED TO HER INSURANCE COMPANY. I TRIED TO CALL THE PATIENT BACK TO LET HER KNOW THAT WE NEEDED TO TALK TO OUR OFFICE MANGER ABOUT THIS BUT THERE WAS NO ANSWER AND HER VOICEMAIL ISN'T SETUP

## 2024-08-08 NOTE — TELEPHONE ENCOUNTER
PATIENT REQUESTING A CALL BACK. SHE STATES THAT THERE HAS BEEN A MIX UP ON HOW HER LABS FOR [employer] AND HER MEDICARE HAVE BEEN BILLED. SHE STATES THE DATE OF THE LABS IN QUESTION ARE FROM 04/17/2024.

## 2024-09-04 DIAGNOSIS — M19.111 POST-TRAUMATIC OSTEOARTHRITIS OF RIGHT SHOULDER: ICD-10-CM

## 2024-09-04 RX ORDER — GABAPENTIN 100 MG/1
300 CAPSULE ORAL EVERY EVENING
Qty: 90 CAPSULE | Refills: 2 | Status: SHIPPED | OUTPATIENT
Start: 2024-09-04

## 2024-09-04 NOTE — TELEPHONE ENCOUNTER
Rx Refill Note  Requested Prescriptions     Pending Prescriptions Disp Refills    gabapentin (NEURONTIN) 100 MG capsule [Pharmacy Med Name: GABAPENTIN 100MG CAPSULES] 90 capsule      Sig: TAKE 3 CAPSULES BY MOUTH EVERY EVENING      Last office visit with prescribing clinician: 5/21/2024   Last telemedicine visit with prescribing clinician: Visit date not found   Next office visit with prescribing clinician: 11/25/2024                         Would you like a call back once the refill request has been completed: [] Yes [] No    If the office needs to give you a call back, can they leave a voicemail: [] Yes [] No    Brea Wagner MA  09/04/24, 10:23 EDT

## 2024-09-25 ENCOUNTER — OFFICE VISIT (OUTPATIENT)
Dept: CARDIOLOGY | Facility: CLINIC | Age: 69
End: 2024-09-25
Payer: MEDICARE

## 2024-09-25 VITALS
BODY MASS INDEX: 22.34 KG/M2 | HEART RATE: 66 BPM | WEIGHT: 121.4 LBS | HEIGHT: 62 IN | OXYGEN SATURATION: 93 % | DIASTOLIC BLOOD PRESSURE: 58 MMHG | SYSTOLIC BLOOD PRESSURE: 139 MMHG

## 2024-09-25 DIAGNOSIS — I38 VHD (VALVULAR HEART DISEASE): Primary | Chronic | ICD-10-CM

## 2024-09-25 DIAGNOSIS — E78.2 MIXED HYPERLIPIDEMIA: ICD-10-CM

## 2024-09-25 DIAGNOSIS — I73.9 PERIPHERAL VASCULAR DISEASE OF EXTREMITY WITH CLAUDICATION: Chronic | ICD-10-CM

## 2024-09-25 DIAGNOSIS — I65.23 BILATERAL CAROTID ARTERY STENOSIS: Chronic | ICD-10-CM

## 2024-09-25 DIAGNOSIS — I10 BENIGN ESSENTIAL HYPERTENSION: Chronic | ICD-10-CM

## 2024-09-25 PROCEDURE — 1159F MED LIST DOCD IN RCRD: CPT | Performed by: NURSE PRACTITIONER

## 2024-09-25 PROCEDURE — 3075F SYST BP GE 130 - 139MM HG: CPT | Performed by: NURSE PRACTITIONER

## 2024-09-25 PROCEDURE — 1160F RVW MEDS BY RX/DR IN RCRD: CPT | Performed by: NURSE PRACTITIONER

## 2024-09-25 PROCEDURE — 3078F DIAST BP <80 MM HG: CPT | Performed by: NURSE PRACTITIONER

## 2024-09-25 PROCEDURE — 93000 ELECTROCARDIOGRAM COMPLETE: CPT | Performed by: NURSE PRACTITIONER

## 2024-09-25 PROCEDURE — 99214 OFFICE O/P EST MOD 30 MIN: CPT | Performed by: NURSE PRACTITIONER

## 2024-10-09 ENCOUNTER — OFFICE VISIT (OUTPATIENT)
Dept: INTERNAL MEDICINE | Facility: CLINIC | Age: 69
End: 2024-10-09
Payer: OTHER MISCELLANEOUS

## 2024-10-09 VITALS
BODY MASS INDEX: 21.42 KG/M2 | TEMPERATURE: 98.7 F | HEIGHT: 62 IN | SYSTOLIC BLOOD PRESSURE: 132 MMHG | DIASTOLIC BLOOD PRESSURE: 58 MMHG | HEART RATE: 57 BPM | WEIGHT: 116.4 LBS

## 2024-10-09 DIAGNOSIS — M19.111 POST-TRAUMATIC OSTEOARTHRITIS OF RIGHT SHOULDER: ICD-10-CM

## 2024-10-09 PROCEDURE — 99213 OFFICE O/P EST LOW 20 MIN: CPT | Performed by: INTERNAL MEDICINE

## 2024-10-09 RX ORDER — TRAMADOL HYDROCHLORIDE 50 MG/1
50 TABLET ORAL 2 TIMES DAILY
Qty: 60 TABLET | Refills: 2 | Status: SHIPPED | OUTPATIENT
Start: 2024-10-09

## 2024-10-09 RX ORDER — TRAMADOL HYDROCHLORIDE 50 MG/1
50 TABLET ORAL 2 TIMES DAILY
Qty: 180 TABLET | Refills: 2 | Status: SHIPPED | OUTPATIENT
Start: 2024-10-09 | End: 2024-10-09 | Stop reason: SDUPTHER

## 2024-10-09 NOTE — PROGRESS NOTES
Aarti Wade  1955  [id number]  Patient Care Team:  Jannette Chapa MD as PCP - General  Jannette Chapa MD as PCP - Family Medicine  Ketan Lazcano MD as Consulting Physician (Orthopedic Surgery)  Lorenzo Good MD as Consulting Physician (Ophthalmology)  Severino Carbajal MD as Consulting Physician (Vascular Surgery)  Cynthia Powell APRN as Nurse Practitioner (Cardiology)  Case, Lyudmila DISLA DO as Consulting Physician (Pulmonary Disease)    Aarti Wade is a pleasant 69 y.o. female who presents for evaluation of Post-traumatic osteoarthritis of right shoulder    Chief Complaint   Patient presents with    Post-traumatic osteoarthritis of right shoulder       HPI:   History of Present Illness  Aarti is here for a routine 6 mo follow up visit for right shoulder.  Uses tramadol BID and gabapentin 300mg HS. 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     Saw Dr. Burgess yrs ago until he retired then saw one of his partners.  PCP here ([facility]) has been following her since. Saw Dr. Lazcano for a an acute injury of left shoulder tripping on vacuum and had surgical repair on left side.   Had to stop celebrex within the last few yrs secondary to kidney insufficiency and hypertension.  The gabapentin has been prescribed for nighttime shoulder pain.  She started tramadol for pain when it got worse which has continued to work well for the chronic right shoulder pain.    Results      Past Medical History:   Diagnosis Date    Anxiety     Arthritis     Chronic UTI     Claudication     COPD (chronic obstructive pulmonary disease)     Depression     diffuse fatty liver infiltration on CT 2009    Dizzy     Former smoker 08/31/2022    GERD (gastroesophageal reflux disease)     Head injury     Related to falls off  horses    Hepatitis     UNKNOWN TYPE    History of shingles     about 5  years ago    Hyperlipidemia     Hypertension     Hypoxia     History of tobacco abuse and COPD.  Using Brio Ellipta inhaler daily.  Also diagnosed with bilateral lower lobe pneumonia and put on Augmentin.  Oxygen desaturation after carotid endarterectomy on 3/3/2022.  Patient was sent home on oxygen at 2 L per nasal cannula.    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     Osteopenia of multiple sites     Osteoporosis     PAD (peripheral artery disease)     Spontaneous pneumothorax 1960    Subclavian steal syndrome 2016    VHD (valvular heart disease) 08/31/2022    Wears dentures     TOP ONLY    Wears glasses      Past Surgical History:   Procedure Laterality Date    ANGIOGRAM - CONVERTED  08/16/2016    AA WITH RUNOFF PER DR. HARRISON    CAROTID ENDARTERECTOMY Right 8/31/2022    Procedure: CAROTID ENDARTERECTOMY- RIGHT;  Surgeon: Francisco Magana MD;  Location: [facility];  Service: Vascular;  Laterality: Right;    COLONOSCOPY      last 8 years ago.  Due to schedule    EYE LENS IMPLANT SECONDARY Left     FEMORAL FEMORAL BYPASS Right 8/22/2016    Procedure: RIGHT COMMON FEMORAL ENDARTERECTOMY WITH PATCH;  Surgeon: Severino Harrison MD;  Location: [facility];  Service:     FINGER SURGERY Left     LEFT INDEX FINGER    INTERVENTIONAL RADIOLOGY PROCEDURE N/A 8/16/2016    Procedure: IR PTA femoral popliteal artery;  Surgeon: Severino Harrison MD;  Location: [facility];  Service:     INTERVENTIONAL RADIOLOGY PROCEDURE Left 1/7/2020    Procedure: LEFT ATHERECTOMY, BALLOON ANGIOPLASTY;  Surgeon: Severino Harrison MD;  Location: [facility];  Service: Interventional Radiology    TONSILLECTOMY      TOTAL SHOULDER ARTHROPLASTY Left 6/8/2020    Procedure: TOTAL SHOULDER ARTHROPLASTY LEFT;  Surgeon: Ketan Lazcano MD;  Location: [facility];  Service: Orthopedics;  Laterality: Left;  protector in: 1346  protector out: 1402     Family History   Problem Relation Age of Onset     Osteoarthritis Mother     Alzheimer's disease Mother     Hypertension Father     Heart attack Father     Alcohol abuse Father     Breast cancer Sister     No Known Problems Sister     No Known Problems Maternal Aunt     No Known Problems Paternal Aunt     Breast cancer Maternal Grandmother 50    Breast cancer Paternal Grandmother 50    Coronary artery disease Paternal Grandfather     Stroke Paternal Grandfather     No Known Problems Daughter     No Known Problems Son     Heart attack Other     Ovarian cancer Neg Hx     Endometrial cancer Neg Hx      Social History     Tobacco Use   Smoking Status Former    Current packs/day: 0.00    Average packs/day: 1 pack/day for 40.0 years (40.0 ttl pk-yrs)    Types: Electronic Cigarette, Cigarettes    Start date: 1976    Quit date: 2016    Years since quittin.3    Passive exposure: Past   Smokeless Tobacco Never   Tobacco Comments    1 PACK/DAY SMOKER UNTIL 2016     Allergies   Allergen Reactions    Abilify [Aripiprazole] Other (See Comments)     Tardive- Dyskinesia    Levocetirizine Dihydrochloride Myalgia     Muscle aches/joint pain    Losartan Other (See Comments)     Patient got heart murmurs       Current Outpatient Medications:     acetaminophen (Acetaminophen 8 Hour) 650 MG 8 hr tablet, Take 1 tablet by mouth 2 (two) times a day., Disp: , Rfl:     albuterol sulfate  (90 Base) MCG/ACT inhaler, Inhale 2 puffs Every 4 (Four) Hours As Needed for Wheezing., Disp: 18 g, Rfl: 5    amLODIPine (NORVASC) 10 MG tablet, Take 1 tablet by mouth Daily., Disp: 90 tablet, Rfl: 3    ascorbic acid (VITAMIN C) 1000 MG tablet, Take 1 tablet by mouth Daily., Disp: , Rfl:     aspirin 81 MG EC tablet, Take 1 tablet by mouth Every Morning., Disp: 90 tablet, Rfl: 3    bisoprolol (ZEBeta) 10 MG tablet, Take 1 tablet by mouth Daily., Disp: 90 tablet, Rfl: 3    calcium acetate (PHOS BINDER,) 667 MG capsule capsule, TAKE 2 CAPSULES BY MOUTH DAILY, Disp: 180 capsule, Rfl: 1    " cholecalciferol (Vitamin D, Cholecalciferol,) 25 MCG (1000 UT) tablet, Take 1 tablet by mouth Daily., Disp: , Rfl:     citalopram (CeleXA) 40 MG tablet, Take 1 tablet by mouth Daily., Disp: 90 tablet, Rfl: 1    clopidogrel (Plavix) 75 MG tablet, Take 1 tablet by mouth Daily., Disp: 90 tablet, Rfl: 3    Coenzyme Q10 400 MG capsule, Take 1 capsule by mouth Daily., Disp: , Rfl:     Fish Oil oil, Take 1 capsule by mouth Daily., Disp: , Rfl:     fluticasone (FLONASE) 50 MCG/ACT nasal spray, 1 spray into the nostril(s) as directed by provider 2 (Two) Times a Day. shake liquid (Patient taking differently: Administer 1 spray into the nostril(s) as directed by provider Daily. shake liquid), Disp: 48 g, Rfl: 3    Fluticasone Furoate-Vilanterol (BREO ELLIPTA) 200-25 MCG/ACT inhaler, INHALE 1 PUFF BY MOUTH DAILY, Disp: 60 each, Rfl: 5    folic acid (FOLVITE) 1 MG tablet, TAKE 2 TABLETS BY MOUTH DAILY, Disp: 180 tablet, Rfl: 3    gabapentin (NEURONTIN) 100 MG capsule, TAKE 3 CAPSULES BY MOUTH EVERY EVENING, Disp: 90 capsule, Rfl: 2    Methylsulfonylmethane (MSM PO), Take 2 tablets by mouth daily., Disp: , Rfl:     Multiple Vitamins-Minerals (MULTIVITAMIN ADULT PO), Take 1 tablet by mouth Daily., Disp: , Rfl:     pantoprazole (PROTONIX) 40 MG EC tablet, Take 1 tablet by mouth Daily., Disp: 90 tablet, Rfl: 1    rosuvastatin (CRESTOR) 40 MG tablet, Take 1 tablet by mouth Daily., Disp: 90 tablet, Rfl: 3    tiotropium bromide monohydrate (Spiriva Respimat) 2.5 MCG/ACT aerosol solution inhaler, Inhale 2 puffs Daily., Disp: 4 g, Rfl: 11    traMADol (ULTRAM) 50 MG tablet, Take 1 tablet by mouth 2 (Two) Times a Day., Disp: 60 tablet, Rfl: 2    URINARY HEALTH/CRANBERRY PO, \"CRANACTIN\" Take 2 tablets in the morning, 1 mid-day, and 1 in the evening, Disp: , Rfl:     Review of Systems  Review of systems was completed, and pertinent findings are noted in the HPI.  /58 (BP Location: Left arm, Patient Position: Sitting, Cuff Size: Adult) " "  Pulse 57   Temp 98.7 °F (37.1 °C) (Temporal)   Ht 157.5 cm (62\")   Wt 52.8 kg (116 lb 6.4 oz)   BMI 21.29 kg/m²     Physical Exam  Vitals reviewed.   Constitutional:       Appearance: She is well-developed.   Pulmonary:      Effort: Pulmonary effort is normal.   Skin:     General: Skin is warm and dry.   Neurological:      Mental Status: She is alert.   Psychiatric:         Behavior: Behavior normal.         PHQ-9 Total Score:      Assessment/Plan:  Assessment & Plan  1. Routine follow-up.  Her prescription for tramadol was refilled. A new prescription for tramadol was issued, with a supply of 60 tablets and two refills.    Follow-up  Patient will return in 6 months for follow-up.  There are no Patient Instructions on file for this visit.  Plan of care reviewed with patient at the conclusion of today's visit. Education was provided regarding diagnosis, management and any prescribed or recommended OTC medications.  Patient verbalizes understanding of and agreement with management plan.    No follow-ups on file.    Dictated Utilizing Dragon Dictation.    CYRUS Gallegos       Patient or patient representative verbalized consent for the use of Ambient Listening during the visit with  CYRUS Gallegos for chart documentation. 10/9/2024  14:07 EDT    "

## 2024-11-13 ENCOUNTER — HOSPITAL ENCOUNTER (OUTPATIENT)
Dept: CT IMAGING | Facility: HOSPITAL | Age: 69
Discharge: HOME | End: 2024-11-13
Admitting: INTERNAL MEDICINE
Payer: MEDICARE

## 2024-11-13 DIAGNOSIS — R91.1 INCIDENTAL LUNG NODULE, GREATER THAN OR EQUAL TO 8MM: ICD-10-CM

## 2024-11-13 PROCEDURE — 71250 CT THORAX DX C-: CPT

## 2024-11-25 ENCOUNTER — OFFICE VISIT (OUTPATIENT)
Dept: INTERNAL MEDICINE | Facility: CLINIC | Age: 69
End: 2024-11-25
Payer: MEDICARE

## 2024-11-25 VITALS
OXYGEN SATURATION: 92 % | HEART RATE: 68 BPM | WEIGHT: 119.2 LBS | DIASTOLIC BLOOD PRESSURE: 72 MMHG | HEIGHT: 62 IN | SYSTOLIC BLOOD PRESSURE: 132 MMHG | BODY MASS INDEX: 21.94 KG/M2 | TEMPERATURE: 98.7 F

## 2024-11-25 DIAGNOSIS — Z91.81 AT HIGH RISK FOR FALLS: Chronic | ICD-10-CM

## 2024-11-25 DIAGNOSIS — N18.31 STAGE 3A CHRONIC KIDNEY DISEASE: Chronic | ICD-10-CM

## 2024-11-25 DIAGNOSIS — Z00.00 MEDICARE ANNUAL WELLNESS VISIT, SUBSEQUENT: Primary | ICD-10-CM

## 2024-11-25 DIAGNOSIS — I10 BENIGN ESSENTIAL HYPERTENSION: Chronic | ICD-10-CM

## 2024-11-25 DIAGNOSIS — E44.0 MODERATE PROTEIN-CALORIE MALNUTRITION: Chronic | ICD-10-CM

## 2024-11-25 DIAGNOSIS — F33.0 MAJOR DEPRESSIVE DISORDER, RECURRENT EPISODE, MILD DEGREE: Chronic | ICD-10-CM

## 2024-11-25 DIAGNOSIS — I70.213 ATHEROSCLEROSIS OF NATIVE ARTERY OF BOTH LOWER EXTREMITIES WITH INTERMITTENT CLAUDICATION: Chronic | ICD-10-CM

## 2024-11-25 DIAGNOSIS — J44.9 COPD WITHOUT EXACERBATION: Chronic | ICD-10-CM

## 2024-11-25 DIAGNOSIS — Z00.00 ANNUAL PHYSICAL EXAM: ICD-10-CM

## 2024-11-25 DIAGNOSIS — N95.9 MENOPAUSAL AND POSTMENOPAUSAL DISORDER: ICD-10-CM

## 2024-11-25 DIAGNOSIS — E78.2 MIXED HYPERLIPIDEMIA: ICD-10-CM

## 2024-11-25 DIAGNOSIS — M85.89 OSTEOPENIA OF MULTIPLE SITES: ICD-10-CM

## 2024-11-25 DIAGNOSIS — Z12.11 COLON CANCER SCREENING: ICD-10-CM

## 2024-11-25 PROCEDURE — 1170F FXNL STATUS ASSESSED: CPT | Performed by: INTERNAL MEDICINE

## 2024-11-25 PROCEDURE — 1126F AMNT PAIN NOTED NONE PRSNT: CPT | Performed by: INTERNAL MEDICINE

## 2024-11-25 PROCEDURE — 96160 PT-FOCUSED HLTH RISK ASSMT: CPT | Performed by: INTERNAL MEDICINE

## 2024-11-25 PROCEDURE — 3075F SYST BP GE 130 - 139MM HG: CPT | Performed by: INTERNAL MEDICINE

## 2024-11-25 PROCEDURE — 1159F MED LIST DOCD IN RCRD: CPT | Performed by: INTERNAL MEDICINE

## 2024-11-25 PROCEDURE — 99397 PER PM REEVAL EST PAT 65+ YR: CPT | Performed by: INTERNAL MEDICINE

## 2024-11-25 PROCEDURE — 1160F RVW MEDS BY RX/DR IN RCRD: CPT | Performed by: INTERNAL MEDICINE

## 2024-11-25 PROCEDURE — 3078F DIAST BP <80 MM HG: CPT | Performed by: INTERNAL MEDICINE

## 2024-11-25 PROCEDURE — G0439 PPPS, SUBSEQ VISIT: HCPCS | Performed by: INTERNAL MEDICINE

## 2024-11-25 RX ORDER — TIOTROPIUM BROMIDE INHALATION SPRAY 3.12 UG/1
2 SPRAY, METERED RESPIRATORY (INHALATION)
Qty: 4 G | Refills: 11 | Status: SHIPPED | OUTPATIENT
Start: 2024-11-25

## 2024-11-25 RX ORDER — ALBUTEROL SULFATE 90 UG/1
2 INHALANT RESPIRATORY (INHALATION) EVERY 4 HOURS PRN
Qty: 18 G | Refills: 5 | Status: SHIPPED | OUTPATIENT
Start: 2024-11-25

## 2024-11-25 RX ORDER — UREA 10 %
1 LOTION (ML) TOPICAL DAILY
COMMUNITY
Start: 2024-10-23

## 2024-11-25 RX ORDER — CITALOPRAM HYDROBROMIDE 40 MG/1
40 TABLET ORAL DAILY
Qty: 90 TABLET | Refills: 1 | Status: SHIPPED | OUTPATIENT
Start: 2024-11-25

## 2024-11-25 NOTE — PATIENT INSTRUCTIONS
Problem List Items Addressed This Visit          Advance Directives and General Issues    At high risk for falls (Chronic)       Cardiac and Vasculature    Benign essential hypertension (Chronic)    Overview     Taking amlodipine, bisoprolol.          Relevant Medications    amLODIPine (NORVASC) 10 MG tablet    bisoprolol (ZEBeta) 10 MG tablet    Atherosclerosis of native artery of both lower extremities with intermittent claudication (Chronic)    Overview     Patient is status post left lower extremity atherectomy and balloon angioplasty by Dr. Carbajal on 1/7/2020.    2021 SAIDA and Doppler shows mild arterial insufficiency in both lower extremities at rest.  It shows significant vascular claudication on the right leg.  It also shows significant subclavian  and arterial insufficiency since the arm blood pressures are significantly different.    Taking daily  aspirin and plavix and nightly statin.          Mixed hyperlipidemia    Overview     Taking  rosuvastatin every evening.               Relevant Medications    rosuvastatin (CRESTOR) 40 MG tablet       Endocrine and Metabolic    Moderate protein-calorie malnutrition (Chronic)       Genitourinary and Reproductive     Stage III CKD (Chronic)    Overview                   Health Encounters    Medicare annual wellness visit, subsequent - Primary    Annual physical exam       Mental Health    Major depressive disorder, recurrent episode, mild degree (Chronic)    Overview     Taking citalopram 40 mg daily.             Relevant Medications    citalopram (CeleXA) 40 MG tablet       Musculoskeletal and Injuries    Osteopenia of multiple sites    Overview     12/28/2020 DEXA showed mild improvement in osteopenia of the hip.  T score in the left total hip is now -1.1 and in  the left femoral neck is -2.0.    DEXA 2/2023 shows a mild decrease in osteopenia with the lowest T-scores in the femoral neck at -2.0.             Pulmonary and Pneumonias    COPD without  exacerbation (Chronic)    Relevant Medications    fluticasone (FLONASE) 50 MCG/ACT nasal spray    Fluticasone Furoate-Vilanterol (BREO ELLIPTA) 200-25 MCG/ACT inhaler    albuterol sulfate  (90 Base) MCG/ACT inhaler    tiotropium bromide monohydrate (Spiriva Respimat) 2.5 MCG/ACT aerosol solution inhaler     Other Visit Diagnoses       Colon cancer screening        Relevant Orders    Cologuard - Stool, Per Rectum    Menopausal and postmenopausal disorder        Relevant Orders    DEXA Bone Density Axial          Exercising to Stay Healthy  To become healthy and stay healthy, it is recommended that you do moderate-intensity and vigorous-intensity exercise. You can tell that you are exercising at a moderate intensity if your heart starts beating faster and you start breathing faster but can still hold a conversation. You can tell that you are exercising at a vigorous intensity if you are breathing much harder and faster and cannot hold a conversation while exercising.  How can exercise benefit me?  Exercising regularly is important. It has many health benefits, such as:  Improving overall fitness, flexibility, and endurance.  Increasing bone density.  Helping with weight control.  Decreasing body fat.  Increasing muscle strength and endurance.  Reducing stress and tension, anxiety, depression, or anger.  Improving overall health.  What guidelines should I follow while exercising?  Before you start a new exercise program, talk with your health care provider.  Do not exercise so much that you hurt yourself, feel dizzy, or get very short of breath.  Wear comfortable clothes and wear shoes with good support.  Drink plenty of water while you exercise to prevent dehydration or heat stroke.  Work out until your breathing and your heartbeat get faster (moderate intensity).  How often should I exercise?  Choose an activity that you enjoy, and set realistic goals. Your health care provider can help you make an activity  plan that is individually designed and works best for you.  Exercise regularly as told by your health care provider. This may include:  Doing strength training two times a week, such as:  Lifting weights.  Using resistance bands.  Push-ups.  Sit-ups.  Yoga.  Doing a certain intensity of exercise for a given amount of time. Choose from these options:  A total of 150 minutes of moderate-intensity exercise every week.  A total of 75 minutes of vigorous-intensity exercise every week.  A mix of moderate-intensity and vigorous-intensity exercise every week.  Children, pregnant women, people who have not exercised regularly, people who are overweight, and older adults may need to talk with a health care provider about what activities are safe to perform. If you have a medical condition, be sure to talk with your health care provider before you start a new exercise program.  What are some exercise ideas?  Moderate-intensity exercise ideas include:  Walking 1 mile (1.6 km) in about 15 minutes.  Biking.  Hiking.  Golfing.  Dancing.  Water aerobics.  Vigorous-intensity exercise ideas include:  Walking 4.5 miles (7.2 km) or more in about 1 hour.  Jogging or running 5 miles (8 km) in about 1 hour.  Biking 10 miles (16.1 km) or more in about 1 hour.  Lap swimming.  Roller-skating or in-line skating.  Cross-country skiing.  Vigorous competitive sports, such as football, basketball, and soccer.  Jumping rope.  Aerobic dancing.  What are some everyday activities that can help me get exercise?  Yard work, such as:  Pushing a .  Raking and bagging leaves.  Washing your car.  Pushing a stroller.  Shoveling snow.  Gardening.  Washing windows or floors.  How can I be more active in my day-to-day activities?  Use stairs instead of an elevator.  Take a walk during your lunch break.  If you drive, park your car farther away from your work or school.  If you take public transportation, get off one stop early and walk the rest of  the way.  Stand up or walk around during all of your indoor phone calls.  Get up, stretch, and walk around every 30 minutes throughout the day.  Enjoy exercise with a friend. Support to continue exercising will help you keep a regular routine of activity.  Where to find more information  You can find more information about exercising to stay healthy from:  [place] Department of Health and Human Services: www.hhs.gov  Centers for Disease Control and Prevention (CDC): www.cdc.gov  Summary  Exercising regularly is important. It will improve your overall fitness, flexibility, and endurance.  Regular exercise will also improve your overall health. It can help you control your weight, reduce stress, and improve your bone density.  Do not exercise so much that you hurt yourself, feel dizzy, or get very short of breath.  Before you start a new exercise program, talk with your health care provider.  This information is not intended to replace advice given to you by your health care provider. Make sure you discuss any questions you have with your health care provider.  Document Revised: 04/15/2022 Document Reviewed: 04/15/2022  [employer] Patient Education © 2023 [employer] Heart-Healthy Eating Plan  Many factors influence your heart (coronary) health, including eating and exercise habits. Coronary risk increases with abnormal blood fat (lipid) levels. Heart-healthy meal planning includes limiting unhealthy fats, increasing healthy fats, and making other diet and lifestyle changes.  What is my plan?  Your health care provider may recommend that you:  Limit your fat intake to _________% or less of your total calories each day.  Limit your saturated fat intake to _________% or less of your total calories each day.  Limit the amount of cholesterol in your diet to less than _________ mg per day.  What are tips for following this plan?  Cooking  Cook foods using methods other than frying. Baking, boiling, grilling, and broiling are  all good options. Other ways to reduce fat include:  Removing the skin from poultry.  Removing all visible fats from meats.  Steaming vegetables in water or broth.  Meal planning  A plate with examples of foods in a healthy diet.      At meals, imagine dividing your plate into fourths:  Fill one-half of your plate with vegetables and green salads.  Fill one-fourth of your plate with whole grains.  Fill one-fourth of your plate with lean protein foods.  Eat 4-5 servings of vegetables per day. One serving equals 1 cup raw or cooked vegetable, or 2 cups raw leafy greens.  Eat 4-5 servings of fruit per day. One serving equals 1 medium whole fruit, ¼ cup dried fruit, ½ cup fresh, frozen, or canned fruit, or ½ cup 100% fruit juice.  Eat more foods that contain soluble fiber. Examples include apples, broccoli, carrots, beans, peas, and barley. Aim to get 25-30 g of fiber per day.  Increase your consumption of legumes, nuts, and seeds to 4-5 servings per week. One serving of dried beans or legumes equals ½ cup cooked, 1 serving of nuts is ¼ cup, and 1 serving of seeds equals 1 tablespoon.  Fats  Choose healthy fats more often. Choose monounsaturated and polyunsaturated fats, such as olive and canola oils, flaxseeds, walnuts, almonds, and seeds.  Eat more omega-3 fats. Choose salmon, mackerel, sardines, tuna, flaxseed oil, and ground flaxseeds. Aim to eat fish at least 2 times each week.  Check food labels carefully to identify foods with trans fats or high amounts of saturated fat.  Limit saturated fats. These are found in animal products, such as meats, butter, and cream. Plant sources of saturated fats include palm oil, palm kernel oil, and coconut oil.  Avoid foods with partially hydrogenated oils in them. These contain trans fats. Examples are stick margarine, some tub margarines, cookies, crackers, and other baked goods.  Avoid fried foods.  General information  Eat more home-cooked food and less restaurant, buffet,  and fast food.  Limit or avoid alcohol.  Limit foods that are high in starch and sugar.  Lose weight if you are overweight. Losing just 5-10% of your body weight can help your overall health and prevent diseases such as diabetes and heart disease.  Monitor your salt (sodium) intake, especially if you have high blood pressure. Talk with your health care provider about your sodium intake.  Try to incorporate more vegetarian meals weekly.  What foods can I eat?  Fruits  All fresh, canned (in natural juice), or frozen fruits.  Vegetables  Fresh or frozen vegetables (raw, steamed, roasted, or grilled). Green salads.  Grains  Most grains. Choose whole wheat and whole grains most of the time. Rice and pasta, including brown rice and pastas made with whole wheat.  Meats and other proteins  Lean, well-trimmed beef, veal, pork, and lamb. Chicken and turkey without skin. All fish and shellfish. Wild duck, rabbit, pheasant, and venison. Egg whites or low-cholesterol egg substitutes. Dried beans, peas, lentils, and tofu. Seeds and most nuts.  Dairy  Low-fat or nonfat cheeses, including ricotta and mozzarella. Skim or 1% milk (liquid, powdered, or evaporated). Buttermilk made with low-fat milk. Nonfat or low-fat yogurt.  Fats and oils  Non-hydrogenated (trans-free) margarines. Vegetable oils, including soybean, sesame, sunflower, olive, peanut, safflower, corn, canola, and cottonseed. Salad dressings or mayonnaise made with a vegetable oil.  Beverages  Water (mineral or sparkling). Coffee and tea. Diet carbonated beverages.  Sweets and desserts  Sherbet, gelatin, and fruit ice. Small amounts of dark chocolate.  Limit all sweets and desserts.  Seasonings and condiments  All seasonings and condiments.  The items listed above may not be a complete list of foods and beverages you can eat. Contact a dietitian for more options.  What foods are not recommended?  Fruits  Canned fruit in heavy syrup. Fruit in cream or butter sauce. Fried  fruit. Limit coconut.  Vegetables  Vegetables cooked in cheese, cream, or butter sauce. Fried vegetables.  Grains  Breads made with saturated or trans fats, oils, or whole milk. Croissants. Sweet rolls. Donuts. High-fat crackers, such as cheese crackers.  Meats and other proteins  Fatty meats, such as hot dogs, ribs, sausage, sutton, rib-eye roast or steak. High-fat deli meats, such as salami and bologna. Caviar. Domestic duck and goose. Organ meats, such as liver.  Dairy  Cream, sour cream, cream cheese, and creamed cottage cheese. Whole-milk cheeses. Whole or 2% milk (liquid, evaporated, or condensed). Whole buttermilk. Cream sauce or high-fat cheese sauce. Whole-milk yogurt.  Fats and oils  Meat fat, or shortening. Cocoa butter, hydrogenated oils, palm oil, coconut oil, palm kernel oil. Solid fats and shortenings, including sutton fat, salt pork, lard, and butter. Nondairy cream substitutes. Salad dressings with cheese or sour cream.  Beverages  Regular sodas and any drinks with added sugar.  Sweets and desserts  Frosting. Pudding. Cookies. Cakes. Pies. Milk chocolate or white chocolate. Buttered syrups. Full-fat ice cream or ice cream drinks.  The items listed above may not be a complete list of foods and beverages to avoid. Contact a dietitian for more information.  Summary  Heart-healthy meal planning includes limiting unhealthy fats, increasing healthy fats, and making other diet and lifestyle changes.  Lose weight if you are overweight. Losing just 5-10% of your body weight can help your overall health and prevent diseases such as diabetes and heart disease.  Focus on eating a balance of foods, including fruits and vegetables, low-fat or nonfat dairy, lean protein, nuts and legumes, whole grains, and heart-healthy oils and fats.  This information is not intended to replace advice given to you by your health care provider. Make sure you discuss any questions you have with your health care provider.  Document  Revised: 04/28/2022 Document Reviewed: 04/28/2022  [employer] Patient Education © 2022 [employer]    Fall Prevention in the Home, Adult  Falls can cause injuries and affect people of all ages. There are many simple things that you can do to make your home safe and to help prevent falls.  If you need it, ask for help making these changes.  What actions can I take to prevent falls?  General information  Use good lighting in all rooms. Make sure to:  Replace any light bulbs that burn out.  Turn on lights if it is dark and use night-lights.  Keep items that you use often in easy-to-reach places. Lower the shelves around your home if needed.  Move furniture so that there are clear paths around it.  Do not keep throw rugs or other things on the floor that can make you trip.  If any of your floors are uneven, fix them.  Add color or contrast paint or tape to clearly fadia and help you see:  Grab bars or handrails.  First and last steps of staircases.  Where the edge of each step is.  If you use a ladder or stepladder:  Make sure that it is fully opened. Do not climb a closed ladder.  Make sure the sides of the ladder are locked in place.  Have someone hold the ladder while you use it.  Know where your pets are as you move through your home.  What can I do in the bathroom?         Keep the floor dry. Clean up any water that is on the floor right away.  Remove soap buildup in the bathtub or shower. Buildup makes bathtubs and showers slippery.  Use non-skid mats or decals on the floor of the bathtub or shower.  Attach bath mats securely with double-sided, non-slip rug tape.  If you need to sit down while you are in the shower, use a non-slip stool.  Install grab bars by the toilet and in the bathtub and shower. Do not use towel bars as grab bars.  What can I do in the bedroom?  Make sure that you have a light by your bed that is easy to reach.  Do not use any sheets or blankets on your bed that hang to the floor.  Have a firm  bench or chair with side arms that you can use for support when you get dressed.  What can I do in the kitchen?  Clean up any spills right away.  If you need to reach something above you, use a sturdy step stool that has a grab bar.  Keep electrical cables out of the way.  Do not use floor polish or wax that makes floors slippery.  What can I do with my stairs?  Do not leave anything on the stairs.  Make sure that you have a light switch at the top and the bottom of the stairs. Have them installed if you do not have them.  Make sure that there are handrails on both sides of the stairs. Fix handrails that are broken or loose. Make sure that handrails are as long as the staircases.  Install non-slip stair treads on all stairs in your home if they do not have carpet.  Avoid having throw rugs at the top or bottom of stairs, or secure the rugs with carpet tape to prevent them from moving.  Choose a carpet design that does not hide the edge of steps on the stairs. Make sure that carpet is firmly attached to the stairs. Fix any carpet that is loose or worn.  What can I do on the outside of my home?  Use bright outdoor lighting.  Repair the edges of walkways and driveways and fix any cracks. Clear paths of anything that can make you trip, such as tools or rocks.  Add color or contrast paint or tape to clearly fadia and help you see high doorway thresholds.  Trim any bushes or trees on the main path into your home.  Check that handrails are securely fastened and in good repair. Both sides of all steps should have handrails.  Install guardrails along the edges of any raised decks or porches.  Have leaves, snow, and ice cleared regularly. Use sand, salt, or ice melt on walkways during winter months if you live where there is ice and snow.  In the garage, clean up any spills right away, including grease or oil spills.  What other actions can I take?  Review your medicines with your health care provider. Some medicines can make  you confused or feel dizzy. This can increase your chance of falling.  Wear closed-toe shoes that fit well and support your feet. Wear shoes that have rubber soles and low heels.  Use a cane, walker, scooter, or crutches that help you move around if needed.  Talk with your provider about other ways that you can decrease your risk of falls. This may include seeing a physical therapist to learn to do exercises to improve movement and strength.  Where to find more information  Centers for Disease Control and PreventionRIGOBERTO: cdc.gov  [place] on Aging: shawanda.nih.gov  National [place] on Aging: shawanda.nih.gov  Contact a health care provider if:  You are afraid of falling at home.  You feel weak, drowsy, or dizzy at home.  You fall at home.  Get help right away if you:  Lose consciousness or have trouble moving after a fall.  Have a fall that causes a head injury.  These symptoms may be an emergency. Get help right away. Call 911.  Do not wait to see if the symptoms will go away.  Do not drive yourself to the hospital.  This information is not intended to replace advice given to you by your health care provider. Make sure you discuss any questions you have with your health care provider.  Document Revised: 08/21/2023 Document Reviewed: 08/21/2023  [employer] Patient Education © 2024 [employer]
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
## 2024-12-02 ENCOUNTER — OFFICE VISIT (OUTPATIENT)
Dept: PULMONOLOGY | Facility: CLINIC | Age: 69
End: 2024-12-02
Payer: MEDICARE

## 2024-12-02 VITALS
TEMPERATURE: 97.6 F | HEIGHT: 62 IN | HEART RATE: 68 BPM | DIASTOLIC BLOOD PRESSURE: 70 MMHG | SYSTOLIC BLOOD PRESSURE: 128 MMHG | OXYGEN SATURATION: 94 % | WEIGHT: 119 LBS | BODY MASS INDEX: 21.9 KG/M2

## 2024-12-02 DIAGNOSIS — J20.8 ACUTE BRONCHITIS DUE TO OTHER SPECIFIED ORGANISMS: Primary | Chronic | ICD-10-CM

## 2024-12-02 PROCEDURE — 99214 OFFICE O/P EST MOD 30 MIN: CPT | Performed by: INTERNAL MEDICINE

## 2024-12-02 PROCEDURE — 3074F SYST BP LT 130 MM HG: CPT | Performed by: INTERNAL MEDICINE

## 2024-12-02 PROCEDURE — 3078F DIAST BP <80 MM HG: CPT | Performed by: INTERNAL MEDICINE

## 2024-12-02 RX ORDER — PREDNISONE 10 MG/1
TABLET ORAL
Qty: 30 TABLET | Refills: 0 | Status: SHIPPED | OUTPATIENT
Start: 2024-12-02

## 2024-12-02 RX ORDER — FLUTICASONE FUROATE AND VILANTEROL 200; 25 UG/1; UG/1
1 POWDER RESPIRATORY (INHALATION) DAILY
Qty: 60 EACH | Refills: 5 | Status: SHIPPED | OUTPATIENT
Start: 2024-12-02

## 2024-12-02 RX ORDER — DOXYCYCLINE 100 MG/1
100 CAPSULE ORAL 2 TIMES DAILY
Qty: 14 CAPSULE | Refills: 0 | Status: SHIPPED | OUTPATIENT
Start: 2024-12-02

## 2024-12-02 NOTE — PROGRESS NOTES
"Pulmonary Office Follow Up      Subjective   Chief Complaint: Shortness of Breath    Aarti Wade is a 69 y.o. female is being seen in follow up for Emphysema    History of Present Illness    Ms. Wade is a 70yo F who is followed for emphysema. She was last seen in clinic on 6/3/24.     She returns to clinic today for follow up.     She reports that she has been sick for the past 3 days with cough, wheezing, and fatigue. No fever or chills. She has been taking Bactrim that she uses for her horses.     The following portions of the patient's history were reviewed and updated as appropriate: allergies, current medications, past family history, past medical history, past social history, past surgical history and problem list.    Review of Systems  All other systems were reviewed and are negative.  Exceptions are noted in the HPI or above.      Objective   Blood pressure 128/70, pulse 68, temperature 97.6 °F (36.4 °C), height 157.5 cm (62.01\"), weight 54 kg (119 lb), SpO2 94%.  Physical Exam  Vitals and nursing note reviewed.   Constitutional:       General: She is not in acute distress.     Appearance: She is well-developed.   HENT:      Head: Normocephalic and atraumatic.   Eyes:      General: No scleral icterus.     Conjunctiva/sclera: Conjunctivae normal.      Pupils: Pupils are equal, round, and reactive to light.   Neck:      Thyroid: No thyromegaly.      Trachea: No tracheal deviation.   Cardiovascular:      Rate and Rhythm: Normal rate and regular rhythm.      Heart sounds: Normal heart sounds.   Pulmonary:      Effort: Pulmonary effort is normal. No respiratory distress.      Breath sounds: Decreased breath sounds present. No wheezing.   Abdominal:      General: Bowel sounds are normal.      Palpations: Abdomen is soft.      Tenderness: There is no abdominal tenderness.   Musculoskeletal:         General: Normal range of motion.      Cervical back: Normal range of motion and neck supple.   Lymphadenopathy:     "  Cervical: No cervical adenopathy.   Skin:     General: Skin is warm and dry.      Findings: No erythema or rash.   Neurological:      Mental Status: She is alert and oriented to person, place, and time.      Motor: No abnormal muscle tone.      Coordination: Coordination normal.   Psychiatric:         Speech: Speech normal.         Behavior: Behavior normal.         Judgment: Judgment normal.         PFTs:  No new PFTs.     Imaging:  Low-dose CT Chest from 11/13/24 reviewed. There has been interval resolution of noncalcified nodules within the right lung consistent with resolution of an infectious or inflammatory process. There are no new nodules.     Assessment & Plan   Diagnoses and all orders for this visit:    1. Acute bronchitis due to other specified organisms (Primary)    Other orders  -     doxycycline (VIBRAMYCIN) 100 MG capsule; Take 1 capsule by mouth 2 (Two) Times a Day.  Dispense: 14 capsule; Refill: 0  -     predniSONE (DELTASONE) 10 MG tablet; Take 4 tabs daily x 3 days, then take 3 tabs daily x 3 days, then take 2 tabs daily x 3 days, then take 1 tab daily x 3 days  Dispense: 30 tablet; Refill: 0  -     Fluticasone Furoate-Vilanterol (BREO ELLIPTA) 200-25 MCG/ACT inhaler; Inhale 1 puff Daily.  Dispense: 60 each; Refill: 5        Discussion:  Ms. Wade is a 70yo F who is followed for shortness of breath.      1. Emphysema  - PFTs are technically negative for COPD but are suggestive of emphysema with the reduced DLCO.   - Continue Breo and Spiriva for maintenance therapy.   - Instructed to use Albuterol as needed for a rescue inhaler.   - Treat for an exacerbation today with a course of Doxycycline and Prednisone taper.      2. Former Smoker  - Quit smoking in 2016.   - Low dose CT Chest from November 2024 negative. Continue yearly screening.      Follow up in 4-6 months.          Aarti Wade  reports that she quit smoking about 8 years ago. Her smoking use included electronic cigarette and  cigarettes. She started smoking about 48 years ago. She has a 40 pack-year smoking history. She has been exposed to tobacco smoke. She has never used smokeless tobacco.     Lyudmila STRANGE Case, DO  Pulmonary and Critical Care Medicine  Note Electronically Signed

## 2024-12-05 DIAGNOSIS — M19.111 POST-TRAUMATIC OSTEOARTHRITIS OF RIGHT SHOULDER: ICD-10-CM

## 2024-12-05 RX ORDER — GABAPENTIN 100 MG/1
300 CAPSULE ORAL EVERY EVENING
Qty: 90 CAPSULE | Refills: 2 | Status: SHIPPED | OUTPATIENT
Start: 2024-12-05

## 2024-12-05 RX ORDER — TRAMADOL HYDROCHLORIDE 50 MG/1
50 TABLET ORAL 2 TIMES DAILY
Qty: 60 TABLET | Refills: 2 | Status: SHIPPED | OUTPATIENT
Start: 2024-12-05

## 2024-12-05 NOTE — TELEPHONE ENCOUNTER
Caller: Aarti Wade NELIDA    Relationship: Self    Best call back number: [phone]     Requested Prescriptions:   Requested Prescriptions     Pending Prescriptions Disp Refills    traMADol (ULTRAM) 50 MG tablet 60 tablet 2     Sig: Take 1 tablet by mouth 2 (Two) Times a Day.    gabapentin (NEURONTIN) 100 MG capsule 90 capsule 2     Sig: Take 3 capsules by mouth Every Evening.        Pharm[facility] where request should be sent: [id number] [place] [address] [address]  AT [facility] [phone] [facility] [phone] FX     Last office visit with prescribing clinician: 11/25/2024   Last telemedicine visit with prescribing clinician: Visit date not found   Next office visit with prescribing clinician: 4/9/2025     Additional details provided by patient: PATIENT HAS ONE DAY LEFT.     Does the patient have less than a 3 day supply:  [x] Yes  [] No    Would you like a call back once the refill request has been completed: [] Yes [x] No    If the office needs to give you a call back, can they leave a voicemail: [] Yes [x] No    Cadance Dunaway, RegSched Rep   12/05/24 11:02 EST

## 2024-12-07 ENCOUNTER — TRANSCRIBE ORDERS (OUTPATIENT)
Dept: INTERNAL MEDICINE | Facility: CLINIC | Age: 69
End: 2024-12-07
Payer: MEDICARE

## 2024-12-07 DIAGNOSIS — Z12.31 ENCOUNTER FOR SCREENING MAMMOGRAM FOR BREAST CANCER: Primary | ICD-10-CM

## 2025-01-29 RX ORDER — FOLIC ACID 1 MG/1
2000 TABLET ORAL DAILY
Qty: 180 TABLET | Refills: 3 | Status: SHIPPED | OUTPATIENT
Start: 2025-01-29

## 2025-02-06 RX ORDER — CITALOPRAM HYDROBROMIDE 40 MG/1
40 TABLET ORAL DAILY
Qty: 90 TABLET | Refills: 1 | Status: SHIPPED | OUTPATIENT
Start: 2025-02-06

## 2025-02-06 RX ORDER — CLOPIDOGREL BISULFATE 75 MG/1
75 TABLET ORAL DAILY
Qty: 90 TABLET | Refills: 3 | Status: SHIPPED | OUTPATIENT
Start: 2025-02-06

## 2025-02-06 NOTE — TELEPHONE ENCOUNTER
Rx Refill Note  Requested Prescriptions     Pending Prescriptions Disp Refills    clopidogrel (Plavix) 75 MG tablet 90 tablet 3     Sig: Take 1 tablet by mouth Daily.    citalopram (CeleXA) 40 MG tablet 90 tablet 1     Sig: Take 1 tablet by mouth Daily.      Last office visit with prescribing clinician: 11/25/2024   Last telemedicine visit with prescribing clinician: Visit date not found   Next office visit with prescribing clinician: 4/9/2025                         Would you like a call back once the refill request has been completed: [] Yes [] No    If the office needs to give you a call back, can they leave a voicemail: [] Yes [] No    Brea Wagner MA  02/06/25, 10:01 EST

## 2025-02-06 NOTE — TELEPHONE ENCOUNTER
Caller: Aarti Wade NELIDA    Relationship: Self    Best call back number: [phone]     Requested Prescriptions:   Requested Prescriptions     Pending Prescriptions Disp Refills    clopidogrel (Plavix) 75 MG tablet 90 tablet 3     Sig: Take 1 tablet by mouth Daily.    citalopram (CeleXA) 40 MG tablet 90 tablet 1     Sig: Take 1 tablet by mouth Daily.        Pharmacy where request should be sent: [id number] [address] [phone] [facility] [phone] FX     Last office visit with prescribing clinician: 11/25/2024   Last telemedicine visit with prescribing clinician: Visit date not found   Next office visit with prescribing clinician: 4/9/2025     Additional details provided by patient: 1 DAY LEFT    Does the patient have less than a 3 day supply:  [x] Yes  [] No    Would you like a call back once the refill request has been completed: [] Yes [x] No    If the office needs to give you a call back, can they leave a voicemail: [] Yes [x] No    Latanya Parker Rep   02/06/25 09:59 EST

## 2025-03-05 DIAGNOSIS — M19.111 POST-TRAUMATIC OSTEOARTHRITIS OF RIGHT SHOULDER: ICD-10-CM

## 2025-03-05 RX ORDER — GABAPENTIN 100 MG/1
300 CAPSULE ORAL EVERY EVENING
Qty: 90 CAPSULE | Refills: 2 | Status: SHIPPED | OUTPATIENT
Start: 2025-03-05

## 2025-03-05 RX ORDER — TRAMADOL HYDROCHLORIDE 50 MG/1
50 TABLET ORAL 2 TIMES DAILY
Qty: 60 TABLET | Refills: 2 | Status: SHIPPED | OUTPATIENT
Start: 2025-03-05

## 2025-03-05 NOTE — TELEPHONE ENCOUNTER
Rx Refill Note  Requested Prescriptions     Pending Prescriptions Disp Refills    gabapentin (NEURONTIN) 100 MG capsule 90 capsule 2     Sig: Take 3 capsules by mouth Every Evening.    traMADol (ULTRAM) 50 MG tablet 60 tablet 2     Sig: Take 1 tablet by mouth 2 (Two) Times a Day.      Last office visit with prescribing clinician: 11/25/2024   Last telemedicine visit with prescribing clinician: Visit date not found   Next office visit with prescribing clinician: 4/9/2025                         Would you like a call back once the refill request has been completed: [] Yes [] No    If the office needs to give you a call back, can they leave a voicemail: [] Yes [] No    Brea Wagner MA  03/05/25, 12:00 EST

## 2025-03-05 NOTE — TELEPHONE ENCOUNTER
Caller: Aarti Wade NELIDA    Relationship: Self    Best call back number: [phone]     Requested Prescriptions:   Requested Prescriptions     Pending Prescriptions Disp Refills    gabapentin (NEURONTIN) 100 MG capsule 90 capsule 2     Sig: Take 3 capsules by mouth Every Evening.    traMADol (ULTRAM) 50 MG tablet 60 tablet 2     Sig: Take 1 tablet by mouth 2 (Two) Times a Day.        Pharmacy where request should be sent: [facility] #[id number] [place] [address] [address]  [facility] [phone] [facility] [phone] FX     Last office visit with prescribing clinician: 11/25/2024   Last telemedicine visit with prescribing clinician: Visit date not found   Next office visit with prescribing clinician: 4/9/2025     Does the patient have less than a 3 day supply:  [x] Yes  [] No    Would you like a call back once the refill request has been completed: [] Yes [] No    If the office needs to give you a call back, can they leave a voicemail: [] Yes [] No    Latanya Diterich Rep   03/05/25 11:58 EST

## 2025-03-06 ENCOUNTER — HOSPITAL ENCOUNTER (OUTPATIENT)
Dept: MAMMOGRAPHY | Facility: HOSPITAL | Age: 70
Discharge: HOME | End: 2025-03-06
Payer: MEDICARE

## 2025-03-06 ENCOUNTER — HOSPITAL ENCOUNTER (OUTPATIENT)
Dept: BONE DENSITY | Facility: HOSPITAL | Age: 70
Discharge: HOME | End: 2025-03-06
Payer: MEDICARE

## 2025-03-06 DIAGNOSIS — Z12.31 ENCOUNTER FOR SCREENING MAMMOGRAM FOR BREAST CANCER: ICD-10-CM

## 2025-03-06 DIAGNOSIS — N95.9 MENOPAUSAL AND POSTMENOPAUSAL DISORDER: ICD-10-CM

## 2025-03-06 PROCEDURE — 77080 DXA BONE DENSITY AXIAL: CPT

## 2025-03-06 PROCEDURE — 77063 BREAST TOMOSYNTHESIS BI: CPT

## 2025-03-06 PROCEDURE — 77067 SCR MAMMO BI INCL CAD: CPT
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
## 2025-03-26 ENCOUNTER — OFFICE VISIT (OUTPATIENT)
Dept: CARDIOLOGY | Facility: CLINIC | Age: 70
End: 2025-03-26
Payer: MEDICARE

## 2025-03-26 VITALS
OXYGEN SATURATION: 94 % | WEIGHT: 127.2 LBS | HEIGHT: 62 IN | HEART RATE: 68 BPM | BODY MASS INDEX: 23.41 KG/M2 | DIASTOLIC BLOOD PRESSURE: 66 MMHG | SYSTOLIC BLOOD PRESSURE: 126 MMHG

## 2025-03-26 DIAGNOSIS — I70.90 ATHEROSCLEROSIS OF ARTERIES: ICD-10-CM

## 2025-03-26 DIAGNOSIS — R42 POSTURAL DIZZINESS WITH PRESYNCOPE: ICD-10-CM

## 2025-03-26 DIAGNOSIS — R42 DIZZINESS: ICD-10-CM

## 2025-03-26 DIAGNOSIS — I20.89 ANGINAL EQUIVALENT: ICD-10-CM

## 2025-03-26 DIAGNOSIS — I70.213 ATHEROSCLEROSIS OF NATIVE ARTERY OF BOTH LOWER EXTREMITIES WITH INTERMITTENT CLAUDICATION: Primary | Chronic | ICD-10-CM

## 2025-03-26 DIAGNOSIS — I65.21 CAROTID STENOSIS, ASYMPTOMATIC, RIGHT: ICD-10-CM

## 2025-03-26 DIAGNOSIS — R55 POSTURAL DIZZINESS WITH PRESYNCOPE: ICD-10-CM

## 2025-03-26 DIAGNOSIS — I10 BENIGN ESSENTIAL HYPERTENSION: Chronic | ICD-10-CM

## 2025-03-28 DIAGNOSIS — E83.39 HIGH PHOSPHATE LEVELS: ICD-10-CM

## 2025-03-28 RX ORDER — CALCIUM ACETATE 667 MG/1
1334 CAPSULE ORAL DAILY
Qty: 180 CAPSULE | Refills: 1 | Status: SHIPPED | OUTPATIENT
Start: 2025-03-28

## 2025-03-28 NOTE — TELEPHONE ENCOUNTER
Rx Refill Note  Requested Prescriptions     Pending Prescriptions Disp Refills    calcium acetate (PHOS BINDER,) 667 MG capsule capsule [Pharmacy Med Name: CALCIUM ACETATE 667MG CAPSULES] 180 capsule 1     Sig: TAKE 2 CAPSULES BY MOUTH DAILY      Last office visit with prescribing clinician: 11/25/2024   Last telemedicine visit with prescribing clinician: Visit date not found   Next office visit with prescribing clinician: 4/9/2025                         Would you like a call back once the refill request has been completed: [] Yes [] No    If the office needs to give you a call back, can they leave a voicemail: [] Yes [] No    Elizabeth Saleh MA  03/28/25, 11:06 EDT

## 2025-03-31 ENCOUNTER — TELEPHONE (OUTPATIENT)
Dept: INTERNAL MEDICINE | Facility: CLINIC | Age: 70
End: 2025-03-31
Payer: MEDICARE

## 2025-03-31 NOTE — TELEPHONE ENCOUNTER
Advised patient via my chart that I received a prior authorization request from the pharmacy for the medication CALCIUM ACETATE . I submitted the authorization to the insurance company and once I have an answer back I will let her know .

## 2025-04-01 ENCOUNTER — TELEPHONE (OUTPATIENT)
Dept: INTERNAL MEDICINE | Facility: CLINIC | Age: 70
End: 2025-04-01
Payer: MEDICARE

## 2025-04-01 NOTE — TELEPHONE ENCOUNTER
Advised patient via my chart that I have received an approval from the insurance company for the medication  CALCIUM ACETATE 667 mg capsule . This authorization is good until 12/31/25. A copy of this letter will be scanned into the chart.

## 2025-04-09 ENCOUNTER — OFFICE VISIT (OUTPATIENT)
Dept: INTERNAL MEDICINE | Facility: CLINIC | Age: 70
End: 2025-04-09
Payer: MEDICARE

## 2025-04-09 VITALS
BODY MASS INDEX: 22.34 KG/M2 | HEART RATE: 60 BPM | OXYGEN SATURATION: 93 % | HEIGHT: 62 IN | DIASTOLIC BLOOD PRESSURE: 70 MMHG | TEMPERATURE: 98.6 F | WEIGHT: 121.4 LBS | SYSTOLIC BLOOD PRESSURE: 132 MMHG

## 2025-04-09 DIAGNOSIS — M19.111 POST-TRAUMATIC OSTEOARTHRITIS OF RIGHT SHOULDER: Chronic | ICD-10-CM

## 2025-04-09 DIAGNOSIS — N18.31 STAGE 3A CHRONIC KIDNEY DISEASE: Chronic | ICD-10-CM

## 2025-04-09 DIAGNOSIS — E78.2 MIXED HYPERLIPIDEMIA: ICD-10-CM

## 2025-04-09 DIAGNOSIS — F33.0 MAJOR DEPRESSIVE DISORDER, RECURRENT EPISODE, MILD DEGREE: Chronic | ICD-10-CM

## 2025-04-09 DIAGNOSIS — I10 BENIGN ESSENTIAL HYPERTENSION: Primary | Chronic | ICD-10-CM

## 2025-04-09 DIAGNOSIS — I73.9 PERIPHERAL VASCULAR DISEASE OF EXTREMITY WITH CLAUDICATION: Chronic | ICD-10-CM

## 2025-04-09 DIAGNOSIS — M85.89 OSTEOPENIA OF MULTIPLE SITES: ICD-10-CM

## 2025-04-09 RX ORDER — AMLODIPINE BESYLATE 10 MG/1
10 TABLET ORAL DAILY
Qty: 90 TABLET | Refills: 3 | Status: SHIPPED | OUTPATIENT
Start: 2025-04-09

## 2025-04-09 RX ORDER — PANTOPRAZOLE SODIUM 40 MG/1
40 TABLET, DELAYED RELEASE ORAL DAILY
Qty: 90 TABLET | Refills: 3 | Status: SHIPPED | OUTPATIENT
Start: 2025-04-09

## 2025-04-09 RX ORDER — ASPIRIN 81 MG/1
81 TABLET ORAL EVERY MORNING
Qty: 90 TABLET | Refills: 3 | Status: SHIPPED | OUTPATIENT
Start: 2025-04-09

## 2025-04-09 RX ORDER — BISOPROLOL FUMARATE 10 MG/1
10 TABLET, FILM COATED ORAL DAILY
Qty: 90 TABLET | Refills: 3 | Status: SHIPPED | OUTPATIENT
Start: 2025-04-09

## 2025-04-09 RX ORDER — CITALOPRAM HYDROBROMIDE 40 MG/1
40 TABLET ORAL DAILY
Qty: 90 TABLET | Refills: 3 | Status: SHIPPED | OUTPATIENT
Start: 2025-04-09

## 2025-04-09 RX ORDER — ROSUVASTATIN CALCIUM 40 MG/1
40 TABLET, COATED ORAL DAILY
Qty: 90 TABLET | Refills: 3 | Status: SHIPPED | OUTPATIENT
Start: 2025-04-09

## 2025-04-09 NOTE — PATIENT INSTRUCTIONS
Problem List Items Addressed This Visit          Cardiac and Vasculature    PVD (Chronic)    Overview   Taking daily Plavix and aspirin and nightly rosuvastatin.           Benign essential hypertension - Primary (Chronic)    Overview   Taking amlodipine, bisoprolol.          Relevant Medications    amLODIPine (NORVASC) 10 MG tablet    bisoprolol (ZEBeta) 10 MG tablet    Other Relevant Orders    CBC & Differential    Comprehensive Metabolic Panel    Microalbumin / Creatinine Urine Ratio - Urine, Clean Catch    Urinalysis With Microscopic - Urine, Clean Catch    Mixed hyperlipidemia    Overview   Taking  rosuvastatin every evening.               Relevant Medications    rosuvastatin (CRESTOR) 40 MG tablet    Other Relevant Orders    Lipid Panel    TSH    Vitamin B12       Genitourinary and Reproductive     Stage III CKD (Chronic)    Overview                 Mental Health    Major depressive disorder, recurrent episode, mild degree (Chronic)    Overview   Taking citalopram 40 mg daily.             Relevant Medications    citalopram (CeleXA) 40 MG tablet       Musculoskeletal and Injuries    Post-traumatic osteoarthritis of right shoulder (Chronic)    Overview   Taking gabapentin, tramadol, and Tylenol as needed for pain.  Sees Dr. Lazcano          Relevant Medications    gabapentin (NEURONTIN) 100 MG capsule    traMADol (ULTRAM) 50 MG tablet    Osteopenia of multiple sites    Overview   12/28/2020 DEXA showed mild improvement in osteopenia of the hip.  T score in the left total hip is now -1.1 and in  the left femoral neck is -2.0.    DEXA 2/2023 shows a mild decrease in osteopenia with the lowest T-scores in the femoral neck at -2.0.   DEXA 3/2025 shows T-scores in the osteopenia range with the lowest in the hip at -2.1.  This is fairly stable compared to 2022 when the T-score in the femoral neck was -2.0.            Relevant Orders    Vitamin D,25-Hydroxy

## 2025-04-09 NOTE — PROGRESS NOTES
[facility]     Aarti Wade  1955   [id number]      Patient Care Team:  Jannette Chapa MD as PCP - General  Jannette Chapa MD as PCP - Family Medicine  Ketan Lazcano MD as Consulting Physician (Orthopedic Surgery)  Lorenzo Good MD as Consulting Physician (Ophthalmology)  Severino Carbajal MD as Consulting Physician (Vascular Surgery)  Cynthia Powell APRN as Nurse Practitioner (Cardiology)  Jorge, Lyudmila DISLA DO as Consulting Physician (Pulmonary Disease)    Chief Complaint   Patient presents with    6 mo f/u    Hyperlipidemia    Hypertension    Allergies    Heartburn        Patient is a 70 y.o. female.   History of Present Illness  The patient presents for a follow-up visit.    She reports no new health issues but has been experiencing bruising, which she attributes to her anticoagulant therapy. She is not experiencing any hematochezia or other medication side effects.    Her blood pressure readings at home have been within normal limits, although she admits to not monitoring it as regularly as recommended. She is not experiencing any peripheral edema.    She occasionally experiences calf pain during ambulation, which she believes is associated with her dietary intake of meat. She has been making efforts to incorporate more vegetables into her diet.    She has experienced episodes of lightheadedness upon standing, prompting a referral for a stress test scheduled for 04/16/2025. These episodes have caused her distress, but she reports no associated palpitations, chest pain, or pressure. She does not believe her gabapentin contributes to these symptoms.    She reports no feelings of depression and believes her citalopram is effective. She engages in physical activity [place] her [place], including lifting, balance exercises, and walking. She acknowledges the need for more consistent exercise.    She continues to play tennis and has refills for all her medications. She is  currently taking tramadol twice daily without any associated dizziness or lightheadedness. She also takes Tylenol for pain management.    She has been experiencing right shoulder pain, which she describes as less severe than it was 20 years ago.    MEDICATIONS  Current: amlodipine, bisoprolol, rosuvastatin, aspirin, clopidogrel, citalopram, gabapentin, tramadol, Tylenol, vitamin D3, calcium    IMMUNIZATIONS  She is up to date on her immunizations.         CHRONIC CONDITIONS      Past Medical History:   Diagnosis Date    Anxiety     Arthritis     Chronic UTI     Claudication     COPD (chronic obstructive pulmonary disease)     Depression     diffuse fatty liver infiltration on CT 2009    Dizzy     Former smoker 08/31/2022    GERD (gastroesophageal reflux disease)     Head injury     Related to falls off  horses    Hepatitis     UNKNOWN TYPE    History of shingles     about 5 years ago    Hyperlipidemia     Hypertension     Hypoxia 3/9/2022    History of tobacco abuse and COPD.  Using Brio Ellipta inhaler daily.  Also diagnosed with bilateral lower lobe pneumonia and put on Augmentin.  Oxygen desaturation after carotid endarterectomy on 3/3/2022.  Patient was sent home on oxygen at 2 L per nasal cannula.    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 HARRISON    CAROTID ENDARTERECTOMY Right 8/31/2022    Procedure: CAROTID ENDARTERECTOMY- RIGHT;  Surgeon: Francisco Magana MD;  Location: [facility];  Service: Vascular;  Laterality: Right;    COLONOSCOPY      last 8 years ago.  Due to schedule    EYE LENS IMPLANT SECONDARY Left     FEMORAL FEMORAL BYPASS  Right 2016    Procedure: RIGHT COMMON FEMORAL ENDARTERECTOMY WITH PATCH;  Surgeon: Severino Carbajal MD;  Location:  [facility];  Service:     FINGER SURGERY Left     LEFT INDEX FINGER    INTERVENTIONAL RADIOLOGY PROCEDURE N/A 2016    Procedure: IR PTA femoral popliteal artery;  Surgeon: Severino Carbajal MD;  Location: [email or web address];  Service:     INTERVENTIONAL RADIOLOGY PROCEDURE Left 2020    Procedure: LEFT ATHERECTOMY, BALLOON ANGIOPLASTY;  Surgeon: Severino Carbajal MD;  Location: [email or web address];  Service: Interventional Radiology    TONSILLECTOMY      TOTAL SHOULDER ARTHROPLASTY Left 2020    Procedure: TOTAL SHOULDER ARTHROPLASTY LEFT;  Surgeon: Ketan Lazcano MD;  Location:  [facility];  Service: Orthopedics;  Laterality: Left;  protector in: 1346  protector out: 1402       Family History   Problem Relation Age of Onset    Osteoarthritis Mother     Alzheimer's disease Mother     Hypertension Father     Heart attack Father     Alcohol abuse Father     Breast cancer Sister     No Known Problems Sister     No Known Problems Maternal Aunt     No Known Problems Paternal Aunt     Breast cancer Maternal Grandmother 50    Breast cancer Paternal Grandmother 50    Coronary artery disease Paternal Grandfather     Stroke Paternal Grandfather     No Known Problems Daughter     No Known Problems Son     Heart attack Other     Ovarian cancer Neg Hx     Endometrial cancer Neg Hx        Social History     Socioeconomic History    Marital status: Single    Number of children: 0   Tobacco Use    Smoking status: Former     Current packs/day: 0.00     Average packs/day: 1 pack/day for 40.0 years (40.0 ttl pk-yrs)     Types: Electronic Cigarette, Cigarettes     Start date: 1976     Quit date: 2016     Years since quittin.8     Passive exposure: Past    Smokeless tobacco: Never    Tobacco comments:     1 PACK/DAY SMOKER UNTIL 2016   Vaping Use    Vaping  "status: Former    Substances: Nicotine, Flavoring    Devices: Disposable   Substance and Sexual Activity    Alcohol use: Yes     Alcohol/week: 14.0 standard drinks of alcohol     Types: 14 Shots of liquor per week     Comment: 2 DRINKS PER DAY    Drug use: Not Currently     Types: Marijuana, Benzodiazepines    Sexual activity: Defer       Allergies   Allergen Reactions    Abilify [Aripiprazole] Other (See Comments)     Tardive- Dyskinesia    Levocetirizine Dihydrochloride Myalgia     Muscle aches/joint pain    Losartan Other (See Comments)     Patient got heart murmurs       Review of Systems:     Review of Systems    Vital Signs  Vitals:    04/09/25 1357   BP: 132/70   BP Location: Right arm   Patient Position: Sitting   Cuff Size: Adult   Pulse: 60   Temp: 98.6 °F (37 °C)   TempSrc: Infrared   SpO2: 93%   Weight: 55.1 kg (121 lb 6.4 oz)   Height: 157.5 cm (62.01\")   PainSc: 0-No pain     Body mass index is 22.2 kg/m².  BMI is within normal parameters. No other follow-up for BMI required.          Current Outpatient Medications:     acetaminophen (Acetaminophen 8 Hour) 650 MG 8 hr tablet, Take 1 tablet by mouth 2 (two) times a day., Disp: , Rfl:     albuterol sulfate  (90 Base) MCG/ACT inhaler, Inhale 2 puffs Every 4 (Four) Hours As Needed for Wheezing., Disp: 18 g, Rfl: 5    amLODIPine (NORVASC) 10 MG tablet, Take 1 tablet by mouth Daily., Disp: 90 tablet, Rfl: 3    ascorbic acid (VITAMIN C) 1000 MG tablet, Take 1 tablet by mouth Daily., Disp: , Rfl:     aspirin 81 MG EC tablet, Take 1 tablet by mouth Every Morning., Disp: 90 tablet, Rfl: 3    bisoprolol (ZEBeta) 10 MG tablet, Take 1 tablet by mouth Daily., Disp: 90 tablet, Rfl: 3    calcium acetate (PHOS BINDER,) 667 MG capsule capsule, TAKE 2 CAPSULES BY MOUTH DAILY, Disp: 180 capsule, Rfl: 1    cholecalciferol (Vitamin D, Cholecalciferol,) 25 MCG (1000 UT) tablet, Take 1 tablet by mouth Daily., Disp: , Rfl:     citalopram (CeleXA) 40 MG tablet, Take 1 " "tablet by mouth Daily., Disp: 90 tablet, Rfl: 1    clopidogrel (Plavix) 75 MG tablet, Take 1 tablet by mouth Daily., Disp: 90 tablet, Rfl: 3    Coenzyme Q10 400 MG capsule, Take 1 capsule by mouth Daily., Disp: , Rfl:     Fish Oil oil, Take 1 capsule by mouth Daily., Disp: , Rfl:     fluticasone (FLONASE) 50 MCG/ACT nasal spray, 1 spray into the nostril(s) as directed by provider 2 (Two) Times a Day. shake liquid (Patient taking differently: Administer 1 spray into the nostril(s) as directed by provider Daily. shake liquid), Disp: 48 g, Rfl: 3    Fluticasone Furoate-Vilanterol (BREO ELLIPTA) 200-25 MCG/ACT inhaler, Inhale 1 puff Daily., Disp: 60 each, Rfl: 5    folic acid (FOLVITE) 1 MG tablet, Take 2 tablets by mouth Daily., Disp: 180 tablet, Rfl: 3    gabapentin (NEURONTIN) 100 MG capsule, Take 3 capsules by mouth Every Evening., Disp: 90 capsule, Rfl: 2    Methylsulfonylmethane (MSM PO), Take 2 tablets by mouth daily., Disp: , Rfl:     Multiple Vitamins-Minerals (MULTIVITAMIN ADULT PO), Take 1 tablet by mouth Daily., Disp: , Rfl:     pantoprazole (PROTONIX) 40 MG EC tablet, Take 1 tablet by mouth Daily., Disp: 90 tablet, Rfl: 1    rosuvastatin (CRESTOR) 40 MG tablet, Take 1 tablet by mouth Daily., Disp: 90 tablet, Rfl: 3    tiotropium bromide monohydrate (Spiriva Respimat) 2.5 MCG/ACT aerosol solution inhaler, Inhale 2 puffs Daily., Disp: 4 g, Rfl: 11    traMADol (ULTRAM) 50 MG tablet, Take 1 tablet by mouth 2 (Two) Times a Day., Disp: 60 tablet, Rfl: 2    URINARY HEALTH/CRANBERRY PO, \"CRANACTIN\" Take 2 tablets in the morning, 1 mid-day, and 1 in the evening, Disp: , Rfl:     vitamin B-12 (CYANOCOBALAMIN) 500 MCG tablet, Take 1 tablet by mouth Daily., Disp: , Rfl:     Physical Exam:    Physical Exam     ACE III MINI        Results Review:    I reviewed the patient's new clinical results.  Results         CMP:  Lab Results   Component Value Date    Glucose 93 05/24/2024    Glucose 152 (H) 06/03/2023    Glucose 102 " (H) 06/02/2023    Glucose Negative 06/01/2023    Glucose, UA Negative 05/24/2024    Glucose, UA Negative 04/17/2024    BUN 20 05/24/2024    BUN 24 (H) 09/21/2022    BUN/Creatinine Ratio 19 05/24/2024    BUN/Creatinine Ratio 22.0 09/21/2022    Creatinine 1.4 (H) 07/17/2024    Creatinine, Urine 137.9 05/24/2024    Creatinine, Urine 70.3 10/09/2020    Ketones Negative 05/24/2024    Ketones, UA Negative 04/17/2024    Ketones, UA Negative 09/21/2022    CO2 21.0 (L) 09/21/2022    Total CO2 23 05/24/2024    Calcium 9.0 05/24/2024    Calcium 8.9 09/21/2022    Albumin 4.6 05/24/2024    Albumin 5.10 10/09/2020    AST (SGOT) 28 05/24/2024    AST (SGOT) 28 10/09/2020    ALT (SGPT) 19 05/24/2024    ALT (SGPT) 44 (H) 10/09/2020     HbA1c:  Lab Results   Component Value Date    HGBA1C 4.90 08/26/2022    HGBA1C 5.50 06/05/2020     Microalbumin:  Lab Results   Component Value Date    MICROALBUR 717.2 05/24/2024     Lipid Panel  Lab Results   Component Value Date    CHOL 136 10/09/2020    TRIG 45 05/24/2024    HDL 99 05/24/2024    LDL 45 05/24/2024    AST 28 05/24/2024    ALT 19 05/24/2024       Medication Review: Medications reviewed and noted  Patient Instructions   Problem List Items Addressed This Visit    None        No diagnosis found.  Assessment & Plan  Benign essential hypertension.  Her blood pressure is well-controlled at 132/70, with a previous reading of 126/66 in March. She will continue her current regimen of amlodipine and bisoprolol, along with a low-salt diet.    Mixed hyperlipidemia.  She will maintain her current treatment plan of rosuvastatin every evening, coupled with a low-fat, healthy diet and regular exercise.    Peripheral vascular disease.  She will persist with her daily intake of aspirin and clopidogrel, and nightly rosuvastatin. Daily walking will also be continued.    Stage 3 chronic kidney disease.  She will continue to ensure adequate hydration by consuming a significant amount of fluids  daily.    Depression.  She will continue her daily citalopram regimen, along with regular physical activity and exercise.    Posttraumatic osteoarthritis of the right shoulder.  She will continue her current regimen of gabapentin, tramadol, and Tylenol.    Osteopenia.  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 She will also continue her vitamin D3 and calcium supplements.    Health Maintenance.  She is up to date on her mammogram, bone density test, and immunizations. Fasting labs, including urinalysis and blood work, have been ordered. A PET scan with cardiac perfusion is scheduled for next week. She will undergo the test and follow up with Cynthia Powell in the cardiology clinic.    Follow-up  The patient will follow up in August for annual wellness.         Plan of care reviewed with patient at the conclusion of today's visit. Education was provided regarding diagnosis, management, and any prescribed or recommended OTC medications. Patient verbalizes understanding of and agreement with management plan.         04/09/25   14:00 EDT    Patient or patient representative verbalized consent for the use of Ambient Listening during the visit with  Jannette Chapa MD for chart documentation. 4/9/2025  18:26 EDT

## 2025-04-16 ENCOUNTER — HOSPITAL ENCOUNTER (OUTPATIENT)
Dept: CARDIOLOGY | Facility: HOSPITAL | Age: 70
Discharge: HOME | End: 2025-04-16
Admitting: NURSE PRACTITIONER
Payer: MEDICARE

## 2025-04-16 DIAGNOSIS — R55 POSTURAL DIZZINESS WITH PRESYNCOPE: ICD-10-CM

## 2025-04-16 DIAGNOSIS — R42 POSTURAL DIZZINESS WITH PRESYNCOPE: ICD-10-CM

## 2025-04-16 DIAGNOSIS — I20.89 ANGINAL EQUIVALENT: ICD-10-CM

## 2025-04-16 PROBLEM — I25.119 CORONARY ARTERY DISEASE INVOLVING NATIVE CORONARY ARTERY OF NATIVE HEART WITH ANGINA PECTORIS: Status: ACTIVE | Noted: 2025-03-26

## 2025-04-16 PROBLEM — E78.5 HYPERLIPIDEMIA LDL GOAL <50: Status: ACTIVE | Noted: 2017-05-03

## 2025-04-16 LAB
BH CV REST NUCLEAR ISOTOPE DOSE: 30.4 MCI
BH CV STRESS BP STAGE 1: NORMAL
BH CV STRESS BP STAGE 4: NORMAL
BH CV STRESS COMMENTS STAGE 1: NORMAL
BH CV STRESS DOSE REGADENOSON STAGE 1: 0.4
BH CV STRESS DURATION MIN STAGE 1: 1
BH CV STRESS DURATION MIN STAGE 2: 1
BH CV STRESS DURATION MIN STAGE 3: 1
BH CV STRESS DURATION MIN STAGE 4: 1
BH CV STRESS DURATION SEC STAGE 1: 0
BH CV STRESS DURATION SEC STAGE 2: 0
BH CV STRESS DURATION SEC STAGE 3: 0
BH CV STRESS DURATION SEC STAGE 4: 0
BH CV STRESS HR STAGE 1: 71
BH CV STRESS HR STAGE 2: 72
BH CV STRESS HR STAGE 3: 72
BH CV STRESS HR STAGE 4: 72
BH CV STRESS NUCLEAR ISOTOPE DOSE: 30.2 MCI
BH CV STRESS O2 STAGE 1: 92
BH CV STRESS O2 STAGE 2: 97
BH CV STRESS O2 STAGE 3: 97
BH CV STRESS O2 STAGE 4: 96
BH CV STRESS PROTOCOL 1: NORMAL
BH CV STRESS RECOVERY BP: NORMAL MMHG
BH CV STRESS RECOVERY HR: 69 BPM
BH CV STRESS RECOVERY O2: 94 %
BH CV STRESS STAGE 1: 1
BH CV STRESS STAGE 2: 2
BH CV STRESS STAGE 3: 3
BH CV STRESS STAGE 4: 4
MAXIMAL PREDICTED HEART RATE: 148 BPM
PERCENT MAX PREDICTED HR: 48.65 %
SPECT HRT GATED+EF W RNC IV: 73 %
STRESS BASELINE BP: NORMAL MMHG
STRESS BASELINE HR: 61 BPM
STRESS O2 SAT REST: 95 %
STRESS PERCENT HR: 57 %
STRESS POST ESTIMATED WORKLOAD: 1 METS
STRESS POST EXERCISE DUR MIN: 4 MIN
STRESS POST EXERCISE DUR SEC: 0 SEC
STRESS POST O2 SAT PEAK: 97 %
STRESS POST PEAK BP: NORMAL MMHG
STRESS POST PEAK HR: 72 BPM
STRESS TARGET HR: 126 BPM

## 2025-04-16 PROCEDURE — 25010000002 REGADENOSON 0.4 MG/5ML SOLUTION: Performed by: NURSE PRACTITIONER

## 2025-04-16 PROCEDURE — 34310000005 RUBIDIUM CHLORIDE: Performed by: NURSE PRACTITIONER

## 2025-04-16 PROCEDURE — 93017 CV STRESS TEST TRACING ONLY: CPT

## 2025-04-16 PROCEDURE — A9555 RB82 RUBIDIUM: HCPCS | Performed by: NURSE PRACTITIONER

## 2025-04-16 PROCEDURE — 78431 MYOCRD IMG PET RST&STRS CT: CPT

## 2025-04-16 RX ORDER — REGADENOSON 0.08 MG/ML
0.4 INJECTION, SOLUTION INTRAVENOUS ONCE
Status: COMPLETED | OUTPATIENT
Start: 2025-04-16 | End: 2025-04-16

## 2025-04-16 RX ADMIN — RUBIDIUM CHLORIDE RB-82 1 DOSE: 150 INJECTION, SOLUTION INTRAVENOUS at 13:13

## 2025-04-16 RX ADMIN — REGADENOSON 0.4 MG: 0.08 INJECTION, SOLUTION INTRAVENOUS at 13:10

## 2025-04-16 RX ADMIN — RUBIDIUM CHLORIDE RB-82 1 DOSE: 150 INJECTION, SOLUTION INTRAVENOUS at 13:02

## 2025-05-05 ENCOUNTER — OFFICE VISIT (OUTPATIENT)
Dept: PULMONOLOGY | Facility: CLINIC | Age: 70
End: 2025-05-05
Payer: MEDICARE

## 2025-05-05 VITALS
HEIGHT: 62 IN | TEMPERATURE: 97.9 F | OXYGEN SATURATION: 92 % | SYSTOLIC BLOOD PRESSURE: 134 MMHG | HEART RATE: 60 BPM | DIASTOLIC BLOOD PRESSURE: 78 MMHG | WEIGHT: 121.6 LBS | BODY MASS INDEX: 22.38 KG/M2

## 2025-05-05 DIAGNOSIS — Z87.891 FORMER SMOKER: Primary | ICD-10-CM

## 2025-05-05 PROCEDURE — 3078F DIAST BP <80 MM HG: CPT | Performed by: INTERNAL MEDICINE

## 2025-05-05 PROCEDURE — 3075F SYST BP GE 130 - 139MM HG: CPT | Performed by: INTERNAL MEDICINE

## 2025-05-05 PROCEDURE — G2211 COMPLEX E/M VISIT ADD ON: HCPCS | Performed by: INTERNAL MEDICINE

## 2025-05-05 PROCEDURE — 99214 OFFICE O/P EST MOD 30 MIN: CPT | Performed by: INTERNAL MEDICINE

## 2025-05-05 NOTE — PROGRESS NOTES
"Pulmonary Office Follow Up      Subjective   Chief Complaint: Shortness of Breath    Aarti Wade is a 70 y.o. female is being seen in follow up for Emphysema    History of Present Illness    Ms. Wade is a 69yo F who is followed for emphysema. She was last seen in clinic on 12/2/24.    She returns to clinic today for follow up. She thinks that she had 1 episode over the winter of trouble breathing. 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     The following portions of the patient's history were reviewed and updated as appropriate: allergies, current medications, past family history, past medical history, past social history, past surgical history and problem list.    Review of Systems  All other systems were reviewed and are negative.  Exceptions are noted in the HPI or above.      Objective   Blood pressure 134/78, pulse 60, temperature 97.9 °F (36.6 °C), height 157.5 cm (62.01\"), weight 55.2 kg (121 lb 9.6 oz), SpO2 92%.  Physical Exam  Vitals and nursing note reviewed.   Constitutional:       General: She is not in acute distress.     Appearance: She is well-developed.   HENT:      Head: Normocephalic and atraumatic.   Eyes:      General: No scleral icterus.     Conjunctiva/sclera: Conjunctivae normal.      Pupils: Pupils are equal, round, and reactive to light.   Neck:      Thyroid: No thyromegaly.      Trachea: No tracheal deviation.   Cardiovascular:      Rate and Rhythm: Normal rate and regular rhythm.      Heart sounds: Normal heart sounds.   Pulmonary:      Effort: Pulmonary effort is normal. No respiratory distress.      Breath sounds: Decreased breath sounds present. No wheezing.   Abdominal:      General: Bowel sounds are normal.      Palpations: Abdomen is soft.      Tenderness: There is no abdominal tenderness.   Musculoskeletal:         General: Normal range of motion.      Cervical back: Normal range of motion and neck supple.   Lymphadenopathy:      Cervical: No cervical " adenopathy.   Skin:     General: Skin is warm and dry.      Findings: No erythema or rash.   Neurological:      Mental Status: She is alert and oriented to person, place, and time.      Motor: No abnormal muscle tone.      Coordination: Coordination normal.   Psychiatric:         Speech: Speech normal.         Behavior: Behavior normal.         Judgment: Judgment normal.         PFTs:  No new PFTs.     Imaging:  No new imaging.     Assessment & Plan   Diagnoses and all orders for this visit:    1. Former smoker (Primary)  -      CT Chest Low Dose Cancer Screening WO; Future      Discussion:  Ms. Wade is a 71yo F who is followed for shortness of breath.      1. Emphysema  - PFTs are technically negative for COPD but are suggestive of emphysema with the reduced DLCO.   - Continue Breo and Spiriva for maintenance therapy.   - Instructed to use Albuterol as needed for a rescue inhaler.   - 1 possible exacerbation since her last visit for which she took equine Bactrim.      2. Former Smoker  - Quit smoking in 2016.   - Low dose CT Chest from November 2024 negative. Continue yearly screening.      Follow up in November after low-dose CT Chest.        Aarti Wade  reports that she quit smoking about 8 years ago. Her smoking use included electronic cigarette and cigarettes. She started smoking about 48 years ago. She has a 40 pack-year smoking history. She has been exposed to tobacco smoke. She has never used smokeless tobacco.     Lyudmila Patel, DO  Pulmonary and Critical Care Medicine  Note Electronically Signed

## 2025-05-09 DIAGNOSIS — I10 BENIGN ESSENTIAL HYPERTENSION: Chronic | ICD-10-CM

## 2025-05-09 RX ORDER — PANTOPRAZOLE SODIUM 40 MG/1
40 TABLET, DELAYED RELEASE ORAL DAILY
Qty: 90 TABLET | Refills: 3 | Status: SHIPPED | OUTPATIENT
Start: 2025-05-09

## 2025-05-09 RX ORDER — BISOPROLOL FUMARATE 10 MG/1
10 TABLET, FILM COATED ORAL DAILY
Qty: 90 TABLET | Refills: 3 | Status: SHIPPED | OUTPATIENT
Start: 2025-05-09

## 2025-05-09 NOTE — TELEPHONE ENCOUNTER
Rx Refill Note  Requested Prescriptions     Pending Prescriptions Disp Refills    pantoprazole (PROTONIX) 40 MG EC tablet [Pharmacy Med Name: PANTOPRAZOLE 40MG TABLETS] 90 tablet 3     Sig: TAKE 1 TABLET BY MOUTH DAILY      Last office visit with prescribing clinician: 4/9/2025   Last telemedicine visit with prescribing clinician: Visit date not found   Next office visit with prescribing clinician: 8/12/2025                         Would you like a call back once the refill request has been completed: [] Yes [] No    If the office needs to give you a call back, can they leave a voicemail: [] Yes [] No    Elizabeth Saleh MA  05/09/25, 14:14 EDT

## 2025-05-23 RX ORDER — FLUTICASONE PROPIONATE 50 MCG
SPRAY, SUSPENSION (ML) NASAL
Qty: 48 G | Refills: 3 | Status: SHIPPED | OUTPATIENT
Start: 2025-05-23

## 2025-06-06 DIAGNOSIS — M19.111 POST-TRAUMATIC OSTEOARTHRITIS OF RIGHT SHOULDER: ICD-10-CM

## 2025-06-06 RX ORDER — GABAPENTIN 100 MG/1
300 CAPSULE ORAL EVERY EVENING
Qty: 90 CAPSULE | Refills: 2 | Status: SHIPPED | OUTPATIENT
Start: 2025-06-06

## 2025-06-06 RX ORDER — TRAMADOL HYDROCHLORIDE 50 MG/1
50 TABLET ORAL 2 TIMES DAILY
Qty: 60 TABLET | Refills: 2 | Status: SHIPPED | OUTPATIENT
Start: 2025-06-06

## 2025-06-06 NOTE — TELEPHONE ENCOUNTER
Caller: Mauro Aarti L    Relationship: Self    Best call back number:      Requested Prescriptions:   Requested Prescriptions     Pending Prescriptions Disp Refills    gabapentin (NEURONTIN) 100 MG capsule 90 capsule 2     Sig: Take 3 capsules by mouth Every Evening.    traMADol (ULTRAM) 50 MG tablet 60 tablet 2     Sig: Take 1 tablet by mouth 2 (Two) Times a Day.        Pharmacy where request should be sent: [id number] [place] [address] [facility] [address] [facility] [phone] [facility] [phone] FX     Last office visit with prescribing clinician: 4/9/2025   Last telemedicine visit with prescribing clinician: Visit date not found   Next office visit with prescribing clinician: 8/12/2025     Additional details provided by patient:  PATIENT HAS ENOUGH UNTIL MONDAY     Does the patient have less than a 3 day supply:  [x] Yes  [] No        Latanya Davey   06/06/25 09:38 EDT

## 2025-06-06 NOTE — TELEPHONE ENCOUNTER
Rx Refill Note  Requested Prescriptions     Pending Prescriptions Disp Refills    gabapentin (NEURONTIN) 100 MG capsule 90 capsule 2     Sig: Take 3 capsules by mouth Every Evening.    traMADol (ULTRAM) 50 MG tablet 60 tablet 2     Sig: Take 1 tablet by mouth 2 (Two) Times a Day.      Last office visit with prescribing clinician: 4/9/2025   Last telemedicine visit with prescribing clinician: Visit date not found   Next office visit with prescribing clinician: 8/12/2025                         Would you like a call back once the refill request has been completed: [] Yes [] No    If the office needs to give you a call back, can they leave a voicemail: [] Yes [] No    Elizabeth Saleh MA  06/06/25, 09:50 EDT

## 2025-06-11 RX ORDER — ROSUVASTATIN CALCIUM 40 MG/1
40 TABLET, COATED ORAL DAILY
Qty: 90 TABLET | Refills: 1 | Status: SHIPPED | OUTPATIENT
Start: 2025-06-11

## 2025-06-18 ENCOUNTER — OFFICE VISIT (OUTPATIENT)
Dept: INTERNAL MEDICINE | Facility: CLINIC | Age: 70
End: 2025-06-18
Payer: MEDICARE

## 2025-06-18 VITALS
DIASTOLIC BLOOD PRESSURE: 56 MMHG | BODY MASS INDEX: 21.71 KG/M2 | HEIGHT: 62 IN | TEMPERATURE: 98.4 F | HEART RATE: 64 BPM | WEIGHT: 118 LBS | SYSTOLIC BLOOD PRESSURE: 142 MMHG

## 2025-06-18 DIAGNOSIS — M19.111 POST-TRAUMATIC OSTEOARTHRITIS OF RIGHT SHOULDER: Primary | Chronic | ICD-10-CM

## 2025-06-18 DIAGNOSIS — I10 BENIGN ESSENTIAL HYPERTENSION: Chronic | ICD-10-CM

## 2025-06-18 DIAGNOSIS — Z79.899 HIGH RISK MEDICATION USE: ICD-10-CM

## 2025-06-18 PROBLEM — J02.9 SORE THROAT: Status: RESOLVED | Noted: 2024-02-15 | Resolved: 2025-06-18

## 2025-06-18 PROCEDURE — 1160F RVW MEDS BY RX/DR IN RCRD: CPT | Performed by: NURSE PRACTITIONER

## 2025-06-18 PROCEDURE — 3078F DIAST BP <80 MM HG: CPT | Performed by: NURSE PRACTITIONER

## 2025-06-18 PROCEDURE — 3077F SYST BP >= 140 MM HG: CPT | Performed by: NURSE PRACTITIONER

## 2025-06-18 PROCEDURE — 99214 OFFICE O/P EST MOD 30 MIN: CPT | Performed by: NURSE PRACTITIONER

## 2025-06-18 PROCEDURE — 1159F MED LIST DOCD IN RCRD: CPT | Performed by: NURSE PRACTITIONER

## 2025-06-18 PROCEDURE — 1125F AMNT PAIN NOTED PAIN PRSNT: CPT | Performed by: NURSE PRACTITIONER

## 2025-06-18 NOTE — PROGRESS NOTES
Aarti Wade  1955  [id number]  Patient Care Team:  Jannette Chapa MD as PCP - General  Jannette Chapa MD as PCP - Family Medicine  Ketan Lazcano MD as Consulting Physician (Orthopedic Surgery)  Lorenzo Good MD as Consulting Physician (Ophthalmology)  Severino Carbajal MD as Consulting Physician (Vascular Surgery)  Cynthia Powell APRN as Nurse Practitioner (Cardiology)  Case, Lyudmila DISLA DO as Consulting Physician (Pulmonary Disease)    Aarti Wade is a pleasant 70 y.o. female who presents for evaluation of Shoulder Pain    Chief Complaint   Patient presents with    Shoulder Pain       HPI:   History of Present Illness  Aarti is here for a routine 6 mo follow up visit for right shoulder. Uses tramadol BID and gabapentin 300mg HS. 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     Saw Dr. Burgess yrs ago until he retired then saw one of his partners. PCP here ([facility]) has been following her since. Saw Dr. Lazcano for a an acute injury of left shoulder tripping on vacuum and had surgical repair on left side.   Had to stop celebrex within the last few yrs secondary to kidney insufficiency and hypertension. The gabapentin has been prescribed for nighttime shoulder pain. She started tramadol for pain when it got worse which has continued to work well for the chronic right shoulder pain.      Past Medical History:   Diagnosis Date    Anxiety     Arthritis     Chronic UTI     Claudication     COPD (chronic obstructive pulmonary disease)     Depression     diffuse fatty liver infiltration on CT 2009    Dizzy     Former smoker 08/31/2022    GERD (gastroesophageal reflux disease)     Head injury     Related to falls off  horses    Hepatitis     UNKNOWN TYPE    History of shingles     about 5 years ago    Hyperlipidemia     Hypertension     Hypoxia 3/9/2022    History of  tobacco abuse and COPD.  Using Brio Ellipta inhaler daily.  Also diagnosed with bilateral lower lobe pneumonia and put on Augmentin.  Oxygen desaturation after carotid endarterectomy on 3/3/2022.  Patient was sent home on oxygen at 2 L per nasal cannula.    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     Osteopenia of multiple sites     Osteoporosis     PAD (peripheral artery disease)     Spontaneous pneumothorax 1960    Subclavian steal syndrome 2016    VHD (valvular heart disease) 08/31/2022    Wears dentures     TOP ONLY    Wears glasses      Past Surgical History:   Procedure Laterality Date    ANGIOGRAM - CONVERTED  08/16/2016    AA WITH RUNOFF PER DR. HARRISON    CAROTID ENDARTERECTOMY Right 8/31/2022    Procedure: CAROTID ENDARTERECTOMY- RIGHT;  Surgeon: Francisco Magana MD;  Location:  [facility];  Service: Vascular;  Laterality: Right;    COLONOSCOPY      last 8 years ago.  Due to schedule    EYE LENS IMPLANT SECONDARY Left     FEMORAL FEMORAL BYPASS Right 8/22/2016    Procedure: RIGHT COMMON FEMORAL ENDARTERECTOMY WITH PATCH;  Surgeon: Severino Harrison MD;  Location:  [facility];  Service:     FINGER SURGERY Left     LEFT INDEX FINGER    INTERVENTIONAL RADIOLOGY PROCEDURE N/A 8/16/2016    Procedure: IR PTA femoral popliteal artery;  Surgeon: Severino Harrison MD;  Location:  [facility];  Service:     INTERVENTIONAL RADIOLOGY PROCEDURE Left 1/7/2020    Procedure: LEFT ATHERECTOMY, BALLOON ANGIOPLASTY;  Surgeon: Severino Harrison MD;  Location:  [facility];  Service: Interventional Radiology    TONSILLECTOMY      TOTAL SHOULDER ARTHROPLASTY Left 6/8/2020    Procedure: TOTAL SHOULDER ARTHROPLASTY LEFT;  Surgeon: Ketan Lazcano MD;  Location:  [facility];  Service: Orthopedics;  Laterality: Left;  protector in: 1346  protector out: 1402     Family History   Problem Relation Age of Onset    Osteoarthritis Mother     Alzheimer's disease Mother     Hypertension Father      Heart attack Father     Alcohol abuse Father     Breast cancer Sister     No Known Problems Sister     No Known Problems Maternal Aunt     No Known Problems Paternal Aunt     Breast cancer Maternal Grandmother 50    Breast cancer Paternal Grandmother 50    Coronary artery disease Paternal Grandfather     Stroke Paternal Grandfather     No Known Problems Daughter     No Known Problems Son     Heart attack Other     Ovarian cancer Neg Hx     Endometrial cancer Neg Hx      Social History     Tobacco Use   Smoking Status Former    Current packs/day: 0.00    Average packs/day: 1 pack/day for 40.0 years (40.0 ttl pk-yrs)    Types: Electronic Cigarette, Cigarettes    Start date: 1976    Quit date: 2016    Years since quittin.0    Passive exposure: Past   Smokeless Tobacco Never   Tobacco Comments    1 PACK/DAY SMOKER UNTIL 2016     Allergies   Allergen Reactions    Abilify [Aripiprazole] Other (See Comments)     Tardive- Dyskinesia    Levocetirizine Dihydrochloride Myalgia     Muscle aches/joint pain    Losartan Other (See Comments)     Patient got heart murmurs       Current Outpatient Medications:     acetaminophen (Acetaminophen 8 Hour) 650 MG 8 hr tablet, Take 1 tablet by mouth 2 (two) times a day., Disp: , Rfl:     albuterol sulfate  (90 Base) MCG/ACT inhaler, Inhale 2 puffs Every 4 (Four) Hours As Needed for Wheezing., Disp: 18 g, Rfl: 5    amLODIPine (NORVASC) 10 MG tablet, Take 1 tablet by mouth Daily., Disp: 90 tablet, Rfl: 3    ascorbic acid (VITAMIN C) 1000 MG tablet, Take 1 tablet by mouth Daily., Disp: , Rfl:     aspirin 81 MG EC tablet, Take 1 tablet by mouth Every Morning., Disp: 90 tablet, Rfl: 3    bisoprolol (ZEBeta) 10 MG tablet, TAKE 1 TABLET BY MOUTH DAILY, Disp: 90 tablet, Rfl: 3    calcium acetate (PHOS BINDER,) 667 MG capsule capsule, TAKE 2 CAPSULES BY MOUTH DAILY, Disp: 180 capsule, Rfl: 1    cholecalciferol (Vitamin D, Cholecalciferol,) 25 MCG (1000 UT) tablet, Take  "1 tablet by mouth Daily., Disp: , Rfl:     citalopram (CeleXA) 40 MG tablet, Take 1 tablet by mouth Daily., Disp: 90 tablet, Rfl: 3    clopidogrel (Plavix) 75 MG tablet, Take 1 tablet by mouth Daily., Disp: 90 tablet, Rfl: 3    Coenzyme Q10 400 MG capsule, Take 1 capsule by mouth Daily., Disp: , Rfl:     Fish Oil oil, Take 1 capsule by mouth Daily., Disp: , Rfl:     fluticasone (FLONASE) 50 MCG/ACT nasal spray, ADMINISTER 1 SPRAY INTO EACH NOSTRIL TWICE DAILY, Disp: 48 g, Rfl: 3    Fluticasone Furoate-Vilanterol (BREO ELLIPTA) 200-25 MCG/ACT inhaler, Inhale 1 puff Daily., Disp: 60 each, Rfl: 5    folic acid (FOLVITE) 1 MG tablet, Take 2 tablets by mouth Daily., Disp: 180 tablet, Rfl: 3    gabapentin (NEURONTIN) 100 MG capsule, Take 3 capsules by mouth Every Evening., Disp: 90 capsule, Rfl: 2    Methylsulfonylmethane (MSM PO), Take 2 tablets by mouth daily., Disp: , Rfl:     Multiple Vitamins-Minerals (MULTIVITAMIN ADULT PO), Take 1 tablet by mouth Daily., Disp: , Rfl:     pantoprazole (PROTONIX) 40 MG EC tablet, TAKE 1 TABLET BY MOUTH DAILY, Disp: 90 tablet, Rfl: 3    rosuvastatin (CRESTOR) 40 MG tablet, TAKE 1 TABLET BY MOUTH DAILY, Disp: 90 tablet, Rfl: 1    traMADol (ULTRAM) 50 MG tablet, Take 1 tablet by mouth 2 (Two) Times a Day., Disp: 60 tablet, Rfl: 2    URINARY HEALTH/CRANBERRY PO, \"CRANACTIN\" Take 2 tablets in the morning, 1 mid-day, and 1 in the evening, Disp: , Rfl:     vitamin B-12 (CYANOCOBALAMIN) 500 MCG tablet, Take 1 tablet by mouth Daily., Disp: , Rfl:     Review of systems was completed, and pertinent findings are noted in the HPI.  /56 (BP Location: Right arm, Patient Position: Sitting, Cuff Size: Adult)   Pulse 64   Temp 98.4 °F (36.9 °C) (Temporal)   Ht 157.5 cm (62.01\")   Wt 53.5 kg (118 lb)   BMI 21.58 kg/m²     Physical Exam  Vitals reviewed.   Constitutional:       Appearance: She is well-developed.   Pulmonary:      Effort: Pulmonary effort is normal.   Skin:     General: Skin " is warm and dry.   Neurological:      Mental Status: She is alert.   Psychiatric:         Behavior: Behavior normal.         PHQ-9 Total Score:      Assessment/Plan:  Diagnoses and all orders for this visit:    1. Post-traumatic osteoarthritis of right shoulder (Primary)    2. Benign essential hypertension    3. High risk medication use  -     Urine Drug Screen - Urine, Clean Catch; Future       Assessment & Plan  1.  Chronic shoulder pain.  - Continue tramadol twice a day and gabapentin 300 mg at night; consider further evaluation and potential treatment options, including surgery, if pain worsens.  - Urine drug screen ordered today to be completed with other lab tests.    2. Hypertension.  - Blood pressure readings are within the normal range during this visit.  - No regular home monitoring but feels well overall.  - Discussed importance of maintaining a healthy diet, focusing on low-sodium options.  - Continue current blood pressure management regimen.    Patient Instructions   This patient is on a controlled substance which improves symptoms/quality of life and is aware of the risks, benefits and possible side-effects current treatment. The patient denies any medication side-effects at this time. A controlled substance agreement will be obtained or is currently on file. We reviewed required monitoring for controlled substances including but not limited to quarterly follow-up visits, annual depression screening, and urine drug screens to which the patient is agreeable. A WOOD report has been or shortly will be reviewed. There are no signs of deviation or misuse.     Plan of care reviewed with patient at the conclusion of today's visit. Education was provided regarding diagnosis, management and any prescribed or recommended OTC medications.  Patient verbalizes understanding of and agreement with management plan.    Return in about 6 months (around 12/18/2025) for Next scheduled follow up.    Dictated Utilizing  Meg Dictation.    CYRUS Gallegos       Patient or patient representative verbalized consent for the use of Ambient Listening during the visit with  CYRUS Gallegos for chart documentation. 6/19/2025  16:36 EDT

## 2025-06-19 PROBLEM — S91.109A OPEN WOUND OF TOE: Status: RESOLVED | Noted: 2023-03-13 | Resolved: 2025-06-19

## 2025-07-09 ENCOUNTER — LAB (OUTPATIENT)
Dept: LAB | Facility: HOSPITAL | Age: 70
End: 2025-07-09
Payer: MEDICARE

## 2025-07-09 DIAGNOSIS — E78.2 MIXED HYPERLIPIDEMIA: ICD-10-CM

## 2025-07-09 DIAGNOSIS — M85.89 OSTEOPENIA OF MULTIPLE SITES: ICD-10-CM

## 2025-07-09 DIAGNOSIS — I10 BENIGN ESSENTIAL HYPERTENSION: ICD-10-CM

## 2025-07-09 DIAGNOSIS — Z79.899 HIGH RISK MEDICATION USE: ICD-10-CM

## 2025-07-09 LAB
25(OH)D3 SERPL-MCNC: 39.8 NG/ML (ref 30–100)
ALBUMIN SERPL-MCNC: 4.5 G/DL (ref 3.5–5.2)
ALBUMIN UR-MCNC: 46.6 MG/DL
ALBUMIN/GLOB SERPL: 1.7 G/DL
ALP SERPL-CCNC: 59 U/L (ref 39–117)
ALT SERPL W P-5'-P-CCNC: 18 U/L (ref 1–33)
AMPHET+METHAMPHET UR QL: NEGATIVE
AMPHETAMINES UR QL: NEGATIVE
ANION GAP SERPL CALCULATED.3IONS-SCNC: 13.8 MMOL/L (ref 5–15)
AST SERPL-CCNC: 26 U/L (ref 1–32)
BACTERIA UR QL AUTO: NORMAL /HPF
BARBITURATES UR QL SCN: NEGATIVE
BASOPHILS # BLD AUTO: 0.02 10*3/MM3 (ref 0–0.2)
BASOPHILS NFR BLD AUTO: 0.3 % (ref 0–1.5)
BENZODIAZ UR QL SCN: NEGATIVE
BILIRUB SERPL-MCNC: 0.4 MG/DL (ref 0–1.2)
BILIRUB UR QL STRIP: NEGATIVE
BUN SERPL-MCNC: 27 MG/DL (ref 8–23)
BUN/CREAT SERPL: 20.8 (ref 7–25)
BUPRENORPHINE SERPL-MCNC: NEGATIVE NG/ML
CALCIUM SPEC-SCNC: 8.9 MG/DL (ref 8.6–10.5)
CANNABINOIDS SERPL QL: NEGATIVE
CHLORIDE SERPL-SCNC: 102 MMOL/L (ref 98–107)
CHOLEST SERPL-MCNC: 143 MG/DL (ref 0–200)
CLARITY UR: CLEAR
CO2 SERPL-SCNC: 23.2 MMOL/L (ref 22–29)
COCAINE UR QL: NEGATIVE
COLOR UR: YELLOW
CREAT SERPL-MCNC: 1.3 MG/DL (ref 0.57–1)
CREAT UR-MCNC: 82.1 MG/DL
DEPRECATED RDW RBC AUTO: 45.5 FL (ref 37–54)
EGFRCR SERPLBLD CKD-EPI 2021: 43.8 ML/MIN/1.73
EOSINOPHIL # BLD AUTO: 0.18 10*3/MM3 (ref 0–0.4)
EOSINOPHIL NFR BLD AUTO: 2.6 % (ref 0.3–6.2)
ERYTHROCYTE [DISTWIDTH] IN BLOOD BY AUTOMATED COUNT: 12.8 % (ref 12.3–15.4)
FENTANYL UR-MCNC: NEGATIVE NG/ML
GLOBULIN UR ELPH-MCNC: 2.7 GM/DL
GLUCOSE SERPL-MCNC: 82 MG/DL (ref 65–99)
GLUCOSE UR STRIP-MCNC: NEGATIVE MG/DL
HCT VFR BLD AUTO: 38.6 % (ref 34–46.6)
HDLC SERPL-MCNC: 89 MG/DL (ref 40–60)
HGB BLD-MCNC: 12.8 G/DL (ref 12–15.9)
HGB UR QL STRIP.AUTO: NEGATIVE
HYALINE CASTS UR QL AUTO: NORMAL /LPF
IMM GRANULOCYTES # BLD AUTO: 0.02 10*3/MM3 (ref 0–0.05)
IMM GRANULOCYTES NFR BLD AUTO: 0.3 % (ref 0–0.5)
KETONES UR QL STRIP: NEGATIVE
LDLC SERPL CALC-MCNC: 43 MG/DL (ref 0–100)
LDLC/HDLC SERPL: 0.49 {RATIO}
LEUKOCYTE ESTERASE UR QL STRIP.AUTO: ABNORMAL
LYMPHOCYTES # BLD AUTO: 0.65 10*3/MM3 (ref 0.7–3.1)
LYMPHOCYTES NFR BLD AUTO: 9.5 % (ref 19.6–45.3)
MCH RBC QN AUTO: 32.4 PG (ref 26.6–33)
MCHC RBC AUTO-ENTMCNC: 33.2 G/DL (ref 31.5–35.7)
MCV RBC AUTO: 97.7 FL (ref 79–97)
METHADONE UR QL SCN: NEGATIVE
MICROALBUMIN/CREAT UR: 567.6 MG/G (ref 0–29)
MONOCYTES # BLD AUTO: 0.84 10*3/MM3 (ref 0.1–0.9)
MONOCYTES NFR BLD AUTO: 12.2 % (ref 5–12)
NEUTROPHILS NFR BLD AUTO: 5.15 10*3/MM3 (ref 1.7–7)
NEUTROPHILS NFR BLD AUTO: 75.1 % (ref 42.7–76)
NITRITE UR QL STRIP: NEGATIVE
NRBC BLD AUTO-RTO: 0 /100 WBC (ref 0–0.2)
OPIATES UR QL: NEGATIVE
OXYCODONE UR QL SCN: NEGATIVE
PCP UR QL SCN: NEGATIVE
PH UR STRIP.AUTO: 5.5 [PH] (ref 5–8)
PLATELET # BLD AUTO: 285 10*3/MM3 (ref 140–450)
PMV BLD AUTO: 10.3 FL (ref 6–12)
POTASSIUM SERPL-SCNC: 4.8 MMOL/L (ref 3.5–5.2)
PROT SERPL-MCNC: 7.2 G/DL (ref 6–8.5)
PROT UR QL STRIP: ABNORMAL
RBC # BLD AUTO: 3.95 10*6/MM3 (ref 3.77–5.28)
RBC # UR STRIP: NORMAL /HPF
REF LAB TEST METHOD: NORMAL
SODIUM SERPL-SCNC: 139 MMOL/L (ref 136–145)
SP GR UR STRIP: 1.02 (ref 1–1.03)
SQUAMOUS #/AREA URNS HPF: NORMAL /HPF
TRICYCLICS UR QL SCN: NEGATIVE
TRIGL SERPL-MCNC: 50 MG/DL (ref 0–150)
TSH SERPL DL<=0.05 MIU/L-ACNC: 2.3 UIU/ML (ref 0.27–4.2)
UROBILINOGEN UR QL STRIP: ABNORMAL
VIT B12 BLD-MCNC: 580 PG/ML (ref 211–946)
VLDLC SERPL-MCNC: 11 MG/DL (ref 5–40)
WBC # UR STRIP: NORMAL /HPF
WBC NRBC COR # BLD AUTO: 6.86 10*3/MM3 (ref 3.4–10.8)

## 2025-07-09 PROCEDURE — 82570 ASSAY OF URINE CREATININE: CPT

## 2025-07-09 PROCEDURE — 84443 ASSAY THYROID STIM HORMONE: CPT

## 2025-07-09 PROCEDURE — 81001 URINALYSIS AUTO W/SCOPE: CPT

## 2025-07-09 PROCEDURE — 82306 VITAMIN D 25 HYDROXY: CPT

## 2025-07-09 PROCEDURE — 80307 DRUG TEST PRSMV CHEM ANLYZR: CPT

## 2025-07-09 PROCEDURE — 80061 LIPID PANEL: CPT

## 2025-07-09 PROCEDURE — 85025 COMPLETE CBC W/AUTO DIFF WBC: CPT

## 2025-07-09 PROCEDURE — 82043 UR ALBUMIN QUANTITATIVE: CPT

## 2025-07-09 PROCEDURE — 80053 COMPREHEN METABOLIC PANEL: CPT

## 2025-07-09 PROCEDURE — 82607 VITAMIN B-12: CPT

## 2025-07-10 RX ORDER — MULTIVITAMIN WITH IRON
500 TABLET ORAL DAILY
Qty: 90 TABLET | Refills: 1 | Status: SHIPPED | OUTPATIENT
Start: 2025-07-10

## 2025-07-10 NOTE — TELEPHONE ENCOUNTER
Rx Refill Note  Requested Prescriptions     Pending Prescriptions Disp Refills    vitamin B-12 (CYANOCOBALAMIN) 500 MCG tablet [Pharmacy Med Name: VITAMIN B-12 500MCG TABLETS] 90 tablet      Sig: TAKE 1 TABLET BY MOUTH DAILY      Last office visit with prescribing clinician: 4/9/2025   Last telemedicine visit with prescribing clinician: Visit date not found   Next office visit with prescribing clinician: 8/12/2025                         Would you like a call back once the refill request has been completed: [] Yes [] No    If the office needs to give you a call back, can they leave a voicemail: [] Yes [] No    Kelsey Elam MA  07/10/25, 10:31 EDT

## 2025-07-30 DIAGNOSIS — E83.39 HIGH PHOSPHATE LEVELS: ICD-10-CM

## 2025-07-30 RX ORDER — CALCIUM ACETATE 667 MG/1
1334 CAPSULE ORAL DAILY
Qty: 180 CAPSULE | Refills: 1 | Status: SHIPPED | OUTPATIENT
Start: 2025-07-30

## 2025-07-30 NOTE — TELEPHONE ENCOUNTER
Rx Refill Note  Requested Prescriptions     Pending Prescriptions Disp Refills    calcium acetate (PHOS BINDER,) 667 MG capsule capsule [Pharmacy Med Name: CALCIUM ACETATE 667MG CAPSULES] 180 capsule 1     Sig: TAKE 2 CAPSULES BY MOUTH DAILY      Last office visit with prescribing clinician: 4/9/2025   Last telemedicine visit with prescribing clinician: Visit date not found   Next office visit with prescribing clinician: 8/12/2025                         Would you like a call back once the refill request has been completed: [] Yes [] No    If the office needs to give you a call back, can they leave a voicemail: [] Yes [] No    Elizabeth Saleh MA  07/30/25, 08:27 EDT

## 2025-08-12 ENCOUNTER — OFFICE VISIT (OUTPATIENT)
Dept: INTERNAL MEDICINE | Facility: CLINIC | Age: 70
End: 2025-08-12
Payer: MEDICARE

## 2025-08-12 VITALS
WEIGHT: 122 LBS | BODY MASS INDEX: 22.45 KG/M2 | DIASTOLIC BLOOD PRESSURE: 62 MMHG | HEIGHT: 62 IN | SYSTOLIC BLOOD PRESSURE: 132 MMHG | OXYGEN SATURATION: 96 % | HEART RATE: 60 BPM | TEMPERATURE: 98.7 F

## 2025-08-12 DIAGNOSIS — M85.89 OSTEOPENIA OF MULTIPLE SITES: ICD-10-CM

## 2025-08-12 DIAGNOSIS — G89.29 CHRONIC RIGHT SHOULDER PAIN: Chronic | ICD-10-CM

## 2025-08-12 DIAGNOSIS — I70.213 ATHEROSCLEROSIS OF NATIVE ARTERY OF BOTH LOWER EXTREMITIES WITH INTERMITTENT CLAUDICATION: Chronic | ICD-10-CM

## 2025-08-12 DIAGNOSIS — M54.50 CHRONIC BILATERAL LOW BACK PAIN WITHOUT SCIATICA: ICD-10-CM

## 2025-08-12 DIAGNOSIS — M25.511 CHRONIC RIGHT SHOULDER PAIN: Chronic | ICD-10-CM

## 2025-08-12 DIAGNOSIS — I10 BENIGN ESSENTIAL HYPERTENSION: Chronic | ICD-10-CM

## 2025-08-12 DIAGNOSIS — G89.29 CHRONIC BILATERAL LOW BACK PAIN WITHOUT SCIATICA: ICD-10-CM

## 2025-08-12 DIAGNOSIS — E78.5 HYPERLIPIDEMIA LDL GOAL <50: Primary | ICD-10-CM

## 2025-08-12 DIAGNOSIS — F33.0 MAJOR DEPRESSIVE DISORDER, RECURRENT EPISODE, MILD DEGREE: Chronic | ICD-10-CM

## 2025-08-12 PROBLEM — J20.8 ACUTE BRONCHITIS DUE TO OTHER SPECIFIED ORGANISMS: Chronic | Status: RESOLVED | Noted: 2019-02-12 | Resolved: 2025-08-12

## 2025-08-12 RX ORDER — ROSUVASTATIN CALCIUM 40 MG/1
40 TABLET, COATED ORAL DAILY
Qty: 90 TABLET | Refills: 3 | Status: SHIPPED | OUTPATIENT
Start: 2025-08-12

## (undated) DEVICE — PINNACLE INTRODUCER SHEATH: Brand: PINNACLE

## (undated) DEVICE — SLNG ULTRSLING2 11TO13IN MD

## (undated) DEVICE — STRYKER PERFORMANCE SERIES SAGITTAL BLADE: Brand: STRYKER PERFORMANCE SERIES

## (undated) DEVICE — ATHERECTOMY H1-LX HAWKONE 7F EXT TIP US: Brand: HAWKONE™

## (undated) DEVICE — PK MAJ SHLDR SPLT 10

## (undated) DEVICE — CVR PROB ULTRASND/TRANSD W/GEL 18X120CM STRL

## (undated) DEVICE — VIPERTRACK, 50 PACK: Brand: VIPERTRACK

## (undated) DEVICE — SUT SILK 2/0 PS 18IN 1588H

## (undated) DEVICE — SUT MONOCRYL PLS ANTIB UND 3/0  PS1 27IN

## (undated) DEVICE — KT MANIFOLD CATHLAB CUST

## (undated) DEVICE — SUNDT™ INTERNAL CAROTID ENDARTERECTOMY SHUNT: Brand: SUNDT™

## (undated) DEVICE — SUT SILK 4/0 TIES 18IN A183H

## (undated) DEVICE — ANTIBACTERIAL UNDYED BRAIDED (POLYGLACTIN 910), SYNTHETIC ABSORBABLE SUTURE: Brand: COATED VICRYL

## (undated) DEVICE — DRVR HAWK1 CUT

## (undated) DEVICE — HANDPIECE SET WITH HIGH FLOW TIP AND SUCTION TUBE: Brand: INTERPULSE

## (undated) DEVICE — CATH ANGIO SOFT/VU SELECTIVE RIM .035IN 4F 65CM

## (undated) DEVICE — DECANTER BAG 9": Brand: MEDLINE INDUSTRIES, INC.

## (undated) DEVICE — SUT SILK 2/0 TIES 18IN A185H

## (undated) DEVICE — DRAPE,SHOULDER,BEACH CHAIR,STERILE: Brand: MEDLINE

## (undated) DEVICE — SPNG GZ WOVN 4X4IN 12PLY 10/BX STRL

## (undated) DEVICE — RADIFOCUS TORQUE DEVICE MULTI-TORQUE VISE: Brand: RADIFOCUS TORQUE DEVICE

## (undated) DEVICE — RADIFOCUS GLIDEWIRE ADVANTAGE GUIDEWIRE: Brand: GLIDEWIRE ADVANTAGE

## (undated) DEVICE — SUCTION CANISTER 2500CC: Brand: DEROYAL

## (undated) DEVICE — DEV INFL MONARCH 25W

## (undated) DEVICE — SUT PROLN 6/0 BV1 D/A 30IN 8709H

## (undated) DEVICE — CVR HNDL LIGHT RIGID

## (undated) DEVICE — PULLOVER TOGA, X-LARGE: Brand: FLYTE, SURGICOOL

## (undated) DEVICE — GLV SURG SIGNATURE ESSENTIAL PF LTX SZ7.5

## (undated) DEVICE — DRSNG SURG AQUACEL AG 9X15CM

## (undated) DEVICE — DEV EPS SPIDERFX 6MM OTW320/RX190CM

## (undated) DEVICE — SUT SILK 3/0 TIES 18IN A184H

## (undated) DEVICE — SUT PROLN 7/0 BV1 D/A 24IN 8702H

## (undated) DEVICE — ST ACC MICROPUNCTURE .018 TRANSLSS/SS/TP 5F/10CM 21G/7CM

## (undated) DEVICE — SYR LUERLOK 30CC

## (undated) DEVICE — DESTINATION RENAL GUIDING SHEATH: Brand: DESTINATION

## (undated) DEVICE — QUICK-CROSS™ SUPPORT CATHETER: Brand: QUICK-CROSS™

## (undated) DEVICE — UNDERGLV SURG BIOGEL INDICAT PI SZ8 BLU

## (undated) DEVICE — PUMP PAIN AUTOFUSER AUTO SELCT NOBOLUS 1TO14ML/HR 550ML DISP

## (undated) DEVICE — SNAP KOVER: Brand: UNBRANDED

## (undated) DEVICE — POSTN HD UNIV

## (undated) DEVICE — NDL HYPO ECLPS SFTY 22G 1 1/2IN

## (undated) DEVICE — NDL HYPO ECLPS SFTY 18G 1 1/2IN

## (undated) DEVICE — SKIN AFFIX SURG ADHESIVE 72/CS 0.55ML: Brand: MEDLINE

## (undated) DEVICE — 3 BONE CEMENT MIXER: Brand: MIXEVAC

## (undated) DEVICE — PK CATH CARD 10

## (undated) DEVICE — GLV SURG BIOGEL LTX PF 7 1/2

## (undated) DEVICE — INTRAOPERATIVE COVER KIT, 10 PACK: Brand: SITE-RITE

## (undated) DEVICE — CATH GUIDE SOFTVU FLUSH HT STR .035 4F 65CM

## (undated) DEVICE — GLV SURG SIGNATURE TOUCH PF LTX 8 STRL BX/50

## (undated) DEVICE — Device

## (undated) DEVICE — DEV ANGIO FLOSWITCH HP BX24

## (undated) DEVICE — ADHS SKIN PREMIERPRO EXOFIN TOPICAL HI/VISC .5ML

## (undated) DEVICE — SYR CONTRL LUERLOK 10CC

## (undated) DEVICE — LP VESL MINI RED 2PK

## (undated) DEVICE — GW STARTER JB STR .035 15X150CM

## (undated) DEVICE — 3M™ STERI-DRAPE™ INSTRUMENT POUCH 1018: Brand: STERI-DRAPE™

## (undated) DEVICE — SYRINGE,PISTON,IRRIGATION,60ML,STERILE: Brand: MEDLINE

## (undated) DEVICE — PK VASC 10

## (undated) DEVICE — STERILE PVP: Brand: MEDLINE INDUSTRIES, INC.